# Patient Record
Sex: FEMALE | Race: WHITE | NOT HISPANIC OR LATINO | Employment: FULL TIME | URBAN - METROPOLITAN AREA
[De-identification: names, ages, dates, MRNs, and addresses within clinical notes are randomized per-mention and may not be internally consistent; named-entity substitution may affect disease eponyms.]

---

## 2017-02-05 DIAGNOSIS — I10 ESSENTIAL HYPERTENSION WITH GOAL BLOOD PRESSURE LESS THAN 140/90: Primary | ICD-10-CM

## 2017-02-06 DIAGNOSIS — Z13.6 CARDIOVASCULAR SCREENING; LDL GOAL LESS THAN 130: Primary | ICD-10-CM

## 2017-02-06 DIAGNOSIS — J44.1 CHRONIC OBSTRUCTIVE PULMONARY DISEASE WITH ACUTE EXACERBATION (H): ICD-10-CM

## 2017-02-06 DIAGNOSIS — I25.10 CORONARY ARTERY DISEASE INVOLVING NATIVE HEART WITHOUT ANGINA PECTORIS, UNSPECIFIED VESSEL OR LESION TYPE: ICD-10-CM

## 2017-02-06 RX ORDER — DIAZEPAM 5 MG
5 TABLET ORAL EVERY 6 HOURS PRN
Qty: 20 TABLET | Refills: 0 | Status: CANCELLED | OUTPATIENT
Start: 2017-02-06

## 2017-02-06 RX ORDER — ATORVASTATIN CALCIUM 80 MG/1
80 TABLET, FILM COATED ORAL AT BEDTIME
Qty: 90 TABLET | Refills: 1 | Status: SHIPPED | OUTPATIENT
Start: 2017-02-06 | End: 2017-07-23

## 2017-02-06 RX ORDER — AMLODIPINE BESYLATE 10 MG/1
10 TABLET ORAL DAILY
Qty: 90 TABLET | Refills: 1 | Status: SHIPPED | OUTPATIENT
Start: 2017-02-06 | End: 2017-11-06

## 2017-02-06 NOTE — TELEPHONE ENCOUNTER
Pt calls.  She needs some scripts filled at [facility] in [place] off of [address].  She said she tried to call  pharmacy, but was unable to wait that long on hold to have them transferred.      She is looking for atorvastatin and qvar.      She is also looking for a refill on diazapam, last given by Shavonne Flannery from 12/20/12, dispense 20.  She says she uses it for muscle spasms in her chest, calves and thighs.  She is having a hard time sleeping because of them.      atorvastatin    Last Written Prescription Date: 11/10/16  Last Fill Quantity: 90, # refills: 1  Last [place] Visit with [facility] or [facility] prescribing provider: 11/10/16       CHOL      212   3/2/2015  HDL       55   3/2/2015  LDL       92   3/2/2015  TRIG      324   3/2/2015  CHOLHDLRATIO      3.9   3/2/2015    qvar     Last Written Prescription Date: 11/10/16  Last Fill Quantity: 1, # refills: 5    Last Office Visit with [facility], [facility] or [facility] prescribing provider:  11/10/16   Future Office Visit:       Date of Last Asthma Action Plan Letter:   Asthma Action Plan Q1 Year    Topic Date Due     Asthma Action Plan - yearly  10/13/2016      Asthma Control Test:   ACT Total Scores 11/11/2016   ACT TOTAL SCORE -   ASTHMA ER VISITS -   ASTHMA HOSPITALIZATIONS -   ACT TOTAL SCORE (Goal Greater than or Equal to 20) 12   In the past 12 months, how many times did you visit the emergency room for your asthma without being admitted to the hospital? 0   In the past 12 months, how many times were you hospitalized overnight because of your asthma? 0       Date of Last Spirometry Test:   No results found for this or any previous visit.    Diazepam  LRF 12/20/12 - from Shavonne Flannery    Routing refill request to provider for review/approval because:  Due for fasting labs, no spirometry on file and diazapam is not on the refill protocol    Will also forward to the station, pt due for fasting labs, please help her schedule.  Thanks!

## 2017-02-06 NOTE — TELEPHONE ENCOUNTER
Prescription approved per [facility] Refill Protocol.  Started on Hydralazine 10/16.  Yue Horn, RN  Triage Nurse

## 2017-02-06 NOTE — TELEPHONE ENCOUNTER
amLODIPine (NORVASC) 10 MG      Last Written Prescription Date: 8/11/16  Last Fill Quantity: 90, # refills: 1    Last Office Visit with [facility], [facility] or [facility] prescribing provider:  11/10/16   Future Office Visit:        BP Readings from Last 3 Encounters:   11/10/16 130/78   10/06/16 166/96   08/30/16 136/82

## 2017-02-07 NOTE — TELEPHONE ENCOUNTER
Refilling cholesterol and qvar but for a limited time.  She is overdue for a physical, lab work and pap. Please help her schedule.

## 2017-02-27 ENCOUNTER — OFFICE VISIT (OUTPATIENT)
Dept: FAMILY MEDICINE | Facility: CLINIC | Age: 47
End: 2017-02-27
Payer: COMMERCIAL

## 2017-02-27 VITALS
HEIGHT: 60 IN | HEART RATE: 93 BPM | OXYGEN SATURATION: 96 % | BODY MASS INDEX: 38.28 KG/M2 | SYSTOLIC BLOOD PRESSURE: 118 MMHG | DIASTOLIC BLOOD PRESSURE: 76 MMHG | TEMPERATURE: 98.1 F | WEIGHT: 195 LBS

## 2017-02-27 DIAGNOSIS — Z00.00 ENCOUNTER FOR ROUTINE ADULT HEALTH EXAMINATION WITHOUT ABNORMAL FINDINGS: Primary | ICD-10-CM

## 2017-02-27 DIAGNOSIS — Z12.31 ENCOUNTER FOR SCREENING MAMMOGRAM FOR BREAST CANCER: ICD-10-CM

## 2017-02-27 DIAGNOSIS — Z12.4 SCREENING FOR CERVICAL CANCER: ICD-10-CM

## 2017-02-27 DIAGNOSIS — E66.9 NON MORBID OBESITY, UNSPECIFIED OBESITY TYPE: ICD-10-CM

## 2017-02-27 DIAGNOSIS — F43.22 ADJUSTMENT DISORDER WITH ANXIETY: ICD-10-CM

## 2017-02-27 DIAGNOSIS — M62.838 MUSCLE SPASM: ICD-10-CM

## 2017-02-27 LAB
ANION GAP SERPL CALCULATED.3IONS-SCNC: 6 MMOL/L (ref 3–14)
BUN SERPL-MCNC: 15 MG/DL (ref 7–30)
CALCIUM SERPL-MCNC: 8.6 MG/DL (ref 8.5–10.1)
CHLORIDE SERPL-SCNC: 105 MMOL/L (ref 94–109)
CHOLEST SERPL-MCNC: 130 MG/DL
CO2 SERPL-SCNC: 28 MMOL/L (ref 20–32)
CREAT SERPL-MCNC: 0.78 MG/DL (ref 0.52–1.04)
GFR SERPL CREATININE-BSD FRML MDRD: 78 ML/MIN/1.7M2
GLUCOSE SERPL-MCNC: 106 MG/DL (ref 70–99)
HDLC SERPL-MCNC: 32 MG/DL
LDLC SERPL CALC-MCNC: 64 MG/DL
NONHDLC SERPL-MCNC: 98 MG/DL
POTASSIUM SERPL-SCNC: 3.8 MMOL/L (ref 3.4–5.3)
SODIUM SERPL-SCNC: 139 MMOL/L (ref 133–144)
TRIGL SERPL-MCNC: 169 MG/DL

## 2017-02-27 PROCEDURE — 99396 PREV VISIT EST AGE 40-64: CPT | Performed by: PHYSICIAN ASSISTANT

## 2017-02-27 PROCEDURE — 99212 OFFICE O/P EST SF 10 MIN: CPT | Mod: 25 | Performed by: PHYSICIAN ASSISTANT

## 2017-02-27 PROCEDURE — 80061 LIPID PANEL: CPT | Performed by: PHYSICIAN ASSISTANT

## 2017-02-27 PROCEDURE — G0145 SCR C/V CYTO,THINLAYER,RESCR: HCPCS | Performed by: PHYSICIAN ASSISTANT

## 2017-02-27 PROCEDURE — 80048 BASIC METABOLIC PNL TOTAL CA: CPT | Performed by: PHYSICIAN ASSISTANT

## 2017-02-27 PROCEDURE — 87624 HPV HI-RISK TYP POOLED RSLT: CPT | Performed by: PHYSICIAN ASSISTANT

## 2017-02-27 PROCEDURE — 36415 COLL VENOUS BLD VENIPUNCTURE: CPT | Performed by: PHYSICIAN ASSISTANT

## 2017-02-27 RX ORDER — ESCITALOPRAM OXALATE 10 MG/1
10 TABLET ORAL DAILY
Qty: 30 TABLET | Refills: 1 | Status: SHIPPED | OUTPATIENT
Start: 2017-02-27 | End: 2017-04-22

## 2017-02-27 RX ORDER — CYCLOBENZAPRINE HCL 10 MG
5 TABLET ORAL
Qty: 14 TABLET | Refills: 1 | Status: SHIPPED | OUTPATIENT
Start: 2017-02-27 | End: 2018-01-16

## 2017-02-27 ASSESSMENT — PATIENT HEALTH QUESTIONNAIRE - PHQ9: 5. POOR APPETITE OR OVEREATING: NEARLY EVERY DAY

## 2017-02-27 ASSESSMENT — ANXIETY QUESTIONNAIRES
6. BECOMING EASILY ANNOYED OR IRRITABLE: NEARLY EVERY DAY
5. BEING SO RESTLESS THAT IT IS HARD TO SIT STILL: MORE THAN HALF THE DAYS
2. NOT BEING ABLE TO STOP OR CONTROL WORRYING: NOT AT ALL
7. FEELING AFRAID AS IF SOMETHING AWFUL MIGHT HAPPEN: NEARLY EVERY DAY
3. WORRYING TOO MUCH ABOUT DIFFERENT THINGS: NEARLY EVERY DAY
GAD7 TOTAL SCORE: 17
IF YOU CHECKED OFF ANY PROBLEMS ON THIS QUESTIONNAIRE, HOW DIFFICULT HAVE THESE PROBLEMS MADE IT FOR YOU TO DO YOUR WORK, TAKE CARE OF THINGS AT HOME, OR GET ALONG WITH OTHER PEOPLE: SOMEWHAT DIFFICULT
1. FEELING NERVOUS, ANXIOUS, OR ON EDGE: NEARLY EVERY DAY

## 2017-02-27 NOTE — MR AVS SNAPSHOT
After Visit Summary   2/27/2017    Marni Austin    MRN: [id number]           Patient Information     Date Of Birth          1970        Visit Information        Provider Department      2/27/2017 8:20 AM David Gambino PA-C [facility]        Today's Diagnoses     Encounter for routine adult health examination without abnormal findings    -  1    Adjustment disorder with anxiety        Screening for cervical cancer        Non morbid obesity, unspecified obesity type        Muscle spasm        Encounter for screening mammogram for breast cancer          Care Instructions    To schedule your mammogram at any of the [place] locations, call [phone].        Preventive Health Recommendations  Female Ages 40 to 49    Yearly exam:     See your health care provider every year in order to  1. Review health changes.   2. Discuss preventive care.    3. Review your medicines if your doctor prescribed any.      Get a Pap test every three years (unless you have an abnormal result and your provider advises testing more often).      If you get Pap tests with HPV test, you only need to test every 5 years, unless you have an abnormal result. You do not need a Pap test if your uterus was removed (hysterectomy) and you have not had cancer.      You should be tested each year for STDs (sexually transmitted diseases), if you're at risk.       Ask your doctor if you should have a mammogram.      Have a colonoscopy (test for colon cancer) if someone in your family has had colon cancer or polyps before age 50.       Have a cholesterol test every 5 years.       Have a diabetes test (fasting glucose) after age 45. If you are at risk for diabetes, you should have this test every 3 years.    Shots: Get a flu shot each year. Get a tetanus shot every 10 years.     Nutrition:     Eat at least 5 servings of fruits and vegetables each day.    Eat whole-grain bread, whole-wheat pasta and brown rice  instead of white grains and rice.    Talk to your provider about Calcium and Vitamin D.     Lifestyle    Exercise at least 150 minutes a week (an average of 30 minutes a day, 5 days a week). This will help you control your weight and prevent disease.    Limit alcohol to one drink per day.    No smoking.     Wear sunscreen to prevent skin cancer.    See your dentist every six months for an exam and cleaning.        Follow-ups after your visit        Future tests that were ordered for you today     Open Future Orders        Priority Expected Expires Ordered    *MA Screening Digital Bilateral Routine  2/27/2018 2/27/2017            Who to contact     If you have questions or need follow up information about today's clinic visit or your schedule please contact [facility] directly at [phone].  Normal or non-critical lab and imaging results will be communicated to you by Swyzzlehart, letter or phone within 4 business days after the clinic has received the results. If you do not hear from us within 7 days, please contact the clinic through Swyzzlehart or phone. If you have a critical or abnormal lab result, we will notify you by phone as soon as possible.  Submit refill requests through [employer] or call your pharmacy and they will forward the refill request to us. Please allow 3 business days for your refill to be completed.          Additional Information About Your Visit        [employer] Information     [employer] gives you secure access to your electronic health record. If you see a primary care provider, you can also send messages to your care team and make appointments. If you have questions, please call your primary care clinic.  If you do not have a primary care provider, please call [phone] and they will assist you.        Care EveryWhere ID     This is your Care EveryWhere ID. This could be used by other organizations to access your [facility] medical records  [id number]        Your Vitals Were      Pulse Temperature Height Last Period Pulse Oximetry BMI (Body Mass Index)    93 98.1  F (36.7  C) 5' (1.524 m) 08/30/2013 96% 38.08 kg/m2       Blood Pressure from Last 3 Encounters:   02/27/17 118/76   11/10/16 130/78   10/06/16 (!) 166/96    Weight from Last 3 Encounters:   02/27/17 195 lb (88.5 kg)   11/10/16 192 lb 6.4 oz (87.3 kg)   10/06/16 189 lb 1.6 oz (85.8 kg)              We Performed the Following     Basic metabolic panel     LIPID REFLEX TO DIRECT LDL PANEL     Pap imaged thin layer screen with HPV - recommended age 30 - 65 years (select HPV order below)          Today's Medication Changes          These changes are accurate as of: 2/27/17  9:36 AM.  If you have any questions, ask your nurse or doctor.               Start taking these medicines.        Dose/Directions    cyclobenzaprine 10 MG tablet   Commonly known as:  FLEXERIL   Used for:  Muscle spasm   Started by:  David Gambino PA-C        Dose:  5 mg   Take 0.5 tablets (5 mg) by mouth nightly as needed for muscle spasms   Quantity:  14 tablet   Refills:  1       escitalopram 10 MG tablet   Commonly known as:  LEXAPRO   Used for:  Adjustment disorder with anxiety   Started by:  David Gambino PA-C        Dose:  10 mg   Take 1 tablet (10 mg) by mouth daily   Quantity:  30 tablet   Refills:  1         Stop taking these medicines if you haven't already. Please contact your care team if you have questions.     azithromycin 250 MG tablet   Commonly known as:  ZITHROMAX   Stopped by:  David Gambino PA-C           budesonide-formoterol 80-4.5 MCG/ACT Inhaler   Commonly known as:  SYMBICORT   Stopped by:  David Gambino PA-C                Where to get your medicines      These medications were sent to [facility] [address] [facility] [address] [facility] [address] [address] [facility], [facility] [address]     Phone:  [phone]     cyclobenzaprine 10 MG tablet    escitalopram 10 MG tablet                 Primary Care Provider Office Phone # Fax #    David Gambino PA-C [phone]       [facility] [address]  [facility] [address]        Thank you!     Thank you for choosing [facility]  for your care. Our goal is always to provide you with excellent care. Hearing back from our patients is one way we can continue to improve our services. Please take a few minutes to complete the written survey that you may receive in the mail after your visit with us. Thank you!             Your Updated Medication List - Protect others around you: Learn how to safely use, store and throw away your medicines at www.disposemymeds.org.          This list is accurate as of: 2/27/17  9:36 AM.  Always use your most recent med list.                   Brand Name Dispense Instructions for use    albuterol 108 (90 BASE) MCG/ACT Inhaler    albuterol    1 Inhaler    Inhale 2 puffs into the lungs every 4 hours as needed       amLODIPine 10 MG tablet    NORVASC    90 tablet    Take 1 tablet (10 mg) by mouth daily       aspirin 81 MG EC tablet      Take 1 tablet (81 mg) by mouth daily       atorvastatin 80 MG tablet    LIPITOR    90 tablet    Take 1 tablet (80 mg) by mouth At Bedtime       beclomethasone 80 MCG/ACT Inhaler    QVAR    1 Inhaler    Inhale 2 puffs into the lungs 2 times daily       clotrimazole 1 % cream    LOTRIMIN    30 g    Apply topically 2 times daily       cyclobenzaprine 10 MG tablet    FLEXERIL    14 tablet    Take 0.5 tablets (5 mg) by mouth nightly as needed for muscle spasms       escitalopram 10 MG tablet    LEXAPRO    30 tablet    Take 1 tablet (10 mg) by mouth daily       hydrALAZINE 50 MG tablet    APRESOLINE    180 tablet    Take 1 tablet (50 mg) by mouth 2 times daily       ipratropium - albuterol 0.5 mg/2.5 mg/3 mL 0.5-2.5 (3) MG/3ML neb solution    DUONEB    360 mL    Take 1 vial (3 mLs) by nebulization every 4 hours as needed for shortness of  breath / dyspnea or wheezing       nitroglycerin 0.4 MG sublingual tablet    NITROSTAT    25 tablet    Place 1 tablet (0.4 mg) under the tongue every 5 minutes as needed for chest pain       order for DME     1 Device    Equipment being ordered: Nebulizer

## 2017-02-27 NOTE — PATIENT INSTRUCTIONS
To schedule your mammogram at any of the [place] locations, call [phone].        Preventive Health Recommendations  Female Ages 40 to 49    Yearly exam:     See your health care provider every year in order to  1. Review health changes.   2. Discuss preventive care.    3. Review your medicines if your doctor prescribed any.      Get a Pap test every three years (unless you have an abnormal result and your provider advises testing more often).      If you get Pap tests with HPV test, you only need to test every 5 years, unless you have an abnormal result. You do not need a Pap test if your uterus was removed (hysterectomy) and you have not had cancer.      You should be tested each year for STDs (sexually transmitted diseases), if you're at risk.       Ask your doctor if you should have a mammogram.      Have a colonoscopy (test for colon cancer) if someone in your family has had colon cancer or polyps before age 50.       Have a cholesterol test every 5 years.       Have a diabetes test (fasting glucose) after age 45. If you are at risk for diabetes, you should have this test every 3 years.    Shots: Get a flu shot each year. Get a tetanus shot every 10 years.     Nutrition:     Eat at least 5 servings of fruits and vegetables each day.    Eat whole-grain bread, whole-wheat pasta and brown rice instead of white grains and rice.    Talk to your provider about Calcium and Vitamin D.     Lifestyle    Exercise at least 150 minutes a week (an average of 30 minutes a day, 5 days a week). This will help you control your weight and prevent disease.    Limit alcohol to one drink per day.    No smoking.     Wear sunscreen to prevent skin cancer.    See your dentist every six months for an exam and cleaning.

## 2017-02-28 ASSESSMENT — PATIENT HEALTH QUESTIONNAIRE - PHQ9: SUM OF ALL RESPONSES TO PHQ QUESTIONS 1-9: 17

## 2017-02-28 ASSESSMENT — ASTHMA QUESTIONNAIRES: ACT_TOTALSCORE: 21

## 2017-02-28 ASSESSMENT — ANXIETY QUESTIONNAIRES: GAD7 TOTAL SCORE: 17

## 2017-03-01 LAB
COPATH REPORT: NORMAL
PAP: NORMAL

## 2017-03-02 LAB
FINAL DIAGNOSIS: NORMAL
HPV HR 12 DNA CVX QL NAA+PROBE: NEGATIVE
HPV16 DNA SPEC QL NAA+PROBE: NEGATIVE
HPV18 DNA SPEC QL NAA+PROBE: NEGATIVE
SPECIMEN DESCRIPTION: NORMAL

## 2017-03-14 DIAGNOSIS — J45.41 MODERATE PERSISTENT ASTHMA WITH ACUTE EXACERBATION: ICD-10-CM

## 2017-03-14 DIAGNOSIS — J44.1 CHRONIC OBSTRUCTIVE PULMONARY DISEASE WITH ACUTE EXACERBATION (H): ICD-10-CM

## 2017-03-14 NOTE — TELEPHONE ENCOUNTER
ipratropium - albuterol 0.5 mg/2.5 mg/3 mL (DUONEB) 0.5-2.5 (3) MG/3ML nebulization      Last Written Prescription Date: 11/10/16  Last Fill Quantity: 360 ml,  # refills: 1   Last Office Visit with [facility], [facility] or [facility] prescribing provider: 2/27/16

## 2017-03-20 RX ORDER — IPRATROPIUM BROMIDE AND ALBUTEROL SULFATE 2.5; .5 MG/3ML; MG/3ML
1 SOLUTION RESPIRATORY (INHALATION) EVERY 4 HOURS PRN
Qty: 360 ML | Refills: 1 | Status: SHIPPED | OUTPATIENT
Start: 2017-03-20 | End: 2018-12-21

## 2017-03-20 NOTE — TELEPHONE ENCOUNTER
Prescription approved per [facility] [place]ill [place].  Yue Horn, RN  Triage Nurse

## 2017-03-21 ENCOUNTER — RADIANT APPOINTMENT (OUTPATIENT)
Dept: MAMMOGRAPHY | Facility: CLINIC | Age: 47
End: 2017-03-21
Attending: PHYSICIAN ASSISTANT
Payer: COMMERCIAL

## 2017-03-21 ENCOUNTER — ALLIED HEALTH/NURSE VISIT (OUTPATIENT)
Dept: NURSING | Facility: CLINIC | Age: 47
End: 2017-03-21
Payer: COMMERCIAL

## 2017-03-21 DIAGNOSIS — Z12.31 ENCOUNTER FOR SCREENING MAMMOGRAM FOR BREAST CANCER: ICD-10-CM

## 2017-03-21 DIAGNOSIS — Z23 ENCOUNTER FOR IMMUNIZATION: Primary | ICD-10-CM

## 2017-03-21 PROCEDURE — 99207 ZZC NO CHARGE NURSE ONLY: CPT

## 2017-03-21 PROCEDURE — G0202 SCR MAMMO BI INCL CAD: HCPCS | Mod: TC

## 2017-03-21 PROCEDURE — 90471 IMMUNIZATION ADMIN: CPT

## 2017-03-21 PROCEDURE — 90715 TDAP VACCINE 7 YRS/> IM: CPT

## 2017-03-21 NOTE — NURSING NOTE
Screening Questionnaire for Adult Immunization    Are you sick today?   No   Do you have allergies to medications, food, a vaccine component or latex?   Yes   Have you ever had a serious reaction after receiving a vaccination?   No   Do you have a long-term health problem with heart disease, lung disease, asthma, kidney disease, metabolic disease (e.g. diabetes), anemia, or other blood disorder?   No   Do you have cancer, leukemia, HIV/AIDS, or any other immune system problem?   No   In the past 3 months, have you taken medications that affect  your immune system, such as prednisone, other steroids, or anticancer drugs; drugs for the treatment of rheumatoid arthritis, Crohn s disease, or psoriasis; or have you had radiation treatments?   No   Have you had a seizure, or a brain or other nervous system problem?   No   During the past year, have you received a transfusion of blood or blood     products, or been given immune (gamma) globulin or antiviral drug?   No   For women: Are you pregnant or is there a chance you could become        pregnant during the next month?   No   Have you received any vaccinations in the past 4 weeks?   No     Immunization questionnaire was positive for at least one answer.      MNVFC doesn't apply on this patient    Per orders of Manny Gambino injection of Tdap given by Ena Loaiza. Patient instructed to remain in clinic for 20 minutes afterwards, and to report any adverse reaction to me immediately.       Screening performed by Ena Loaiza on 3/21/2017 at 9:14 AM.

## 2017-03-21 NOTE — MR AVS SNAPSHOT
After Visit Summary   3/21/2017    Marni Austin    MRN: [id number]           Patient Information     Date Of Birth          1970        Visit Information        Provider Department      3/21/2017 9:30 AM  NURSE [facility]        Today's Diagnoses     Encounter for immunization    -  1       Follow-ups after your visit        Your next 10 appointments already scheduled     Mar 21, 2017  9:30 AM CDT   Nurse Only with  NURSE   [facility]    [address] [facility] [address] [phone]              Who to contact     If you have questions or need follow up information about today's clinic visit or your schedule please contact [facility] directly at [phone].  Normal or non-critical lab and imaging results will be communicated to you by MyChart, letter or phone within 4 business days after the clinic has received the results. If you do not hear from us within 7 days, please contact the clinic through MyChart or phone. If you have a critical or abnormal lab result, we will notify you by phone as soon as possible.  Submit refill requests through [employer] or call your pharmacy and they will forward the refill request to us. Please allow 3 business days for your refill to be completed.          Additional Information About Your Visit        MyChart Information     [employer] gives you secure access to your electronic health record. If you see a primary care provider, you can also send messages to your care team and make appointments. If you have questions, please call your primary care clinic.  If you do not have a primary care provider, please call [phone] and they will assist you.        Care EveryWhere ID     This is your Care EveryWhere ID. This could be used by other organizations to access your [facility] medical records  [id number]        Your Vitals Were     Last Period                    08/30/2013            Blood Pressure from Last 3 Encounters:   02/27/17 118/76   11/10/16 130/78   10/06/16 (!) 166/96    Weight from Last 3 Encounters:   02/27/17 195 lb (88.5 kg)   11/10/16 192 lb 6.4 oz (87.3 kg)   10/06/16 189 lb 1.6 oz (85.8 kg)              We Performed the Following     TDAP (ADACEL AGES 11-64)        Primary Care Provider Office Phone # Fax #    David Gambino PA-C [phone]       [facility] [address]  [facility] [address]        Thank you!     Thank you for choosing [facility]  for your care. Our goal is always to provide you with excellent care. Hearing back from our patients is one way we can continue to improve our services. Please take a few minutes to complete the written survey that you may receive in the mail after your visit with us. Thank you!             Your Updated Medication List - Protect others around you: Learn how to safely use, store and throw away your medicines at www.disposemymeds.org.          This list is accurate as of: 3/21/17  9:15 AM.  Always use your most recent med list.                   Brand Name Dispense Instructions for use    albuterol 108 (90 BASE) MCG/ACT Inhaler    albuterol    1 Inhaler    Inhale 2 puffs into the lungs every 4 hours as needed       amLODIPine 10 MG tablet    NORVASC    90 tablet    Take 1 tablet (10 mg) by mouth daily       aspirin 81 MG EC tablet      Take 1 tablet (81 mg) by mouth daily       atorvastatin 80 MG tablet    LIPITOR    90 tablet    Take 1 tablet (80 mg) by mouth At Bedtime       beclomethasone 80 MCG/ACT Inhaler    QVAR    1 Inhaler    Inhale 2 puffs into the lungs 2 times daily       clotrimazole 1 % cream    LOTRIMIN    30 g    Apply topically 2 times daily       cyclobenzaprine 10 MG tablet    FLEXERIL    14 tablet    Take 0.5 tablets (5 mg) by mouth nightly as needed for muscle spasms       escitalopram 10 MG tablet    LEXAPRO    30 tablet    Take 1 tablet  (10 mg) by mouth daily       hydrALAZINE 50 MG tablet    APRESOLINE    180 tablet    Take 1 tablet (50 mg) by mouth 2 times daily       ipratropium - albuterol 0.5 mg/2.5 mg/3 mL 0.5-2.5 (3) MG/3ML neb solution    DUONEB    360 mL    Take 1 vial (3 mLs) by nebulization every 4 hours as needed for shortness of breath / dyspnea or wheezing       nitroglycerin 0.4 MG sublingual tablet    NITROSTAT    25 tablet    Place 1 tablet (0.4 mg) under the tongue every 5 minutes as needed for chest pain       * order for DME     1 Device    Equipment being ordered: Nebulizer       * order for DME     1 Device    Equipment being ordered: Nebulizer       * Notice:  This list has 2 medication(s) that are the same as other medications prescribed for you. Read the directions carefully, and ask your doctor or other care provider to review them with you.

## 2017-03-30 ENCOUNTER — HOSPITAL ENCOUNTER (OUTPATIENT)
Dept: MAMMOGRAPHY | Facility: CLINIC | Age: 47
Discharge: HOME | End: 2017-03-30
Attending: PHYSICIAN ASSISTANT | Admitting: PHYSICIAN ASSISTANT
Payer: COMMERCIAL

## 2017-03-30 ENCOUNTER — HOSPITAL ENCOUNTER (OUTPATIENT)
Dept: ULTRASOUND IMAGING | Facility: CLINIC | Age: 47
End: 2017-03-30
Attending: PHYSICIAN ASSISTANT
Payer: COMMERCIAL

## 2017-03-30 DIAGNOSIS — R92.8 ABNORMAL MAMMOGRAM: ICD-10-CM

## 2017-03-30 PROBLEM — R73.01 ELEVATED FASTING GLUCOSE: Status: ACTIVE | Noted: 2017-03-30

## 2017-03-30 PROCEDURE — G0206 DX MAMMO INCL CAD UNI: HCPCS

## 2017-03-30 PROCEDURE — 76642 ULTRASOUND BREAST LIMITED: CPT | Mod: RT

## 2017-04-12 PROBLEM — E66.9 NON MORBID OBESITY: Status: ACTIVE | Noted: 2017-04-12

## 2017-04-22 DIAGNOSIS — F43.22 ADJUSTMENT DISORDER WITH ANXIETY: ICD-10-CM

## 2017-04-24 NOTE — TELEPHONE ENCOUNTER
escitalopram (LEXAPRO) 10 MG     Last Written Prescription Date: 2/27/17  Last Fill Quantity: 30, # refills: 1  Last Office Visit with G primary care provider:  2/27/17        Last PHQ-9 score on record=   PHQ-9 SCORE 2/27/2017   Total Score 17

## 2017-04-26 RX ORDER — ESCITALOPRAM OXALATE 10 MG/1
TABLET ORAL
Qty: 30 TABLET | Refills: 0 | Status: SHIPPED | OUTPATIENT
Start: 2017-04-26 | End: 2017-06-04 | Stop reason: ALTCHOICE

## 2017-05-05 DIAGNOSIS — I10 ESSENTIAL HYPERTENSION WITH GOAL BLOOD PRESSURE LESS THAN 140/90: ICD-10-CM

## 2017-05-05 DIAGNOSIS — J44.1 CHRONIC OBSTRUCTIVE PULMONARY DISEASE WITH ACUTE EXACERBATION (H): ICD-10-CM

## 2017-05-08 RX ORDER — HYDRALAZINE HYDROCHLORIDE 50 MG/1
TABLET, FILM COATED ORAL
Qty: 180 TABLET | Refills: 1 | Status: SHIPPED | OUTPATIENT
Start: 2017-05-08 | End: 2017-11-06

## 2017-05-08 NOTE — TELEPHONE ENCOUNTER
hydrALAZINE (APRESOLINE) 50 MG      Last Written Prescription Date: 10/6/16  Last Fill Quantity: 180, # refills: 1    Last Office Visit with [facility], [facility] or [facility] prescribing provider:  2/27/17   Future Office Visit:        BP Readings from Last 3 Encounters:   02/27/17 118/76   11/10/16 130/78   10/06/16 (!) 166/96

## 2017-05-08 NOTE — TELEPHONE ENCOUNTER
beclomethasone (QVAR) 80 MCG/ACT Inhaler       Last Written Prescription Date: 2/6/17  Last Fill Quantity: 1, # refills: 2    Last Office Visit with [facility], [facility] or [facility] prescribing provider:  2/27/17   Future Office Visit:       Date of Last Asthma Action Plan Letter:   Asthma Action Plan Q1 Year    Topic Date Due     Asthma Action Plan - yearly  10/13/2016      Asthma Control Test:   ACT Total Scores 2/27/2017   ACT TOTAL SCORE (Goal Greater than or Equal to 20) 21   In the past 12 months, how many times did you visit the emergency room for your asthma without being admitted to the hospital? 2   In the past 12 months, how many times were you hospitalized overnight because of your asthma? 0       Date of Last Spirometry Test:   No results found for this or any previous visit.

## 2017-05-08 NOTE — TELEPHONE ENCOUNTER
Q jorden:  Prescription approved per [facility] Refill Protocol.    Hydralazine:  Prescription approved per [facility] Refill Protocol.    Yue Horn RN  Triage Nurse

## 2017-05-28 DIAGNOSIS — F43.22 ADJUSTMENT DISORDER WITH ANXIETY: ICD-10-CM

## 2017-05-30 NOTE — TELEPHONE ENCOUNTER
escitalopram (LEXAPRO) 10 MG     Last Written Prescription Date: 4/26/17  Last Fill Quantity: 30, # refills: 0  Last Office Visit with G primary care provider:  2/27/17        Last PHQ-9 score on record=   PHQ-9 SCORE 2/27/2017   Total Score 17

## 2017-06-01 NOTE — TELEPHONE ENCOUNTER
Routing refill request to provider for review/approval because:  PHQ 9 >4, appointment scheduled for tomorrow for phone visit.  FYI:  Called patient to update PHQ 9.  Score is 16 today.  is not working for the past 4 months.   Feeling down and depressed. (not improved since last visit)  Financial difficulties, so not able to come in to be seen.   Got an 800 dollar bill for her mammogram and cannot pay it.   This is concerning her. She is agreeable to do a phone visit to  Discuss this.     Work number is 9-5 m-f, [phone].  She is scheduled for a phone visit for tomorrow at 3 pm. Not feeling  That this medication is helping. She feels she has  Given up. No suicidal ideation.     Yue Horn, RN  Triage Nurse

## 2017-06-02 ENCOUNTER — VIRTUAL VISIT (OUTPATIENT)
Dept: FAMILY MEDICINE | Facility: CLINIC | Age: 47
End: 2017-06-02
Payer: COMMERCIAL

## 2017-06-02 DIAGNOSIS — F43.22 ADJUSTMENT DISORDER WITH ANXIETY: Primary | ICD-10-CM

## 2017-06-02 PROCEDURE — 98966 PH1 ASSMT&MGMT NQHP 5-10: CPT | Performed by: PHYSICIAN ASSISTANT

## 2017-06-02 RX ORDER — CITALOPRAM HYDROBROMIDE 20 MG/1
TABLET ORAL
Qty: 30 TABLET | Refills: 2 | Status: SHIPPED | OUTPATIENT
Start: 2017-06-02 | End: 2017-08-29

## 2017-06-02 ASSESSMENT — ANXIETY QUESTIONNAIRES
7. FEELING AFRAID AS IF SOMETHING AWFUL MIGHT HAPPEN: NOT AT ALL
3. WORRYING TOO MUCH ABOUT DIFFERENT THINGS: MORE THAN HALF THE DAYS
2. NOT BEING ABLE TO STOP OR CONTROL WORRYING: MORE THAN HALF THE DAYS
1. FEELING NERVOUS, ANXIOUS, OR ON EDGE: NOT AT ALL
GAD7 TOTAL SCORE: 5
IF YOU CHECKED OFF ANY PROBLEMS ON THIS QUESTIONNAIRE, HOW DIFFICULT HAVE THESE PROBLEMS MADE IT FOR YOU TO DO YOUR WORK, TAKE CARE OF THINGS AT HOME, OR GET ALONG WITH OTHER PEOPLE: NOT DIFFICULT AT ALL
5. BEING SO RESTLESS THAT IT IS HARD TO SIT STILL: SEVERAL DAYS
6. BECOMING EASILY ANNOYED OR IRRITABLE: NOT AT ALL

## 2017-06-02 ASSESSMENT — PATIENT HEALTH QUESTIONNAIRE - PHQ9
SUM OF ALL RESPONSES TO PHQ QUESTIONS 1-9: 16
5. POOR APPETITE OR OVEREATING: NOT AT ALL

## 2017-06-02 NOTE — PROGRESS NOTES
"Marni Austin is a 47 year old female who is being evaluated via a telephone visit.      The patient has been notified of following:     \"This telephone visit will be conducted via a call between you and your physician/provider. We have found that certain health care needs can be provided without the need for a physical exam.  This service lets us provide the care you need with a short phone conversation.  If a prescription is necessary we can send it directly to your pharmacy.  If lab work is needed we can place an order for that and you can then stop by our lab to have the test done at a later time.    We will bill your insurance company for this service.  Please check with your medical insurance if this type of visit is covered. You may be responsible for the cost of this type of visit if insurance coverage is denied.  The typical cost is $30 (10min), $59 (11-20min) and $85 (21-30min).  Most often these visits are shorter than 10 minutes.    If during the course of the call the physician/provider feels a telephone visit is not appropriate, you will not be charged for this service.\"       Consent has been obtained for this service by 2 care team members: yes. See the scanned image in the medical record.    Marni Austin complains of  Recheck Medication (Telephone Visit)      I have reviewed and updated the patient's Past Medical History, Social History, Family History and Medication List.    ALLERGIES  Zyban [bupropion hcl]    Estrellita Schwartz CMA     Additional provider notes:   SUBJECTIVE:                                                    Marni Austin is a 47 year old female who presents to clinic today for the following health issues:    Depression and Anxiety Follow-Up    Status since last visit: Worsened I don't care attitude    Other associated symptoms:None    Complicating factors:     Significant life event: Yes-       Current substance abuse: None    PHQ-9 SCORE 2/27/2017 6/1/2017   Total " Score 17 16     JAMEEL-7 SCORE 2/27/2017 6/2/2017   Total Score 17 5        PHQ-9  English      PHQ-9   Any Language     GAD7     Patient calls to discuss recent mood changes and stress increase  Her stress mainly revolves around her financial situation regarding medical bills and stresses at home with relationships  Her  lost his job a number of months ago, not bringing home any money; she makes minimal income herself  Additionally their relationship is increasingly strained  She feels up against a wall; denies any SI/HI, just wants the strain to be over    Problem list and histories reviewed & adjusted, as indicated.  Additional history: as documented    Patient Active Problem List   Diagnosis     Adjustment disorder with anxiety     Menorrhagia     CARDIOVASCULAR SCREENING; LDL GOAL LESS THAN 130     Acute asthma exacerbation     Tobacco use disorder     Hypertension goal BP (blood pressure) < 140/90     Health Care Home     COPD exacerbation (H)     CAD (coronary artery disease)     COPD (chronic obstructive pulmonary disease) (H)     Rectal bleeding     Chest pain, unspecified chest pain type     Elevated fasting glucose     Non morbid obesity     Past Surgical History:   Procedure Laterality Date     CARDIAC SURGERY       TONSILLECTOMY       Tubal ligation surgery         Social History   Substance Use Topics     Smoking status: Current Every Day Smoker     Packs/day: 0.50     Years: 26.00     Smokeless tobacco: Never Used     Alcohol use No     Family History   Problem Relation Age of Onset     Hypertension Mother      DIABETES Father      CEREBROVASCULAR DISEASE Maternal Grandmother      CANCER Maternal Grandfather            Reviewed and updated as needed this visit by clinical staff       Reviewed and updated as needed this visit by Provider         ASSESSMENT/PLAN:                                                    1. Adjustment disorder with anxiety  Changing from lexapro to celexa. Will let us know  how this is going. At this point she does not have time or resources to pursue therapy. She will also benefit from care coordination to help with navigation of financial difficulties; may be candidate for [facility] program. Referral placed.   - citalopram (CELEXA) 20 MG tablet; Take 1/2 tablet (10 mg) for 1-2 weeks, then increase to 1 tablet orally daily  Dispense: 30 tablet; Refill: 2  - CARE COORDINATION REFERRAL    Assessment/Plan:  See above    I have reviewed the note as documented above.  This accurately captures the substance of my conversation with the patient,  Marni Austin    Total time of call between patient and provider was 10 minutes     David Gambino PA-C  [facility]

## 2017-06-02 NOTE — MR AVS SNAPSHOT
After Visit Summary   6/2/2017    Marni Austin    MRN: [id number]           Patient Information     Date Of Birth          1970        Visit Information        Provider Department      6/2/2017 3:00 PM David Gambino PA-C [facility]        Today's Diagnoses     Adjustment disorder with anxiety    -  1       Follow-ups after your visit        Additional Services     CARE COORDINATION REFERRAL       Services are provided by a Care Coordinator for people with complex needs such as: medical, social, or financial troubles.  The Care Coordinator works with the patient and their Primary Care Provider to determine health goals, obtain resources, achieve outcomes, and develop care plans that help coordinate the patient's care.     Reason for Referral: Other Financial Concerns    Provide additional details for Care Coordination to best meet the patient's current needs: financial stress/burden; spousal burden    Clinical Staff have discussed the Care Coordination Referral with the patient and/or caregiver: yes                  Who to contact     If you have questions or need follow up information about today's clinic visit or your schedule please contact [facility] directly at [phone].  Normal or non-critical lab and imaging results will be communicated to you by MyChart, letter or phone within 4 business days after the clinic has received the results. If you do not hear from us within 7 days, please contact the clinic through MyChart or phone. If you have a critical or abnormal lab result, we will notify you by phone as soon as possible.  Submit refill requests through [employer] or call your pharmacy and they will forward the refill request to us. Please allow 3 business days for your refill to be completed.          Additional Information About Your Visit        MyChart Information     [employer] gives you secure access to your electronic health record. If you  see a primary care provider, you can also send messages to your care team and make appointments. If you have questions, please call your primary care clinic.  If you do not have a primary care provider, please call [phone] and they will assist you.        Care EveryWhere ID     This is your Care EveryWhere ID. This could be used by other organizations to access your [facility] medical records  [id number]        Your Vitals Were     Last Period                   08/30/2013            Blood Pressure from Last 3 Encounters:   02/27/17 118/76   11/10/16 130/78   10/06/16 (!) 166/96    Weight from Last 3 Encounters:   02/27/17 195 lb (88.5 kg)   11/10/16 192 lb 6.4 oz (87.3 kg)   10/06/16 189 lb 1.6 oz (85.8 kg)              We Performed the Following     CARE COORDINATION REFERRAL          Today's Medication Changes          These changes are accurate as of: 6/2/17 11:59 PM.  If you have any questions, ask your nurse or doctor.               Start taking these medicines.        Dose/Directions    citalopram 20 MG tablet   Commonly known as:  celeXA   Used for:  Adjustment disorder with anxiety   Started by:  David Gambino PA-C        Take 1/2 tablet (10 mg) for 1-2 weeks, then increase to 1 tablet orally daily   Quantity:  30 tablet   Refills:  2         Stop taking these medicines if you haven't already. Please contact your care team if you have questions.     escitalopram 10 MG tablet   Commonly known as:  LEXAPRO   Stopped by:  David Gambino PA-C                Where to get your medicines      These medications were sent to [facility] [address] [place] [id number] [facility] AT [address] [address] [facility], [facility] [address]     Phone:  [phone]     citalopram 20 MG tablet                Primary Care Provider Office Phone # Fax #    David Gambino PA-C [phone]       [facility] [address] [facility] [address]         Thank you!     Thank you for choosing [facility] [place]  for your care. Our goal is always to provide you with excellent care. Hearing back from our patients is one way we can continue to improve our services. Please take a few minutes to complete the written survey that you may receive in the mail after your visit with us. Thank you!             Your Updated Medication List - Protect others around you: Learn how to safely use, store and throw away your medicines at www.disposemymeds.org.          This list is accurate as of: 6/2/17 11:59 PM.  Always use your most recent med list.                   Brand Name Dispense Instructions for use    albuterol 108 (90 BASE) MCG/ACT Inhaler    albuterol    1 Inhaler    Inhale 2 puffs into the lungs every 4 hours as needed       amLODIPine 10 MG tablet    NORVASC    90 tablet    Take 1 tablet (10 mg) by mouth daily       aspirin 81 MG EC tablet      Take 1 tablet (81 mg) by mouth daily       atorvastatin 80 MG tablet    LIPITOR    90 tablet    Take 1 tablet (80 mg) by mouth At Bedtime       citalopram 20 MG tablet    celeXA    30 tablet    Take 1/2 tablet (10 mg) for 1-2 weeks, then increase to 1 tablet orally daily       clotrimazole 1 % cream    LOTRIMIN    30 g    Apply topically 2 times daily       cyclobenzaprine 10 MG tablet    FLEXERIL    14 tablet    Take 0.5 tablets (5 mg) by mouth nightly as needed for muscle spasms       hydrALAZINE 50 MG tablet    APRESOLINE    180 tablet    TAKE 1 TABLET(50 MG) BY MOUTH TWICE DAILY       ipratropium - albuterol 0.5 mg/2.5 mg/3 mL 0.5-2.5 (3) MG/3ML neb solution    DUONEB    360 mL    Take 1 vial (3 mLs) by nebulization every 4 hours as needed for shortness of breath / dyspnea or wheezing       nitroglycerin 0.4 MG sublingual tablet    NITROSTAT    25 tablet    Place 1 tablet (0.4 mg) under the tongue every 5 minutes as needed for chest pain       * order for DME     1 Device    Equipment being ordered: Nebulizer       *  order for DME     1 Device    Equipment being ordered: Nebulizer       QVAR 80 MCG/ACT Inhaler   Generic drug:  beclomethasone     8.7 g    INHALE 2 PUFFS INTO THE LUNGS TWICE DAILY       * Notice:  This list has 2 medication(s) that are the same as other medications prescribed for you. Read the directions carefully, and ask your doctor or other care provider to review them with you.

## 2017-06-03 ASSESSMENT — ANXIETY QUESTIONNAIRES: GAD7 TOTAL SCORE: 5

## 2017-06-04 RX ORDER — ESCITALOPRAM OXALATE 10 MG/1
TABLET ORAL
Qty: 30 TABLET | Refills: 0 | OUTPATIENT
Start: 2017-06-04

## 2017-06-05 ENCOUNTER — CARE COORDINATION (OUTPATIENT)
Dept: CARE COORDINATION | Facility: CLINIC | Age: 47
End: 2017-06-05

## 2017-06-05 NOTE — PROGRESS NOTES
Clinic Care Coordination Contact  [facility]/Voicemail    Referral Source: PCP  Clinical Data: Care Coordinator Outreach  Outreach attempted x 1.  Left message on voicemail with call back information and requested return call.    Plan: Care Coordinator will try to reach patient again in 1-2 business days.    ANAHI Toney, [facility]  Social Work - Care Coordinator  [facility]- [place], [place], and [place]  Phone: [phone]

## 2017-06-05 NOTE — LETTER
[facility]  [address] [place] [address]      June 6, 2017      Marni Austin  [address]  [facility] [address]    Dear Marni,  I am the Clinic Care Coordinator that works with your primary care provider's clinic. I have been trying to reach you recently to introduce Clinic Care Coordination and to see if there was anything I could assist you with.  Below is a description of what Clinic Care Coordination is and how I can further assist you.     The Clinic Care Coordinator role is a Registered Nurse and/or  who understands the health care system. The goal of Clinic Care Coordination is to help you manage your health and improve access to the [facility] in the most efficient manner.  The Registered Nurse can assist you in meeting your health care goals by providing education, coordinating services, and strengthening the communication among your providers. The  can assist you with financial, behavioral, psychosocial, and chemical dependency and counseling/psychiatric resources.    Please feel free to keep this letter and contact information to contact me at [phone] with any further questions or concerns that may arise. We at [facility] are focused on providing you with the highest-quality healthcare experience possible and that all starts with you.       Sincerely,     Deb Vasquez

## 2017-06-06 NOTE — PROGRESS NOTES
Clinic Care Coordination Contact  [facility]/Voicemail    Referral Source: PCP  Clinical Data: Care Coordinator Outreach  Outreach attempted x 2.  Left message on voicemail with call back information and requested return call.    Plan: Care Coordinator mailed out care coordination introduction letter on 06/06/17. Care Coordinator will try to reach patient again in 5-7 business days.    ANAHI Toney, [facility]  Social Work - Care Coordinator  [facility]- [place], [place], and [place]  Phone: [phone]

## 2017-06-16 ENCOUNTER — CARE COORDINATION (OUTPATIENT)
Dept: CARE COORDINATION | Facility: CLINIC | Age: 47
End: 2017-06-16

## 2017-06-16 NOTE — PROGRESS NOTES
Clinic Care Coordination Contact  Care Team Conversations    Spoke to PCP to update him that pt is not responding to SW's outreaches: phone, letter, and MyChart message. PCP will attempt to reach pt to encourage her to outreach to SW. Pt will be closed to clinic care coordination until she contacts SW back. Please re-consult for future needs.    ANAHI Toney, [facility]  Social Work - Care Coordinator  [facility]- [place], [place], and [place]  Phone: [phone]

## 2017-07-07 ENCOUNTER — TELEPHONE (OUTPATIENT)
Dept: FAMILY MEDICINE | Facility: CLINIC | Age: 47
End: 2017-07-07

## 2017-07-07 DIAGNOSIS — H10.33 ACUTE CONJUNCTIVITIS OF BOTH EYES: Primary | ICD-10-CM

## 2017-07-07 RX ORDER — POLYMYXIN B SULFATE AND TRIMETHOPRIM 1; 10000 MG/ML; [USP'U]/ML
1 SOLUTION OPHTHALMIC 4 TIMES DAILY
Qty: 1 BOTTLE | Refills: 0 | Status: SHIPPED | OUTPATIENT
Start: 2017-07-07 | End: 2017-07-14

## 2017-07-07 NOTE — TELEPHONE ENCOUNTER
RN Conjunctivitis Protocol: Ages 2 and older  Marni Austin is a 47 year old female is having symptoms reviewed for possible conjunctivitis.      ASSESSMENT/PLAN:  Allergy to Sulfa?  No   1.  Medication Indicated: YES - POLYTRIM 90125-2.1 UNIT/ML-% OP Sol, 1 drop in affected eye(s) 4 times daily while awake x 7 days. .    2.  Education regarding contact precautions, hand washing, avoid wearing contacts until finished with drops or until symptoms resolve, contact clinic if there is no improvement of symptoms within 3 days and if develops eye pain or sensitivity to light.   3.  Follow-up: Schedule an appointment with a provider if symptoms do not improve after 3 days of antibiotics or if symptoms return after antibiotic therapy is complete.  4.  Patient verbalized understanding of this plan and is agreeable.    SUBJECTIVE:     Conjunctival symptoms: redness, mattering (yellow/green), itching and watery or pus discharge   Location: both eyes- mostly left eye. Right eye seems irriatable  Onset: 2 days ago  In addition notes: hx a allergy to cats but no recent exposure. Has tried a cold compress last night and eye wash, visine eye drops last night, worked for a little while but eye stayed the same  Contact Lens use?: No  Complicating factors    Reports:Eyelid symptoms some puffiness to left eye may have been after placing  ( no vision changes, able to open fully)   Denies:Vision changes; not cleared with blinking, Recent  history of eye trauma, Sensitivity to light, Severe eye pain, Fever: none, Eyelid symptoms    OBJECTIVE:      NURSING PLAN: Nursing advice to patient Warm compress to eye. May try OTC abx and f/u in 3 days if no improvement.    EDUCATION:      RECOMMENDED DISPOSITION:  Home care advice - see above  Will comply with recommendation: Yes  Encounter handled by: Nurse Triage.     Hollie Howell RN

## 2017-07-07 NOTE — TELEPHONE ENCOUNTER
Reason for call:  Patient reporting a symptom    Symptom or request: red, crusty, itchy eyes    Duration (how long have symptoms been present): 2 days    Have you been treated for this before? No    Additional comments: Please call patient and advise    Phone Number patient can be reached at:  Home number on file [phone] (home)    Best Time:  anytime    Can we leave a detailed message on this number:  YES    Gisele Booth,   [facility]

## 2017-07-23 DIAGNOSIS — I25.10 CORONARY ARTERY DISEASE INVOLVING NATIVE HEART WITHOUT ANGINA PECTORIS, UNSPECIFIED VESSEL OR LESION TYPE: ICD-10-CM

## 2017-07-23 DIAGNOSIS — Z13.6 CARDIOVASCULAR SCREENING; LDL GOAL LESS THAN 130: ICD-10-CM

## 2017-07-24 RX ORDER — ATORVASTATIN CALCIUM 80 MG/1
TABLET, FILM COATED ORAL
Qty: 90 TABLET | Refills: 1 | Status: SHIPPED | OUTPATIENT
Start: 2017-07-24 | End: 2018-02-09

## 2017-07-24 NOTE — TELEPHONE ENCOUNTER
atorvastatin (LIPITOR) 80 MG tablet     Last Written Prescription Date: 2/6/2017  Last Fill Quantity: 90, # refills: 1  Last Office Visit with [facility], P or [facility] prescribing provider: 6/2/2017       Lab Results   Component Value Date    CHOL 130 02/27/2017     Lab Results   Component Value Date    HDL 32 02/27/2017     Lab Results   Component Value Date    LDL 64 02/27/2017     Lab Results   Component Value Date    TRIG 169 02/27/2017     Lab Results   Component Value Date    CHOLHDLRATIO 3.9 03/02/2015

## 2017-07-24 NOTE — TELEPHONE ENCOUNTER
Prescription approved per [facility] Refill Protocol.    Jackie Armando, MSN, RN-BC  Care Coordinator

## 2017-08-26 DIAGNOSIS — J44.1 CHRONIC OBSTRUCTIVE PULMONARY DISEASE WITH ACUTE EXACERBATION (H): ICD-10-CM

## 2017-08-28 NOTE — TELEPHONE ENCOUNTER
QVAR 80 MCG/ACT Inhaler       Last Written Prescription Date: 5/8/2017  Last Fill Quantity: 8.7g, # refills: 3    Last Office Visit with [facility], [facility] or [facility] prescribing provider:  2/27/2017   Future Office Visit:       Date of Last Asthma Action Plan Letter:   Asthma Action Plan Q1 Year    Topic Date Due     Asthma Action Plan - yearly  10/13/2016      Asthma Control Test:   ACT Total Scores 2/27/2017   ACT TOTAL SCORE -   ASTHMA ER VISITS -   ASTHMA HOSPITALIZATIONS -   ACT TOTAL SCORE (Goal Greater than or Equal to 20) 21   In the past 12 months, how many times did you visit the emergency room for your asthma without being admitted to the hospital? 2   In the past 12 months, how many times were you hospitalized overnight because of your asthma? 0       Date of Last Spirometry Test:   No results found for this or any previous visit.

## 2017-08-28 NOTE — TELEPHONE ENCOUNTER
Patient uses this for COPD.  Prescription approved per [facility] Refill Protocol.  Yue Horn, RN  Triage Nurse

## 2017-08-29 DIAGNOSIS — F43.22 ADJUSTMENT DISORDER WITH ANXIETY: ICD-10-CM

## 2017-08-29 NOTE — TELEPHONE ENCOUNTER
citalopram (CELEXA) 20 MG     Last Written Prescription Date: 6/2/17  Last Fill Quantity: 30, # refills: 2  Last Office Visit with G primary care provider:  2/27/17        Last PHQ-9 score on record=   PHQ-9 SCORE 6/1/2017   Total Score 16

## 2017-08-30 NOTE — TELEPHONE ENCOUNTER
Score is 13. Patient should make an appointment-had some financial issues at last virtual visit,   Should we call her to set up another visit?  (this score is slightly better)    Yue Horn, RN  Triage Nurse

## 2017-08-31 RX ORDER — CITALOPRAM HYDROBROMIDE 20 MG/1
20 TABLET ORAL DAILY
Qty: 90 TABLET | Refills: 1 | Status: SHIPPED | OUTPATIENT
Start: 2017-08-31 | End: 2018-02-12

## 2017-11-06 DIAGNOSIS — I10 ESSENTIAL HYPERTENSION WITH GOAL BLOOD PRESSURE LESS THAN 140/90: ICD-10-CM

## 2017-11-08 RX ORDER — AMLODIPINE BESYLATE 10 MG/1
TABLET ORAL
Qty: 90 TABLET | Refills: 0 | Status: SHIPPED | OUTPATIENT
Start: 2017-11-08 | End: 2018-01-16

## 2017-11-08 RX ORDER — HYDRALAZINE HYDROCHLORIDE 50 MG/1
TABLET, FILM COATED ORAL
Qty: 180 TABLET | Refills: 0 | Status: SHIPPED | OUTPATIENT
Start: 2017-11-08 | End: 2018-01-16

## 2017-11-08 NOTE — TELEPHONE ENCOUNTER
Requested Prescriptions   Pending Prescriptions Disp Refills     hydrALAZINE (APRESOLINE) 50 MG tablet [Pharmacy Med Name: HYDRALAZINE 50MG TABLETS (ORANGE)] 180 tablet 0     Sig: TAKE 1 TABLET(50 MG) BY MOUTH TWICE DAILY    There is no refill protocol information for this order        amLODIPine (NORVASC) 10 MG tablet [Pharmacy Med Name: AMLODIPINE BESYLATE 10MG TABLETS] 90 tablet 0     Sig: TAKE 1 TABLET(10 MG) BY MOUTH DAILY    Calcium Channel Blockers Protocol  Passed    11/7/2017  9:44 AM       Passed - Blood pressure under 140/90    BP Readings from Last 3 Encounters:   02/27/17 118/76   11/10/16 130/78   10/06/16 (!) 166/96          Passed - Recent or future visit with authorizing provider    Patient had office visit in the last year or has a visit in the next 30 days with authorizing provider.  See chart review.          Passed - Patient is age 18 or older       Passed - No active pregnancy on record       Passed - Normal serum creatinine on file in past 12 months    Recent Labs   Lab Test  02/27/17   0925   CR  0.78            Passed - No positive pregnancy test in past 12 months        Prescription approved per [facility] Refill Protocol.  Yue Horn, RN  Triage Nurse

## 2017-12-17 DIAGNOSIS — H10.33 ACUTE CONJUNCTIVITIS OF BOTH EYES: ICD-10-CM

## 2017-12-19 NOTE — TELEPHONE ENCOUNTER
trimethoprim-polymyxin b (POLYTRIM) ophthalmic solution      Last Written Prescription Date:  7/7/17   Last Fill Quantity: 1,   # refills: 0  Last Office Visit: 2/27/15  Future Office visit:       Routing refill request to provider for review/approval because:  Drug not active on patient's medication list    Requested Prescriptions   Pending Prescriptions Disp Refills     trimethoprim-polymyxin b (POLYTRIM) ophthalmic solution [Pharmacy Med Name: POLYMYXIN-B/TRIMETHOPRIMOPHTH SOL] 10 mL 0     Sig: INSTILL 1 DROP INTO BOTH EYES FOUR TIMES DAILY FOR 7 DAYS    There is no refill protocol information for this order

## 2017-12-20 RX ORDER — POLYMYXIN B SULFATE AND TRIMETHOPRIM 1; 10000 MG/ML; [USP'U]/ML
SOLUTION OPHTHALMIC
Qty: 10 ML | Refills: 0 | OUTPATIENT
Start: 2017-12-20

## 2017-12-20 NOTE — TELEPHONE ENCOUNTER
This is a medication for pink eye that would require an appointment.  Sent back as declined.  Yue Horn, RN  Triage Nurse

## 2018-01-08 DIAGNOSIS — J44.1 CHRONIC OBSTRUCTIVE PULMONARY DISEASE WITH ACUTE EXACERBATION (H): ICD-10-CM

## 2018-01-09 NOTE — TELEPHONE ENCOUNTER
"Requested Prescriptions   Pending Prescriptions Disp Refills     QVAR 80 MCG/ACT Inhaler [Pharmacy Med Name: QVAR 80MCG ORAL INHALER 120 DOSES]  Last Written Prescription Date:  8/28/17  Last Fill Quantity: 8.7g,  # refills: .   Last Office Visit with G, P or [facility] prescribing provider:  6/2/2017     Future Office Visit:      8.7 g 0     Sig: INHALE 2 PUFFS INTO THE LUNGS TWICE DAILY    Inhaled Steroids Protocol Failed    1/8/2018  6:31 PM       Failed - Asthma control test 20 or greater in last 6 months    Please review ACT score.   ACT Total Scores 8/12/2016 11/11/2016 2/27/2017   ACT TOTAL SCORE - - -   ASTHMA ER VISITS - - -   ASTHMA HOSPITALIZATIONS - - -   ACT TOTAL SCORE (Goal Greater than or Equal to 20) 17 12 21   In the past 12 months, how many times did you visit the emergency room for your asthma without being admitted to the hospital? 0 0 2   In the past 12 months, how many times were you hospitalized overnight because of your asthma? 0 0 0            Failed - Recent (6 mo) or future visit with authorizing provider's specialty    Patient had office visit in the last 6 months or has a visit in the next 30 days with authorizing provider.  See \"Patient Info\" tab in inbasket, or \"Choose Columns\" in Meds & Orders section of the refill encounter.          Passed - Patient is age 12 or older          "

## 2018-01-12 NOTE — TELEPHONE ENCOUNTER
Patient takes this for her COPD. Refilled x 1, due for annual office visit/physical  In February. Please call her to schedule this.   Yue Horn RN  Triage Nurse

## 2018-01-16 ENCOUNTER — OFFICE VISIT (OUTPATIENT)
Dept: FAMILY MEDICINE | Facility: CLINIC | Age: 48
End: 2018-01-16
Payer: COMMERCIAL

## 2018-01-16 VITALS
BODY MASS INDEX: 39.27 KG/M2 | HEART RATE: 85 BPM | WEIGHT: 200 LBS | HEIGHT: 60 IN | OXYGEN SATURATION: 98 % | RESPIRATION RATE: 16 BRPM | SYSTOLIC BLOOD PRESSURE: 132 MMHG | DIASTOLIC BLOOD PRESSURE: 72 MMHG | TEMPERATURE: 98.7 F

## 2018-01-16 DIAGNOSIS — J45.40 MODERATE PERSISTENT ASTHMA WITHOUT COMPLICATION: ICD-10-CM

## 2018-01-16 DIAGNOSIS — I10 ESSENTIAL HYPERTENSION WITH GOAL BLOOD PRESSURE LESS THAN 140/90: ICD-10-CM

## 2018-01-16 DIAGNOSIS — J44.9 CHRONIC OBSTRUCTIVE PULMONARY DISEASE, UNSPECIFIED COPD TYPE (H): Primary | ICD-10-CM

## 2018-01-16 DIAGNOSIS — F17.200 TOBACCO USE DISORDER: ICD-10-CM

## 2018-01-16 PROCEDURE — 99214 OFFICE O/P EST MOD 30 MIN: CPT | Performed by: PHYSICIAN ASSISTANT

## 2018-01-16 RX ORDER — AMLODIPINE BESYLATE 10 MG/1
TABLET ORAL
Qty: 90 TABLET | Refills: 1 | Status: SHIPPED | OUTPATIENT
Start: 2018-01-16 | End: 2018-02-09

## 2018-01-16 RX ORDER — HYDRALAZINE HYDROCHLORIDE 50 MG/1
TABLET, FILM COATED ORAL
Qty: 180 TABLET | Refills: 1 | Status: SHIPPED | OUTPATIENT
Start: 2018-01-16 | End: 2018-02-09

## 2018-01-16 RX ORDER — FLUTICASONE PROPIONATE AND SALMETEROL 232; 14 UG/1; UG/1
1 POWDER, METERED RESPIRATORY (INHALATION) 2 TIMES DAILY
Qty: 1 EACH | Refills: 5 | Status: SHIPPED | OUTPATIENT
Start: 2018-01-16 | End: 2018-08-02

## 2018-01-16 NOTE — MR AVS SNAPSHOT
"              After Visit Summary   1/16/2018    Marni Austin    MRN: [id number]           Patient Information     Date Of Birth          1970        Visit Information        Provider Department      1/16/2018 5:20 PM David Gambino PA-C [facility]        Today's Diagnoses     Chronic obstructive pulmonary disease, unspecified COPD type (H)    -  1    Moderate persistent asthma without complication        Essential hypertension with goal blood pressure less than 140/90        Tobacco use disorder          Care Instructions    Varenicline Tartrate (Chantix):  Chantix tablets Days Dosage  0.5 mg once daily for the first three days.  0.5 mg  twice daily for the next four days.  1.0 mg twice daily from day 8 and forward.    How to take:  You will start taking Chantix one week before your quit date while you are still smoking. At first you will use the Chantix starter pack. Once you are finished with that, you will be on a \"maintenance dose\" that will probably stay the same for the rest of your treatment.  After a week of slowly increasing your dose, you will take Chantix twice a day. Take each dose after eating and with a full glass of water. Taking Chantix with food and water decreases nausea. That is also why you begin on a lower dose and slowly increase it. Once you begin the twice a day dosage, your 2 doses should be at least 8-10 hours apart and at least 4-5 hours before bedtime.     Duration of therapy: 12 weeks. An additional course of 12 weeks of treatment is recommended if the first 12 weeks were successful to improve long term abstinence.    Adverse reactions: The most common adverse reaction associated with varenicline treatment is nausea. Other common adverse effects include sleep disturbance, constipation, flatulence and vomiting.    There have been warnings from the FDA to be on the lookout for erratic behavior, suicidal ideation, or suicidal behavior. There is one case " report of a death, though it appears alcohol consumption may have played a part in that case. Please report any behavior or mood changes as well as being aware of drowsiness. Be cautious when operating motor vehicles or dangerous machinery until the medication's effect on you is clear.            Follow-ups after your visit        Who to contact     If you have questions or need follow up information about today's clinic visit or your schedule please contact [facility] directly at [phone].  Normal or non-critical lab and imaging results will be communicated to you by [employer], letter or phone within 4 business days after the clinic has received the results. If you do not hear from us within 7 days, please contact the clinic through [employer] or phone. If you have a critical or abnormal lab result, we will notify you by phone as soon as possible.  Submit refill requests through [employer] or call your pharmacy and they will forward the refill request to us. Please allow 3 business days for your refill to be completed.          Additional Information About Your Visit        [employer] Information     [employer] gives you secure access to your electronic health record. If you see a primary care provider, you can also send messages to your care team and make appointments. If you have questions, please call your primary care clinic.  If you do not have a primary care provider, please call [phone] and they will assist you.        Care EveryWhere ID     This is your Care EveryWhere ID. This could be used by other organizations to access your [facility] medical records  [id number]        Your Vitals Were     Pulse Temperature Respirations Height Last Period Pulse Oximetry    85 98.7  F (37.1  C) (Tympanic) 16 5' (1.524 m) 08/30/2013 98%    BMI (Body Mass Index)                   39.06 kg/m2            Blood Pressure from Last 3 Encounters:   01/16/18 150/78   02/27/17 118/76   11/10/16 130/78    Weight from  Last 3 Encounters:   01/16/18 200 lb (90.7 kg)   02/27/17 195 lb (88.5 kg)   11/10/16 192 lb 6.4 oz (87.3 kg)              We Performed the Following     Asthma Action Plan (AAP)          Today's Medication Changes          These changes are accurate as of: 1/16/18  6:14 PM.  If you have any questions, ask your nurse or doctor.               Start taking these medicines.        Dose/Directions    Fluticasone-Salmeterol 232-14 MCG/ACT Aepb   Used for:  Chronic obstructive pulmonary disease, unspecified COPD type (H), Moderate persistent asthma without complication   Started by:  David Gambino PA-C        Dose:  1 puff   Inhale 1 puff into the lungs 2 times daily   Quantity:  1 each   Refills:  5       varenicline 0.5 MG X 11 & 1 MG X 42 tablet   Commonly known as:  CHANTIX STARTING MONTH PAK   Used for:  Tobacco use disorder   Started by:  David Gambino PA-C        Take 0.5 mg tab daily for 3 days, then 0.5 mg tab twice daily for 4 days, then 1 mg twice daily.   Quantity:  53 tablet   Refills:  0         These medicines have changed or have updated prescriptions.        Dose/Directions    amLODIPine 10 MG tablet   Commonly known as:  NORVASC   This may have changed:  See the new instructions.   Used for:  Essential hypertension with goal blood pressure less than 140/90   Changed by:  David Gambino PA-C        TAKE 1 TABLET(10 MG) BY MOUTH DAILY   Quantity:  90 tablet   Refills:  1       hydrALAZINE 50 MG tablet   Commonly known as:  APRESOLINE   This may have changed:  See the new instructions.   Used for:  Essential hypertension with goal blood pressure less than 140/90   Changed by:  David Gambino PA-C        TAKE 1 TABLET(50 MG) BY MOUTH TWICE DAILY   Quantity:  180 tablet   Refills:  1         Stop taking these medicines if you haven't already. Please contact your care team if you have questions.     cyclobenzaprine 10 MG tablet   Commonly known as:  FLEXERIL   Stopped by:   David Gambino PA-C           QVAR 80 MCG/ACT Inhaler   Generic drug:  beclomethasone   Stopped by:  David Gambino PA-C                Where to get your medicines      These medications were sent to [facility] [address] [facility] [address] [facility] AT [address] [address] [facility], [facility] [address]     Phone:  [phone]     amLODIPine 10 MG tablet    Fluticasone-Salmeterol 232-14 MCG/ACT Aepb    hydrALAZINE 50 MG tablet         Call your pharmacy to confirm that your medication is ready for pickup. It may take up to 24 hours for them to receive the prescription. If the prescription is not ready within 3 business days, please contact your clinic or your provider.     We will let you know when these medications are ready. If you don't hear back within 3 business days, please contact us.     varenicline 0.5 MG X 11 & 1 MG X 42 tablet                Primary Care Provider Office Phone # Fax #    David Gambino PA-C [phone]       [address] [facility] [address]        Equal Access to Services     [facility] AH: Hadii aad ku hadasho Soomaali, waaxda luqadaha, qaybta kaalmada adeegyada, waxay idiin hayaan ademagdiel yanesarabrandy cope . So [facility] [phone].    ATENCIÓN: Si habla español, tiene a haywood disposición servicios gratuitos de asistencia lingüística. [facility] [phone].    We comply with applicable federal civil rights laws and [place] laws. We do not discriminate on the basis of race, color, national origin, age, disability, sex, sexual orientation, or gender identity.            Thank you!     Thank you for choosing [facility]  for your care. Our goal is always to provide you with excellent care. Hearing back from our patients is one way we can continue to improve our services. Please take a few minutes to complete the written survey that you may receive in the mail after your visit with us. Thank you!             Your  Updated Medication List - Protect others around you: Learn how to safely use, store and throw away your medicines at www.disposemymeds.org.          This list is accurate as of: 1/16/18  6:14 PM.  Always use your most recent med list.                   Brand Name Dispense Instructions for use Diagnosis    albuterol 108 (90 BASE) MCG/ACT Inhaler    PROAIR HFA    1 Inhaler    Inhale 2 puffs into the lungs every 4 hours as needed    Cough       amLODIPine 10 MG tablet    NORVASC    90 tablet    TAKE 1 TABLET(10 MG) BY MOUTH DAILY    Essential hypertension with goal blood pressure less than 140/90       aspirin 81 MG EC tablet      Take 1 tablet (81 mg) by mouth daily    NSTEMI (non-ST elevated myocardial infarction) (H)       atorvastatin 80 MG tablet    LIPITOR    90 tablet    TAKE 1 TABLET(80 MG) BY MOUTH AT BEDTIME    Coronary artery disease involving native heart without angina pectoris, unspecified vessel or lesion type, CARDIOVASCULAR SCREENING; LDL GOAL LESS THAN 130       citalopram 20 MG tablet    celeXA    90 tablet    Take 1 tablet (20 mg) by mouth daily    Adjustment disorder with anxiety       clotrimazole 1 % cream    LOTRIMIN    30 g    Apply topically 2 times daily    Maculopapular rash, Itching       Fluticasone-Salmeterol 232-14 MCG/ACT Aepb     1 each    Inhale 1 puff into the lungs 2 times daily    Chronic obstructive pulmonary disease, unspecified COPD type (H), Moderate persistent asthma without complication       hydrALAZINE 50 MG tablet    APRESOLINE    180 tablet    TAKE 1 TABLET(50 MG) BY MOUTH TWICE DAILY    Essential hypertension with goal blood pressure less than 140/90       ipratropium - albuterol 0.5 mg/2.5 mg/3 mL 0.5-2.5 (3) MG/3ML neb solution    DUONEB    360 mL    Take 1 vial (3 mLs) by nebulization every 4 hours as needed for shortness of breath / dyspnea or wheezing    Chronic obstructive pulmonary disease with acute exacerbation (H), Moderate persistent asthma with acute  exacerbation       nitroGLYcerin 0.4 MG sublingual tablet    NITROSTAT    25 tablet    Place 1 tablet (0.4 mg) under the tongue every 5 minutes as needed for chest pain    NSTEMI (non-ST elevated myocardial infarction) (H)       * order for DME     1 Device    Equipment being ordered: Nebulizer    Moderate persistent asthma with acute exacerbation       * order for DME     1 Device    Equipment being ordered: Nebulizer    Moderate persistent asthma with acute exacerbation       varenicline 0.5 MG X 11 & 1 MG X 42 tablet    CHANTIX STARTING MONTH PAK    53 tablet    Take 0.5 mg tab daily for 3 days, then 0.5 mg tab twice daily for 4 days, then 1 mg twice daily.    Tobacco use disorder       * Notice:  This list has 2 medication(s) that are the same as other medications prescribed for you. Read the directions carefully, and ask your doctor or other care provider to review them with you.

## 2018-01-16 NOTE — PROGRESS NOTES
SUBJECTIVE:   Marni Austin is a 47 year old female who presents to clinic today for the following health issues:    Asthma/COPD Follow-Up    Was ACT completed today?    Yes    ACT Total Scores 1/16/2018   ACT TOTAL SCORE -   ASTHMA ER VISITS -   ASTHMA HOSPITALIZATIONS -   ACT TOTAL SCORE (Goal Greater than or Equal to 20) 14   In the past 12 months, how many times did you visit the emergency room for your asthma without being admitted to the hospital? 0   In the past 12 months, how many times were you hospitalized overnight because of your asthma? 0       Recent asthma triggers that patient is dealing with: COPD    Amount of exercise or physical activity: None    Problems taking medications regularly: No    Medication side effects: none    Diet: regular (no restrictions)    -Patient presents to follow up on asthma/copd  -She continues to suffer from shortness of breath   -does feel like it may be increased since last seen   -she has continued on qvar, but using only once daily, on most days  -Continues to smoke 1ppd  -Lot of stress at home    -She continues on amlodipine and hydralazine for blood pressure  -taking and tolerating these well  -denies chest pain/pressure or palpitations; just shortness of breath as mentioned above    -daughter is having a baby this spring and this is motivation to quit smoking  -she had an allergy to zyban  -has tried and failed some of the nicotine replacement  -would like to try chantix    Problem list and histories reviewed & adjusted, as indicated.  Additional history: as documented    Patient Active Problem List   Diagnosis     Adjustment disorder with anxiety     Menorrhagia     CARDIOVASCULAR SCREENING; LDL GOAL LESS THAN 130     Acute asthma exacerbation     Tobacco use disorder     Hypertension goal BP (blood pressure) < 140/90     Health Care Home     COPD exacerbation (H)     CAD (coronary artery disease)     COPD (chronic obstructive pulmonary disease) (H)      Rectal bleeding     Chest pain, unspecified chest pain type     Elevated fasting glucose     Non morbid obesity     Past Surgical History:   Procedure Laterality Date     CARDIAC SURGERY       TONSILLECTOMY       Tubal ligation surgery         Social History   Substance Use Topics     Smoking status: Current Every Day Smoker     Packs/day: 0.50     Years: 26.00     Smokeless tobacco: Never Used     Alcohol use No     Family History   Problem Relation Age of Onset     Hypertension Mother      DIABETES Father      CEREBROVASCULAR DISEASE Maternal Grandmother      CANCER Maternal Grandfather            Reviewed and updated as needed this visit by clinical staffTobacco  Allergies  Meds  Med Hx  Surg Hx  Fam Hx  Soc Hx      Reviewed and updated as needed this visit by Provider         ROS:  Constitutional, HEENT, cardiovascular, pulmonary, gi and gu systems are negative, except as otherwise noted.    OBJECTIVE:     /72  Pulse 85  Temp 98.7  F (37.1  C) (Tympanic)  Resp 16  Ht 5' (1.524 m)  Wt 200 lb (90.7 kg)  LMP 08/30/2013  SpO2 98%  BMI 39.06 kg/m2  Body mass index is 39.06 kg/(m^2).  GENERAL: healthy, alert and no distress  EYES: Eyes grossly normal to inspection, PERRL and conjunctivae and sclerae normal  RESP: decreased expiratory phase, clear to auscultation without wheeze, rales or rhonchi  CV: regular rates and rhythm  MS: no gross musculoskeletal defects noted, no edema  NEURO: Normal strength and tone, mentation intact and speech normal  PSYCH: mentation appears normal, affect normal/bright    Diagnostic Test Results:  none  - due later this spring    ASSESSMENT/PLAN:   1. Chronic obstructive pulmonary disease, unspecified COPD type (H)  2. Moderate persistent asthma without complication  Reviewed control with patient. Given her continued difficulties and hx of both asthma and copd, will trial combination medication. Do recommend she use as prescribed. She can follow up if not improving or  feels like this is not as effective as qvar.   - Fluticasone-Salmeterol 232-14 MCG/ACT AEPB; Inhale 1 puff into the lungs 2 times daily  Dispense: 1 each; Refill: 5    3. Essential hypertension with goal blood pressure less than 140/90  Refilling. Continue present management.   - hydrALAZINE (APRESOLINE) 50 MG tablet; TAKE 1 TABLET(50 MG) BY MOUTH TWICE DAILY  Dispense: 180 tablet; Refill: 1  - amLODIPine (NORVASC) 10 MG tablet; TAKE 1 TABLET(10 MG) BY MOUTH DAILY  Dispense: 90 tablet; Refill: 1    4. Tobacco use disorder  We will trial chantix. Reviewed recommended use direction as well as adverse risk warnings. She will follow up with how this is going. Ok to send in maintenance dose without OV.  - varenicline (CHANTIX STARTING MONTH HANDY) 0.5 MG X 11 & 1 MG X 42 tablet; Take 0.5 mg tab daily for 3 days, then 0.5 mg tab twice daily for 4 days, then 1 mg twice daily.  Dispense: 53 tablet; Refill: 0    David Gambino PA-C  [facility]

## 2018-01-16 NOTE — NURSING NOTE
Chief Complaint   Patient presents with     Asthma     Recheck Medication       Initial /78 (BP Location: Right arm, Patient Position: Chair, Cuff Size: Adult Large)  Pulse 85  Temp 98.7  F (37.1  C) (Tympanic)  Resp 16  Ht 5' (1.524 m)  Wt 200 lb (90.7 kg)  LMP 08/30/2013  SpO2 98%  BMI 39.06 kg/m2 Estimated body mass index is 39.06 kg/(m^2) as calculated from the following:    Height as of this encounter: 5' (1.524 m).    Weight as of this encounter: 200 lb (90.7 kg).  Medication Reconciliation: complete   Ena Loaiza MA

## 2018-01-17 ASSESSMENT — ASTHMA QUESTIONNAIRES: ACT_TOTALSCORE: 14

## 2018-01-17 NOTE — PATIENT INSTRUCTIONS
"Varenicline Tartrate (Chantix):  Chantix tablets Days Dosage  0.5 mg once daily for the first three days.  0.5 mg  twice daily for the next four days.  1.0 mg twice daily from day 8 and forward.    How to take:  You will start taking Chantix one week before your quit date while you are still smoking. At first you will use the Chantix starter pack. Once you are finished with that, you will be on a \"maintenance dose\" that will probably stay the same for the rest of your treatment.  After a week of slowly increasing your dose, you will take Chantix twice a day. Take each dose after eating and with a full glass of water. Taking Chantix with food and water decreases nausea. That is also why you begin on a lower dose and slowly increase it. Once you begin the twice a day dosage, your 2 doses should be at least 8-10 hours apart and at least 4-5 hours before bedtime.     Duration of therapy: 12 weeks. An additional course of 12 weeks of treatment is recommended if the first 12 weeks were successful to improve long term abstinence.    Adverse reactions: The most common adverse reaction associated with varenicline treatment is nausea. Other common adverse effects include sleep disturbance, constipation, flatulence and vomiting.    There have been warnings from the FDA to be on the lookout for erratic behavior, suicidal ideation, or suicidal behavior. There is one case report of a death, though it appears alcohol consumption may have played a part in that case. Please report any behavior or mood changes as well as being aware of drowsiness. Be cautious when operating motor vehicles or dangerous machinery until the medication's effect on you is clear.    "

## 2018-01-18 ENCOUNTER — TELEPHONE (OUTPATIENT)
Dept: FAMILY MEDICINE | Facility: CLINIC | Age: 48
End: 2018-01-18

## 2018-01-18 DIAGNOSIS — F17.200 TOBACCO USE DISORDER: Primary | ICD-10-CM

## 2018-01-18 NOTE — TELEPHONE ENCOUNTER
Central Prior Authorization Team   Phone: [phone]    PA Initiation    Medication: varenicline (CHANTIX STARTING MONTH HANDY) 0.5 MG X 11 & 1 MG X 42 tablet  Insurance Company: [employer] ([facility]) - Phone [phone] Fax [phone]  Pharmacy Filling the Rx: [facility] [address] [place] [address] [address]  Filling Pharmacy Phone: [phone]  Filling Pharmacy Fax: [phone]  Start Date: 1/18/2018

## 2018-01-18 NOTE — TELEPHONE ENCOUNTER
Prior Authorization Retail Medication Request  Medication/Dose: varenicline (CHANTIX STARTING MONTH HANDY) 0.5 MG X 11 & 1 MG X 42 tablet  Diagnosis and ICD code: Tobacco use disorder, F17.200  New/Renewal/Insurance Change PA: Renewal   Previously Tried and Failed Therapies:    Insurance ID (if provided):   Insurance Phone (if provided):     Any additional info from fax request:     If you received a fax notification from an outside Pharmacy:  Pharmacy Name:[facility]  Pharmacy #:[phone]  Pharmacy Fax:[phone]

## 2018-01-22 NOTE — TELEPHONE ENCOUNTER
See note below regarding PA denial. Patient would have to try alternative first, which are listed below.   Ena Loaiza MA

## 2018-01-22 NOTE — TELEPHONE ENCOUNTER
Please let Marni know her insurance didn't cover chantix - we have to try one of the below first. What would she prefer to try - gum, lozenge, patch?

## 2018-01-25 ENCOUNTER — TELEPHONE (OUTPATIENT)
Dept: FAMILY MEDICINE | Facility: CLINIC | Age: 48
End: 2018-01-25

## 2018-01-25 NOTE — TELEPHONE ENCOUNTER
Central Prior Authorization Team   Phone: [phone]    PA Initiation    Medication: Nicotine 4mg UNCTD MNT 's -pa initaited  Insurance Company: [employer] ([facility]) - Phone [phone] Fax [phone]  Pharmacy Filling the Rx: [facility] [address] [place] [address] [place] [address]  Filling Pharmacy Phone: [phone]  Filling Pharmacy Fax:    Start Date: 1/25/2018

## 2018-01-25 NOTE — LETTER
[facility]  [address] [facility] [address]  Phone: [phone]    January 29, 2018        Marni Austin  [address] [facility] [address]          To whom it may concern:    RE: Marni Grider is a patient of mine with a lengthy history of tobacco (cigarette) use. She also suffers from chronic lungs diseases, including both asthma and COPD. She continues to suffer flares of these diseases and her breathing is getting more difficult. Tobacco use adds to these difficulties and is likely worsening them. We have reviewed strategies both with and without mediation for quitting but so far these have not been fruitful. She needs every avenue for therapy that she can get. Please consider expanding your coverage for this patient to benefit. Clearly, these are medically necessary to improve her health and prevent worsening.     Please contact me for questions or concerns.      Sincerely,        David Gambino PA-C

## 2018-01-25 NOTE — TELEPHONE ENCOUNTER
Prior Authorization Retail Medication Request  Medication/Dose: Nicotine 4mg UNCTD MNT 's  Diagnosis and ICD code: Tobacco use disorder, F17.200  New/Renewal/Insurance Change PA: New  Previously Tried and Failed Therapies:    Insurance ID (if provided): [id number]  Insurance Phone (if provided): [phone]    Any additional info from fax request:     If you received a fax notification from an outside Pharmacy:  Pharmacy Name:[facility]  Pharmacy #:[phone]  Pharmacy Fax:[phone]

## 2018-01-26 NOTE — TELEPHONE ENCOUNTER
Central Prior Authorization Team   Phone: [phone]      PRIOR AUTHORIZATION DENIED    Medication: Nicotine 4mg UNCTD MNT 's -pa initaited - DENIED    Denial Date: 1/26/2018    Denial Rational:               Appeal Information:

## 2018-01-29 NOTE — TELEPHONE ENCOUNTER
Please see attached letter and send to PA department with additional information.   Ena Loaiza MA

## 2018-01-29 NOTE — TELEPHONE ENCOUNTER
Medication Appeal Initiation    We have initiated an appeal for the requested medication:  Medication: Nicotine 4mg UNCTD MNT 's -pa initaited - appeal initaited   Appeal Start Date:  1/29/2018  Insurance Company:  [employer]  Comments:

## 2018-02-02 NOTE — TELEPHONE ENCOUNTER
Called to check on status of appeal and insurance stated that I need to send in another pa and and if that gets denied then I can appeal this medication so now im initiating another PA by phone        Prior Authorization Approval    Authorization Effective Date: 2/2/2018  Authorization Expiration Date: 2/2/2019  Medication: Nicotine 4mg UNCTD MNT 's -pa initaited - pa approved   Reference #: [id number]   Insurance Company: [employer] ([facility]) - Phone [phone] Fax [phone]  Expected CoPay:  Unable to get copay  Which Pharmacy is filling the prescription (Not needed for infusion/clinic administered): [facility] [address] [place] [address] [address]  Pharmacy Notified: Yes  Patient Notified: Yes  Got an approval called pharmacy and they were getting a rejection called the insurance back they informed me that they fixed the error called pharmacy back and they are still getting rejections informed the pharmacy that they will have to call the help desk and get the issue fixed

## 2018-02-08 ENCOUNTER — TELEPHONE (OUTPATIENT)
Dept: FAMILY MEDICINE | Facility: CLINIC | Age: 48
End: 2018-02-08

## 2018-02-08 DIAGNOSIS — I10 ESSENTIAL HYPERTENSION WITH GOAL BLOOD PRESSURE LESS THAN 140/90: ICD-10-CM

## 2018-02-08 DIAGNOSIS — J45.40 MODERATE PERSISTENT ASTHMA WITHOUT COMPLICATION: ICD-10-CM

## 2018-02-08 DIAGNOSIS — I21.4 NSTEMI (NON-ST ELEVATED MYOCARDIAL INFARCTION) (H): ICD-10-CM

## 2018-02-08 DIAGNOSIS — F43.22 ADJUSTMENT DISORDER WITH ANXIETY: ICD-10-CM

## 2018-02-08 DIAGNOSIS — I25.10 CORONARY ARTERY DISEASE INVOLVING NATIVE HEART WITHOUT ANGINA PECTORIS, UNSPECIFIED VESSEL OR LESION TYPE: ICD-10-CM

## 2018-02-08 DIAGNOSIS — F17.200 TOBACCO USE DISORDER: Primary | ICD-10-CM

## 2018-02-08 DIAGNOSIS — J44.9 CHRONIC OBSTRUCTIVE PULMONARY DISEASE, UNSPECIFIED COPD TYPE (H): ICD-10-CM

## 2018-02-08 DIAGNOSIS — Z13.6 CARDIOVASCULAR SCREENING; LDL GOAL LESS THAN 130: ICD-10-CM

## 2018-02-09 ENCOUNTER — MYC MEDICAL ADVICE (OUTPATIENT)
Dept: FAMILY MEDICINE | Facility: CLINIC | Age: 48
End: 2018-02-09

## 2018-02-09 DIAGNOSIS — F43.22 ADJUSTMENT DISORDER WITH ANXIETY: ICD-10-CM

## 2018-02-09 RX ORDER — CITALOPRAM HYDROBROMIDE 20 MG/1
20 TABLET ORAL DAILY
Qty: 90 TABLET | Refills: 1 | Status: CANCELLED | OUTPATIENT
Start: 2018-02-09

## 2018-02-09 RX ORDER — NITROGLYCERIN 0.4 MG/1
0.4 TABLET SUBLINGUAL EVERY 5 MIN PRN
Qty: 25 TABLET | Refills: 3 | Status: SHIPPED | OUTPATIENT
Start: 2018-02-09 | End: 2019-02-26

## 2018-02-09 RX ORDER — AMLODIPINE BESYLATE 10 MG/1
TABLET ORAL
Qty: 90 TABLET | Refills: 1 | Status: SHIPPED | OUTPATIENT
Start: 2018-02-09 | End: 2018-07-07

## 2018-02-09 RX ORDER — ATORVASTATIN CALCIUM 80 MG/1
TABLET, FILM COATED ORAL
Qty: 90 TABLET | Refills: 1 | Status: SHIPPED | OUTPATIENT
Start: 2018-02-09 | End: 2018-07-07

## 2018-02-09 RX ORDER — HYDRALAZINE HYDROCHLORIDE 50 MG/1
TABLET, FILM COATED ORAL
Qty: 180 TABLET | Refills: 1 | Status: SHIPPED | OUTPATIENT
Start: 2018-02-09 | End: 2018-07-07

## 2018-02-09 ASSESSMENT — PATIENT HEALTH QUESTIONNAIRE - PHQ9
10. IF YOU CHECKED OFF ANY PROBLEMS, HOW DIFFICULT HAVE THESE PROBLEMS MADE IT FOR YOU TO DO YOUR WORK, TAKE CARE OF THINGS AT HOME, OR GET ALONG WITH OTHER PEOPLE: SOMEWHAT DIFFICULT
SUM OF ALL RESPONSES TO PHQ QUESTIONS 1-9: 8
SUM OF ALL RESPONSES TO PHQ QUESTIONS 1-9: 8

## 2018-02-09 ASSESSMENT — ANXIETY QUESTIONNAIRES
GAD7 TOTAL SCORE: 5
6. BECOMING EASILY ANNOYED OR IRRITABLE: NOT AT ALL
4. TROUBLE RELAXING: SEVERAL DAYS
1. FEELING NERVOUS, ANXIOUS, OR ON EDGE: NOT AT ALL
GAD7 TOTAL SCORE: 5
7. FEELING AFRAID AS IF SOMETHING AWFUL MIGHT HAPPEN: SEVERAL DAYS
3. WORRYING TOO MUCH ABOUT DIFFERENT THINGS: SEVERAL DAYS
5. BEING SO RESTLESS THAT IT IS HARD TO SIT STILL: SEVERAL DAYS
7. FEELING AFRAID AS IF SOMETHING AWFUL MIGHT HAPPEN: SEVERAL DAYS
2. NOT BEING ABLE TO STOP OR CONTROL WORRYING: SEVERAL DAYS

## 2018-02-09 NOTE — TELEPHONE ENCOUNTER
New mail order pharmacy. Medication listed, but no dosage or directions on faxed request.    lisinopril (PRINIVIL,ZESTRIL) 20 MG tablet (Discontinued)      Sig: Take 1 tablet (20 mg) by mouth 2 times daily  Last Written Prescription Date:  11/18/13  Last Fill Quantity: 90,   # refills: 1  Last Office Visit: 1/16/2018  Future Office visit:       Routing refill request to provider for review/approval because:  Drug not active on patient's medication list      Effexor XR cap      Last Written Prescription Date:  na  Last Fill Quantity: na,   # refills: na  Last Office Visit: 1/16/2018  Future Office visit:       Routing refill request to provider for review/approval because:  Drug not on the G, P or [facility] refill protocol or controlled substance

## 2018-02-09 NOTE — TELEPHONE ENCOUNTER
Left message on answering machine for patient to call back. Need to clarify why these are being requested.   Joann Davison RN

## 2018-02-09 NOTE — TELEPHONE ENCOUNTER
Reason for call:  Medication   If this is a refill request, has the caller requested the refill from the pharmacy already? Yes  Will the patient be using a [facility]? No  Name of the pharmacy and phone number for the current request: [facility] Phone: [phone] Fax: [phone]    Name of the medication requested: fluticasone, nictone patches, atorvastatin, amlodipine, hydralazine, citalopram     Other request: She is completely out of citalopram. Mail order pharmacy covers 90 day supply. She would like 90 day supply for all of the medications listed. Please call to confirm once this has been completed and if there are any questions.    Phone number to reach patient:  Work number on file:  [phone] (work)    Best Time:  anytime    Can we leave a detailed message on this number?  YES     Dora SLATER  Central Scheduler

## 2018-02-09 NOTE — TELEPHONE ENCOUNTER
Patient is calling to have these refilled at [facility] now, and would like them sent   Soon, so she does not run out.  The question was, can she get a short acting inhaler to use prn? Used to use albuterol,  And has not had this for a while. (recently prescribed for a cough) ACT score <20.    Also asking to go back to the [facility], as she can get this for much lower cost.    Also asking for Nicotine patches, the highest dose, she does still smoke quite a few daily.  She has tried the gum and lozenges, and likes the patch better.     Please sign these if ok.  Did send in the BP medications for her.   She is due for annual fasting labs, and updated PHQ9/GAD7. Sent through my chart.      Yue Horn, RN  Triage Nurse

## 2018-02-10 ASSESSMENT — PATIENT HEALTH QUESTIONNAIRE - PHQ9: SUM OF ALL RESPONSES TO PHQ QUESTIONS 1-9: 8

## 2018-02-10 ASSESSMENT — ANXIETY QUESTIONNAIRES: GAD7 TOTAL SCORE: 5

## 2018-02-12 RX ORDER — CITALOPRAM HYDROBROMIDE 20 MG/1
20 TABLET ORAL DAILY
Qty: 90 TABLET | Refills: 1 | Status: SHIPPED | OUTPATIENT
Start: 2018-02-12 | End: 2018-07-11

## 2018-02-12 NOTE — TELEPHONE ENCOUNTER
phq 9 score is 8 today, please sign for refill on the Citalopram if ok.  Yue Horn, RN  Triage Nurse

## 2018-02-13 ENCOUNTER — TELEPHONE (OUTPATIENT)
Dept: FAMILY MEDICINE | Facility: CLINIC | Age: 48
End: 2018-02-13

## 2018-02-13 RX ORDER — NICOTINE 21 MG/24HR
1 PATCH, TRANSDERMAL 24 HOURS TRANSDERMAL EVERY 24 HOURS
Qty: 90 PATCH | Refills: 0 | Status: SHIPPED | OUTPATIENT
Start: 2018-02-13 | End: 2018-07-18

## 2018-02-13 RX ORDER — ALBUTEROL SULFATE 90 UG/1
2 AEROSOL, METERED RESPIRATORY (INHALATION) EVERY 6 HOURS PRN
Qty: 3 INHALER | Refills: 1 | Status: SHIPPED | OUTPATIENT
Start: 2018-02-13 | End: 2019-02-26

## 2018-02-13 NOTE — TELEPHONE ENCOUNTER
Please let her know these are sent in. I have also put in a PA for chantix so let her know we'll try to get this approved if we can.

## 2018-02-14 NOTE — TELEPHONE ENCOUNTER
Prior Authorization Retail Medication Request  Medication/Dose: Chantix Starting Month Pack  Diagnosis and ICD code: Tobacco use disorder [F17.200]   New/Renewal/Insurance Change PA: Insurance change  Previously Tried and Failed Therapies: Bupropion, Nicorette gum-OTC, Nicorette lozenge-OTC    Insurance ID (if provided): [id number]  Insurance Phone (if provided):     Any additional info from fax request:     If you received a fax notification from an outside Pharmacy:  Pharmacy Name:[facility]  Pharmacy #:  Pharmacy Fax:

## 2018-02-15 NOTE — TELEPHONE ENCOUNTER
PA Initiation    Medication: Chantix Starting Month Pack - INITIATED  Insurance Company: [employer] ([facility]) - Phone [phone] Fax [phone]  Pharmacy Filling the Rx: [facility] [address] [place] [address] [facility] [address]  Filling Pharmacy Phone: [phone]  Filling Pharmacy Fax:    Start Date: 2/15/2018

## 2018-02-16 NOTE — TELEPHONE ENCOUNTER
Prior Authorization Approval    Authorization Effective Date: 2/15/2018  Authorization Expiration Date: 2/15/2019  Medication: Chantix Starting Month Pack - APPROVED  Approved Dose/Quantity: 53 FOR 28 DAYS  Reference #: [id number]   Insurance Company: [employer] ([facility]) - Phone [phone] Fax [phone]  Expected CoPay: $     CoPay Card Available:      Foundation Assistance Needed:    Which Pharmacy is filling the prescription (Not needed for infusion/clinic administered): [email or web address] [address] [place] [address] [facility] AT [address]  Pharmacy Notified: Yes  Patient Notified: Yes

## 2018-04-14 ENCOUNTER — MYC MEDICAL ADVICE (OUTPATIENT)
Dept: FAMILY MEDICINE | Facility: CLINIC | Age: 48
End: 2018-04-14

## 2018-04-27 ENCOUNTER — MYC REFILL (OUTPATIENT)
Dept: FAMILY MEDICINE | Facility: CLINIC | Age: 48
End: 2018-04-27

## 2018-04-27 DIAGNOSIS — J45.40 MODERATE PERSISTENT ASTHMA WITHOUT COMPLICATION: ICD-10-CM

## 2018-04-27 DIAGNOSIS — F17.200 TOBACCO USE DISORDER: ICD-10-CM

## 2018-04-27 NOTE — TELEPHONE ENCOUNTER
Message from [facility]:  Original authorizing provider: KASIA Villagomez Elizabethfahad would like a refill of the following medications:  varenicline (CHANTIX STARTING MONTH PAK) 0.5 MG X 11 & 1 MG X 42 tablet [David Gambino PA-C]    Preferred pharmacy: Other - [facility]    Comment:  Jeferson Bryant, I was doing really well on the Chantix, could you please refill it. I went from a pack a day down to 5-10 on it and since I have been out I am back up to almost a pack a day. I tried the gum, and it really hurt my teeth. I then tried the patches, and I couldn't stop itching. Not sure why. If you have any questions I am at work 9-5 Monday through Friday [phone]. Thank you, Marni

## 2018-04-27 NOTE — TELEPHONE ENCOUNTER
"Requested Prescriptions   Pending Prescriptions Disp Refills     varenicline (CHANTIX STARTING MONTH HANDY) 0.5 MG X 11 & 1 MG X 42 tablet  Last Written Prescription Date:  1/16/2018  Last Fill Quantity: 53 tablet,  # refills: 0   Last office visit: 1/16/2018 with prescribing provider:  RAYMUNDO Gambino   Future Office Visit:     53 tablet 0     Sig: Take 0.5 mg tab daily for 3 days, then 0.5 mg tab twice daily for 4 days, then 1 mg twice daily.    Partial Cholinergic Nicotinic Agonist Agents Passed    4/27/2018  3:50 PM       Passed - Blood pressure under 140/90 in past 12 months    BP Readings from Last 3 Encounters:   01/16/18 132/72   02/27/17 118/76   11/10/16 130/78          Passed - Recent (12 mo) or future (30 days) visit within the authorizing provider's specialty    Patient had office visit in the last 12 months or has a visit in the next 30 days with authorizing provider or within the authorizing provider's specialty.  See \"Patient Info\" tab in inbasket, or \"Choose Columns\" in Meds & Orders section of the refill encounter.           Passed - Patient is 18 years of age or older       Passed - Patient is not pregnant       Passed - No positive pregnancy test on file in past 12 months          "

## 2018-05-03 RX ORDER — VARENICLINE TARTRATE 1 MG/1
1 TABLET, FILM COATED ORAL 2 TIMES DAILY
Qty: 56 TABLET | Refills: 2 | Status: SHIPPED | OUTPATIENT
Start: 2018-05-03 | End: 2018-07-31

## 2018-05-03 NOTE — TELEPHONE ENCOUNTER
Please see [employer] message attached below.     Routing refill request to provider for review/approval because:  A break in medication    Hollie MANLEY RN, BSN, PHN  Manito Flex RN

## 2018-05-22 ENCOUNTER — TELEPHONE (OUTPATIENT)
Dept: FAMILY MEDICINE | Facility: CLINIC | Age: 48
End: 2018-05-22

## 2018-05-22 NOTE — TELEPHONE ENCOUNTER
Prior Authorization Retail Medication Request    Medication/Dose: Chantix 1MG cont single pack 56  ICD code (if different than what is on RX):  F17.200  Previously Tried and Failed:  NA  Rationale:  NA    Insurance Name:  [employer]  Insurance ID:  [id number]      Pharmacy Information (if different than what is on RX)  [facility]  Phone:  [phone]  Ena Loaiza MA

## 2018-05-24 NOTE — TELEPHONE ENCOUNTER
Central Prior Authorization Team   Phone: [phone]    PA Initiation    Medication: Chantix 1MG cont single pack 56  Insurance Company: [employer] ([facility]) - Phone [phone] Fax [phone]  Pharmacy Filling the Rx: [facility] [id number] [place] [address] [address]  Filling Pharmacy Phone: [phone]  Filling Pharmacy Fax: [phone]  Start Date: 5/24/2018    INITIATED VIA PHONE.

## 2018-05-25 NOTE — TELEPHONE ENCOUNTER
Prior Authorization Approval    Authorization Effective Date: 2/15/2018  Authorization Expiration Date: 2/15/2019  Medication: Chantix-APPROVED  Approved Dose/Quantity:    Reference #: [id number]   Insurance Company: [employer] ([facility]) - Phone [phone] Fax [phone]  Expected CoPay:       CoPay Card Available:      Foundation Assistance Needed:    Which Pharmacy is filling the prescription (Not needed for infusion/clinic administered): [facility] [address] [place] [address] [facility] [address]  Pharmacy Notified: Yes  Patient Notified: Yes

## 2018-05-31 ENCOUNTER — TELEPHONE (OUTPATIENT)
Dept: FAMILY MEDICINE | Facility: CLINIC | Age: 48
End: 2018-05-31

## 2018-05-31 DIAGNOSIS — F17.200 TOBACCO USE DISORDER: ICD-10-CM

## 2018-05-31 RX ORDER — VARENICLINE TARTRATE 1 MG/1
1 TABLET, FILM COATED ORAL 2 TIMES DAILY
Qty: 56 TABLET | Refills: 2 | Status: CANCELLED | OUTPATIENT
Start: 2018-05-31

## 2018-05-31 NOTE — TELEPHONE ENCOUNTER
New mail order pharmacy requesting Rx.       Disp Refills Start End      varenicline (CHANTIX STARTING MONTH HANDY) 0.5 MG X 11 & 1 MG X 42 tablet 53 tablet 0 5/3/2018     Sig: Take 0.5 mg tab daily for 3 days, then 0.5 mg tab twice daily for 4 days, then 1 mg twice daily.             Disp Refills Start End      varenicline (CHANTIX) 1 MG tablet 56 tablet 2 5/3/2018     Sig - Route: Take 1 tablet (1 mg) by mouth 2 times daily - Oral

## 2018-06-02 ENCOUNTER — HEALTH MAINTENANCE LETTER (OUTPATIENT)
Age: 48
End: 2018-06-02

## 2018-06-05 NOTE — TELEPHONE ENCOUNTER
Call to patient to see if she already filled the starting pack. or not-needed to know how to fill this.  LM to call back.  Eugenia Monroe, RN  Message handled by Nurse Triage.     Statement Selected

## 2018-06-11 NOTE — TELEPHONE ENCOUNTER
Called patient to f/u on Chantix prescription. Patient stated she wants the prescription sent to [facility] but was told by [facility] they couldn't transfer it.    Called [facility] to f/u. They received the transfer request from the patient and they need it to come from [facility] directly.    Called patient back to notify. LVM to return call.

## 2018-06-20 NOTE — TELEPHONE ENCOUNTER
Attempted to call patient a 2nd time: Notified patient to have [facility] call [facility] to transfer the current prescription. Patient stated understanding and is agreement with plan.    Celina PORTILLO Triage RN

## 2018-07-07 DIAGNOSIS — I10 ESSENTIAL HYPERTENSION WITH GOAL BLOOD PRESSURE LESS THAN 140/90: ICD-10-CM

## 2018-07-07 DIAGNOSIS — I25.10 CORONARY ARTERY DISEASE INVOLVING NATIVE HEART WITHOUT ANGINA PECTORIS, UNSPECIFIED VESSEL OR LESION TYPE: ICD-10-CM

## 2018-07-07 DIAGNOSIS — Z13.6 CARDIOVASCULAR SCREENING; LDL GOAL LESS THAN 130: ICD-10-CM

## 2018-07-08 NOTE — TELEPHONE ENCOUNTER
"hydrALAZINE (APRESOLINE) 50 MG tablet      Last Written Prescription Date:  2/9/18  Last Fill Quantity: 180 TABLET,   # refills: 1  Last Office Visit: 1/16/18 WITH TERENCE  Future Office visit:       Routing refill request to provider for review/approval because:  Drug not on the [facility], [facility] or [facility] refill protocol or controlled substance      Requested Prescriptions   Pending Prescriptions Disp Refills                       amLODIPine (NORVASC) 10 MG tablet [Pharmacy Med Name: AMLODIPINE  10MG  TAB]  Last Written Prescription Date:  2/9/18  Last Fill Quantity: 90 TABLET,  # refills: 1   Last office visit: 1/16/2018 with prescribing provider:  TERENCE   Future Office Visit:     90 tablet      Sig: TAKE 1 TABLET BY MOUTH  DAILY    Calcium Channel Blockers Protocol  Failed    7/7/2018  8:29 PM       Failed - Normal serum creatinine on file in past 12 months    Recent Labs   Lab Test  02/27/17   0925   CR  0.78            Passed - Blood pressure under 140/90 in past 12 months    BP Readings from Last 3 Encounters:   01/16/18 132/72   02/27/17 118/76   11/10/16 130/78                Passed - Recent (12 mo) or future (30 days) visit within the authorizing provider's specialty    Patient had office visit in the last 12 months or has a visit in the next 30 days with authorizing provider or within the authorizing provider's specialty.  See \"Patient Info\" tab in inbasket, or \"Choose Columns\" in Meds & Orders section of the refill encounter.           Passed - Patient is age 18 or older       Passed - No active pregnancy on record       Passed - No positive pregnancy test in past 12 months        atorvastatin (LIPITOR) 80 MG tablet [Pharmacy Med Name: ATORVASTATIN  80MG  TAB]  Last Written Prescription Date:  2/9/18  Last Fill Quantity: 90 TABLET,  # refills: 1   Last office visit: 1/16/2018 with prescribing provider:  TERENCE   Future Office Visit:     90 tablet      Sig: TAKE 1 TABLET BY MOUTH AT  BEDTIME    Statins Protocol " "Failed    7/7/2018  8:29 PM       Failed - LDL on file in past 12 months    Recent Labs   Lab Test  02/27/17   0925   LDL  64            Passed - No abnormal creatine kinase in past 12 months    No lab results found.            Passed - Recent (12 mo) or future (30 days) visit within the authorizing provider's specialty    Patient had office visit in the last 12 months or has a visit in the next 30 days with authorizing provider or within the authorizing provider's specialty.  See \"Patient Info\" tab in inbasket, or \"Choose Columns\" in Meds & Orders section of the refill encounter.           Passed - Patient is age 18 or older       Passed - No active pregnancy on record       Passed - No positive pregnancy test in past 12 months          "

## 2018-07-10 NOTE — TELEPHONE ENCOUNTER
Routing refill request to provider for review/approval because:  Hydralazine is not on the protocol and the pt is due for fasting labs and a physical  - labs already futured - do you want hiv screen also?    Will forward to the station, please call the pt and help her schedule an appt for a physical and fasting labs.  Thanks!

## 2018-07-11 DIAGNOSIS — F43.22 ADJUSTMENT DISORDER WITH ANXIETY: ICD-10-CM

## 2018-07-11 RX ORDER — HYDRALAZINE HYDROCHLORIDE 50 MG/1
TABLET, FILM COATED ORAL
Qty: 180 TABLET | Refills: 0 | Status: SHIPPED | OUTPATIENT
Start: 2018-07-11 | End: 2018-09-17

## 2018-07-11 RX ORDER — ATORVASTATIN CALCIUM 80 MG/1
TABLET, FILM COATED ORAL
Qty: 90 TABLET | Refills: 0 | Status: SHIPPED | OUTPATIENT
Start: 2018-07-11 | End: 2018-09-17

## 2018-07-11 RX ORDER — AMLODIPINE BESYLATE 10 MG/1
TABLET ORAL
Qty: 90 TABLET | Refills: 0 | Status: SHIPPED | OUTPATIENT
Start: 2018-07-11 | End: 2018-09-17

## 2018-07-11 NOTE — TELEPHONE ENCOUNTER
Please call Marni. I will fill x 3 months but before refills after that I would like to see her in clinic and she needs fasting lab work.

## 2018-07-12 NOTE — TELEPHONE ENCOUNTER
"Requested Prescriptions   Pending Prescriptions Disp Refills     citalopram (CELEXA) 20 MG tablet [Pharmacy Med Name: CITALOPRAM  20MG  TAB]  Last Written Prescription Date:  02/12/2018  Last Fill Quantity: 90,  # refills: 1   Last office visit: 1/16/2018 with prescribing provider:  01/16/2018   Future Office Visit:   Next 5 appointments (look out 90 days)     Aug 23, 2018  8:00 AM CDT   SHORT with David Gambino PA-C   [address] [address] [phone]                  90 tablet      Sig: TAKE 1 TABLET BY MOUTH  DAILY    SSRIs Protocol Passed    7/11/2018  8:24 PM       Passed - Recent (12 mo) or future (30 days) visit within the authorizing provider's specialty    Patient had office visit in the last 12 months or has a visit in the next 30 days with authorizing provider or within the authorizing provider's specialty.  See \"Patient Info\" tab in inbasket, or \"Choose Columns\" in Meds & Orders section of the refill encounter.           Passed - Patient is age 18 or older       Passed - No active pregnancy on record       Passed - No positive pregnancy test in last 12 months          "

## 2018-07-16 RX ORDER — CITALOPRAM HYDROBROMIDE 20 MG/1
TABLET ORAL
Qty: 90 TABLET | Refills: 0 | Status: SHIPPED | OUTPATIENT
Start: 2018-07-16 | End: 2018-09-17

## 2018-07-16 NOTE — TELEPHONE ENCOUNTER
Prescription approved per [facility] Refill Protocol.  Eugenia Monroe RN  Message handled by Nurse Triage.

## 2018-07-17 ENCOUNTER — TELEPHONE (OUTPATIENT)
Dept: FAMILY MEDICINE | Facility: CLINIC | Age: 48
End: 2018-07-17

## 2018-07-17 NOTE — TELEPHONE ENCOUNTER
Patient calling with difficulty breathing, is all stuffed up with a cold. Sitting in front of fan to cool off and catch breath. Already taken albuterol inhaler (2 puffs 1 hour ago. Now getting nebulizer set up. Has her  and father-in-law with her. Taking all medications as prescribed.     Patient still smoking but has only had about 4 or 5 since yesterday.     Patient having chest pain related to SOB and coughing.     Advised ED. Patient starting neb and gave verbal consent to discuss with . Advised  they should go into ED after she finishes with nebulizer. He is in agreement.    Celina PORTILLO, Triage RN

## 2018-07-18 ENCOUNTER — HOSPITAL ENCOUNTER (INPATIENT)
Facility: CLINIC | Age: 48
LOS: 3 days | Discharge: HOME | DRG: 291 | End: 2018-07-21
Attending: EMERGENCY MEDICINE | Admitting: INTERNAL MEDICINE
Payer: COMMERCIAL

## 2018-07-18 ENCOUNTER — TELEPHONE (OUTPATIENT)
Dept: FAMILY MEDICINE | Facility: CLINIC | Age: 48
End: 2018-07-18

## 2018-07-18 ENCOUNTER — APPOINTMENT (OUTPATIENT)
Dept: GENERAL RADIOLOGY | Facility: CLINIC | Age: 48
DRG: 291 | End: 2018-07-18
Attending: EMERGENCY MEDICINE
Payer: COMMERCIAL

## 2018-07-18 DIAGNOSIS — J44.1 COPD EXACERBATION (H): ICD-10-CM

## 2018-07-18 LAB
ANION GAP SERPL CALCULATED.3IONS-SCNC: 6 MMOL/L (ref 3–14)
BASE DEFICIT BLDV-SCNC: NORMAL MMOL/L
BASE EXCESS BLDV CALC-SCNC: 1.1 MMOL/L
BASE EXCESS BLDV CALC-SCNC: NORMAL MMOL/L
BASOPHILS # BLD AUTO: 0.1 10E9/L (ref 0–0.2)
BASOPHILS NFR BLD AUTO: 0.4 %
BUN SERPL-MCNC: 10 MG/DL (ref 7–30)
CALCIUM SERPL-MCNC: 8.6 MG/DL (ref 8.5–10.1)
CHLORIDE SERPL-SCNC: 105 MMOL/L (ref 94–109)
CO2 SERPL-SCNC: 26 MMOL/L (ref 20–32)
CREAT SERPL-MCNC: 0.74 MG/DL (ref 0.52–1.04)
D DIMER PPP FEU-MCNC: <0.3 UG/ML FEU (ref 0–0.5)
DIFFERENTIAL METHOD BLD: ABNORMAL
EOSINOPHIL # BLD AUTO: 0.1 10E9/L (ref 0–0.7)
EOSINOPHIL NFR BLD AUTO: 0.5 %
ERYTHROCYTE [DISTWIDTH] IN BLOOD BY AUTOMATED COUNT: 12.8 % (ref 10–15)
GFR SERPL CREATININE-BSD FRML MDRD: 83 ML/MIN/1.7M2
GLUCOSE SERPL-MCNC: 127 MG/DL (ref 70–99)
HCO3 BLDV-SCNC: 26 MMOL/L (ref 21–28)
HCO3 BLDV-SCNC: NORMAL MMOL/L (ref 21–28)
HCT VFR BLD AUTO: 45.4 % (ref 35–47)
HGB BLD-MCNC: 15.2 G/DL (ref 11.7–15.7)
IMM GRANULOCYTES # BLD: 0.2 10E9/L (ref 0–0.4)
IMM GRANULOCYTES NFR BLD: 1.2 %
INTERPRETATION ECG - MUSE: NORMAL
LACTATE BLD-SCNC: 1.4 MMOL/L (ref 0.4–1.9)
LYMPHOCYTES # BLD AUTO: 1 10E9/L (ref 0.8–5.3)
LYMPHOCYTES NFR BLD AUTO: 8 %
MCH RBC QN AUTO: 29.3 PG (ref 26.5–33)
MCHC RBC AUTO-ENTMCNC: 33.5 G/DL (ref 31.5–36.5)
MCV RBC AUTO: 88 FL (ref 78–100)
MONOCYTES # BLD AUTO: 0.7 10E9/L (ref 0–1.3)
MONOCYTES NFR BLD AUTO: 6.1 %
NEUTROPHILS # BLD AUTO: 10.1 10E9/L (ref 1.6–8.3)
NEUTROPHILS NFR BLD AUTO: 83.8 %
NRBC # BLD AUTO: 0 10*3/UL
NRBC BLD AUTO-RTO: 0 /100
NT-PROBNP SERPL-MCNC: 68 PG/ML (ref 0–900)
O2/TOTAL GAS SETTING VFR VENT: NORMAL %
O2/TOTAL GAS SETTING VFR VENT: NORMAL %
OXYHGB MFR BLDV: 75 %
OXYHGB MFR BLDV: NORMAL %
PCO2 BLDV: 42 MM HG (ref 40–50)
PCO2 BLDV: NORMAL MM HG (ref 40–50)
PH BLDV: 7.4 PH (ref 7.32–7.43)
PH BLDV: NORMAL PH (ref 7.32–7.43)
PLATELET # BLD AUTO: 256 10E9/L (ref 150–450)
PO2 BLDV: 40 MM HG (ref 25–47)
PO2 BLDV: NORMAL MM HG (ref 25–47)
POTASSIUM SERPL-SCNC: 3.5 MMOL/L (ref 3.4–5.3)
RBC # BLD AUTO: 5.18 10E12/L (ref 3.8–5.2)
SODIUM SERPL-SCNC: 137 MMOL/L (ref 133–144)
TROPONIN I BLD-MCNC: 0 UG/L (ref 0–0.1)
TROPONIN I SERPL-MCNC: <0.015 UG/L (ref 0–0.04)
TROPONIN I SERPL-MCNC: <0.015 UG/L (ref 0–0.04)
WBC # BLD AUTO: 12.1 10E9/L (ref 4–11)

## 2018-07-18 PROCEDURE — 94640 AIRWAY INHALATION TREATMENT: CPT

## 2018-07-18 PROCEDURE — 99223 1ST HOSP IP/OBS HIGH 75: CPT | Mod: AI | Performed by: INTERNAL MEDICINE

## 2018-07-18 PROCEDURE — 25000132 ZZH RX MED GY IP 250 OP 250 PS 637: Performed by: PHYSICIAN ASSISTANT

## 2018-07-18 PROCEDURE — 25000128 H RX IP 250 OP 636: Performed by: PHYSICIAN ASSISTANT

## 2018-07-18 PROCEDURE — 71046 X-RAY EXAM CHEST 2 VIEWS: CPT

## 2018-07-18 PROCEDURE — 83880 ASSAY OF NATRIURETIC PEPTIDE: CPT | Performed by: EMERGENCY MEDICINE

## 2018-07-18 PROCEDURE — 85379 FIBRIN DEGRADATION QUANT: CPT | Performed by: EMERGENCY MEDICINE

## 2018-07-18 PROCEDURE — 83605 ASSAY OF LACTIC ACID: CPT | Performed by: INTERNAL MEDICINE

## 2018-07-18 PROCEDURE — 36415 COLL VENOUS BLD VENIPUNCTURE: CPT | Performed by: EMERGENCY MEDICINE

## 2018-07-18 PROCEDURE — 84484 ASSAY OF TROPONIN QUANT: CPT

## 2018-07-18 PROCEDURE — 12000007 ZZH R&B INTERMEDIATE

## 2018-07-18 PROCEDURE — 84484 ASSAY OF TROPONIN QUANT: CPT | Performed by: EMERGENCY MEDICINE

## 2018-07-18 PROCEDURE — 96374 THER/PROPH/DIAG INJ IV PUSH: CPT

## 2018-07-18 PROCEDURE — 25000132 ZZH RX MED GY IP 250 OP 250 PS 637: Performed by: INTERNAL MEDICINE

## 2018-07-18 PROCEDURE — 36415 COLL VENOUS BLD VENIPUNCTURE: CPT | Performed by: INTERNAL MEDICINE

## 2018-07-18 PROCEDURE — 25000125 ZZHC RX 250: Performed by: EMERGENCY MEDICINE

## 2018-07-18 PROCEDURE — 80048 BASIC METABOLIC PNL TOTAL CA: CPT | Performed by: EMERGENCY MEDICINE

## 2018-07-18 PROCEDURE — 94640 AIRWAY INHALATION TREATMENT: CPT | Mod: 76

## 2018-07-18 PROCEDURE — 99207 ZZC APP CREDIT; MD BILLING SHARED VISIT: CPT | Performed by: PHYSICIAN ASSISTANT

## 2018-07-18 PROCEDURE — 99285 EMERGENCY DEPT VISIT HI MDM: CPT | Mod: 25

## 2018-07-18 PROCEDURE — 82805 BLOOD GASES W/O2 SATURATION: CPT | Performed by: EMERGENCY MEDICINE

## 2018-07-18 PROCEDURE — 40000275 ZZH STATISTIC RCP TIME EA 10 MIN

## 2018-07-18 PROCEDURE — 25000128 H RX IP 250 OP 636: Performed by: INTERNAL MEDICINE

## 2018-07-18 PROCEDURE — 93005 ELECTROCARDIOGRAM TRACING: CPT

## 2018-07-18 PROCEDURE — 84484 ASSAY OF TROPONIN QUANT: CPT | Performed by: INTERNAL MEDICINE

## 2018-07-18 PROCEDURE — 85025 COMPLETE CBC W/AUTO DIFF WBC: CPT | Performed by: EMERGENCY MEDICINE

## 2018-07-18 PROCEDURE — 99207 ZZC CDG-CHARGE REQUIRED MANUAL ENTRY: CPT | Performed by: INTERNAL MEDICINE

## 2018-07-18 PROCEDURE — 25000128 H RX IP 250 OP 636: Performed by: EMERGENCY MEDICINE

## 2018-07-18 PROCEDURE — 25000125 ZZHC RX 250: Performed by: INTERNAL MEDICINE

## 2018-07-18 PROCEDURE — 96375 TX/PRO/DX INJ NEW DRUG ADDON: CPT

## 2018-07-18 PROCEDURE — 25000125 ZZHC RX 250

## 2018-07-18 RX ORDER — CITALOPRAM HYDROBROMIDE 20 MG/1
20 TABLET ORAL DAILY
Status: DISCONTINUED | OUTPATIENT
Start: 2018-07-18 | End: 2018-07-21 | Stop reason: HOSPADM

## 2018-07-18 RX ORDER — FUROSEMIDE 10 MG/ML
20 INJECTION INTRAMUSCULAR; INTRAVENOUS
Status: COMPLETED | OUTPATIENT
Start: 2018-07-18 | End: 2018-07-20

## 2018-07-18 RX ORDER — AMLODIPINE BESYLATE 10 MG/1
10 TABLET ORAL DAILY
Status: DISCONTINUED | OUTPATIENT
Start: 2018-07-19 | End: 2018-07-21 | Stop reason: HOSPADM

## 2018-07-18 RX ORDER — PROCHLORPERAZINE MALEATE 5 MG
10 TABLET ORAL EVERY 6 HOURS PRN
Status: DISCONTINUED | OUTPATIENT
Start: 2018-07-18 | End: 2018-07-21 | Stop reason: HOSPADM

## 2018-07-18 RX ORDER — AMOXICILLIN 250 MG
2 CAPSULE ORAL 2 TIMES DAILY
Status: DISCONTINUED | OUTPATIENT
Start: 2018-07-18 | End: 2018-07-21 | Stop reason: HOSPADM

## 2018-07-18 RX ORDER — ATORVASTATIN CALCIUM 40 MG/1
80 TABLET, FILM COATED ORAL EVERY EVENING
Status: DISCONTINUED | OUTPATIENT
Start: 2018-07-18 | End: 2018-07-21 | Stop reason: HOSPADM

## 2018-07-18 RX ORDER — CETIRIZINE HYDROCHLORIDE 10 MG/1
10 TABLET ORAL DAILY
Status: DISCONTINUED | OUTPATIENT
Start: 2018-07-19 | End: 2018-07-21 | Stop reason: HOSPADM

## 2018-07-18 RX ORDER — HYDRALAZINE HYDROCHLORIDE 50 MG/1
50 TABLET, FILM COATED ORAL 2 TIMES DAILY
Status: DISCONTINUED | OUTPATIENT
Start: 2018-07-18 | End: 2018-07-21 | Stop reason: HOSPADM

## 2018-07-18 RX ORDER — NICOTINE 21 MG/24HR
1 PATCH, TRANSDERMAL 24 HOURS TRANSDERMAL DAILY
Status: DISCONTINUED | OUTPATIENT
Start: 2018-07-18 | End: 2018-07-20

## 2018-07-18 RX ORDER — ACETAMINOPHEN 325 MG/1
650 TABLET ORAL EVERY 4 HOURS PRN
Status: DISCONTINUED | OUTPATIENT
Start: 2018-07-18 | End: 2018-07-21 | Stop reason: HOSPADM

## 2018-07-18 RX ORDER — IPRATROPIUM BROMIDE AND ALBUTEROL SULFATE 2.5; .5 MG/3ML; MG/3ML
3 SOLUTION RESPIRATORY (INHALATION) ONCE
Status: COMPLETED | OUTPATIENT
Start: 2018-07-18 | End: 2018-07-18

## 2018-07-18 RX ORDER — METHYLPREDNISOLONE SODIUM SUCCINATE 125 MG/2ML
60 INJECTION, POWDER, LYOPHILIZED, FOR SOLUTION INTRAMUSCULAR; INTRAVENOUS DAILY
Status: CANCELLED | OUTPATIENT
Start: 2018-07-18

## 2018-07-18 RX ORDER — ASPIRIN 81 MG/1
81 TABLET ORAL DAILY
Status: DISCONTINUED | OUTPATIENT
Start: 2018-07-19 | End: 2018-07-21 | Stop reason: HOSPADM

## 2018-07-18 RX ORDER — ALBUTEROL SULFATE 0.83 MG/ML
2.5 SOLUTION RESPIRATORY (INHALATION) EVERY 6 HOURS PRN
COMMUNITY
End: 2018-12-21

## 2018-07-18 RX ORDER — ALBUTEROL SULFATE 0.83 MG/ML
3 SOLUTION RESPIRATORY (INHALATION)
Status: DISCONTINUED | OUTPATIENT
Start: 2018-07-18 | End: 2018-07-21 | Stop reason: HOSPADM

## 2018-07-18 RX ORDER — ONDANSETRON 2 MG/ML
4 INJECTION INTRAMUSCULAR; INTRAVENOUS EVERY 6 HOURS PRN
Status: DISCONTINUED | OUTPATIENT
Start: 2018-07-18 | End: 2018-07-21 | Stop reason: HOSPADM

## 2018-07-18 RX ORDER — IPRATROPIUM BROMIDE AND ALBUTEROL SULFATE 2.5; .5 MG/3ML; MG/3ML
3 SOLUTION RESPIRATORY (INHALATION)
Status: DISCONTINUED | OUTPATIENT
Start: 2018-07-18 | End: 2018-07-21 | Stop reason: HOSPADM

## 2018-07-18 RX ORDER — PROCHLORPERAZINE 25 MG
25 SUPPOSITORY, RECTAL RECTAL EVERY 12 HOURS PRN
Status: DISCONTINUED | OUTPATIENT
Start: 2018-07-18 | End: 2018-07-21 | Stop reason: HOSPADM

## 2018-07-18 RX ORDER — BENZONATATE 100 MG/1
100 CAPSULE ORAL 3 TIMES DAILY
Status: DISCONTINUED | OUTPATIENT
Start: 2018-07-18 | End: 2018-07-21 | Stop reason: HOSPADM

## 2018-07-18 RX ORDER — CODEINE PHOSPHATE AND GUAIFENESIN 10; 100 MG/5ML; MG/5ML
5-10 SOLUTION ORAL EVERY 4 HOURS PRN
Status: DISCONTINUED | OUTPATIENT
Start: 2018-07-18 | End: 2018-07-21 | Stop reason: HOSPADM

## 2018-07-18 RX ORDER — IPRATROPIUM BROMIDE AND ALBUTEROL SULFATE 2.5; .5 MG/3ML; MG/3ML
3 SOLUTION RESPIRATORY (INHALATION) EVERY 4 HOURS
Status: DISCONTINUED | OUTPATIENT
Start: 2018-07-18 | End: 2018-07-18

## 2018-07-18 RX ORDER — FUROSEMIDE 10 MG/ML
20 INJECTION INTRAMUSCULAR; INTRAVENOUS ONCE
Status: COMPLETED | OUTPATIENT
Start: 2018-07-18 | End: 2018-07-18

## 2018-07-18 RX ORDER — IPRATROPIUM BROMIDE AND ALBUTEROL SULFATE 2.5; .5 MG/3ML; MG/3ML
SOLUTION RESPIRATORY (INHALATION)
Status: COMPLETED
Start: 2018-07-18 | End: 2018-07-18

## 2018-07-18 RX ORDER — AMOXICILLIN 250 MG
1 CAPSULE ORAL 2 TIMES DAILY
Status: DISCONTINUED | OUTPATIENT
Start: 2018-07-18 | End: 2018-07-21 | Stop reason: HOSPADM

## 2018-07-18 RX ORDER — NALOXONE HYDROCHLORIDE 0.4 MG/ML
.1-.4 INJECTION, SOLUTION INTRAMUSCULAR; INTRAVENOUS; SUBCUTANEOUS
Status: DISCONTINUED | OUTPATIENT
Start: 2018-07-18 | End: 2018-07-21 | Stop reason: HOSPADM

## 2018-07-18 RX ORDER — CYCLOBENZAPRINE HCL 5 MG
5 TABLET ORAL 3 TIMES DAILY PRN
Status: DISCONTINUED | OUTPATIENT
Start: 2018-07-18 | End: 2018-07-21 | Stop reason: HOSPADM

## 2018-07-18 RX ORDER — PREDNISONE 20 MG/1
40 TABLET ORAL
Status: DISCONTINUED | OUTPATIENT
Start: 2018-07-18 | End: 2018-07-19

## 2018-07-18 RX ORDER — CETIRIZINE HYDROCHLORIDE 10 MG/1
10 TABLET ORAL DAILY
COMMUNITY

## 2018-07-18 RX ORDER — SODIUM CHLORIDE 9 MG/ML
INJECTION, SOLUTION INTRAVENOUS CONTINUOUS
Status: DISCONTINUED | OUTPATIENT
Start: 2018-07-18 | End: 2018-07-19

## 2018-07-18 RX ORDER — METHYLPREDNISOLONE SODIUM SUCCINATE 125 MG/2ML
125 INJECTION, POWDER, LYOPHILIZED, FOR SOLUTION INTRAMUSCULAR; INTRAVENOUS ONCE
Status: COMPLETED | OUTPATIENT
Start: 2018-07-18 | End: 2018-07-18

## 2018-07-18 RX ORDER — NALOXONE HYDROCHLORIDE 0.4 MG/ML
.1-.4 INJECTION, SOLUTION INTRAMUSCULAR; INTRAVENOUS; SUBCUTANEOUS
Status: DISCONTINUED | OUTPATIENT
Start: 2018-07-18 | End: 2018-07-19

## 2018-07-18 RX ORDER — ONDANSETRON 4 MG/1
4 TABLET, ORALLY DISINTEGRATING ORAL EVERY 6 HOURS PRN
Status: DISCONTINUED | OUTPATIENT
Start: 2018-07-18 | End: 2018-07-21 | Stop reason: HOSPADM

## 2018-07-18 RX ADMIN — ATORVASTATIN CALCIUM 80 MG: 40 TABLET, FILM COATED ORAL at 19:45

## 2018-07-18 RX ADMIN — METHYLPREDNISOLONE SODIUM SUCCINATE 125 MG: 125 INJECTION, POWDER, FOR SOLUTION INTRAMUSCULAR; INTRAVENOUS at 12:24

## 2018-07-18 RX ADMIN — PREDNISONE 40 MG: 20 TABLET ORAL at 18:31

## 2018-07-18 RX ADMIN — CITALOPRAM HYDROBROMIDE 20 MG: 20 TABLET ORAL at 18:31

## 2018-07-18 RX ADMIN — SODIUM CHLORIDE: 9 INJECTION, SOLUTION INTRAVENOUS at 18:39

## 2018-07-18 RX ADMIN — GUAIFENESIN AND DEXTROMETHORPHAN HYDROBROMIDE 2 TABLET: 600; 30 TABLET, EXTENDED RELEASE ORAL at 18:31

## 2018-07-18 RX ADMIN — NICOTINE POLACRILEX 2 MG: 2 GUM, CHEWING ORAL at 19:45

## 2018-07-18 RX ADMIN — IPRATROPIUM BROMIDE AND ALBUTEROL SULFATE 3 ML: .5; 3 SOLUTION RESPIRATORY (INHALATION) at 19:12

## 2018-07-18 RX ADMIN — IPRATROPIUM BROMIDE AND ALBUTEROL SULFATE 3 ML: .5; 3 SOLUTION RESPIRATORY (INHALATION) at 12:31

## 2018-07-18 RX ADMIN — ENOXAPARIN SODIUM 40 MG: 40 INJECTION SUBCUTANEOUS at 18:32

## 2018-07-18 RX ADMIN — IPRATROPIUM BROMIDE AND ALBUTEROL SULFATE 3 ML: .5; 3 SOLUTION RESPIRATORY (INHALATION) at 12:15

## 2018-07-18 RX ADMIN — IPRATROPIUM BROMIDE AND ALBUTEROL SULFATE 3 ML: .5; 3 SOLUTION RESPIRATORY (INHALATION) at 13:37

## 2018-07-18 RX ADMIN — BENZONATATE 100 MG: 100 CAPSULE ORAL at 18:31

## 2018-07-18 RX ADMIN — BENZONATATE 100 MG: 100 CAPSULE ORAL at 22:01

## 2018-07-18 RX ADMIN — FUROSEMIDE 20 MG: 10 INJECTION, SOLUTION INTRAVENOUS at 16:35

## 2018-07-18 RX ADMIN — IPRATROPIUM BROMIDE AND ALBUTEROL SULFATE 3 ML: 2.5; .5 SOLUTION RESPIRATORY (INHALATION) at 12:31

## 2018-07-18 RX ADMIN — FUROSEMIDE 20 MG: 10 INJECTION, SOLUTION INTRAVENOUS at 18:31

## 2018-07-18 RX ADMIN — HYDRALAZINE HYDROCHLORIDE 50 MG: 50 TABLET, FILM COATED ORAL at 22:01

## 2018-07-18 RX ADMIN — CYCLOBENZAPRINE HYDROCHLORIDE 5 MG: 5 TABLET, FILM COATED ORAL at 19:45

## 2018-07-18 ASSESSMENT — ACTIVITIES OF DAILY LIVING (ADL): ADLS_ACUITY_SCORE: 11

## 2018-07-18 ASSESSMENT — ENCOUNTER SYMPTOMS
SHORTNESS OF BREATH: 1
JOINT SWELLING: 1
CHEST TIGHTNESS: 1
FEVER: 0
COUGH: 1

## 2018-07-18 NOTE — IP AVS SNAPSHOT
MRN:[id number]                      After Visit Summary   7/18/2018    Marni Austin    MRN: [id number]           Thank you!     Thank you for choosing [facility] for your care. Our goal is always to provide you with excellent care. Hearing back from our patients is one way we can continue to improve our services. Please take a few minutes to complete the written survey that you may receive in the mail after you visit. If you would like to speak to someone directly about your visit please contact Patient Relations at [phone]. Thank you!          Patient Information     Date Of Birth          1970        Designated Caregiver       Most Recent Value    Caregiver    Will someone help with your care after discharge? yes    Name of designated caregiver Julian [spouse]    Phone number of caregiver [phone]     Caregiver address [place]       About your hospital stay     You were admitted on:  July 18, 2018 You last received care in the:  [place] [address] [facility]    You were discharged on:  July 21, 2018       Who to Call     For medical emergencies, please call 911.  For non-urgent questions about your medical care, please call your primary care provider or clinic, [phone]          Attending Provider     Provider Specialty    Aamir Anderson MD Emergency Medicine    [facility]       Primary Care Provider Office Phone # Fax #    David Gambino PA-C [phone]      After Care Instructions     Activity       Your activity upon discharge: activity as tolerated            Diet       Follow this diet upon discharge: Cardiac, 2 gram salt                  Follow-up Appointments     Follow-up and recommended labs and tests        Follow up with primary care provider, David Gambino, within 7 days for hospital follow- up.  The following labs/tests are recommended: BMP in 7 days.                  Your next 10  "appointments already scheduled     Jul 31, 2018  4:40 PM CDT   [facility]-C   [facility]    [address] [address] [phone]           Bring a current list of meds and any records pertaining to this visit. For Physicals, please bring immunization records and any forms needing to be filled out. Please arrive 10 minutes early to complete paperwork.            Aug 23, 2018  8:00 AM CDT   SHORT with David Gambino PA-C   [facility]    [address] [address] [phone]            Aug 29, 2018  5:00 PM CDT   New Sleep Patient with Charlie Serrano MD   [facility]    [address]  [facility] [address] [phone]              Pending Results     No orders found from 7/16/2018 to 7/19/2018.            Statement of Approval     Ordered          07/21/18 1027  I have reviewed and agree with all the recommendations and orders detailed in this document.  EFFECTIVE NOW     Approved and electronically signed by:  Brent Aviles,              Admission Information     Date & Time Provider Department Dept. Phone    7/18/2018 Carlton Nowak MD [place] [address] [facility] [phone]      Your Vitals Were     Blood Pressure Pulse Temperature Respirations Height Weight    124/57 (BP Location: Right arm) 72 96.8  F (36  C) (Oral) 19 1.549 m (5' 1\") 90 kg (198 lb 6.4 oz)    Last Period Pulse Oximetry BMI (Body Mass Index)             08/30/2013 95% 37.49 kg/m2         MyChart Information     [facility] gives you secure access to your electronic health record. If you see a primary care provider, you can also send messages to your care team and make appointments. If you have questions, please call your primary care clinic.  If you do not have a primary care " provider, please call [phone] and they will assist you.        Care EveryWhere ID     This is your Care EveryWhere ID. This could be used by other organizations to access your [facility] medical records  [id number]        Equal Access to Services     JOSE GUADALUPE GONZALEZ : Hadii aad ku hadjamjeannette Jayne, waaxda luqadaha, qaybta kaalmada adevivien, zenon silviain hayaavictor manuel mckay arnaudbrandy pink. So [facility] [phone].    ATENCIÓN: Si habla español, tiene a haywood disposición servicios gratuitos de asistencia lingüística. Llame al [phone].    We comply with applicable federal civil rights laws and [place] laws. We do not discriminate on the basis of race, color, national origin, age, disability, sex, sexual orientation, or gender identity.               Review of your medicines      START taking        Dose / Directions    amoxicillin-clavulanate 875-125 MG per tablet   Commonly known as:  AUGMENTIN   Used for:  COPD exacerbation (H)        Dose:  1 tablet   Take 1 tablet by mouth 2 times daily for 4 days   Quantity:  8 tablet   Refills:  0       benzonatate 100 MG capsule   Commonly known as:  TESSALON   Used for:  COPD exacerbation (H)        Dose:  100 mg   Take 1 capsule (100 mg) by mouth 3 times daily as needed for cough   Quantity:  30 capsule   Refills:  0       cyclobenzaprine 5 MG tablet   Commonly known as:  FLEXERIL   Used for:  COPD exacerbation (H)        Dose:  5 mg   Take 1 tablet (5 mg) by mouth 2 times daily as needed for muscle spasms   Quantity:  10 tablet   Refills:  0       dextromethorphan-guaiFENesin  MG per 12 hr tablet   Commonly known as:  MUCINEX DM   Used for:  COPD exacerbation (H)        Dose:  2 tablet   Take 2 tablets by mouth 2 times daily   Quantity:  20 tablet   Refills:  0       potassium chloride SA 10 MEQ CR tablet   Commonly known as:  K-DUR/KLOR-CON M   Used for:  COPD exacerbation (H)        Dose:  10 mEq   Take 1 tablet (10 mEq) by mouth daily   Quantity:  30 tablet   Refills:   0       predniSONE 10 MG tablet   Commonly known as:  DELTASONE   Used for:  COPD exacerbation (H)        4 tabs daily for 2 days, then 3 tabs daily for 2 days, then 2 tabs daily for 2 days, then 1 tab daily for 2 days, then stop   Quantity:  20 tablet   Refills:  0         CONTINUE these medicines which have NOT CHANGED        Dose / Directions    * albuterol (2.5 MG/3ML) 0.083% neb solution        Dose:  2.5 mg   Take 2.5 mg by nebulization every 6 hours as needed for shortness of breath / dyspnea or wheezing   Refills:  0       * albuterol 108 (90 Base) MCG/ACT Inhaler   Commonly known as:  PROAIR HFA/PROVENTIL HFA/VENTOLIN HFA   Used for:  Moderate persistent asthma without complication        Dose:  2 puff   Inhale 2 puffs into the lungs every 6 hours as needed for shortness of breath / dyspnea or wheezing   Quantity:  3 Inhaler   Refills:  1       amLODIPine 10 MG tablet   Commonly known as:  NORVASC   Used for:  Essential hypertension with goal blood pressure less than 140/90        TAKE 1 TABLET BY MOUTH  DAILY   Quantity:  90 tablet   Refills:  0       aspirin 81 MG EC tablet   Used for:  NSTEMI (non-ST elevated myocardial infarction) (H)        Dose:  81 mg   Take 1 tablet (81 mg) by mouth daily   Refills:  0       atorvastatin 80 MG tablet   Commonly known as:  LIPITOR   Used for:  Coronary artery disease involving native heart without angina pectoris, unspecified vessel or lesion type, CARDIOVASCULAR SCREENING; LDL GOAL LESS THAN 130        TAKE 1 TABLET BY MOUTH AT  BEDTIME   Quantity:  90 tablet   Refills:  0       cetirizine 10 MG tablet   Commonly known as:  zyrTEC        Dose:  10 mg   Take 10 mg by mouth daily   Refills:  0       citalopram 20 MG tablet   Commonly known as:  celeXA   Used for:  Adjustment disorder with anxiety        TAKE 1 TABLET BY MOUTH  DAILY   Quantity:  90 tablet   Refills:  0       clotrimazole 1 % cream   Commonly known as:  LOTRIMIN   Used for:  Maculopapular rash, Itching         Apply topically 2 times daily   Quantity:  30 g   Refills:  1       Fluticasone-Salmeterol 232-14 MCG/ACT Aepb inhaler   Commonly known as:  AIRDUO RESPICLICK   Used for:  Chronic obstructive pulmonary disease, unspecified COPD type (H), Moderate persistent asthma without complication        Dose:  1 puff   Inhale 1 puff into the lungs 2 times daily   Quantity:  1 each   Refills:  5       hydrALAZINE 50 MG tablet   Commonly known as:  APRESOLINE   Used for:  Essential hypertension with goal blood pressure less than 140/90        TAKE 1 TABLET BY MOUTH  TWICE A DAY   Quantity:  180 tablet   Refills:  0       ipratropium - albuterol 0.5 mg/2.5 mg/3 mL 0.5-2.5 (3) MG/3ML neb solution   Commonly known as:  DUONEB   Used for:  Chronic obstructive pulmonary disease with acute exacerbation (H), Moderate persistent asthma with acute exacerbation        Dose:  1 vial   Take 1 vial (3 mLs) by nebulization every 4 hours as needed for shortness of breath / dyspnea or wheezing   Quantity:  360 mL   Refills:  1       nicotine polacrilex 4 MG gum   Commonly known as:  NICORETTE   Used for:  Tobacco use disorder        Dose:  4 mg   Place 1 each (4 mg) inside cheek as needed for smoking cessation   Quantity:  270 tablet   Refills:  0       nitroGLYcerin 0.4 MG sublingual tablet   Commonly known as:  NITROSTAT   Used for:  NSTEMI (non-ST elevated myocardial infarction) (H)        Dose:  0.4 mg   Place 1 tablet (0.4 mg) under the tongue every 5 minutes as needed for chest pain   Quantity:  25 tablet   Refills:  3       varenicline 1 MG tablet   Commonly known as:  CHANTIX   Used for:  Tobacco use disorder        Dose:  1 mg   Take 1 tablet (1 mg) by mouth 2 times daily   Quantity:  56 tablet   Refills:  2       * Notice:  This list has 2 medication(s) that are the same as other medications prescribed for you. Read the directions carefully, and ask your doctor or other care provider to review them with you.      STOP taking      potassium 99 MG Tabs                Where to get your medicines      These medications were sent to [place], [place] - [address] [address]  [address] [address] [address]     Phone:  [phone]     amoxicillin-clavulanate 875-125 MG per tablet    benzonatate 100 MG capsule    cyclobenzaprine 5 MG tablet    dextromethorphan-guaiFENesin  MG per 12 hr tablet    potassium chloride SA 10 MEQ CR tablet    predniSONE 10 MG tablet                Protect others around you: Learn how to safely use, store and throw away your medicines at www.disposemymeds.org.        ANTIBIOTIC INSTRUCTION     You've Been Prescribed an Antibiotic - Now What?  Your healthcare team thinks that you or your loved one might have an infection. Some infections can be treated with antibiotics, which are powerful, life-saving drugs. Like all medications, antibiotics have side effects and should only be used when necessary. There are some important things you should know about your antibiotic treatment.      Your healthcare team may run tests before you start taking an antibiotic.    Your team may take samples (e.g., from your blood, urine or other areas) to run tests to look for bacteria. These test can be important to determine if you need an antibiotic at all and, if you do, which antibiotic will work best.      Within a few days, your healthcare team might change or even stop your antibiotic.    Your team may start you on an antibiotic while they are working to find out what is making you sick.    Your team might change your antibiotic because test results show that a different antibiotic would be better to treat your infection.    In some cases, once your team has more information, they learn that you do not need an antibiotic at all. They may find out that you don't have an infection, or that the antibiotic you're taking won't work against your infection. For example, an infection caused by a  virus can't be treated with antibiotics. Staying on an antibiotic when you don't need it is more likely to be harmful than helpful.      You may experience side effects from your antibiotic.    Like all medications, antibiotics have side effects. Some of these can be serious.    Let you healthcare team know if you have any known allergies when you are admitted to the hospital.    One significant side effect of nearly all antibiotics is the risk of severe and sometimes deadly diarrhea caused by Clostridium difficile (C. Difficile). This occurs when a person takes antibiotics because some good germs are destroyed. Antibiotic use allows C. diificile to take over, putting patients at high risk for this serious infection.    As a patient or caregiver, it is important to understand your or your loved one's antibiotic treatment. It is especially important for caregivers to speak up when patients can't speak for themselves. Here are some important questions to ask your healthcare team.    What infection is this antibiotic treating and how do you know I have that infection?    What side effects might occur from this antibiotic?    How long will I need to take this antibiotic?    Is it safe to take this antibiotic with other medications or supplements (e.g., vitamins) that I am taking?     Are there any special directions I need to know about taking this antibiotic? For example, should I take it with food?    How will I be monitored to know whether my infection is responding to the antibiotic?    What tests may help to make sure the right antibiotic is prescribed for me?      Information provided by:  www.cdc.gov/getsmart  U.S. Department of Health and Human Services  Centers for disease Control and Prevention  National Center for Emerging and Zoonotic Infectious Diseases  Division of Healthcare Quality Promotion             Medication List: This is a list of all your medications and when to take them. Check marks below  indicate your daily home schedule. Keep this list as a reference.      Medications           Morning Afternoon Evening Bedtime As Needed    * albuterol (2.5 MG/3ML) 0.083% neb solution   Take 2.5 mg by nebulization every 6 hours as needed for shortness of breath / dyspnea or wheezing                                   * albuterol 108 (90 Base) MCG/ACT Inhaler   Commonly known as:  PROAIR HFA/PROVENTIL HFA/VENTOLIN HFA   Inhale 2 puffs into the lungs every 6 hours as needed for shortness of breath / dyspnea or wheezing                                   amLODIPine 10 MG tablet   Commonly known as:  NORVASC   TAKE 1 TABLET BY MOUTH  DAILY   Last time this was given:  10 mg on 7/21/2018  9:32 AM                                   amoxicillin-clavulanate 875-125 MG per tablet   Commonly known as:  AUGMENTIN   Take 1 tablet by mouth 2 times daily for 4 days                                      aspirin 81 MG EC tablet   Take 1 tablet (81 mg) by mouth daily   Last time this was given:  81 mg on 7/21/2018  9:33 AM                                   atorvastatin 80 MG tablet   Commonly known as:  LIPITOR   TAKE 1 TABLET BY MOUTH AT  BEDTIME   Last time this was given:  80 mg on 7/20/2018  8:56 PM                                   benzonatate 100 MG capsule   Commonly known as:  TESSALON   Take 1 capsule (100 mg) by mouth 3 times daily as needed for cough   Last time this was given:  100 mg on 7/21/2018  9:33 AM                                   cetirizine 10 MG tablet   Commonly known as:  zyrTEC   Take 10 mg by mouth daily   Last time this was given:  10 mg on 7/21/2018  9:33 AM                                   citalopram 20 MG tablet   Commonly known as:  celeXA   TAKE 1 TABLET BY MOUTH  DAILY   Last time this was given:  20 mg on 7/21/2018  9:32 AM                                   clotrimazole 1 % cream   Commonly known as:  LOTRIMIN   Apply topically 2 times daily                                      cyclobenzaprine 5 MG  tablet   Commonly known as:  FLEXERIL   Take 1 tablet (5 mg) by mouth 2 times daily as needed for muscle spasms   Last time this was given:  5 mg on 7/18/2018  7:45 PM                                   dextromethorphan-guaiFENesin  MG per 12 hr tablet   Commonly known as:  MUCINEX DM   Take 2 tablets by mouth 2 times daily   Last time this was given:  2 tablets on 7/21/2018  9:32 AM                                      Fluticasone-Salmeterol 232-14 MCG/ACT Aepb inhaler   Commonly known as:  AIRDUO RESPICLICK   Inhale 1 puff into the lungs 2 times daily                                      hydrALAZINE 50 MG tablet   Commonly known as:  APRESOLINE   TAKE 1 TABLET BY MOUTH  TWICE A DAY   Last time this was given:  50 mg on 7/21/2018  9:32 AM                                      ipratropium - albuterol 0.5 mg/2.5 mg/3 mL 0.5-2.5 (3) MG/3ML neb solution   Commonly known as:  DUONEB   Take 1 vial (3 mLs) by nebulization every 4 hours as needed for shortness of breath / dyspnea or wheezing   Last time this was given:  3 mLs on 7/21/2018  7:11 AM                                   nicotine polacrilex 4 MG gum   Commonly known as:  NICORETTE   Place 1 each (4 mg) inside cheek as needed for smoking cessation   Last time this was given:  2 mg on 7/18/2018  7:45 PM                                nitroGLYcerin 0.4 MG sublingual tablet   Commonly known as:  NITROSTAT   Place 1 tablet (0.4 mg) under the tongue every 5 minutes as needed for chest pain                                   potassium chloride SA 10 MEQ CR tablet   Commonly known as:  K-DUR/KLOR-CON M   Take 1 tablet (10 mEq) by mouth daily                                   predniSONE 10 MG tablet   Commonly known as:  DELTASONE   4 tabs daily for 2 days, then 3 tabs daily for 2 days, then 2 tabs daily for 2 days, then 1 tab daily for 2 days, then stop   Last time this was given:  40 mg on 7/21/2018  9:32 AM                                   varenicline 1 MG tablet    Commonly known as:  CHANTIX   Take 1 tablet (1 mg) by mouth 2 times daily   Last time this was given:  1 mg on 7/21/2018  9:33 AM                                      * Notice:  This list has 2 medication(s) that are the same as other medications prescribed for you. Read the directions carefully, and ask your doctor or other care provider to review them with you.              More Information        Discharge Instructions: COPD  You have been diagnosed with chronic obstructive pulmonary disease (COPD). This is a name given to a group of diseases that limit the flow of air in and out of your lungs. This makes it harder to breathe. With COPD, you are also more likely to get lung infections. COPD includes chronic bronchitis and emphysema. COPD is most often caused by heavy, long-term cigarette smoking.  Home care  Quit smoking    If you smoke, quit. It is the best thing you can do for your COPD and your overall health.    Join a stop-smoking program. There are even telephone, text message, and Internet programs to help you quit.    Ask your healthcare provider about medicines or other methods to help you quit.    Ask family members to quit smoking as well.    Don't allow people to smoke in your home, in your car, or when they are around you.  Protect yourself from infection    Wash your hands often. Do your best to keep your hands away from your face. Most germs are spread from your hands to your mouth.    Get a flu shot every year. Also ask your provider about pneumonia vaccines.    Avoid crowds. It's especially important to do this in the winter when more people have colds and flu.    To stay healthy, get enough sleep, exercise regularly, and eat a balanced diet. You should:  ? Get about 8 hours of sleep every night.  ? Try to exercise for at least 30 minutes on most days.  ? Have healthy foods including fruits and vegetables, 100% whole grains, lean meats and fish, and low-fat dairy products. Try to stay away from  foods high in fats and sugar.  Take your medicines  Take your medicines exactly as directed. Don't skip doses.  Manage your stress  Stress can make COPD worse. Use this stress management technique:    Find a quiet place and sit or lie in a comfortable position.    Close your eyes and perform breathing exercises for several minutes. Ask your provider about the best way to breathe.  Pulmonary rehabilitation    Pulmonary rehab can help you feel better. These programs include exercise, breathing techniques, information about COPD, counseling, and help for smokers.    Ask your provider or your local hospital about programs in your area.  When to call your healthcare provider  Call your provider immediately if you have any of the following:    Shortness of breath, wheezing, or coughing    Increased mucus    Yellow, green, bloody, or smelly mucus    Fever or chills    Tightness in your chest that does not go away with rest or medicine    An irregular heartbeat or a feeling that your heart is beating very fast    Swollen ankles   Date Last Reviewed: 5/1/2016 2000-2017 [employer]. [address] [place] [address]. All rights reserved. This information is not intended as a substitute for professional medical care. Always follow your healthcare professional's instructions.                Discharge Instructions for Heart Failure  The heart is a muscle that pumps oxygen-rich blood to all parts of the body. When you have heart failure, the heart is not able to pump as well as it should. Blood and fluid may back up into the lungs (congestive heart failure), and some parts of the body don t get enough oxygen-rich blood to work normally. These problems lead to the symptoms of heart failure. Heart failure can occur due to an injury to the heart or from natural processes.  You can control symptoms of heart failure with some lifestyle changes and by following your doctor's advice.  Activity  Ask your  healthcare provider about an exercise program. You can benefit from simple activities such as walking or gardening. Exercising most days of the week can make you feel better. Don't be discouraged if your progress is slow at first. Rest as needed. Stop activity if you develop symptoms such as chest pain, lightheadedness, or significant shortness of breath. Find activities that you enjoy, such as brisk walking, dancing, swimming, or gardening. These will help you stay active and strengthen your heart.  Diet  Follow a heart healthy diet. And make sure to limit the salt (sodium) in your diet. Salt causes your body to hold water. This makes your heart work harder as there is more fluid for the heart to pump. Limit your salt by doing the following:    Limit canned, dried, packaged, and fast foods.    Don't add salt to your food.    Season foods with herbs instead of salt.    Watch how much liquids you drink. Drinking too much can make heart failure worse. Talk with your health care provider about how much you should drink each day.    Limit the amount of alcohol you drink. It may harm your heart. Women should have no more than 1 drink a day and men should have no more than 2.    When you eat out, request that your meals have no added salt.  Tobacco  If you smoke, it's very important to quit. Smoking increases your chances of having a heart attack by harming the blood vessels that provide oxygen to your heart. This makes heart failure worse. Quitting smoking is the number one thing you can do to improve your health. Enroll in a stop-smoking program to improve your chances of success. Talk with your healthcare provider about medicines or nicotine replacement therapy to help you quit smoking. Ask your healthcare provider about smoking cessation support groups.  Medicine  Take your medicines exactly as prescribed. Learn the names and purpose of each of your medicines. Keep an accurate medicine list and current dosages with  you at all times. Don't skip doses. If you miss a dose of your medicine, take it as soon as you remember. If you miss a dose and it's almost time for your next dose, just wait and take your next dose at the normal time. Don't take a double dose. If you are unsure, call your doctor's office. Make sure not to mix up your medicines or forget what you've taken the same day.  Weight monitoring  Weigh yourself every day. A sudden weight gain can mean your heart failure is getting worse. Weigh yourself at the same time of day and in the same kind of clothes. Ideally, weigh yourself first thing in the morning after you empty your bladder, but before you eat breakfast. Your healthcare provider will show you how to track your weight. He or she will also discuss with you when you should call if you have a sudden, unexpected increase in your weight.  In general, your healthcare provider may ask you to report if your weight goes up by more than 2 pounds in 1 day,  5 pounds in 1 week, or whatever weight gain you were told by your doctor. This is a sign that you are retaining more fluid than you should be. Clues to weight gain include checking your ankles for swelling, or noticing you are short of breath when you lie down.  Follow-up care  Make a follow-up appointment as directed. Depending on the type and severity of heart failure you have, you may need follow-up as early as 7 days from hospital discharge. Keep appointments for checkups and lab tests that are needed to check your medicines and condition.  Recognize that your health and even survival depend on your following medical recommendations.  Symptoms  Heart failure can cause a variety of symptoms, including:    Shortness of breath    Trouble breathing at night, especially when you lie down    Swelling in the legs and feet or in the belly (abdomen)    Becoming easily fatigued    Irregular or rapid heartbeat    Weakness or lightheadedness    Swelling of the neck veins  It  is important to know what to do if symptoms get worse or if you develop signs of worsening heart failure.     When to call your healthcare provider  Call your healthcare provider right away if you have any of these signs of worsening heart failure:    Sudden weight gain (more than 2 pounds in 1 day or 5 pounds in 1 week, or whatever weight gain you were told to report by your doctor)    Trouble breathing not related to being active    New or increased swelling of your legs or ankles    Swelling or pain in your abdomen    Breathing trouble at night (waking up short of breath, needing more pillows to breathe)    Frequent coughing that doesn't go away    Feeling much more tired than usual  Call 911  Call 911 right away if you have:    Severe shortness of breath, such that you can't catch your breath even while resting    Severe chest pain that does not resolve with rest or nitroglycerin    Pink, foamy mucus with cough and shortness of breath    A continuous rapid or irregular heartbeat    Passing out or fainting    Stroke symptoms such as sudden numbness or weakness on one side of your face, arm, or leg or sudden confusion, trouble speaking or vision changes   Date Last Reviewed: 3/21/2016    8517-3551 [employer]. [address] [place] [address]. All rights reserved. This information is not intended as a substitute for professional medical care. Always follow your healthcare professional's instructions.                Patient Education    Cyclobenzaprine Hydrochloride Oral capsule, extended-release    Cyclobenzaprine Hydrochloride Oral tablet  Cyclobenzaprine Hydrochloride Oral tablet  What is this medicine?  CYCLOBENZAPRINE (sye kloe FINESSE za preen) is a muscle relaxer. It is used to treat muscle pain, spasms, and stiffness.  This medicine may be used for other purposes; ask your health care provider or pharmacist if you have questions.  What should I tell my health care provider before I take  this medicine?  They need to know if you have any of these conditions:    heart disease, irregular heartbeat, or previous heart attack    liver disease    thyroid problem    an unusual or allergic reaction to cyclobenzaprine, tricyclic antidepressants, lactose, other medicines, foods, dyes, or preservatives    pregnant or trying to get pregnant    breast-feeding  How should I use this medicine?  Take this medicine by mouth with a glass of water. Follow the directions on the prescription label. If this medicine upsets your stomach, take it with food or milk. Take your medicine at regular intervals. Do not take it more often than directed.  Talk to your pediatrician regarding the use of this medicine in children. Special care may be needed.  Overdosage: If you think you have taken too much of this medicine contact a poison control center or emergency room at once.  NOTE: This medicine is only for you. Do not share this medicine with others.  What if I miss a dose?  If you miss a dose, take it as soon as you can. If it is almost time for your next dose, take only that dose. Do not take double or extra doses.  What may interact with this medicine?  Do not take this medicine with any of the following medications:    certain medicines for fungal infections like fluconazole, itraconazole, ketoconazole, posaconazole, voriconazole    cisapride    dofetilide    dronedarone    droperidol    flecainide    grepafloxacin    halofantrine    levomethadyl    MAOIs like Carbex, Eldepryl, Marplan, Nardil, and Parnate    nilotinib    pimozide    probucol    sertindole    thioridazine    ziprasidone  This medicine may also interact with the following medications:    abarelix    alcohol    certain medicines for cancer    certain medicines for depression, anxiety, or psychotic disturbances    certain medicines for infection like alfuzosin, chloroquine, clarithromycin, levofloxacin, mefloquine, pentamidine, troleandomycin    certain  medicines for an irregular heart beat    certain medicines used for sleep or numbness during surgery or procedure    contrast dyes    dolasetron    guanethidine    methadone    octreotide    ondansetron    other medicines that prolong the QT interval (cause an abnormal heart rhythm)    palonosetron    phenothiazines like chlorpromazine, mesoridazine, prochlorperazine, thioridazine    tramadol    vardenafil  This list may not describe all possible interactions. Give your health care provider a list of all the medicines, herbs, non-prescription drugs, or dietary supplements you use. Also tell them if you smoke, drink alcohol, or use illegal drugs. Some items may interact with your medicine.  What should I watch for while using this medicine?  Check with your doctor or health care professional if your condition does not improve within 1 to 3 weeks.  You may get drowsy or dizzy when you first start taking the medicine or change doses. Do not drive, use machinery, or do anything that may be dangerous until you know how the medicine affects you. Stand or sit up slowly.  Your mouth may get dry. Drinking water, chewing sugarless gum, or sucking on hard candy may help.  What side effects may I notice from receiving this medicine?  Side effects that you should report to your doctor or health care professional as soon as possible:    allergic reactions like skin rash, itching or hives, swelling of the face, lips, or tongue    chest pain    fast heartbeat    hallucinations    seizures    vomiting  Side effects that usually do not require medical attention (report to your doctor or health care professional if they continue or are bothersome):    headache  This list may not describe all possible side effects. Call your doctor for medical advice about side effects. You may report side effects to FDA at 3-346-FDA-4998.  Where should I keep my medicine?  Keep out of the reach of children.  Store at room temperature between 15 and 30  degrees C (59 and 86 degrees F). Keep container tightly closed. Throw away any unused medicine after the expiration date.  NOTE:This sheet is a summary. It may not cover all possible information. If you have questions about this medicine, talk to your doctor, pharmacist, or health care provider. Copyright  2016 [employer]                Patient Education    Prednisone Gastro-resistant tablet    Prednisone Oral solution    Prednisone Oral tablet  Prednisone Oral tablet  What is this medicine?  PREDNISONE (PRED ni sone) is a corticosteroid. It is commonly used to treat inflammation of the skin, joints, lungs, and other organs. Common conditions treated include asthma, allergies, and arthritis. It is also used for other conditions, such as blood disorders and diseases of the adrenal glands.  This medicine may be used for other purposes; ask your health care provider or pharmacist if you have questions.  What should I tell my health care provider before I take this medicine?  They need to know if you have any of these conditions:    Cushing's syndrome    diabetes    glaucoma    heart disease    high blood pressure    infection (especially a virus infection such as chickenpox, cold sores, or herpes)    kidney disease    liver disease    mental illness    myasthenia gravis    osteoporosis    seizures    stomach or intestine problems    thyroid disease    an unusual or allergic reaction to lactose, prednisone, other medicines, foods, dyes, or preservatives    pregnant or trying to get pregnant    breast-feeding  How should I use this medicine?  Take this medicine by mouth with a glass of water. Follow the directions on the prescription label. Take this medicine with food. If you are taking this medicine once a day, take it in the morning. Do not take more medicine than you are told to take. Do not suddenly stop taking your medicine because you may develop a severe reaction. Your doctor will tell you how much medicine to  take. If your doctor wants you to stop the medicine, the dose may be slowly lowered over time to avoid any side effects.  Talk to your pediatrician regarding the use of this medicine in children. Special care may be needed.  Overdosage: If you think you have taken too much of this medicine contact a poison control center or emergency room at once.  NOTE: This medicine is only for you. Do not share this medicine with others.  What if I miss a dose?  If you miss a dose, take it as soon as you can. If it is almost time for your next dose, talk to your doctor or health care professional. You may need to miss a dose or take an extra dose. Do not take double or extra doses without advice.  What may interact with this medicine?  Do not take this medicine with any of the following medications:    metyrapone    mifepristone  This medicine may also interact with the following medications:    aminoglutethimide    amphotericin B    aspirin and aspirin-like medicines    barbiturates    certain medicines for diabetes, like glipizide or glyburide    cholestyramine    cholinesterase inhibitors    cyclosporine    digoxin    diuretics    ephedrine    female hormones, like estrogens and birth control pills    isoniazid    ketoconazole    NSAIDS, medicines for pain and inflammation, like ibuprofen or naproxen    phenytoin    rifampin    toxoids    vaccines    warfarin  This list may not describe all possible interactions. Give your health care provider a list of all the medicines, herbs, non-prescription drugs, or dietary supplements you use. Also tell them if you smoke, drink alcohol, or use illegal drugs. Some items may interact with your medicine.  What should I watch for while using this medicine?  Visit your doctor or health care professional for regular checks on your progress. If you are taking this medicine over a prolonged period, carry an identification card with your name and address, the type and dose of your medicine, and  your doctor's name and address.  This medicine may increase your risk of getting an infection. Tell your doctor or health care professional if you are around anyone with measles or chickenpox, or if you develop sores or blisters that do not heal properly.  If you are going to have surgery, tell your doctor or health care professional that you have taken this medicine within the last twelve months.  Ask your doctor or health care professional about your diet. You may need to lower the amount of salt you eat.  This medicine may affect blood sugar levels. If you have diabetes, check with your doctor or health care professional before you change your diet or the dose of your diabetic medicine.  What side effects may I notice from receiving this medicine?  Side effects that you should report to your doctor or health care professional as soon as possible:    allergic reactions like skin rash, itching or hives, swelling of the face, lips, or tongue    changes in emotions or moods    changes in vision    depressed mood    eye pain    fever or chills, cough, sore throat, pain or difficulty passing urine    increased thirst    swelling of ankles, feet  Side effects that usually do not require medical attention (report to your doctor or health care professional if they continue or are bothersome):    confusion, excitement, restlessness    headache    nausea, vomiting    skin problems, acne, thin and shiny skin    trouble sleeping    weight gain  This list may not describe all possible side effects. Call your doctor for medical advice about side effects. You may report side effects to FDA at 6-740-FDA-4199.  Where should I keep my medicine?  Keep out of the reach of children.  Store at room temperature between 15 and 30 degrees C (59 and 86 degrees F). Protect from light. Keep container tightly closed. Throw away any unused medicine after the expiration date.  NOTE:This sheet is a summary. It may not cover all possible  information. If you have questions about this medicine, talk to your doctor, pharmacist, or health care provider. Copyright  2016 [employer]                Amoxicillin; Clavulanic Acid tablets  Brand Name: Augmentin  What is this medicine?  AMOXICILLIN; CLAVULANIC ACID (a mox i ARBEN in; KEVIN melton ic AS id) is a penicillin antibiotic. It is used to treat certain kinds of bacterial infections. It will not work for colds, flu, or other viral infections.  How should I use this medicine?  Take this medicine by mouth with a full glass of water. Follow the directions on the prescription label. Take at the start of a meal. Do not crush or chew. If the tablet has a score line, you may cut it in half at the score line for easier swallowing. Take your medicine at regular intervals. Do not take your medicine more often than directed. Take all of your medicine as directed even if you think you are better. Do not skip doses or stop your medicine early.  Talk to your pediatrician regarding the use of this medicine in children. Special care may be needed.  What side effects may I notice from receiving this medicine?  Side effects that you should report to your doctor or health care professional as soon as possible:    allergic reactions like skin rash, itching or hives, swelling of the face, lips, or tongue    breathing problems    dark urine    fever or chills, sore throat    redness, blistering, peeling or loosening of the skin, including inside the mouth    seizures    trouble passing urine or change in the amount of urine    unusual bleeding, bruising    unusually weak or tired    white patches or sores in the mouth or throat  Side effects that usually do not require medical attention (report to your doctor or health care professional if they continue or are bothersome):    diarrhea    dizziness    headache    nausea, vomiting    stomach upset    vaginal or anal irritation  What may interact with this  medicine?    allopurinol    anticoagulants    birth control pills    methotrexate    probenecid    What if I miss a dose?  If you miss a dose, take it as soon as you can. If it is almost time for your next dose, take only that dose. Do not take double or extra doses.  Where should I keep my medicine?  Keep out of the reach of children.  Store at room temperature below 25 degrees C (77 degrees F). Keep container tightly closed. Throw away any unused medicine after the expiration date.  What should I tell my health care provider before I take this medicine?  They need to know if you have any of these conditions:    bowel disease, like colitis    kidney disease    liver disease    mononucleosis    an unusual or allergic reaction to amoxicillin, penicillin, cephalosporin, other antibiotics, clavulanic acid, other medicines, foods, dyes, or preservatives    pregnant or trying to get pregnant    breast-feeding  What should I watch for while using this medicine?  Tell your doctor or health care professional if your symptoms do not improve.  Do not treat diarrhea with over the counter products. Contact your doctor if you have diarrhea that lasts more than 2 days or if it is severe and watery.  If you have diabetes, you may get a false-positive result for sugar in your urine. Check with your doctor or health care professional.  Birth control pills may not work properly while you are taking this medicine. Talk to your doctor about using an extra method of birth control.  NOTE:This sheet is a summary. It may not cover all possible information. If you have questions about this medicine, talk to your doctor, pharmacist, or health care provider. Copyright  2018 [employer]                Patient Education    Benzonatate Oral capsule    Benzonatate Oral capsule, liquid filled  Benzonatate Oral capsule  What is this medicine?  BENZONATATE (winifred BENITO na duarte) is used to treat cough.  This medicine may be used for other purposes; ask your  health care provider or pharmacist if you have questions.  What should I tell my health care provider before I take this medicine?  They need to know if you have any of these conditions:    kidney or liver disease    an unusual or allergic reaction to benzonatate, anesthetics, other medicines, foods, dyes, or preservatives    pregnant or trying to get pregnant    breast-feeding  How should I use this medicine?  Take this medicine by mouth with a glass of water. Follow the directions on the prescription label. Avoid breaking, chewing, or sucking the capsule, as this can cause serious side effects. Take your medicine at regular intervals. Do not take your medicine more often than directed.  Talk to your pediatrician regarding the use of this medicine in children. While this drug may be prescribed for children as young as 10 years old for selected conditions, precautions do apply.  Overdosage: If you think you have taken too much of this medicine contact a poison control center or emergency room at once.  NOTE: This medicine is only for you. Do not share this medicine with others.  What if I miss a dose?  If you miss a dose, take it as soon as you can. If it is almost time for your next dose, take only that dose. Do not take double or extra doses.  What may interact with this medicine?  Do not take this medicine with any of the following medications:    MAOIs like Carbex, Eldepryl, Marplan, Nardil, and Parnate  This list may not describe all possible interactions. Give your health care provider a list of all the medicines, herbs, non-prescription drugs, or dietary supplements you use. Also tell them if you smoke, drink alcohol, or use illegal drugs. Some items may interact with your medicine.  What should I watch for while using this medicine?  Tell your doctor if your symptoms do not improve or if they get worse. If you have a high fever, skin rash, or headache, see your health care professional.  You may get drowsy  or dizzy. Do not drive, use machinery, or do anything that needs mental alertness until you know how this medicine affects you. Do not sit or stand up quickly, especially if you are an older patient. This reduces the risk of dizzy or fainting spells.  What side effects may I notice from receiving this medicine?  Side effects that you should report to your doctor or health care professional as soon as possible:    allergic reactions like skin rash, itching or hives, swelling of the face, lips, or tongue    breathing problems    chest pain    confusion or hallucinations    irregular heartbeat    numbness of mouth or throat    seizures  Side effects that usually do not require medical attention (report to your doctor or health care professional if they continue or are bothersome):    burning feeling in the eyes    constipation    headache    nasal congestion    stomach upset  This list may not describe all possible side effects. Call your doctor for medical advice about side effects. You may report side effects to FDA at 0-023-FDA-8965.  Where should I keep my medicine?  Keep out of the reach of children.  Store at room temperature between 15 and 30 degrees C (59 and 86 degrees F). Keep tightly closed. Protect from light and moisture. Throw away any unused medicine after the expiration date.  NOTE:This sheet is a summary. It may not cover all possible information. If you have questions about this medicine, talk to your doctor, pharmacist, or health care provider. Copyright  2016 [employer] Standard

## 2018-07-18 NOTE — ED NOTES
"Pt with decreased breath sounds and exp wheezing. \" I feel a little better after the neb.\" RR 24. O2 sats 94 on RA %.   "

## 2018-07-18 NOTE — ED NOTES
"Pt returns from xray RR 36. Reports activity in xray has increased work of breathing. \" It has been this way for 4 days. The slightest activity and I am short of breath.\"   "

## 2018-07-18 NOTE — LETTER
Transition Communication Hand-off for Care Transitions to Next Level of Care Provider    Hand-off for Care Transitions to Next Level of Care Provider  Name: Marni Austin  : 1970  MRN #: [id number]  Reason for Hospitalization:  COPD exacerbation (H) [J44.1]  Admit Date/Time: 2018 11:50 AM  Discharge Date: 2018    Reason for Communication Hand-off Referral: Admission diagnoses: CHF  Admission diagnoses: COPD    Discharge Plan:  Discharged to: Home with support                   Patient agreeable to post-hospital support suggestions:  Yes    Patient is on new medications:   Yes    MTM follow up recommended: No    Tel-Ass[employer] program:  Already recommended a TA program    Follow-up specialty is recommended: Yes. Follow up with [facility] as scheduled.     Follow-up plan:  Future Appointments  Date Time Provider Department Center   2018 4:40 PM David Gambino PA-C [facility]   2018 8:00 AM David Gambino PA-C [facility]   2018 5:00 PM Charlie Serrano MD [facility]     Any outstanding tests or procedures:  No. Recommend a BMP at f/u appt with PCP.     Key Recommendations: New diagnosis of COPD & CHF. Patient quit smoking 3 days ago when her breathing became difficult. She is concerned about maintaining as a non-smoker with her spouse being a heavy smoker. Discussed smoking cessation strategies at length with pt & family. Provided her with CHF packet & scale for daily weights. [facility] at 5[facility] for JORGE evaluation.     Communicated handoff via EPIC Comm Mgt to Dr. David Gambino's CC at  Care Coord.      Brittney Colon, RN, BSN, CTS  [facility]  [phone]    AVS/Discharge Summary is the source of truth; this is a helpful guide for improved communication of patient story

## 2018-07-18 NOTE — TELEPHONE ENCOUNTER
YESI.    Patient c/o dyspnea x 4 days. Much worse past 2 days. Patient has been using nebs and inhaler with no effectiveness. Patient had difficulty with conversing on phone. Sentences were broken. Patient c/o chest being very tight and feeling like elephant was sitting on it.     Advised to go to ER. Patient will have someone take to ER.    POD ANDI informed of condition.    Nellie Heredia RN

## 2018-07-18 NOTE — PHARMACY-ADMISSION MEDICATION HISTORY
Admission medication history interview status for this patient is complete. See [facility] admission navigator for allergy information, prior to admission medications and immunization status.     Medication history interview source(s):Patient  Medication history resources (including written lists, pill bottles, clinic record):None  Primary pharmacy:[facility]    Changes made to PTA medication list:  Added: albuterol nebs  Deleted: nicotine patch, QVAR, Chantix starting petty  Changed: none    Actions taken by pharmacist (provider contacted, etc):None     Additional medication history information:None    Medication reconciliation/reorder completed by provider prior to medication history? No    For patients on insulin therapy: No (Yes/No)   Lantus/levemir/NPH/Mix 70/30 dose: _____ in AM/PM or twice daily   Sliding scale Novolog Y/N   If Yes, do you have a baseline novolog pre-meal dose: ______units with meals   Patients eat three meals a day: Y/N   Any Barriers to therapy: cost of medications/comfortable with giving injections (if applicable)/ comfortable and confident with current diabetes regimen       Prior to Admission medications    Medication Sig Last Dose Taking? Auth Provider   albuterol (PROAIR HFA/PROVENTIL HFA/VENTOLIN HFA) 108 (90 BASE) MCG/ACT Inhaler Inhale 2 puffs into the lungs every 6 hours as needed for shortness of breath / dyspnea or wheezing 7/18/2018 at Unknown time Yes David Gambino PA-C   amLODIPine (NORVASC) 10 MG tablet TAKE 1 TABLET BY MOUTH  DAILY 7/18/2018 at Unknown time Yes David Gambino PA-C   aspirin EC 81 MG EC tablet Take 1 tablet (81 mg) by mouth daily 7/18/2018 at Unknown time Yes Brent Aviles,    atorvastatin (LIPITOR) 80 MG tablet TAKE 1 TABLET BY MOUTH AT  BEDTIME 7/17/2018 at Unknown time Yes David Gambino PA-C   citalopram (CELEXA) 20 MG tablet TAKE 1 TABLET BY MOUTH  DAILY 7/17/2018 at Unknown time Yes David Gambino PA-C    Fluticasone-Salmeterol 232-14 MCG/ACT AEPB Inhale 1 puff into the lungs 2 times daily 7/18/2018 at Unknown time Yes David Gambino PA-C   hydrALAZINE (APRESOLINE) 50 MG tablet TAKE 1 TABLET BY MOUTH  TWICE A DAY 7/18/2018 at x1 Yes David Gambino PA-C   ipratropium - albuterol 0.5 mg/2.5 mg/3 mL (DUONEB) 0.5-2.5 (3) MG/3ML neb solution Take 1 vial (3 mLs) by nebulization every 4 hours as needed for shortness of breath / dyspnea or wheezing 7/18/2018 at Unknown time Yes David Gambino PA-C   nicotine polacrilex (NICORETTE) 4 MG gum Place 1 each (4 mg) inside cheek as needed for smoking cessation Past Week at Unknown time Yes David Gambino PA-C   nitroGLYcerin (NITROSTAT) 0.4 MG sublingual tablet Place 1 tablet (0.4 mg) under the tongue every 5 minutes as needed for chest pain Past Week at Unknown time Yes David Gambino PA-C   varenicline (CHANTIX) 1 MG tablet Take 1 tablet (1 mg) by mouth 2 times daily 7/18/2018 at Unknown time Yes David Gambino PA-C   clotrimazole (LOTRIMIN) 1 % cream Apply topically 2 times daily Unknown at Unknown time  David Gambino PA-C

## 2018-07-18 NOTE — ED NOTES
[facility]  ED Nurse Handoff Report    Marni Austin is a 48 year old female   ED Chief complaint: Chest Pain  . ED Diagnosis:   Final diagnoses:   COPD exacerbation (H)     Allergies:   Allergies   Allergen Reactions     Zyban [Bupropion Hcl] Anaphylaxis       Code Status: Full Code  Activity level - Baseline/Home:  Independent. Activity Level - Current:   Stand with Assist. Lift room needed: No. Bariatric: No   Needed: No   Isolation: No. Infection: Not Applicable.     Vital Signs:   Vitals:    07/18/18 1315 07/18/18 1400 07/18/18 1415 07/18/18 1430   BP:  135/79  117/75   Pulse:       Resp: 22 (!) 33 17    Temp:       TempSrc:       SpO2: 95% 91% 92%    Weight:       Height:           Cardiac Rhythm:  ,      Pain level: 0-10 Pain Scale: 8  Patient confused: No. Patient Falls Risk: No.   Elimination Status: Has voided   Patient Report - Initial Complaint: Pt presents with shortness of breath, and cough. Symptoms have worsened over the last 3-4 days. Focused Assessment: Lungs with exp wheezing throughout. Pain to mid chest with coughing.   Tests Performed:   Chest XR,  PA & LAT   Final Result   IMPRESSION: No acute cardiopulmonary abnormality.      DANITA CABA MD      Abnormal Results:   Labs Ordered and Resulted from Time of ED Arrival Up to the Time of Departure from the ED   CBC WITH PLATELETS DIFFERENTIAL - Abnormal; Notable for the following:        Result Value    WBC 12.1 (*)     Absolute Neutrophil 10.1 (*)     All other components within normal limits   BASIC METABOLIC PANEL - Abnormal; Notable for the following:     Glucose 127 (*)     All other components within normal limits   NT PROBNP INPATIENT   BLOOD GAS VENOUS AND OXYHGB   BLOOD GAS VENOUS AND OXYHGB   TROPONIN I   D DIMER QUANTITATIVE   IV ACCESS   ISTAT TROPONIN NURSING POCT   TROPONIN POCT      Family Comments: present   OBS brochure/video discussed/provided to patient:  N/A  ED Medications:   Medications    methylPREDNISolone sodium succinate (solu-MEDROL) injection 125 mg (125 mg Intravenous Given 7/18/18 1224)   ipratropium - albuterol 0.5 mg/2.5 mg/3 mL (DUONEB) neb solution 3 mL (3 mLs Nebulization Given 7/18/18 1215)   ipratropium - albuterol 0.5 mg/2.5 mg/3 mL (DUONEB) neb solution 3 mL (3 mLs Nebulization Given 7/18/18 1231)   ipratropium - albuterol 0.5 mg/2.5 mg/3 mL (DUONEB) 0.5-2.5 (3) MG/3ML neb solution (3 mLs  Given 7/18/18 1337)     Drips infusing:  No  For the majority of the shift, the patient's behavior Green. Interventions performed were n/a.     Severe Sepsis OR Septic Shock Diagnosis Present: No      ED Nurse Name/Phone Number: Karli SANGITA [place],   3:15 PM    RECEIVING UNIT ED HANDOFF REVIEW    Above ED Nurse Handoff Report was reviewed: Yes  Reviewed by: Zahira Ling on July 18, 2018 at 4:51 PM

## 2018-07-18 NOTE — H&P
History and Physical     Marni Austin MRN# [id number]   YOB: 1970 Age: 48 year old      Date of Admission:  7/18/2018    Primary care provider: David Gambino          Assessment and Plan:   Marni Austin is a 48 year old female with a PMH significant for coronary disease status post stenting of the RCA (2015), COPD and ongoing tobacco abuse, hypertension, depression who presents with complaints of for 5 day history of worsening shortness of breath, dyspnea on exertion.  Workup in the emergency room reveals negative troponin, BNP.  Chest x-ray was unremarkable.  Clinically she was quite wheezy and requires several DuoNeb and IV steroids.  She remains hypoxic and will be admitted to the hospital for further management.    1.  Acute COPD flare with hypoxia: Likely exacerbated by recent heat exposure, URI symptoms and ongoing tobacco abuse.  She received 3 duo nebs and IV steroids.  She has some mild wheezing on exam however not great air movement.  She remains hypoxic and quite tachypneic.  She will need to be admitted to the hospital for IV steroids, duo nebs.   She also needs to get back on a long-acting inhaler at discharge.  She uses her albuterol twice a day as maintenance.  She states that the Advair was too expensive and unable to fill it.  Will ask pharmacy to see if there are cheaper alternatives.  I think some of her symptoms are also exacerbated by mild URI.  Will add Tessalon Robitussin-AC , and Mucinex for supportive care.    2. CAD: No complaints of recent chest pain.  Does have history of dyspnea on exertion related to #1 last nuclear stress test (2/2016) was normal with a EF of 66%. Continue Norvasc, aspirin, hydralazine.    3. HLP: resume statin    4. Tobacco abuse: smoking cessation counseled.     5. Depression: cont celexa.    Full code  DVT prophyl: scds  Admit as inpatient              Chief Complaint:   Increased SOB         History of Present Illness:   Marni MANLEY  "Geovanna is a 48 year old female who presents with progressively worsening shortness of breath since last Thursday.  Patient states that she was at a wedding and was out in the hot weather for quite some time.  And since then she has been progressively more more short of breath.  Denies any fevers or chills.  She does admit to some URI symptoms with rhinorrhea, dry cough and coughing fits.  She states that she was given Advair in the past but has never used it since it was too expensive.  Normally she does pro-air twice a day every day.  She also have a nebulizer at home and over the last few days has been trying to use that without any improvement in her symptoms.  Due to the ongoing shortness of breath she decided coming to the emergency room.  She denies any chest pain no exertional angina, no change in urinary or bowel habits.  She does endorse to smoking a pack a day however has not been smoking her usual amount for the last 2 days.             Past Medical History:     Past Medical History:   Diagnosis Date     Asthma      CAD (coronary artery disease)      COPD (chronic obstructive pulmonary disease) (H)      Depression      Hypertension              Past Surgical History:     Past Surgical History:   Procedure Laterality Date     CARDIAC SURGERY       TONSILLECTOMY       Tubal ligation surgery                 Social History:     Social History     Social History     Marital status:      Spouse name: Julian     Number of children: 3     Years of education: N/A     Occupational History           Social History Main Topics     Smoking status: Current Every Day Smoker     Packs/day: 0.50     Years: 26.00     Smokeless tobacco: Never Used      Comment: stopped 2 days ago     Alcohol use No     Drug use: No     Sexual activity: Yes     Other Topics Concern      Service No     Blood Transfusions No     Caffeine Concern Yes     \"uses a lot of it\"     Occupational Exposure No     Hobby " Hazards No     Sleep Concern Yes     trouble sleeping     Stress Concern Yes     work related     Weight Concern Yes     Special Diet No     Back Care No     Exercise No     Bike Helmet No     Seat Belt Yes     Self-Exams No     Social History Narrative               Family History:     Family History   Problem Relation Age of Onset     Hypertension Mother      Diabetes Father      Cerebrovascular Disease Maternal Grandmother      Cancer Maternal Grandfather               Allergies:      Allergies   Allergen Reactions     Zyban [Bupropion Hcl] Anaphylaxis               Medications:     Prior to Admission medications    Medication Sig Last Dose Taking? Auth Provider   albuterol (2.5 MG/3ML) 0.083% neb solution Take 2.5 mg by nebulization every 6 hours as needed for shortness of breath / dyspnea or wheezing 7/18/2018 at Unknown time Yes Unknown, Entered By History   albuterol (PROAIR HFA/PROVENTIL HFA/VENTOLIN HFA) 108 (90 BASE) MCG/ACT Inhaler Inhale 2 puffs into the lungs every 6 hours as needed for shortness of breath / dyspnea or wheezing 7/18/2018 at Unknown time Yes David Gambino PA-C   amLODIPine (NORVASC) 10 MG tablet TAKE 1 TABLET BY MOUTH  DAILY 7/18/2018 at Unknown time Yes David Gambino PA-C   aspirin EC 81 MG EC tablet Take 1 tablet (81 mg) by mouth daily 7/18/2018 at Unknown time Yes Brent Aviles,    atorvastatin (LIPITOR) 80 MG tablet TAKE 1 TABLET BY MOUTH AT  BEDTIME 7/17/2018 at Unknown time Yes David Gambino PA-C   cetirizine (ZYRTEC) 10 MG tablet Take 10 mg by mouth daily 7/18/2018 at Unknown time Yes Unknown, Entered By History   citalopram (CELEXA) 20 MG tablet TAKE 1 TABLET BY MOUTH  DAILY 7/17/2018 at Unknown time Yes David Gambino PA-C   Fluticasone-Salmeterol 232-14 MCG/ACT AEPB Inhale 1 puff into the lungs 2 times daily 7/18/2018 at Unknown time Yes David Gambino PA-C   hydrALAZINE (APRESOLINE) 50 MG tablet TAKE 1 TABLET BY MOUTH  TWICE A  "DAY 7/18/2018 at x1 Yes David Gambino PA-C   ipratropium - albuterol 0.5 mg/2.5 mg/3 mL (DUONEB) 0.5-2.5 (3) MG/3ML neb solution Take 1 vial (3 mLs) by nebulization every 4 hours as needed for shortness of breath / dyspnea or wheezing 7/18/2018 at Unknown time Yes David Gambino PA-C   nicotine polacrilex (NICORETTE) 4 MG gum Place 1 each (4 mg) inside cheek as needed for smoking cessation Past Week at Unknown time Yes David Gambino PA-C   nitroGLYcerin (NITROSTAT) 0.4 MG sublingual tablet Place 1 tablet (0.4 mg) under the tongue every 5 minutes as needed for chest pain Past Week at Unknown time Yes David Gambino PA-C   potassium 99 MG TABS Take 1 tablet by mouth daily 7/18/2018 at Unknown time Yes Unknown, Entered By History   varenicline (CHANTIX) 1 MG tablet Take 1 tablet (1 mg) by mouth 2 times daily 7/18/2018 at Unknown time Yes David Gambino PA-C   clotrimazole (LOTRIMIN) 1 % cream Apply topically 2 times daily Unknown at Unknown time  David Gambino PA-C              Review of Systems:   A Comprehensive greater than 10 system review of systems was carried out.  Pertinent positives and negatives are noted above.  Otherwise negative for contributory information.            Physical Exam:   Blood pressure 117/75, pulse 101, temperature 99.8  F (37.7  C), temperature source Temporal, resp. rate 23, height 1.549 m (5' 1\"), weight 90.7 kg (200 lb), last menstrual period 08/30/2013, SpO2 93 %, not currently breastfeeding.  Exam:  GENERAL: Tachypneic  PSYCH: pleasant, oriented, mild acute distress.  HEENT:  PERRLA. Normal conjunctiva, normal hearing, nasal mucosa and Oropharynx are normal.  NECK:  Supple, no neck vein distention, adenopathy or bruits, normal thyroid.  HEART:  Normal S1, S2 with no murmur, no pericardial rub, gallops or S3 or S4.  LUNGS: Poor air movement, decrease breath sounds , few expiratory wheezes  ABDOMEN:  Soft, no hepatosplenomegaly, normal " bowel sounds. Non-tender, non distended.   EXTREMITIES:  No pedal edema, +2 pulses bilateral and equal.  SKIN:  Dry to touch, No rash, wound or ulcerations.  NEUROLOGIC:  CN 2-12 intact, BL 5/5 symmetric upper and lower extremity strength, sensation is intact with no focal deficits.           Data:       Recent Labs  Lab 07/18/18  1224   WBC 12.1*   HGB 15.2   HCT 45.4   MCV 88          Recent Labs  Lab 07/18/18  1224      POTASSIUM 3.5   CHLORIDE 105   CO2 26   ANIONGAP 6   *   BUN 10   CR 0.74   GFRESTIMATED 83   GFRESTBLACK >90   RANDY 8.6     No results for input(s): CULT in the last 168 hours.      Results for orders placed or performed during the hospital encounter of 07/18/18   Chest XR,  PA & LAT    Narrative    XR CHEST 2 VW 7/18/2018 1:35 PM    HISTORY: Chest pain and shortness of breath.    COMPARISON: 2/1/2016    FINDINGS: No airspace consolidation, pleural effusion or pneumothorax.  Normal heart size.      Impression    IMPRESSION: No acute cardiopulmonary abnormality.    MD Anaid MADRIGAL PA-C    This patient was seen and discussed with Dr. Nowak who agrees with the current plans as outlined above.

## 2018-07-18 NOTE — IP AVS SNAPSHOT
[facility] [address] [facility]    [address]    [facility] [address]    Phone:  [phone]    Fax:  [phone]                                       After Visit Summary   7/18/2018    Marni Austin    MRN: [id number]           After Visit Summary Signature Page     I have received my discharge instructions, and my questions have been answered. I have discussed any challenges I see with this plan with the nurse or doctor.    ..........................................................................................................................................  Patient/Patient Representative Signature      ..........................................................................................................................................  Patient Representative Print Name and Relationship to Patient    ..................................................               ................................................  Date                                            Time    ..........................................................................................................................................  Reviewed by Signature/Title    ...................................................              ..............................................  Date                                                            Time

## 2018-07-18 NOTE — LETTER
Transition Communication Hand-off for Care Transitions to Next Level of Care Provider    Name: Marni Autsin  : 1970  MRN #: [id number]  Primary Care Provider: David Gambino  Primary Care MD Name: David DUGGAN  [facility] [address]  Primary Care Clinic Name: [facility]  Reason for Hospitalization:  COPD exacerbation (H) [J44.1]  Admit Date/Time: 2018 11:50 AM  Discharge Date: ***  Payor Source: Payor: [employer] / Plan: [employer] / Product Type: Indemnity /        Key Recommendations:  New diagnosis of COPD & CHF. Patient quit smoking 3 days ago when her breathing became difficult. She is concerned about maintaining as a non-smoker with her spouse being a heavy smoker. Discussed smoking cessation strategies at length with pt & family. Provided her with CHF packet & scale for daily weights. [facility] JORGE evaluation.     Krysta Colon    AVS/Discharge Summary is the source of truth; this is a helpful guide for improved communication of patient story

## 2018-07-18 NOTE — ED PROVIDER NOTES
History     Chief Complaint:  Chest Pain    HPI   Marni Austin is a 48 year old female with a medical history of coronary artery disease, asthma, COPD, hypertension, and NSTEMI who presents with chest pain. The patient reports an onset of shortness of breath with associated fatigue and weakness 4 days ago. She continued to feel unwell the next day, with symptoms worsening over the last couple of days and developed cough with yellow phlegm and sternal chest tightness when she coughs over. She states she hasn't been doing any exertional activity and still feels short of breath, therefore presents to the emergency department for further evaluation and treatment. Patient used a neb prior to arrival without any improvement. She notes takes 2 puffs of Proair in the morning and another 2 puffs at night. Patient used her inhaler 3 times today, and would only normally do it during emergencies. On evaluation, she does note that her ankles have been swelling up when she gets on her feet. She is not on any water pills. Denies fevers or any other symptoms. She does note she went to her daughter's Wedding 6 days ago and was standing outside in the heat throughout the day. She has been previously hospitalized for COPD exacerbation.    Allergies:  Zyban     Medications:    Albuterol  Norvasc  Aspirin  Lipitor  QVAR  Celexa  Fluticasone  Apresoline  Nicorette  Nicoderm  Nitrostat  Chantix    Past Medical History:    Asthma  COPD  CAD  Depression  Hypertension  Adjustment disorder with anxiety  Tobacco use disorder  Nonbmorbid obesity  NSTEMI    Past Surgical History:    Cardiac surgery - stent 1x  Tonsillectomy  Tubal ligation surgery     Family History:    Hypertension  Diabetes     Social History:  Smoking status: Current every day smoker  Alcohol use: No   Marital Status:   [2]  PCP: David Gambino      Review of Systems   Constitutional: Negative for fever.   Respiratory: Positive for cough, chest tightness  "and shortness of breath.    Cardiovascular: Negative for leg swelling.   Musculoskeletal: Positive for joint swelling.   All other systems reviewed and are negative.    Physical Exam   Patient Vitals for the past 24 hrs:   BP Temp Temp src Pulse Heart Rate Resp SpO2 Height Weight   07/18/18 1430 117/75 - - - - - - - -   07/18/18 1415 - - - - 102 17 92 % - -   07/18/18 1400 135/79 - - - 104 (!) 33 91 % - -   07/18/18 1315 - - - - 99 22 95 % - -   07/18/18 1300 143/81 - - - - - - - -   07/18/18 1245 - - - - 103 27 94 % - -   07/18/18 1230 - - - - 98 30 (!) 89 % - -   07/18/18 1215 - - - - 97 (!) 35 91 % - -   07/18/18 1200 - - - - 102 26 (!) 89 % - -   07/18/18 1149 125/70 99.8  F (37.7  C) Temporal 101 - 28 91 % 1.549 m (5' 1\") 90.7 kg (200 lb)      Physical Exam    Vital signs and nursing notes reviewed.     Constitutional: laying on gurney appears mildly uncomfortable due to dyspnea  HENT: Oropharynx is clear and moist  Eyes: Conjunctivae are normal bilaterally. Pupils equal  Neck: normal range of motion, no stridor  Cardiovascular: mild tachycardic rate, regular rhythm, normal heart sounds.   Pulmonary/Chest: tachypnea, bilateral expiratory wheezing, without rales or rhonchi  Abdominal: Soft. Bowel sounds are normal. No tenderness to palpation. No rebound or guarding.   Musculoskeletal: No joint swelling or edema.   Neurological: Alert and oriented. No focal weakness  Skin: Skin is warm and dry. No rash noted.   Psych: normal affect     Emergency Department Course   ECG (11:41:13)  Sinus tachycardia. ST & T wave abnormality. Consider inferolateral ischemia. Abnormal ECG.   ST Depressions inferior laterally.    Agree with computer interpretation.   Interpreted at 1143 by Aamir Anderson MD.   Rate 104 bpm. RI interval 140. QRS duration 96. QT/QTc 340/447. P-R-T axes 66 81 58.   No change from Previous EKG  Imaging:  Radiographic findings were communicated with the patient who voiced understanding of the " findings.  Chest XR, PA & LAT  No acute cardiopulmonary abnormality.  As read by Radiology.     Laboratory:  CBC: WBC 12.1 (H) WNL (HGB 15.2, )   BMP: Glucose 1287 WNL (Creatinine 0.74)   BNP: 68  Troponin (1224): <0.015   Troponin (1240): 0.00  Blood gas venous and oxyhgb: pH 7.54, PCO2 42, PO2 40, Bicarbonate 26, FlO2 room air, Oxyhemoglobin 75, Base excess 1.1  D-dimer (1224): <0.3    Interventions:  1215: Duoneb 3 mLs nebulization  1224: Solu-medrol 125 mg IV  1231: Duoneb 3 mLs nebulization  1337: Duoneb 3 mLs nebulization     Emergency Department Course:  1141: ECG obtained, findings as noted above.    Past medical records, nursing notes, and vitals reviewed.  1201: I performed an exam of the patient and obtained history, as documented above.    1224: IV inserted and blood drawn for basic laboratory. Troponin and D-dimer obtained. Results as noted above.    Patient was given the above interventions while here in the emergency department.   1240: Repeat troponin obtained, findings as noted above.  The patient was sent for a chest XR while in the emergency department, findings above.   1417: I discussed the case with Anaid Kate PA-C, hospitalist service who accepts the patient for further care, monitoring, and treatment.   1420: Anaid Kate PA-C met with the patient here in the emergency department.     Impression & Plan    Medical Decision Making:  Marni Austin is a 48 year old female who presents with increased cough and dyspnea with activity over the last few days. She says she's had some mild chest discomfort as well, but is most likely associated with cough. On presentation, patient has diffuse wheezing and mild hypoxia. She has no history of congestive heart failure, but does have a history of COPD/asthma. She's given IV solu medrol and duoneb treatments, with some improvement though she's still has quite a bit of bronchospasm, and was still requiring a small amount of oxygen therapy.  Chest x-ray does not reveal any evidence of pneumonia and her cardiac studies are unremarkable. There is no suggestion for pulmonary embolus or acute coronary syndrome at this time. I suspect that she does still smoke as well as her  smokes in the house, she is experiencing a COPD exacerbation. There's no clear indication of antibiotic therapy. However she will require hospitalization for further treatment, evaluation. I discussed the plan with the patient and she understands and is admitted to the hospitalist service.    Diagnosis:    ICD-10-CM   1. COPD exacerbation (H) J44.1     Disposition:  Admitted to the hospitalist service.    Ann Cabrera  7/18/2018   [facility] EMERGENCY DEPARTMENT  I, Ann Cabrera, am serving as a scribe at 12:01 PM on 7/18/2018 to document services personally performed by Aamir Anderson MD based on my observations and the provider's statements to me.       Aamir Anderson MD  07/18/18 1530

## 2018-07-18 NOTE — PROGRESS NOTES
"[facility]     Date: 07/18/2018  Admission Dx:COPD Ex  Pulmonary History: COPD, Smoking  Home Nebulizer/MDI Use: Albuterol MDI Q6 prn, Duoneb Q4 prn,   Home Oxygen: None  Acuity Level (RCAT flow sheet): 3  Aerosol Therapy initiated: Duoneb QID, Albuterol Q2 prn  Pulmonary Hygiene initiated: Cough and deep breathing techniques.  Volume Expansion initiated: IS TID  Current Oxygen Requirements: 2 LPM NC  Current SpO2: 93%  Re-evaluation date: 07/21/2018  Patient Education: Education was performed with the patient in regards to indications/benefits and possible side effects of bronchodilators. Will continue to do education with patient.      See \"RT Assessments\" flow sheet for patient assessment scoring and Acuity Level Details.     Vital signs:  Temp: 98.1  F (36.7  C) Temp src: Oral BP: 130/77 Pulse: 101 Heart Rate: 95 Resp: 22 SpO2: 93 % O2 Device: Nasal cannula Oxygen Delivery: 2 LPM Height: 154.9 cm (5' 0.98\") Weight: 90.7 kg (200 lb)  Estimated body mass index is 37.81 kg/(m^2) as calculated from the following:    Height as of this encounter: 1.549 m (5' 0.98\").    Weight as of this encounter: 90.7 kg (200 lb).    PAST MEDICAL HISTORY:   Past Medical History:   Diagnosis Date     Asthma      CAD (coronary artery disease)      COPD (chronic obstructive pulmonary disease) (H)      Depression      Hypertension        PAST SURGICAL HISTORY:   Past Surgical History:   Procedure Laterality Date     CARDIAC SURGERY       TONSILLECTOMY       Tubal ligation surgery         FAMILY HISTORY:   Family History   Problem Relation Age of Onset     Hypertension Mother      Diabetes Father      Cerebrovascular Disease Maternal Grandmother      Cancer Maternal Grandfather        SOCIAL HISTORY:   Social History   Substance Use Topics     Smoking status: Current Every Day Smoker     Packs/day: 0.50     Years: 26.00     Smokeless tobacco: Never Used      Comment: stopped 2 days ago     Alcohol use No     Study Result      XR CHEST 2 VW " 7/18/2018 1:35 PM     HISTORY: Chest pain and shortness of breath.     COMPARISON: 2/1/2016     FINDINGS: No airspace consolidation, pleural effusion or pneumothorax.  Normal heart size.         IMPRESSION: No acute cardiopulmonary abnormality.     DANITA CABA MD     Prior to Admission medications    Medication Sig Last Dose Taking? Auth Provider   albuterol (PROAIR HFA/PROVENTIL HFA/VENTOLIN HFA) 108 (90 BASE) MCG/ACT Inhaler Inhale 2 puffs into the lungs every 6 hours as needed for shortness of breath / dyspnea or wheezing 7/18/2018 at Unknown time Yes David Gambino PA-C   amLODIPine (NORVASC) 10 MG tablet TAKE 1 TABLET BY MOUTH  DAILY 7/18/2018 at Unknown time Yes David Gambino PA-C   aspirin EC 81 MG EC tablet Take 1 tablet (81 mg) by mouth daily 7/18/2018 at Unknown time Yes Brent Aviles,    atorvastatin (LIPITOR) 80 MG tablet TAKE 1 TABLET BY MOUTH AT  BEDTIME 7/17/2018 at Unknown time Yes David Gambino PA-C   citalopram (CELEXA) 20 MG tablet TAKE 1 TABLET BY MOUTH  DAILY 7/17/2018 at Unknown time Yes David Gambino PA-C   Fluticasone-Salmeterol 232-14 MCG/ACT AEPB Inhale 1 puff into the lungs 2 times daily 7/18/2018 at Unknown time Yes David Gambino PA-C   hydrALAZINE (APRESOLINE) 50 MG tablet TAKE 1 TABLET BY MOUTH  TWICE A DAY 7/18/2018 at x1 Yes David Gambino PA-C   ipratropium - albuterol 0.5 mg/2.5 mg/3 mL (DUONEB) 0.5-2.5 (3) MG/3ML neb solution Take 1 vial (3 mLs) by nebulization every 4 hours as needed for shortness of breath / dyspnea or wheezing 7/18/2018 at Unknown time Yes David Gambino PA-C   nicotine polacrilex (NICORETTE) 4 MG gum Place 1 each (4 mg) inside cheek as needed for smoking cessation Past Week at Unknown time Yes David Gambino PA-C   nitroGLYcerin (NITROSTAT) 0.4 MG sublingual tablet Place 1 tablet (0.4 mg) under the tongue every 5 minutes as needed for chest pain Past Week at Unknown time Yes Immanuel  David Mancilla PA-C   varenicline (CHANTIX) 1 MG tablet Take 1 tablet (1 mg) by mouth 2 times daily 7/18/2018 at Unknown time Yes David Gambino PA-C   clotrimazole (LOTRIMIN) 1 % cream Apply topically 2 times daily Unknown at Unknown time   David Gambino PA-C Michael Rentz RT  7/18/2018

## 2018-07-19 ENCOUNTER — APPOINTMENT (OUTPATIENT)
Dept: CARDIOLOGY | Facility: CLINIC | Age: 48
DRG: 291 | End: 2018-07-19
Attending: INTERNAL MEDICINE
Payer: COMMERCIAL

## 2018-07-19 LAB
ANION GAP SERPL CALCULATED.3IONS-SCNC: 7 MMOL/L (ref 3–14)
BUN SERPL-MCNC: 17 MG/DL (ref 7–30)
CALCIUM SERPL-MCNC: 8.5 MG/DL (ref 8.5–10.1)
CHLORIDE SERPL-SCNC: 107 MMOL/L (ref 94–109)
CO2 SERPL-SCNC: 25 MMOL/L (ref 20–32)
CREAT SERPL-MCNC: 0.72 MG/DL (ref 0.52–1.04)
ERYTHROCYTE [DISTWIDTH] IN BLOOD BY AUTOMATED COUNT: 12.7 % (ref 10–15)
GFR SERPL CREATININE-BSD FRML MDRD: 85 ML/MIN/1.7M2
GLUCOSE SERPL-MCNC: 138 MG/DL (ref 70–99)
HCT VFR BLD AUTO: 43.6 % (ref 35–47)
HGB BLD-MCNC: 14.4 G/DL (ref 11.7–15.7)
MCH RBC QN AUTO: 29.3 PG (ref 26.5–33)
MCHC RBC AUTO-ENTMCNC: 33 G/DL (ref 31.5–36.5)
MCV RBC AUTO: 89 FL (ref 78–100)
PLATELET # BLD AUTO: 248 10E9/L (ref 150–450)
POTASSIUM SERPL-SCNC: 3.5 MMOL/L (ref 3.4–5.3)
RBC # BLD AUTO: 4.92 10E12/L (ref 3.8–5.2)
SODIUM SERPL-SCNC: 139 MMOL/L (ref 133–144)
WBC # BLD AUTO: 10.6 10E9/L (ref 4–11)

## 2018-07-19 PROCEDURE — 25000128 H RX IP 250 OP 636: Performed by: INTERNAL MEDICINE

## 2018-07-19 PROCEDURE — 40000275 ZZH STATISTIC RCP TIME EA 10 MIN

## 2018-07-19 PROCEDURE — 25000125 ZZHC RX 250: Performed by: INTERNAL MEDICINE

## 2018-07-19 PROCEDURE — 40000264 ECHO COMPLETE WITH OPTISON

## 2018-07-19 PROCEDURE — 94640 AIRWAY INHALATION TREATMENT: CPT

## 2018-07-19 PROCEDURE — 99232 SBSQ HOSP IP/OBS MODERATE 35: CPT | Performed by: INTERNAL MEDICINE

## 2018-07-19 PROCEDURE — 36415 COLL VENOUS BLD VENIPUNCTURE: CPT | Performed by: PHYSICIAN ASSISTANT

## 2018-07-19 PROCEDURE — 25500064 ZZH RX 255 OP 636: Performed by: INTERNAL MEDICINE

## 2018-07-19 PROCEDURE — 25000128 H RX IP 250 OP 636: Performed by: PHYSICIAN ASSISTANT

## 2018-07-19 PROCEDURE — 99207 ZZC CDG-MDM COMPONENT: MEETS LOW - DOWN CODED: CPT | Performed by: INTERNAL MEDICINE

## 2018-07-19 PROCEDURE — 85027 COMPLETE CBC AUTOMATED: CPT | Performed by: PHYSICIAN ASSISTANT

## 2018-07-19 PROCEDURE — 94640 AIRWAY INHALATION TREATMENT: CPT | Mod: 76

## 2018-07-19 PROCEDURE — 93306 TTE W/DOPPLER COMPLETE: CPT | Mod: 26 | Performed by: INTERNAL MEDICINE

## 2018-07-19 PROCEDURE — 80048 BASIC METABOLIC PNL TOTAL CA: CPT | Performed by: PHYSICIAN ASSISTANT

## 2018-07-19 PROCEDURE — 25000132 ZZH RX MED GY IP 250 OP 250 PS 637: Performed by: PHYSICIAN ASSISTANT

## 2018-07-19 PROCEDURE — 12000007 ZZH R&B INTERMEDIATE

## 2018-07-19 RX ORDER — PREDNISONE 20 MG/1
40 TABLET ORAL DAILY
Status: DISCONTINUED | OUTPATIENT
Start: 2018-07-20 | End: 2018-07-21 | Stop reason: HOSPADM

## 2018-07-19 RX ADMIN — GUAIFENESIN AND DEXTROMETHORPHAN HYDROBROMIDE 2 TABLET: 600; 30 TABLET, EXTENDED RELEASE ORAL at 08:17

## 2018-07-19 RX ADMIN — BENZONATATE 100 MG: 100 CAPSULE ORAL at 22:03

## 2018-07-19 RX ADMIN — ASPIRIN 81 MG: 81 TABLET, COATED ORAL at 08:17

## 2018-07-19 RX ADMIN — HUMAN ALBUMIN MICROSPHERES AND PERFLUTREN 3 ML: 10; .22 INJECTION, SOLUTION INTRAVENOUS at 15:43

## 2018-07-19 RX ADMIN — SODIUM CHLORIDE: 9 INJECTION, SOLUTION INTRAVENOUS at 04:17

## 2018-07-19 RX ADMIN — GUAIFENESIN AND DEXTROMETHORPHAN HYDROBROMIDE 2 TABLET: 600; 30 TABLET, EXTENDED RELEASE ORAL at 20:00

## 2018-07-19 RX ADMIN — AMLODIPINE BESYLATE 10 MG: 10 TABLET ORAL at 08:17

## 2018-07-19 RX ADMIN — CITALOPRAM HYDROBROMIDE 20 MG: 20 TABLET ORAL at 08:17

## 2018-07-19 RX ADMIN — CETIRIZINE HYDROCHLORIDE 10 MG: 10 TABLET, FILM COATED ORAL at 08:17

## 2018-07-19 RX ADMIN — FUROSEMIDE 20 MG: 10 INJECTION, SOLUTION INTRAVENOUS at 06:36

## 2018-07-19 RX ADMIN — ENOXAPARIN SODIUM 40 MG: 40 INJECTION SUBCUTANEOUS at 16:41

## 2018-07-19 RX ADMIN — HYDRALAZINE HYDROCHLORIDE 50 MG: 50 TABLET, FILM COATED ORAL at 20:00

## 2018-07-19 RX ADMIN — ATORVASTATIN CALCIUM 80 MG: 40 TABLET, FILM COATED ORAL at 19:59

## 2018-07-19 RX ADMIN — IPRATROPIUM BROMIDE AND ALBUTEROL SULFATE 3 ML: .5; 3 SOLUTION RESPIRATORY (INHALATION) at 07:16

## 2018-07-19 RX ADMIN — PREDNISONE 40 MG: 20 TABLET ORAL at 16:36

## 2018-07-19 RX ADMIN — FUROSEMIDE 20 MG: 10 INJECTION, SOLUTION INTRAVENOUS at 16:37

## 2018-07-19 RX ADMIN — IPRATROPIUM BROMIDE AND ALBUTEROL SULFATE 3 ML: .5; 3 SOLUTION RESPIRATORY (INHALATION) at 19:21

## 2018-07-19 RX ADMIN — BENZONATATE 100 MG: 100 CAPSULE ORAL at 16:37

## 2018-07-19 RX ADMIN — BENZONATATE 100 MG: 100 CAPSULE ORAL at 08:17

## 2018-07-19 RX ADMIN — IPRATROPIUM BROMIDE AND ALBUTEROL SULFATE 3 ML: .5; 3 SOLUTION RESPIRATORY (INHALATION) at 11:28

## 2018-07-19 RX ADMIN — IPRATROPIUM BROMIDE AND ALBUTEROL SULFATE 3 ML: .5; 3 SOLUTION RESPIRATORY (INHALATION) at 15:18

## 2018-07-19 RX ADMIN — PREDNISONE 40 MG: 20 TABLET ORAL at 06:34

## 2018-07-19 RX ADMIN — HYDRALAZINE HYDROCHLORIDE 50 MG: 50 TABLET, FILM COATED ORAL at 08:17

## 2018-07-19 ASSESSMENT — ACTIVITIES OF DAILY LIVING (ADL)
ADLS_ACUITY_SCORE: 11

## 2018-07-19 NOTE — PROGRESS NOTES
Discharge Planner   Discharge Plans in progress: Yes  Barriers to discharge plan: Supplemental oxygen   Follow up plan: PCP f/u appt Tuesday, 7/31/18 at 4:40pm       Entered by: Krysta Colon 07/19/2018 2:10 PM

## 2018-07-19 NOTE — CONSULTS
Care Transition Initial Assessment - RN    Reason For Consult: care coordination/care conference, discharge planning   Met with: Patient and Family.  DATA   Active Problems:    COPD exacerbation (H)     COPD Action Plan discussed with pt & family at bedside.   Cognitive Status: awake, alert and oriented.  [facility]  Primary Care MD Name: David DUGGAN  Contact information and PCP information verified: Yes  Lives With: spouse  Living Arrangements: house     Description of Support System: Supportive   Who is your support system?: , Children       Insurance concerns: No Insurance issues identified  ASSESSMENT  Patient currently receives the following services:  None.         Identified issues/concerns regarding health management: New diagnosis of COPD. Patient quit smoking 3 days ago when her breathing became difficult. She is concerned about maintaining as a non-smoker with her spouse as a heavy smoker. Discussed smoking cessation strategies at length with pt & family.     PLAN  Financial costs for the patient were not discussed.  Patient given options and choices for discharge.  Patient/family is agreeable to the plan?  Yes: ongoing cessation, methods to maintain non-smoking status.   Patient anticipates discharging back to home.     Other Resources: Other (see comment) (COPD Action Plan)  Patient anticipates needs for home equipment: No  Plan/Disposition: Home   Appointments: David Gambino PA-C on Tuesday, 7/31/18 at 4:40pm.     CM will continue to follow patient until discharge for any additional needs.     Brittney Colon RN, BSN, CTS  [facility]  [phone]

## 2018-07-19 NOTE — PROGRESS NOTES
Respiratory Therapy Note        Started Pt on aerobika (flutter valve) therapy as needed.    July 19, 2018 3:23 PM  Eros Armendariz

## 2018-07-19 NOTE — PLAN OF CARE
Problem: Patient Care Overview  Goal: Plan of Care/Patient Progress Review  OT/CR: Smoking cessation attempted x2 this date. Pt working with other providers both attempts. Re-scheduled.

## 2018-07-19 NOTE — PLAN OF CARE
Problem: Patient Care Overview  Goal: Plan of Care/Patient Progress Review  Outcome: No Change  Pt A&O x4, SBA, Tele SR with ST depressions, right PIV with IVF NS @ 100ml/hr, Knight with 250ml output this shift, IV lasix and PO prednisone administered, continue with POC    Problem: Breathing Pattern Ineffective (Adult)  Goal: Effective Oxygenation/Ventilation  Patient will demonstrate the desired outcomes by discharge/transition of care.   Outcome: No Change  Pt's oxygen needs increased to 2LPM NC, LS wheezes, other VSS

## 2018-07-19 NOTE — PROGRESS NOTES
"[facility]  Hospitalist Progress Note  Carlton Nowak MD 07/19/2018    Reason for Stay (Diagnosis): acute hypoxic resp failure         Assessment and Plan:      Summary of Stay: Marni Austin is a 48 year old female admitted on 7/18/2018 with shortness of breath thought due primarily to COPD exacerbation.       Problem List:   1. Acute hypoxic resp failure, likely multifactorial.  COPD, morbid obesity, diatolic CHF, suspected JORGE.  2. Suspected COPD exacerbation. On-going smoking, inadequate use of preventative medications, seasonal changes.  It is unclear how well this has been evaluated. No CT chest, no full PFTs. This should be evaluated with Dr. Gambino (PMD) when pt is closer to baseline.   3. Diastolic CHF. Received IVF overnight, and nearly balanced in I/O. Cont with diuresis. Echocardiogram shows \"Grade II\" diastolic dysfunction. Seems generally improved with modest diuresis.   4. Morbid obesity with BMI 37.  5. Suspected JORGE.     PLAN:  1.  Anticipate switch to oral diuretic on Saturday. (One more day of IV diuresis) Consider torsemide rather than lasix due to absorption issues.  2.  Plan to reduce steroids to once daily Prednisone tomorrow.   3.  Cont with empiric bronchodilators.   4.  Care coordinator to set up Sleep Study upon discharge.   5.  Nutrition teaching for CHF.   6.  Consider stress testing after discharge.   7.  Discussed tobacco cessation. Pt is very motivated by her grandson.  8.  Consider Betablocker empirically for CHF.     DVT Prophylaxis: Enoxaparin (Lovenox) SQ  Code Status: Full Code  Discharge Dispo: home  Estimated Disch Date / # of Days until Disch: likely 2        Interval History (Subjective):      Patient indicates she is feeling substantially better at this time than when she came in yesterday.    In retrospect she admits that she was intensely uncomfortable with shortness of breath over the preceding couple of days.  She had been out in the hot weather " "participating in a wedding and became intensely exhausted with that.    The patient and I talked at length about the diagnosis of diastolic heart failure.  We talked about the importance of addressing possible obstructive sleep apnea.  In addition patient appears very motivated now to quit smoking.  Apparently her  also smokes and this makes it difficult for her to remain abstinent.                  Physical Exam:      Last Vital Signs:  /78 (BP Location: Left arm)  Pulse 88  Temp 97.2  F (36.2  C) (Oral)  Resp 24  Ht 1.549 m (5' 1\")  Wt 90 kg (198 lb 6.4 oz)  LMP 08/30/2013  SpO2 (!) 87%  BMI 37.49 kg/m2    I/O last 3 completed shifts:  In: 2001 [P.O.:720; I.V.:1281]  Out: 1725 [Urine:1725]    Constitutional: Awake, alert, cooperative, no apparent distress. Morbidly obese with prominent abdominal distribution of adipose tissue.   Respiratory:  Much less dyspneic lying flat than she had been on examination yesterday.  Clear to auscultation bilaterally, no crackles.  Scattered wheeze still noted.   Cardiovascular: Regular rate and rhythm, normal S1 and S2, and no murmur noted   Abdomen: Normal bowel sounds, soft, non-distended, non-tender   Skin: No rashes, no cyanosis, dry to touch   Neuro: Alert and oriented x3, no weakness, numbness, memory loss   Extremities: Trace edema, normal range of motion   Other(s):  No apparent jugular venous distention; equivocal hepatojugular reflux.       All other systems: Negative          Medications:      All current medications were reviewed with changes reflected in problem list.         Data:      All new lab and imaging data was reviewed.   Labs/Imaging:  Results for orders placed or performed during the hospital encounter of 07/18/18 (from the past 24 hour(s))   Basic metabolic panel   Result Value Ref Range    Sodium 139 133 - 144 mmol/L    Potassium 3.5 3.4 - 5.3 mmol/L    Chloride 107 94 - 109 mmol/L    Carbon Dioxide 25 20 - 32 mmol/L    Anion Gap 7 3 " - 14 mmol/L    Glucose 138 (H) 70 - 99 mg/dL    Urea Nitrogen 17 7 - 30 mg/dL    Creatinine 0.72 0.52 - 1.04 mg/dL    GFR Estimate 85 >60 mL/min/1.7m2    GFR Estimate If Black >90 >60 mL/min/1.7m2    Calcium 8.5 8.5 - 10.1 mg/dL   CBC with platelets   Result Value Ref Range    WBC 10.6 4.0 - 11.0 10e9/L    RBC Count 4.92 3.8 - 5.2 10e12/L    Hemoglobin 14.4 11.7 - 15.7 g/dL    Hematocrit 43.6 35.0 - 47.0 %    MCV 89 78 - 100 fl    MCH 29.3 26.5 - 33.0 pg    MCHC 33.0 31.5 - 36.5 g/dL    RDW 12.7 10.0 - 15.0 %    Platelet Count 248 150 - 450 10e9/L   ECHO COMPLETE WITH OPTISON    Narrative    [id number]  [id number]  [id number]  [id number]^MICHAEL^CHASE^R           [facility]  Echocardiography Laboratory  [address] [place] [address]        Name: MAYELA BOSE  MRN: [id number]  : 1970  Study Date: 2018 03:47 PM  Age: 48 yrs  Gender: Female  Patient Location: [facility]  Reason For Study: CHF  Ordering Physician: CHASE RODRIGUEZ  Referring Physician: David Gambino  Performed By: Leighton Talavera RDCS     BSA: 1.9 m2  Height: 61 in  Weight: 200 lb  HR: 98  BP: 136/80 mmHg  _____________________________________________________________________________  __        Procedure  Complete Echo Adult. Contrast Optison.  _____________________________________________________________________________  __        Interpretation Summary     Sinus rhythm was noted. The rhythm was sinus tachycardia.  The ascending aorta is Mildly dilated at 3.9 cm (The upper limit of normal is  3.7cm for aortic root diameter.)  There is trace to mild tricuspid regurgitation. The right ventricular systolic  pressure is approximated at 32mmHg plus the right atrial pressure is elevated,  consistent with mild pulmonary hypertension.  With this, the inferior vena cava is not dilated.  The left ventricle is normal in size with mild concentric left ventricular  hypertrophy.  The visual ejection fraction is estimated at 65-70%, however,  with Grade II or  moderate diastolic dysfunction.  Despite the above observations, there is a normal left atrial size by volume  measurement at 26.5 ml/m2. The ULNL for LA volume is 34 ml/m2.  No hemodynamically significant valvular aortic stenosis.     Compared to the echos from 2015 and 2016, no significant changes.  _____________________________________________________________________________  __        Left Ventricle  The left ventricle is normal in size. There is mild concentric left  ventricular hypertrophy. The left ventricular ejection fraction is normal. The  visual ejection fraction is estimated at 65-70%. Hyperdynamic left ventricular  function. Grade II or moderate diastolic dysfunction. Diastolic Doppler  findings (E/E' ratio and/or other parameters) suggest left ventricular filling  pressures are indeterminate. No regional wall motion abnormalities noted.  There is no thrombus seen in the left ventricle.     Right Ventricle  Normal right ventricle structure and size. The right ventricular systolic  function is normal.     Atria  Normal left atrial size. by volume measurement at 26.5 ml/m2. The ULNL for LA  volume is 34 ml/m2. Right atrial size is normal. There is no atrial shunt  seen.     Mitral Valve  The mitral valve is normal in structure and function. There is no evidence of  mitral valve prolapse. There is trace mitral regurgitation. There is no mitral  valve stenosis.        Tricuspid Valve  The tricuspid valve is normal in structure and function. There is trace to  mild tricuspid regurgitation. The right ventricular systolic pressure is  approximated at 32mmHg plus the right atrial pressure. Right ventricular  systolic pressure is elevated, consistent with mild pulmonary hypertension.     Aortic Valve  The aortic valve is normal in structure and function. The aortic valve is  trileaflet. No aortic regurgitation is present. The mean AoV pressure gradient  is 9.6 mmHg. The peak AoV pressure  gradient is 20.0 mmHg. The calculated  aortic valve are is 2.6 cm^2. No hemodynamically significant valvular aortic  stenosis.     Pulmonic Valve  The pulmonic valve is not well seen, but is grossly normal. There is trace  pulmonic valvular regurgitation.     Vessels  Normal size aorta. (The upper limit of normal is 3.7cm for aortic root  diameter.). The ascending aorta is Mildly dilated. at 3.9 cm. The IVC is  normal in size and reactivity with respiration, suggesting normal central  venous pressure. The inferior vena cava is not dilated.     Pericardium  There is no pericardial effusion. There is no pleural effusion.        Rhythm  Sinus rhythm was noted. The rhythm was sinus tachycardia.  _____________________________________________________________________________  __  MMode/2D Measurements & Calculations  IVSd: 1.2 cm     LVIDd: 4.6 cm  LVIDs: 2.6 cm  LVPWd: 1.3 cm  FS: 44.0 %  LV mass(C)d: 225.5 grams  LV mass(C)dI: 119.4 grams/m2  Ao root diam: 3.9 cm  LA dimension: 3.9 cm  asc Aorta Diam: 3.9 cm  LA/Ao: 1.0  LVOT diam: 2.1 cm  LVOT area: 3.6 cm2  LA Volume (BP): 50.0 ml  LA Volume Index (BP): 26.5 ml/m2  RWT: 0.55           Doppler Measurements & Calculations  MV E max amrit: 81.9 cm/sec  MV A max amrit: 71.6 cm/sec  MV E/A: 1.1  MV max P.0 mmHg  MV mean P.1 mmHg  MV V2 VTI: 22.3 cm  MVA(VTI): 4.2 cm2  MV dec time: 0.16 sec  Ao V2 max: 224.1 cm/sec  Ao max P.0 mmHg  Ao V2 mean: 143.6 cm/sec  Ao mean P.6 mmHg  Ao V2 VTI: 35.9 cm  ELIEL(I,D): 2.6 cm2  ELIEL(V,D): 2.5 cm2  LV V1 max P.7 mmHg  LV V1 max: 156.1 cm/sec  LV V1 VTI: 26.2 cm  CO(LVOT): 9.6 l/min  CI(LVOT): 5.1 l/min/m2  SV(LVOT): 94.4 ml  SI(LVOT): 49.9 ml/m2  PA acc time: 0.12 sec  TR max amrit: 285.0 cm/sec  TR max P.5 mmHg  AV Amrit Ratio (DI): 0.70  ELIEL Index (cm2/m2): 1.4  E/E' avg: 10.5  Lateral E/e': 9.7  Medial E/e': 11.4              _____________________________________________________________________________  __        Report  approved by: Tyrell Wolff 07/19/2018 05:06 PM

## 2018-07-19 NOTE — PLAN OF CARE
Problem: Patient Care Overview  Goal: Plan of Care/Patient Progress Review  Outcome: No Change  VS stable. A & O x 4.   Tele:sinus rhythm, slight ST depression  Diet:2 gram NA.   Ambulates:independently    IV meds: IV lasix given   Pain: no report of pain or back spasms today. Patient stated that flexeril really improved her spasms and pain yesterday.   Abnormal labs:WBC slightly down from yesterday, 10.6    Abnormal assessments: expiratory wheezing with shallow breaths. Cough with small amounts of phlegm. Started on acappella device today  Drains/devices: titrated oxygen down to 1 liter with sats about 94%. Will continue to titrate down upon tolerance this evening.   Discharge plan: no note in place about DC, although care coordinators began discharge planning (see note). Will continue to monitor and review plan of care.

## 2018-07-19 NOTE — PLAN OF CARE
Problem: Patient Care Overview  Goal: Plan of Care/Patient Progress Review  Outcome: No Change  VS stable. A & O x 4.   Tele:sinus rhythm/sinus tach with ST depression and inverted T waves   Diet:2 gram NA.   Ambulates:assist of 1 due to drains and IV.   IV meds: IVF running at 100 ml/hr. IV lasix given  Pain: back spasms occurring intermittently- flexeril given    Abnormal labs: triggered sepsis protocol- lactic 1.6. WBC slightly elevated- 12.1.   Abnormal assessments: expiratory wheezing with shallow breaths. Cough with small amounts of phlegm.   Drains/devices :pavon catheter in place . 1 liter of oxygen nasal cannula most of the shift, bumped up to 1.5 liters when patient was sleeping   Discharge plan: not yet in place, patient lives in single family home with her . Will continue to monitor and review plan of care.

## 2018-07-19 NOTE — PLAN OF CARE
"Problem: Patient Care Overview  Goal: Plan of Care/Patient Progress Review  Outcome: Improving  Pt up independent in room. VSS. Denies pain.    /63 (BP Location: Left arm)  Pulse 88  Temp 95.3  F (35.2  C) (Oral)  Resp 20  Ht 1.549 m (5' 1\")  Wt 90 kg (198 lb 6.4 oz)  LMP 08/30/2013  SpO2 97%  BMI 37.49 kg/m2   Tele is SR with ST depressions. Saline locked.  Knight pulled and voiding okay. Lungs sounds expiratory wheezy bilateral, SOB on exertion. Infrequent cough and using IS. Continues on 2L O2 nasal cannula. Echo still needed. Plan to dc 1-2 days. Will continue to monitor.          "

## 2018-07-20 ENCOUNTER — APPOINTMENT (OUTPATIENT)
Dept: OCCUPATIONAL THERAPY | Facility: CLINIC | Age: 48
DRG: 291 | End: 2018-07-20
Attending: PHYSICIAN ASSISTANT
Payer: COMMERCIAL

## 2018-07-20 LAB
ANION GAP SERPL CALCULATED.3IONS-SCNC: 7 MMOL/L (ref 3–14)
BUN SERPL-MCNC: 26 MG/DL (ref 7–30)
CALCIUM SERPL-MCNC: 8.8 MG/DL (ref 8.5–10.1)
CHLORIDE SERPL-SCNC: 107 MMOL/L (ref 94–109)
CO2 SERPL-SCNC: 26 MMOL/L (ref 20–32)
CREAT SERPL-MCNC: 0.78 MG/DL (ref 0.52–1.04)
ERYTHROCYTE [DISTWIDTH] IN BLOOD BY AUTOMATED COUNT: 12.7 % (ref 10–15)
GFR SERPL CREATININE-BSD FRML MDRD: 78 ML/MIN/1.7M2
GLUCOSE SERPL-MCNC: 123 MG/DL (ref 70–99)
HCT VFR BLD AUTO: 43.3 % (ref 35–47)
HGB BLD-MCNC: 14.2 G/DL (ref 11.7–15.7)
LACTATE BLD-SCNC: 1.4 MMOL/L (ref 0.4–1.9)
MCH RBC QN AUTO: 29 PG (ref 26.5–33)
MCHC RBC AUTO-ENTMCNC: 32.8 G/DL (ref 31.5–36.5)
MCV RBC AUTO: 88 FL (ref 78–100)
PLATELET # BLD AUTO: 280 10E9/L (ref 150–450)
POTASSIUM SERPL-SCNC: 3.6 MMOL/L (ref 3.4–5.3)
PROCALCITONIN SERPL-MCNC: <0.05 NG/ML
RBC # BLD AUTO: 4.9 10E12/L (ref 3.8–5.2)
SODIUM SERPL-SCNC: 140 MMOL/L (ref 133–144)
WBC # BLD AUTO: 13.3 10E9/L (ref 4–11)

## 2018-07-20 PROCEDURE — 36415 COLL VENOUS BLD VENIPUNCTURE: CPT | Performed by: INTERNAL MEDICINE

## 2018-07-20 PROCEDURE — 84145 PROCALCITONIN (PCT): CPT | Performed by: INTERNAL MEDICINE

## 2018-07-20 PROCEDURE — 85027 COMPLETE CBC AUTOMATED: CPT | Performed by: INTERNAL MEDICINE

## 2018-07-20 PROCEDURE — 99207 ZZC CDG-MDM COMPONENT: MEETS LOW - DOWN CODED: CPT | Performed by: INTERNAL MEDICINE

## 2018-07-20 PROCEDURE — 25000132 ZZH RX MED GY IP 250 OP 250 PS 637: Performed by: PHYSICIAN ASSISTANT

## 2018-07-20 PROCEDURE — 99406 BEHAV CHNG SMOKING 3-10 MIN: CPT

## 2018-07-20 PROCEDURE — 40000133 ZZH STATISTIC OT WARD VISIT

## 2018-07-20 PROCEDURE — 25000125 ZZHC RX 250: Performed by: INTERNAL MEDICINE

## 2018-07-20 PROCEDURE — 94640 AIRWAY INHALATION TREATMENT: CPT | Mod: 76

## 2018-07-20 PROCEDURE — 99232 SBSQ HOSP IP/OBS MODERATE 35: CPT | Performed by: INTERNAL MEDICINE

## 2018-07-20 PROCEDURE — 25000128 H RX IP 250 OP 636: Performed by: INTERNAL MEDICINE

## 2018-07-20 PROCEDURE — 80048 BASIC METABOLIC PNL TOTAL CA: CPT | Performed by: INTERNAL MEDICINE

## 2018-07-20 PROCEDURE — 12000007 ZZH R&B INTERMEDIATE

## 2018-07-20 PROCEDURE — 83605 ASSAY OF LACTIC ACID: CPT | Performed by: INTERNAL MEDICINE

## 2018-07-20 PROCEDURE — 25000132 ZZH RX MED GY IP 250 OP 250 PS 637: Performed by: INTERNAL MEDICINE

## 2018-07-20 PROCEDURE — 94640 AIRWAY INHALATION TREATMENT: CPT

## 2018-07-20 PROCEDURE — 40000275 ZZH STATISTIC RCP TIME EA 10 MIN

## 2018-07-20 RX ORDER — DOXYCYCLINE 100 MG/10ML
100 INJECTION, POWDER, LYOPHILIZED, FOR SOLUTION INTRAVENOUS EVERY 12 HOURS
Status: DISCONTINUED | OUTPATIENT
Start: 2018-07-20 | End: 2018-07-21 | Stop reason: HOSPADM

## 2018-07-20 RX ORDER — VARENICLINE TARTRATE 1 MG/1
1 TABLET, FILM COATED ORAL 2 TIMES DAILY
Status: DISCONTINUED | OUTPATIENT
Start: 2018-07-20 | End: 2018-07-21 | Stop reason: HOSPADM

## 2018-07-20 RX ORDER — CEFTRIAXONE 2 G/1
2 INJECTION, POWDER, FOR SOLUTION INTRAMUSCULAR; INTRAVENOUS EVERY 24 HOURS
Status: DISCONTINUED | OUTPATIENT
Start: 2018-07-20 | End: 2018-07-21 | Stop reason: HOSPADM

## 2018-07-20 RX ADMIN — AMLODIPINE BESYLATE 10 MG: 10 TABLET ORAL at 08:54

## 2018-07-20 RX ADMIN — CITALOPRAM HYDROBROMIDE 20 MG: 20 TABLET ORAL at 08:49

## 2018-07-20 RX ADMIN — DOXYCYCLINE 100 MG: 100 INJECTION, POWDER, LYOPHILIZED, FOR SOLUTION INTRAVENOUS at 16:30

## 2018-07-20 RX ADMIN — HYDRALAZINE HYDROCHLORIDE 50 MG: 50 TABLET, FILM COATED ORAL at 20:57

## 2018-07-20 RX ADMIN — ATORVASTATIN CALCIUM 80 MG: 40 TABLET, FILM COATED ORAL at 20:56

## 2018-07-20 RX ADMIN — IPRATROPIUM BROMIDE AND ALBUTEROL SULFATE 3 ML: .5; 3 SOLUTION RESPIRATORY (INHALATION) at 19:24

## 2018-07-20 RX ADMIN — ENOXAPARIN SODIUM 40 MG: 40 INJECTION SUBCUTANEOUS at 17:42

## 2018-07-20 RX ADMIN — GUAIFENESIN AND DEXTROMETHORPHAN HYDROBROMIDE 2 TABLET: 600; 30 TABLET, EXTENDED RELEASE ORAL at 21:00

## 2018-07-20 RX ADMIN — CEFTRIAXONE SODIUM 2 G: 2 INJECTION, POWDER, FOR SOLUTION INTRAMUSCULAR; INTRAVENOUS at 18:01

## 2018-07-20 RX ADMIN — BENZONATATE 100 MG: 100 CAPSULE ORAL at 16:30

## 2018-07-20 RX ADMIN — BENZONATATE 100 MG: 100 CAPSULE ORAL at 08:49

## 2018-07-20 RX ADMIN — BENZONATATE 100 MG: 100 CAPSULE ORAL at 21:00

## 2018-07-20 RX ADMIN — IPRATROPIUM BROMIDE AND ALBUTEROL SULFATE 3 ML: .5; 3 SOLUTION RESPIRATORY (INHALATION) at 08:08

## 2018-07-20 RX ADMIN — PREDNISONE 40 MG: 20 TABLET ORAL at 08:48

## 2018-07-20 RX ADMIN — VARENICLINE TARTRATE 1 MG: 1 TABLET, FILM COATED ORAL at 12:38

## 2018-07-20 RX ADMIN — NICOTINE 1 PATCH: 14 PATCH, EXTENDED RELEASE TRANSDERMAL at 08:50

## 2018-07-20 RX ADMIN — IPRATROPIUM BROMIDE AND ALBUTEROL SULFATE 3 ML: .5; 3 SOLUTION RESPIRATORY (INHALATION) at 15:15

## 2018-07-20 RX ADMIN — CETIRIZINE HYDROCHLORIDE 10 MG: 10 TABLET, FILM COATED ORAL at 08:49

## 2018-07-20 RX ADMIN — GUAIFENESIN AND DEXTROMETHORPHAN HYDROBROMIDE 2 TABLET: 600; 30 TABLET, EXTENDED RELEASE ORAL at 08:48

## 2018-07-20 RX ADMIN — HYDRALAZINE HYDROCHLORIDE 50 MG: 50 TABLET, FILM COATED ORAL at 08:54

## 2018-07-20 RX ADMIN — IPRATROPIUM BROMIDE AND ALBUTEROL SULFATE 3 ML: .5; 3 SOLUTION RESPIRATORY (INHALATION) at 11:55

## 2018-07-20 RX ADMIN — FLUTICASONE FUROATE AND VILANTEROL TRIFENATATE 1 PUFF: 200; 25 POWDER RESPIRATORY (INHALATION) at 11:57

## 2018-07-20 RX ADMIN — VARENICLINE TARTRATE 1 MG: 1 TABLET, FILM COATED ORAL at 20:57

## 2018-07-20 RX ADMIN — FUROSEMIDE 20 MG: 10 INJECTION, SOLUTION INTRAVENOUS at 07:07

## 2018-07-20 RX ADMIN — ASPIRIN 81 MG: 81 TABLET, COATED ORAL at 08:49

## 2018-07-20 ASSESSMENT — ACTIVITIES OF DAILY LIVING (ADL)
ADLS_ACUITY_SCORE: 11
ADLS_ACUITY_SCORE: 9
ADLS_ACUITY_SCORE: 11
ADLS_ACUITY_SCORE: 11
ADLS_ACUITY_SCORE: 9
ADLS_ACUITY_SCORE: 11

## 2018-07-20 NOTE — PLAN OF CARE
"Problem: Patient Care Overview  Goal: Plan of Care/Patient Progress Review  Outcome: Improving  VSS. Tolerating RA.  Denies pain.  Good oral intake.  BRV quantity sufficient.  No complaints of bowel issues.  LS diminished and wheezy.  Receiving nebs and inhalers.  Tolerating flutter valve/IS.  Smoking cessation changed from patch to chantix per pt request.  SR on monitor.  Up independently.  Had a hot flash this afternoon, but pt states that is normal for her.  Afebrile.  Working on changing diuretics/steroids to oral and possible discharge this weekend to home.  Pt would like flexeril or potassium to help with \"[place]\" when discharged.  She also asked about FMLA due to possible issues at her employment.  Will defer that to provider or .  Continue to encourage activity as able.  Pt still dyspneic with activity and has productive cough.        "

## 2018-07-20 NOTE — PROGRESS NOTES
CM observed that hospitalist's note stated new CHF. Met with patient & family at bedside and provided her with CHF packet. Discussed at length. Provided pt with scale. Updated bedside RN. Consult for sleep evaluation. [facility]ed  [facility] ([phone]) to schedule initial MD visit. Patient works 8-4 Monday thru Friday. Scheduled initial appt for Wednesday, 8/29/18 at 5pm.     CM will continue to follow patient until discharge for any additional needs.     Brittney Colon RN, BSN, CTS  [facility]  [phone]

## 2018-07-20 NOTE — PLAN OF CARE
Problem: Patient Care Overview  Goal: Plan of Care/Patient Progress Review  Outcome: No Change  A&OX4,VSS.LICEA, on room air.Tele- SR. On 2 gm Na diet, Up independent. On IV lasix.

## 2018-07-20 NOTE — PROGRESS NOTES
"[facility]  Hospitalist Progress Note  Brent Aviles, DO 07/20/2018    Reason for Stay (Diagnosis): COPD exacerbation         Assessment and Plan:      Summary of Stay: Marni Austin is a 48 year old female admitted on 7/18/2018 with COPD exacerbation.  Problem List:   1. Acute COPD exacerbation.  Continue prednisone, duo nebs, Breo Ellipta.  Albuterol as needed.  She does have purulent sputum.  Start doxycycline 100 mg twice daily and ceftriaxone 2 g daily.  Check pro-calcitonin level.  2. Acute hypoxic respiratory failure.  Due to COPD exacerbation.  Continue supplemental oxygen as needed.  3. Tobacco use disorder.  I did advise smoking cessation long-term.  Restart Chantix.  4. Acute on chronic diastolic congestive heart failure exacerbation.  Appears much improved.  No longer appears fluid overloaded.  Would hold off on further diuresis at this point.  5. Hypertension.  Continue amlodipine and hydralazine.  6. Hyperlipidemia.  Continue atorvastatin.  7. Coronary artery disease.  Continue aspirin and atorvastatin.  DVT Prophylaxis: Enoxaparin (Lovenox) SQ  Code Status: Full Code  Discharge Dispo: Home  Estimated Disch Date / # of Days until Disch: 1-2        Interval History (Subjective):      Short of breath.  Denies chest pain, fevers, chills, nausea, vomiting, or diarrhea.                  Physical Exam:      Last Vital Signs:  /76 (BP Location: Right arm)  Pulse 72  Temp 97.2  F (36.2  C) (Oral)  Resp 16  Ht 1.549 m (5' 1\")  Wt 90 kg (198 lb 6.4 oz)  LMP 08/30/2013  SpO2 94%  BMI 37.49 kg/m2    Gen:  NAD, A&Ox3.  Eyes:  PERRL, sclera anicteric.  OP:  MMM, no lesions.  Neck:  Supple.  CV:  Regular, no murmurs.  Lung:  Extensive wheeze b/l, normal effort.  Ab:  +BS, soft.  Skin:  Warm, dry to touch.  No rash.  Ext:  No pitting edema LE b/l.           Medications:      All current medications were reviewed with changes reflected in problem list.         Data:      All new " lab and imaging data was reviewed.   Labs:    Recent Labs  Lab 07/20/18  0653      POTASSIUM 3.6   CHLORIDE 107   CO2 26   ANIONGAP 7   *   BUN 26   CR 0.78   GFRESTIMATED 78   GFRESTBLACK >90   RANDY 8.8       Recent Labs  Lab 07/20/18  0653   WBC 13.3*   HGB 14.2   HCT 43.3   MCV 88         Imaging:   No results found for this or any previous visit (from the past 24 hour(s)).

## 2018-07-20 NOTE — SMOKING CESSATION
"OT/CR: Smoking cessation completed. Pt very motivated to quit, \"I have a new grand baby that I want to be around for.\" Pt reports she has been successful in cutting back for past 2 months with use of Chantix. Family present and supportive.      07/20/18 1100   General Information   Patient is receptive to smoking cessation at this time Yes  (\"I have been cutting back\")   Packs Per Day 0.5 PPD  (~10 cigarettes/day)   Years Smoked (#) 26 yrs   Cigarette Pack Years 13   Stage of Behavior Change   Patient's Stage of Behavior Change Preparation \"I will quit (within the next 30 days\"   Processes of Change   The following interventions were used (smoking cessation) Identify healthy alternatives;Identify support system;Recognize rewards of value to him/her;Initiate pro/con list of reasons to quit   Motivation to Quit Scale (1-10) 10   Confidence to Quit Scale (1-10) 8   Quit Attempt Date 07/18/18   Education/Recommendations   Education Stage-matched literature;Other (Comment)  (\"Help for Smokers\" handout)   Treatment Time (Minutes) 6 minutes   Total Evaluation Time   Total Evaluation Time (Minutes) 6     "

## 2018-07-21 VITALS
RESPIRATION RATE: 19 BRPM | BODY MASS INDEX: 37.46 KG/M2 | DIASTOLIC BLOOD PRESSURE: 57 MMHG | HEIGHT: 61 IN | TEMPERATURE: 96.8 F | WEIGHT: 198.4 LBS | SYSTOLIC BLOOD PRESSURE: 124 MMHG | HEART RATE: 72 BPM | OXYGEN SATURATION: 97 %

## 2018-07-21 LAB — PLATELET # BLD AUTO: 296 10E9/L (ref 150–450)

## 2018-07-21 PROCEDURE — 25000132 ZZH RX MED GY IP 250 OP 250 PS 637: Performed by: INTERNAL MEDICINE

## 2018-07-21 PROCEDURE — 99239 HOSP IP/OBS DSCHRG MGMT >30: CPT | Performed by: INTERNAL MEDICINE

## 2018-07-21 PROCEDURE — 94640 AIRWAY INHALATION TREATMENT: CPT

## 2018-07-21 PROCEDURE — 85049 AUTOMATED PLATELET COUNT: CPT | Performed by: INTERNAL MEDICINE

## 2018-07-21 PROCEDURE — 25000125 ZZHC RX 250: Performed by: INTERNAL MEDICINE

## 2018-07-21 PROCEDURE — 94640 AIRWAY INHALATION TREATMENT: CPT | Mod: 76

## 2018-07-21 PROCEDURE — 40000275 ZZH STATISTIC RCP TIME EA 10 MIN

## 2018-07-21 PROCEDURE — 36415 COLL VENOUS BLD VENIPUNCTURE: CPT | Performed by: INTERNAL MEDICINE

## 2018-07-21 PROCEDURE — 25000132 ZZH RX MED GY IP 250 OP 250 PS 637: Performed by: PHYSICIAN ASSISTANT

## 2018-07-21 RX ORDER — CYCLOBENZAPRINE HCL 5 MG
5 TABLET ORAL 2 TIMES DAILY PRN
Qty: 10 TABLET | Refills: 0 | Status: SHIPPED | OUTPATIENT
Start: 2018-07-21 | End: 2018-09-17

## 2018-07-21 RX ORDER — BENZONATATE 100 MG/1
100 CAPSULE ORAL 3 TIMES DAILY PRN
Qty: 30 CAPSULE | Refills: 0 | Status: SHIPPED | OUTPATIENT
Start: 2018-07-21 | End: 2018-09-17

## 2018-07-21 RX ORDER — PREDNISONE 10 MG/1
TABLET ORAL
Qty: 20 TABLET | Refills: 0 | Status: SHIPPED | OUTPATIENT
Start: 2018-07-21 | End: 2018-07-31

## 2018-07-21 RX ORDER — POTASSIUM CHLORIDE 750 MG/1
10 TABLET, EXTENDED RELEASE ORAL DAILY
Qty: 30 TABLET | Refills: 0 | Status: SHIPPED | OUTPATIENT
Start: 2018-07-21 | End: 2018-08-29

## 2018-07-21 RX ADMIN — AMLODIPINE BESYLATE 10 MG: 10 TABLET ORAL at 09:32

## 2018-07-21 RX ADMIN — VARENICLINE TARTRATE 1 MG: 1 TABLET, FILM COATED ORAL at 09:33

## 2018-07-21 RX ADMIN — IPRATROPIUM BROMIDE AND ALBUTEROL SULFATE 3 ML: .5; 3 SOLUTION RESPIRATORY (INHALATION) at 11:08

## 2018-07-21 RX ADMIN — PREDNISONE 40 MG: 20 TABLET ORAL at 09:32

## 2018-07-21 RX ADMIN — CETIRIZINE HYDROCHLORIDE 10 MG: 10 TABLET, FILM COATED ORAL at 09:33

## 2018-07-21 RX ADMIN — ASPIRIN 81 MG: 81 TABLET, COATED ORAL at 09:33

## 2018-07-21 RX ADMIN — GUAIFENESIN AND DEXTROMETHORPHAN HYDROBROMIDE 2 TABLET: 600; 30 TABLET, EXTENDED RELEASE ORAL at 09:32

## 2018-07-21 RX ADMIN — FLUTICASONE FUROATE AND VILANTEROL TRIFENATATE 1 PUFF: 200; 25 POWDER RESPIRATORY (INHALATION) at 07:11

## 2018-07-21 RX ADMIN — BENZONATATE 100 MG: 100 CAPSULE ORAL at 09:33

## 2018-07-21 RX ADMIN — IPRATROPIUM BROMIDE AND ALBUTEROL SULFATE 3 ML: .5; 3 SOLUTION RESPIRATORY (INHALATION) at 07:11

## 2018-07-21 RX ADMIN — HYDRALAZINE HYDROCHLORIDE 50 MG: 50 TABLET, FILM COATED ORAL at 09:32

## 2018-07-21 RX ADMIN — CITALOPRAM HYDROBROMIDE 20 MG: 20 TABLET ORAL at 09:32

## 2018-07-21 RX ADMIN — DOXYCYCLINE 100 MG: 100 INJECTION, POWDER, LYOPHILIZED, FOR SOLUTION INTRAVENOUS at 03:01

## 2018-07-21 ASSESSMENT — ACTIVITIES OF DAILY LIVING (ADL)
ADLS_ACUITY_SCORE: 11

## 2018-07-21 NOTE — PLAN OF CARE
Problem: Patient Care Overview  Goal: Plan of Care/Patient Progress Review  Outcome: Improving  VSS. A&OX4. Tele SR with PVCs and ST depression. LS dim with expiratory wheeze over RLL. O2 94% RA. Gave IV doxycycline this shift, taking nebulizers, rocephin, chantix and mucinex. Denies pain. Independent, probable discharge in 1-2 days.

## 2018-07-21 NOTE — PLAN OF CARE
Problem: Patient Care Overview  Goal: Plan of Care/Patient Progress Review  Outcome: Improving  Pt A/OX4, VSS, denies pain, on RA, pt does experience LICEA, up independent in room, Tele: SR w/ ST depression/ frequent PVCs, LS- diminished/expiratory wheezes- on scheduled duonebs, productive cough- on scheduled mucinex and tessalon pearls, likely dc home 1-2 days, continue POC.

## 2018-07-21 NOTE — DISCHARGE SUMMARY
Discharge Summary  Hospitalist Service    Marni Austin MRN# [id number]   YOB: 1970 Age: 48 year old     Date of Admission:  7/18/2018  Date of Discharge:  7/21/2018  Admitting Physician:  Carlton Nowak MD  Discharge Physician: Brent Aviles DO  Discharging Service: Hospitalist Service     Primary Provider: David Gambino  Primary Care Physician Phone Number: [phone]         Discharge Diagnoses/Problem Oriented Hospital Course (Providers):    Marni Austin was admitted on 7/18/2018 by Carlton Nowak MD and I would refer you to their history and physical.  The following problems were addressed during her hospitalization:  1. Acute COPD exacerbation.  Continues to improve by the day of discharge.  Initially required IV methylprednisolone.  Taper prednisone off over the next 8 days.  Continue nebs, Breo Ellipta.  Continue antibiotics with Augmentin 875/125 mg 1 tablet twice a day for the next 4 days to complete a 5 day course.  2. Acute hypoxic respiratory failure.  Due to COPD exacerbation.  Resolved.  3. Tobacco use disorder.  I did reinforce recommendation for smoking cessation long-term on 7/21/18.  Continue Chantix.  4. Acute on chronic diastolic congestive heart failure exacerbation.  Appears much improved.  No longer appears fluid overloaded.  Would hold off on further diuresis at this point.  5. Hypertension.  Continue amlodipine and hydralazine.  6. Hyperlipidemia.  Continue atorvastatin.  7. Coronary artery disease.  Continue aspirin and atorvastatin.  I would recommend cardiac stress testing once her current acute pulmonary issues have completely resolved.  She will need to discuss this with her primary care physician in the outpatient setting.  She does need to follow-up with her primary care physician in 1 week.  8. Potassium.  Potassium level has been noted to be on the low side of normal throughout hospital stay.  Start potassium chloride 10 mEq daily.   Follow-up with repeat basic metabolic panel in 7 days.         Code Status:      Full Code          Pending Results:        Unresulted Labs Ordered in the Past 30 Days of this Admission     No orders found from 5/19/2018 to 7/19/2018.            Discharge Instructions and Follow-Up:      Follow-up Appointments     Follow-up and recommended labs and tests        Follow up with primary care provider, David Gambino, within 7 days   for hospital follow- up.  The following labs/tests are recommended: BMP in   7 days.                      Discharge Disposition:      Discharged to home         Discharge Medications:        Current Discharge Medication List      START taking these medications    Details   amoxicillin-clavulanate (AUGMENTIN) 875-125 MG per tablet Take 1 tablet by mouth 2 times daily for 4 days  Qty: 8 tablet, Refills: 0    Associated Diagnoses: COPD exacerbation (H)      benzonatate (TESSALON) 100 MG capsule Take 1 capsule (100 mg) by mouth 3 times daily as needed for cough  Qty: 30 capsule, Refills: 0    Associated Diagnoses: COPD exacerbation (H)      cyclobenzaprine (FLEXERIL) 5 MG tablet Take 1 tablet (5 mg) by mouth 2 times daily as needed for muscle spasms  Qty: 10 tablet, Refills: 0    Associated Diagnoses: COPD exacerbation (H)      dextromethorphan-guaiFENesin (MUCINEX DM)  MG per 12 hr tablet Take 2 tablets by mouth 2 times daily  Qty: 20 tablet, Refills: 0    Associated Diagnoses: COPD exacerbation (H)      potassium chloride SA (K-DUR/KLOR-CON M) 10 MEQ CR tablet Take 1 tablet (10 mEq) by mouth daily  Qty: 30 tablet, Refills: 0    Associated Diagnoses: COPD exacerbation (H)      predniSONE (DELTASONE) 10 MG tablet 4 tabs daily for 2 days, then 3 tabs daily for 2 days, then 2 tabs daily for 2 days, then 1 tab daily for 2 days, then stop  Qty: 20 tablet, Refills: 0    Associated Diagnoses: COPD exacerbation (H)         CONTINUE these medications which have NOT CHANGED    Details    albuterol (2.5 MG/3ML) 0.083% neb solution Take 2.5 mg by nebulization every 6 hours as needed for shortness of breath / dyspnea or wheezing      albuterol (PROAIR HFA/PROVENTIL HFA/VENTOLIN HFA) 108 (90 BASE) MCG/ACT Inhaler Inhale 2 puffs into the lungs every 6 hours as needed for shortness of breath / dyspnea or wheezing  Qty: 3 Inhaler, Refills: 1    Associated Diagnoses: Moderate persistent asthma without complication      amLODIPine (NORVASC) 10 MG tablet TAKE 1 TABLET BY MOUTH  DAILY  Qty: 90 tablet, Refills: 0    Associated Diagnoses: Essential hypertension with goal blood pressure less than 140/90      aspirin EC 81 MG EC tablet Take 1 tablet (81 mg) by mouth daily    Associated Diagnoses: NSTEMI (non-ST elevated myocardial infarction) (H)      atorvastatin (LIPITOR) 80 MG tablet TAKE 1 TABLET BY MOUTH AT  BEDTIME  Qty: 90 tablet, Refills: 0    Associated Diagnoses: Coronary artery disease involving native heart without angina pectoris, unspecified vessel or lesion type; CARDIOVASCULAR SCREENING; LDL GOAL LESS THAN 130      cetirizine (ZYRTEC) 10 MG tablet Take 10 mg by mouth daily      citalopram (CELEXA) 20 MG tablet TAKE 1 TABLET BY MOUTH  DAILY  Qty: 90 tablet, Refills: 0    Associated Diagnoses: Adjustment disorder with anxiety      Fluticasone-Salmeterol 232-14 MCG/ACT AEPB Inhale 1 puff into the lungs 2 times daily  Qty: 1 each, Refills: 5    Comments: Please use CHEAPEST like alternative  Associated Diagnoses: Chronic obstructive pulmonary disease, unspecified COPD type (H); Moderate persistent asthma without complication      hydrALAZINE (APRESOLINE) 50 MG tablet TAKE 1 TABLET BY MOUTH  TWICE A DAY  Qty: 180 tablet, Refills: 0    Associated Diagnoses: Essential hypertension with goal blood pressure less than 140/90      ipratropium - albuterol 0.5 mg/2.5 mg/3 mL (DUONEB) 0.5-2.5 (3) MG/3ML neb solution Take 1 vial (3 mLs) by nebulization every 4 hours as needed for shortness of breath / dyspnea  "or wheezing  Qty: 360 mL, Refills: 1    Associated Diagnoses: Chronic obstructive pulmonary disease with acute exacerbation (H); Moderate persistent asthma with acute exacerbation      nicotine polacrilex (NICORETTE) 4 MG gum Place 1 each (4 mg) inside cheek as needed for smoking cessation  Qty: 270 tablet, Refills: 0    Associated Diagnoses: Tobacco use disorder      nitroGLYcerin (NITROSTAT) 0.4 MG sublingual tablet Place 1 tablet (0.4 mg) under the tongue every 5 minutes as needed for chest pain  Qty: 25 tablet, Refills: 3    Associated Diagnoses: NSTEMI (non-ST elevated myocardial infarction) (H)      varenicline (CHANTIX) 1 MG tablet Take 1 tablet (1 mg) by mouth 2 times daily  Qty: 56 tablet, Refills: 2    Associated Diagnoses: Tobacco use disorder      clotrimazole (LOTRIMIN) 1 % cream Apply topically 2 times daily  Qty: 30 g, Refills: 1    Associated Diagnoses: Maculopapular rash; Itching         STOP taking these medications       potassium 99 MG TABS Comments:   Reason for Stopping:                 Allergies:         Allergies   Allergen Reactions     Zyban [Bupropion Hcl] Anaphylaxis           Condition and Physical on Discharge:      Discharge condition: Stable   Vitals: Blood pressure 124/57, pulse 72, temperature 96.8  F (36  C), temperature source Oral, resp. rate 19, height 1.549 m (5' 1\"), weight 90 kg (198 lb 6.4 oz), last menstrual period 08/30/2013, SpO2 97 %, not currently breastfeeding.   Gen:  NAD, A&Ox3.  Eyes:  PERRL, sclera anicteric.  OP:  MMM, no lesions.  Neck:  Supple.  CV:  Regular, no murmurs.  Lung:  Occasional wheeze b/l, normal effort.  Ab:  +BS, soft.  Skin:  Warm, dry to touch.  No rash.  Ext:  No pitting edema LE b/l.        Discharge Time:      I spent 40 minutes with Ms Austin and working on discharge on 7/21/18.        Image Results From This Hospital Stay (For Non-EPIC Providers):        Results for orders placed or performed during the hospital encounter of 07/18/18 "   Chest XR,  PA & LAT    Narrative    XR CHEST 2 VW 7/18/2018 1:35 PM    HISTORY: Chest pain and shortness of breath.    COMPARISON: 2/1/2016    FINDINGS: No airspace consolidation, pleural effusion or pneumothorax.  Normal heart size.      Impression    IMPRESSION: No acute cardiopulmonary abnormality.    DANITA CABA MD           Most Recent Lab Results In EPIC (For Non-EPIC Providers):    Most Recent 3 CBC's:  Recent Labs   Lab Test  07/21/18   0647  07/20/18   0653  07/19/18   0732  07/18/18   1224   WBC   --   13.3*  10.6  12.1*   HGB   --   14.2  14.4  15.2   MCV   --   88  89  88   PLT  296  280  248  256      Most Recent 3 BMP's:  Recent Labs   Lab Test  07/20/18   0653  07/19/18   0732  07/18/18   1224   NA  140  139  137   POTASSIUM  3.6  3.5  3.5   CHLORIDE  107  107  105   CO2  26  25  26   BUN  26  17  10   CR  0.78  0.72  0.74   ANIONGAP  7  7  6   RANDY  8.8  8.5  8.6   GLC  123*  138*  127*     Most Recent 3 Troponin's:No lab results found.  Most Recent 3 INR's:  Recent Labs   Lab Test  02/27/15   0956   INR  0.90     Most Recent 2 LFT's:  Recent Labs   Lab Test  08/11/16   0928  02/02/16   1026   AST  12  15   ALT  21  19   ALKPHOS  103  118   BILITOTAL  0.4  0.3     Most Recent Cholesterol Panel:  Recent Labs   Lab Test  02/27/17   0925   CHOL  130   LDL  64   HDL  32*   TRIG  169*     Most Recent 6 Bacteria Isolates From Any Culture (See EPIC Reports for Culture Details):  Recent Labs   Lab Test  03/06/15   1610  02/27/15   0956   CULT  No Salmonella, Shigella, Campylobacter, E. coli O157, Aeromonas, or Plesiomonas   isolated.    No Beta Streptococcus isolated     Most Recent TSH, T4 and HgbA1c:   Recent Labs   Lab Test  02/02/16   1026   02/27/15   0956   TSH   --    --   0.87   A1C  6.2*   < >   --     < > = values in this interval not displayed.

## 2018-07-21 NOTE — PLAN OF CARE
Problem: Patient Care Overview  Goal: Plan of Care/Patient Progress Review  Outcome: Adequate for Discharge Date Met: 07/21/18  VSS.  Denies pain.  IV removed without incident.  I/O adequate.  Moves independently.  Tolerating RA.  AVS reviewed.  All questions answered.  New medications reviewed.  Smoking cessation and CHF as well as COPD education reviewed.  Knows to schedule F/U appts.  Medications sent to floor from pharmacy.  Left [facility] with all known belongings.  Wheeled out by staff.  Family to drive pt home.  Notes for work given to pt as well.    Heart Failure Care Pathway  GOALS TO BE MET BEFORE DISCHARGE:    1. Decrease congestion and/or edema with diuretic therapy to achieve near      optimal volume status.            Dyspnea improved:  Yes            Edema improved:     Yes        Net I/O and Weights since admission:          06/21 1500 - 07/21 1459  In: 3138 [P.O.:1857; I.V.:1281]  Out: 3175 [Urine:3175]  Net: -37            Vitals:    07/18/18 1149 07/18/18 1940   Weight: 90.7 kg (200 lb) 90 kg (198 lb 6.4 oz)       2.  O2 sats > 92% on RA or at prior home O2 therapy level.          Current oxygenation status:       SpO2: 95 %         O2 Device: None (Room air),            Able to wean O2 this shift to keep sats > 92%:  Yes       Does patient use Home O2? No    3.  Tolerates ambulation and mobility near baseline: Yes        How many times did the patient ambulate with nursing staff this shift? Independent    Please review the Heart Failure Care Pathway for additional HF goal outcomes.    Kathryn Daugherty RN  7/21/2018

## 2018-07-22 NOTE — PROGRESS NOTES
Hand-off for Care Transitions to Next Level of Care Provider  Name: Marni Austin  : 1970  MRN #: [id number]  Reason for Hospitalization:  COPD exacerbation (H) [J44.1]  Admit Date/Time: 2018 11:50 AM  Discharge Date: 2018    Reason for Communication Hand-off Referral: Admission diagnoses: CHF  Admission diagnoses: COPD    Discharge Plan:  Discharged to: Home with support                   Patient agreeable to post-hospital support suggestions:  Yes    Patient is on new medications:   Yes    MTM follow up recommended: No    Tel-Assurance program:  Already recommended a TA program    Follow-up specialty is recommended: Yes. Follow up with [facility] as scheduled.     Follow-up plan:  Future Appointments  Date Time Provider Department Center   2018 4:40 PM David Gambino PA-C [facility]   2018 8:00 AM David Gambino PA-C [facility]   2018 5:00 PM Charlie Serrano MD [facility]     Any outstanding tests or procedures:  No. Recommend a BMP at f/u appt with PCP.     Key Recommendations: New diagnosis of COPD & CHF. Patient quit smoking 3 days ago when her breathing became difficult. She is concerned about maintaining as a non-smoker with her spouse being a heavy smoker. Discussed smoking cessation strategies at length with pt & family. Provided her with CHF packet & scale for daily weights. [facility] at 5pm for JORGE evaluation.     Communicated handoff via EPIC Comm Mgt to Dr. David Gambino's CC at  Care Coord.      Brittney Colon, RN, BSN, CTS  [facility]  [phone]

## 2018-07-23 ENCOUNTER — TELEPHONE (OUTPATIENT)
Dept: FAMILY MEDICINE | Facility: CLINIC | Age: 48
End: 2018-07-23

## 2018-07-23 ENCOUNTER — PATIENT OUTREACH (OUTPATIENT)
Dept: CARE COORDINATION | Facility: CLINIC | Age: 48
End: 2018-07-23

## 2018-07-23 NOTE — LETTER
Health Care Home - Access Care Plan    About Me  Patient Name:  Marni Bose    YOB: 1970  Age:                             48 year old   [facility] MRN:            [id number] Telephone Information:     Home Phone [phone]   Mobile [phone]       Address:    [address]  [place] [address] Email address:  [email or web address]      Emergency Contact(s)  Name Relationship Lgl Grd Work Phone Home Phone Mobile Phone   1. URBANO BOSE Spouse No none [phone]   2. IKE FRYE Brother No none [phone]   3. ZIA DOMINGUEZ Sister No none [phone]             Health Maintenance: Routine Health maintenance Reviewed: Due/Overdue  (Refer to Health Maintenance for details. )    My Access Plan  Medical Emergency 911   Questions or concerns during clinic hours Primary Clinic Line, I will call the clinic directly: [facility] [phone]   24 Hour Appointment Line [phone] or  [phone]) (toll free)   24 Hour Nurse Line [phone] (toll free)   Questions or concerns outside clinic hours 24 Hour Appointment Line, I will call the after-hours on-call line:   [facility] [phone] or [phone] ([phone]) (toll-free)   Preferred Urgent Care     Preferred Hospital [facility]  [phone]   Preferred Pharmacy [facility] [address] [place], [place] - [address] [facility] AT [address]     Behavioral Health Crisis Line The National Suicide Prevention Lifeline at 1-509.409.2294 or 911     My Care Team Members  Patient Care Team       Relationship Specialty Notifications Start End    David Gambino PA-C PCP - General Physician Assistant  8/29/16     Phone: [phone] Fax: [phone]         [address] [facility] [address]           My Medical and Care Information  Problem List   Patient Active Problem List   Diagnosis      Adjustment disorder with anxiety     Menorrhagia     CARDIOVASCULAR SCREENING; LDL GOAL LESS THAN 130     Tobacco use disorder     Hypertension goal BP (blood pressure) < 140/90     Health Care Home     COPD exacerbation (H)     CAD (coronary artery disease)     COPD (chronic obstructive pulmonary disease) (H)     Rectal bleeding     Chest pain, unspecified chest pain type     Elevated fasting glucose     Non morbid obesity     Morbid obesity (H)     Moderate persistent asthma      Current Medications and Allergies:  See printed Medication Report

## 2018-07-23 NOTE — LETTER
[place] CARE COORDINATION        August 2, 2018    Marni Austin  [address]  [place] [address]      Dear Marni,    I am a clinic care coordinator who works with David Gambino PA-C at [facility]. I have been trying to reach you recently to introduce Clinic Care Coordination and to see if there was anything I could assist you with.  I wanted to introduce myself and provide you with my contact information so that you can call me with questions or concerns about your health care. Below is a description of clinic care coordination and how I can further assist you.     The clinic care coordinator is a registered nurse and/or  who understand the health care system. The goal of clinic care coordination is to help you manage your health and improve access to the [facility] system in the most efficient manner. The registered nurse can assist you in meeting your health care goals by providing education, coordinating services, and strengthening the communication among your providers. The  can assist you with financial, behavioral, psychosocial, chemical dependency, counseling, and/or psychiatric resources.    Please feel free to contact me at [phone], with any questions or concerns. We at [facility] are focused on providing you with the highest-quality healthcare experience possible and that all starts with you.     Sincerely,     Ora Duvals    Enclosed: I have enclosed a copy of a 24 Hour Access Plan. This has helpful phone numbers for you to call when needed. Please keep this in an easy to access place to use as needed.

## 2018-07-23 NOTE — PROGRESS NOTES
Clinic Care Coordination Contact    Situation: Patient chart reviewed by care coordinator.    Background: Patient was hospitalized at [facility] from 07/18/2018 through 07/21/2018 for COPD exacerbation (new dx COPD in addition to CHF).        Per 07/21/2018 CTS - See Letters tab:  Follow-up specialty is recommended: Yes. Follow up with [facility] as scheduled.      Follow-up plan:  Future Appointments  Date Time Provider Department Center   7/31/2018 4:40 PM David Gambino PA-C [facility]   8/23/2018 8:00 AM David Gambino PA-C [facility]   8/29/2018 5:00 PM Charlie Serrano MD [facility]      Any outstanding tests or procedures:  No. Recommend a BMP at f/u appt with PCP.      Key Recommendations: New diagnosis of COPD & CHF. Patient quit smoking 3 days ago when her breathing became difficult. She is concerned about maintaining as a non-smoker with her spouse being a heavy smoker. Discussed smoking cessation strategies at length with pt & family. Provided her with CHF packet & scale for daily weights. [facility] on [facility]18 at 5pm for JORGE evaluation.      Communicated handoff via EPIC Comm Mgt to Dr. David Gambino's CC at [facility].       Brittney Colon RN, BSN, CTS  [facility]  [phone]  -----------------------------------------------------------------------------------------------------------------------------------------------------------------------------------------------------------------------------------------------------------------------      Assessment:   Patient received Hospital F/U outreach from [facility] (Triage RN) today.       Plan/Recommendations:     CCRN will outreach to patient in the next 1-2 business days for initial CCRN assessment.   Patient will require COPD pathway.     ENROLLMENT STATUS:  POTENTIAL     CARE COORDINATOR STATUS: Care team updated today.     Ora Herr, RN  [facility]  Clinic Care Coordinator - [facility] Locations   Direct:  [phone] (voicemail available)   (Today's Date: 07/23/2018)

## 2018-07-23 NOTE — TELEPHONE ENCOUNTER
"ED / Discharge Outreach Protocol    Patient Contact    Attempt # 1    Was call answered?  Yes.  \"May I please speak with <patient name>\"  Is patient available?   Yes    Hospital/TCU/ED for chronic condition Discharge Protocol    \"Hi, my name is Celina Wiggins, a registered nurse, and I am calling from [facility].  I am calling to follow up and see how things are going for you after your recent emergency visit/hospital/TCU stay.\"    Tell me how you are doing now that you are home?\" Doing good. Back at work. Not smoking (for 1 week now). On low-sodium diet. Gave up Coke and butter.       Discharge Instructions    \"Let's review your discharge instructions.  What is/are the follow-up recommendations?  Pt. Response:  Follow up with primary care provider, David Gambino, within 7 days for hospital follow- up.  The following labs/tests are recommended: BMP in 7 days.       \"Has an appointment with your primary care provider been scheduled?\"   Yes. (confirm)    \"When you see the provider, I would recommend that you bring your medications with you.\"    Medications    \"Tell me what changed about your medicines when you discharged?\"    Changes to chronic meds?    2 or more - [facility] MTM referral needed    \"What questions do you have about your medications?\"    Patient is experiencing incontience of stool when she coughs - Questions cannot be easily answered - Epic MTM referral needed     New diagnoses of heart failure, COPD, diabetes, or MI?    Yes    Medication reconciliation completed? Yes  Was MTM referral placed (*Make sure to put transitions as reason for referral)?   Yes - \"The Medication Therapy  will call you within the next few days to schedule an appointment with an MTM pharmacist to discuss your medicines and make sure they are working as well as they possibly can for you. This visit can be done in a [facility] clinic or on the phone and is at no charge to you.\"    Call " "Summary    \"What questions or concerns do you have about your recent visit and your follow-up care?\"     none    \"If you have questions or things don't continue to improve, we encourage you contact us through the main clinic number (give number).  Even if the clinic is not open, triage nurses are available 24/7 to help you.     We would like you to know that our clinic has extended hours (provide information).  We also have urgent care (provide details on closest location and hours/contact info)\"      \"Thank you for your time and take care!\"    Celina PORTILLO Triage RN           "

## 2018-07-23 NOTE — TELEPHONE ENCOUNTER
Please contact patient for In-patient follow up.  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

## 2018-07-23 NOTE — LETTER
[facility] CARE COORDINATION      August 2, 2018    Marni Austin  [address] [facility] [place] [address]      Dear Marni,    I am a clinic care coordinator who works with David Gambino PA-C at [facility]. I have been trying to reach you recently to introduce Clinic Care Coordination and to see if there was anything I could assist you with.  I wanted to introduce myself and provide you with my contact information so that you can call me with questions or concerns about your health care. Below is a description of clinic care coordination and how I can further assist you.     The clinic care coordinator is a registered nurse and/or  who understand the health care system. The goal of clinic care coordination is to help you manage your health and improve access to the [facility] system in the most efficient manner. The registered nurse can assist you in meeting your health care goals by providing education, coordinating services, and strengthening the communication among your providers. The  can assist you with financial, behavioral, psychosocial, chemical dependency, counseling, and/or psychiatric resources.    Please feel free to contact me at [phone], with any questions or concerns. We at [facility] are focused on providing you with the highest-quality healthcare experience possible and that all starts with you.     Sincerely,     Ora Duvals    Enclosed: I have enclosed a copy of a 24 Hour Access Plan. This has helpful phone numbers for you to call when needed. Please keep this in an easy to access place to use as needed.

## 2018-07-24 ENCOUNTER — TELEPHONE (OUTPATIENT)
Dept: PHARMACY | Facility: OTHER | Age: 48
End: 2018-07-24

## 2018-07-24 NOTE — PROGRESS NOTES
Clinic Care Coordination Contact  [facility]/Voicemail    Referral Source: IP Handoff  Clinical Data: Care Coordinator Outreach  Outreach attempted x 1.  Left message on voicemail with call back information and requested return call.  Plan:  Care Coordinator will try to reach patient again in 1-2 business days.  ENROLLMENT STATUS:  POTENTIAL   CARE COORDINATOR STATUS: Care team current as of today.    Ora Herr, RN  [facility] Care Coordinator - [facility] and [place] Locations   Direct:  [phone] (voicemail available)   (Today's Date: 07/24/2018)

## 2018-07-24 NOTE — TELEPHONE ENCOUNTER
MTM referral from: Transitions of Care (recent hospital discharge or ED visit)    MTM referral outreach attempt #2 on July 24, 2018 at 4:22 PM      Outcome: Patient not reachable after several attempts, will route to MTM Pharmacist/Provider as an FYI. Thank you for the referral.    Becka Sams, MTM Coordinator

## 2018-07-26 NOTE — PROGRESS NOTES
Clinic Care Coordination Contact  [facility]/Voicemail    Referral Source: IP Handoff  Clinical Data: Care Coordinator Outreach  Outreach attempted x 2.  Left message on voicemail with call back information and requested return call.  Plan:  Care Coordinator will try to reach patient again in 3-5 business days.  ENROLLMENT STATUS:  Potential  CARE COORDINATOR STATUS: Care team current.     Ora Herr, RN  [facility] Care Coordinator - [place] and [place] Locations   Direct:  [phone] (voicemail available)   (Today's Date: 07/26/2018)

## 2018-07-31 ENCOUNTER — OFFICE VISIT (OUTPATIENT)
Dept: FAMILY MEDICINE | Facility: CLINIC | Age: 48
End: 2018-07-31
Payer: COMMERCIAL

## 2018-07-31 VITALS
HEART RATE: 89 BPM | WEIGHT: 209.1 LBS | HEIGHT: 61 IN | RESPIRATION RATE: 17 BRPM | DIASTOLIC BLOOD PRESSURE: 79 MMHG | BODY MASS INDEX: 39.48 KG/M2 | TEMPERATURE: 99.2 F | SYSTOLIC BLOOD PRESSURE: 127 MMHG | OXYGEN SATURATION: 96 %

## 2018-07-31 DIAGNOSIS — J45.40 MODERATE PERSISTENT ASTHMA, UNSPECIFIED WHETHER COMPLICATED: Primary | ICD-10-CM

## 2018-07-31 DIAGNOSIS — I50.33 ACUTE ON CHRONIC DIASTOLIC CONGESTIVE HEART FAILURE (H): ICD-10-CM

## 2018-07-31 DIAGNOSIS — E66.01 MORBID OBESITY (H): ICD-10-CM

## 2018-07-31 DIAGNOSIS — J44.1 CHRONIC OBSTRUCTIVE PULMONARY DISEASE WITH ACUTE EXACERBATION (H): ICD-10-CM

## 2018-07-31 DIAGNOSIS — I25.10 CORONARY ARTERY DISEASE INVOLVING NATIVE HEART WITHOUT ANGINA PECTORIS, UNSPECIFIED VESSEL OR LESION TYPE: ICD-10-CM

## 2018-07-31 DIAGNOSIS — F17.200 TOBACCO USE DISORDER: ICD-10-CM

## 2018-07-31 PROCEDURE — 99495 TRANSJ CARE MGMT MOD F2F 14D: CPT | Performed by: PHYSICIAN ASSISTANT

## 2018-07-31 PROCEDURE — 36415 COLL VENOUS BLD VENIPUNCTURE: CPT | Performed by: PHYSICIAN ASSISTANT

## 2018-07-31 PROCEDURE — 80048 BASIC METABOLIC PNL TOTAL CA: CPT | Performed by: PHYSICIAN ASSISTANT

## 2018-07-31 RX ORDER — VARENICLINE TARTRATE 1 MG/1
1 TABLET, FILM COATED ORAL 2 TIMES DAILY
Qty: 56 TABLET | Refills: 2 | Status: SHIPPED | OUTPATIENT
Start: 2018-07-31 | End: 2018-09-17

## 2018-07-31 NOTE — PATIENT INSTRUCTIONS
Marni,    It is great to see you.  I cant imagine going through what you have been.  We will take this slow.  Lets see sleep first.  We'll need to see a heart dosctor as well - this most recent hospital visit should be the final straw to show you how important it is.  Also please work with Care Coordination - they will help in many many ways!    I will plan to see you soon after the sleep study

## 2018-07-31 NOTE — PROGRESS NOTES
SUBJECTIVE:   Marni Austin is a 48 year old female who presents to clinic today for the following health issues:    Hospital Follow-up Visit:    Hospital/Nursing Home/IP Rehab Facility: [facility]  Date of Admission: 7/18/18  Date of Discharge: 7/21/18  Reason(s) for Admission: COPD            Problems taking medications regularly:  None       Medication changes since discharge: Potassium, changed dose on Flexeril to 1-2 tablets       Problems adhering to non-medication therapy:  None    Patient presents to follow up after hospitalization for ARF/COPD, acute on chronic diastolic HF  Today she notes her breathing continues to improve, but slowly  As long as she doesn't overdue it, she is breathing wnl         When she walks a longer distance, she will feel more winded  Denies chest pain  She has successfully quit smoking now for 2 weeks         Her  still smokes in the basement   She uses qvar 80mcg 2puffs twice daily  Continues with nebs at this point    Summary of hospitalization:  [facility] discharge summary reviewed  Diagnostic Tests/Treatments reviewed.  Follow up needed: Doing Sleep study, consider stress testing, needs updated bmp  Other Healthcare Providers Involved in Patient s Care:         None - we have discussed regularly consideration for cardiology, pulmonology though she has declined  Update since discharge: improved.     Post Discharge Medication Reconciliation: discharge medications reconciled, continue medications without change.  Plan of care communicated with patient            Problem list and histories reviewed & adjusted, as indicated.  Additional history: as documented    Patient Active Problem List   Diagnosis     Adjustment disorder with anxiety     Menorrhagia     CARDIOVASCULAR SCREENING; LDL GOAL LESS THAN 130     Acute asthma exacerbation     Tobacco use disorder     Hypertension goal BP (blood pressure) < 140/90     Health Care Home     COPD  "exacerbation (H)     CAD (coronary artery disease)     COPD (chronic obstructive pulmonary disease) (H)     Rectal bleeding     Chest pain, unspecified chest pain type     Elevated fasting glucose     Non morbid obesity     Morbid obesity (H)     Past Surgical History:   Procedure Laterality Date     CARDIAC SURGERY       TONSILLECTOMY       Tubal ligation surgery         Social History   Substance Use Topics     Smoking status: Current Every Day Smoker     Packs/day: 0.50     Years: 26.00     Smokeless tobacco: Never Used      Comment: stopped 2 days ago     Alcohol use No     Family History   Problem Relation Age of Onset     Hypertension Mother      Diabetes Father      Cerebrovascular Disease Maternal Grandmother      Cancer Maternal Grandfather            Reviewed and updated as needed this visit by clinical staff       Reviewed and updated as needed this visit by Provider         ROS:  CONSTITUTIONAL:POSITIVE  for weight gain with smoking cessation and NEGATIVE  for chills and fever   ENT/MOUTH: NEGATIVE for ear, mouth and throat problems  RESP:POSITIVE for dyspnea on exertion  CV: NEGATIVE for chest pain, palpitations; some peripheral edema bilaterally  PSYCHIATRIC: POSITIVE for stress 2/2 bills, recent hospitalization, etc     OBJECTIVE:     /79 (BP Location: Right arm, Patient Position: Chair, Cuff Size: Adult Large)  Pulse 89  Temp 99.2  F (37.3  C) (Tympanic)  Resp 17  Ht 5' 1\" (1.549 m)  Wt 209 lb 1.6 oz (94.8 kg)  LMP 08/30/2013  SpO2 96%  BMI 39.51 kg/m2  Body mass index is 39.51 kg/(m^2).  GENERAL: healthy, alert and no distress  EYES: Eyes grossly normal to inspection, PERRL and conjunctivae and sclerae normal  HENT: ear canals and TM's normal, nose and mouth without ulcers or lesions  NECK: no carotid bruits  RESP: lungs clear to auscultation - no rales, rhonchi or wheezes  CV: regular rates and rhythm  MS: mild trace to 1+ pitting bilateral LE  PSYCH: mentation appears normal, affect " normal, tearful at times    Diagnostic Test Results:  Results for orders placed or performed in visit on 07/31/18   Basic metabolic panel   Result Value Ref Range    Sodium 141 133 - 144 mmol/L    Potassium 4.2 3.4 - 5.3 mmol/L    Chloride 105 94 - 109 mmol/L    Carbon Dioxide 26 20 - 32 mmol/L    Anion Gap 10 3 - 14 mmol/L    Glucose 99 70 - 99 mg/dL    Urea Nitrogen 18 7 - 30 mg/dL    Creatinine 0.74 0.52 - 1.04 mg/dL    GFR Estimate 83 >60 mL/min/1.7m2    GFR Estimate If Black >90 >60 mL/min/1.7m2    Calcium 8.9 8.5 - 10.1 mg/dL       ASSESSMENT/PLAN:   1. Chronic obstructive pulmonary disease with acute exacerbation (H)  2. Moderate persistent asthma, unspecified whether complicated  Marni has struggled with control for sometime. Cost is certainly prohibitive to use and we've tried to work with her to see if she could benefit from assistance in the past. We'll have care coordination help with this again as well. She uses qvar regularly but would likely benefit from maximized therapy of combo inhaler, laba/ics, with LAMA. She is now 2 weeks free of smoking. She should consider pulmonology but defers at this point.     3. Morbid obesity (H)  Patient notes difficulty breathing with exercise is one road block but she is motivated to begin. She also notes weight gain with smoking cessation. We reviewed strategies briefly today. She'll continue to work at this.   - Basic metabolic panel    4. Acute on chronic diastolic congestive heart failure (H)  Preserved EF on Echo. This is likely 2/2 pulmonary htn/copd/undx alec. We'll aim to maximize control of those. There is some minimal LE edema and we could consider small diuretic dose I think without risk for too much of a drop on preload. With her asthma there is likely too much of a risk for BB, could defer to cardiology. She has apparently had reaction in the past and was told not to use ace (lisinopril). For now her BP is controlled and again, we'll maximize control of  the likely causes  - Basic metabolic panel    5. Tobacco use disorder  - varenicline (CHANTIX) 1 MG tablet; Take 1 tablet (1 mg) by mouth 2 times daily  Dispense: 56 tablet; Refill: 2    6. Coronary artery disease involving native heart without angina pectoris, unspecified vessel or lesion type  Echo showed EF ok. BP well controlled. On statin. Smoke free for 2 weeks. Denies chest pain. Will need to consider stress testing.       David Gambino PA-C  [facility]

## 2018-07-31 NOTE — MR AVS SNAPSHOT
After Visit Summary   7/31/2018    Marni Austin    MRN: [id number]           Patient Information     Date Of Birth          1970        Visit Information        Provider Department      7/31/2018 4:40 PM David Gambino PA-C [facility]        Today's Diagnoses     Chronic obstructive pulmonary disease with acute exacerbation (H)    -  1    Morbid obesity (H)        Acute on chronic diastolic congestive heart failure (H)        Tobacco use disorder          Care Instructions    Marni,    It is great to see you.  I cant imagine going through what you have been.  We will take this slow.  Lets see sleep first.  We'll need to see a heart dosctor as well - this most recent hospital visit should be the final straw to show you how important it is.  Also please work with Care Coordination - they will help in many many ways!    I will plan to see you soon after the sleep study          Follow-ups after your visit        Your next 10 appointments already scheduled     Aug 23, 2018  8:00 AM CDT   SHORT with David Gambino PA-C   [facility]    [address] [address] [address] [phone]            Aug 29, 2018  5:00 PM CDT   New Sleep Patient with Charlie Serrano MD   [facility] ([facility])    [address] [address] [address] [phone]              Who to contact     If you have questions or need follow up information about today's clinic visit or your schedule please contact [facility] directly at [phone].  Normal or non-critical lab and imaging results will be communicated to you by MyChart, letter or phone within 4 business days after the clinic has received the results. If you do not hear from us within 7 days, please contact the clinic through MyChart or phone. If you have a critical or abnormal lab result,  "we will notify you by phone as soon as possible.  Submit refill requests through [employer] or call your pharmacy and they will forward the refill request to us. Please allow 3 business days for your refill to be completed.          Additional Information About Your Visit        Asthmatrackerhart Information     [employer] gives you secure access to your electronic health record. If you see a primary care provider, you can also send messages to your care team and make appointments. If you have questions, please call your primary care clinic.  If you do not have a primary care provider, please call [phone] and they will assist you.        Care EveryWhere ID     This is your Care EveryWhere ID. This could be used by other organizations to access your [employer] medical records  [id number]        Your Vitals Were     Pulse Temperature Respirations Height Last Period Pulse Oximetry    89 99.2  F (37.3  C) (Tympanic) 17 5' 1\" (1.549 m) 08/30/2013 96%    BMI (Body Mass Index)                   39.51 kg/m2            Blood Pressure from Last 3 Encounters:   07/31/18 127/79   07/21/18 124/57   01/16/18 132/72    Weight from Last 3 Encounters:   07/31/18 209 lb 1.6 oz (94.8 kg)   07/18/18 198 lb 6.4 oz (90 kg)   01/16/18 200 lb (90.7 kg)              We Performed the Following     Basic metabolic panel          Today's Medication Changes          These changes are accurate as of 7/31/18  5:34 PM.  If you have any questions, ask your nurse or doctor.               Stop taking these medicines if you haven't already. Please contact your care team if you have questions.     clotrimazole 1 % cream   Commonly known as:  LOTRIMIN   Stopped by:  David Gambino PA-C                Where to get your medicines      These medications were sent to [facility] - [address]  [address] [place] [address] [address], [facility] [address]     Phone:  [phone]     varenicline 1 MG tablet                Primary Care " Provider Office Phone # Fax #    David Gambino PA-C [phone]       [address] [address] [facility] [address]        Equal Access to Services     JOSE GUADALUPE GONZALEZ : Hadii aad ku hadjamo Soanhali, waaxda luqadaha, qaybta kaalmada adeegyada, zenon quinonestatyana joesph. So [facility] [phone].    ATENCIÓN: Si habla español, tiene a haywodo disposición servicios gratuitos de asistencia lingüística. Llame al [phone].    We comply with applicable federal civil rights laws and [place] laws. We do not discriminate on the basis of race, color, national origin, age, disability, sex, sexual orientation, or gender identity.            Thank you!     Thank you for choosing [facility]  for your care. Our goal is always to provide you with excellent care. Hearing back from our patients is one way we can continue to improve our services. Please take a few minutes to complete the written survey that you may receive in the mail after your visit with us. Thank you!             Your Updated Medication List - Protect others around you: Learn how to safely use, store and throw away your medicines at www.disposemymeds.org.          This list is accurate as of 7/31/18  5:34 PM.  Always use your most recent med list.                   Brand Name Dispense Instructions for use Diagnosis    * albuterol (2.5 MG/3ML) 0.083% neb solution      Take 2.5 mg by nebulization every 6 hours as needed for shortness of breath / dyspnea or wheezing        * albuterol 108 (90 Base) MCG/ACT Inhaler    PROAIR HFA/PROVENTIL HFA/VENTOLIN HFA    3 Inhaler    Inhale 2 puffs into the lungs every 6 hours as needed for shortness of breath / dyspnea or wheezing    Moderate persistent asthma without complication       amLODIPine 10 MG tablet    NORVASC    90 tablet    TAKE 1 TABLET BY MOUTH  DAILY    Essential hypertension with goal blood pressure less than 140/90       aspirin 81 MG EC tablet      Take 1 tablet (81 mg)  by mouth daily    NSTEMI (non-ST elevated myocardial infarction) (H)       atorvastatin 80 MG tablet    LIPITOR    90 tablet    TAKE 1 TABLET BY MOUTH AT  BEDTIME    Coronary artery disease involving native heart without angina pectoris, unspecified vessel or lesion type, CARDIOVASCULAR SCREENING; LDL GOAL LESS THAN 130       benzonatate 100 MG capsule    TESSALON    30 capsule    Take 1 capsule (100 mg) by mouth 3 times daily as needed for cough    COPD exacerbation (H)       cetirizine 10 MG tablet    zyrTEC     Take 10 mg by mouth daily        citalopram 20 MG tablet    celeXA    90 tablet    TAKE 1 TABLET BY MOUTH  DAILY    Adjustment disorder with anxiety       cyclobenzaprine 5 MG tablet    FLEXERIL    10 tablet    Take 1 tablet (5 mg) by mouth 2 times daily as needed for muscle spasms    COPD exacerbation (H)       Fluticasone-Salmeterol 232-14 MCG/ACT Aepb inhaler    AIRDUO RESPICLICK    1 each    Inhale 1 puff into the lungs 2 times daily    Chronic obstructive pulmonary disease, unspecified COPD type (H), Moderate persistent asthma without complication       hydrALAZINE 50 MG tablet    APRESOLINE    180 tablet    TAKE 1 TABLET BY MOUTH  TWICE A DAY    Essential hypertension with goal blood pressure less than 140/90       ipratropium - albuterol 0.5 mg/2.5 mg/3 mL 0.5-2.5 (3) MG/3ML neb solution    DUONEB    360 mL    Take 1 vial (3 mLs) by nebulization every 4 hours as needed for shortness of breath / dyspnea or wheezing    Chronic obstructive pulmonary disease with acute exacerbation (H), Moderate persistent asthma with acute exacerbation       nicotine polacrilex 4 MG gum    NICORETTE    270 tablet    Place 1 each (4 mg) inside cheek as needed for smoking cessation    Tobacco use disorder       nitroGLYcerin 0.4 MG sublingual tablet    NITROSTAT    25 tablet    Place 1 tablet (0.4 mg) under the tongue every 5 minutes as needed for chest pain    NSTEMI (non-ST elevated myocardial infarction) (H)        potassium chloride SA 10 MEQ CR tablet    K-DUR/KLOR-CON M    30 tablet    Take 1 tablet (10 mEq) by mouth daily    COPD exacerbation (H)       QVAR IN           varenicline 1 MG tablet    CHANTIX    56 tablet    Take 1 tablet (1 mg) by mouth 2 times daily    Tobacco use disorder       * Notice:  This list has 2 medication(s) that are the same as other medications prescribed for you. Read the directions carefully, and ask your doctor or other care provider to review them with you.

## 2018-08-01 LAB
ANION GAP SERPL CALCULATED.3IONS-SCNC: 10 MMOL/L (ref 3–14)
BUN SERPL-MCNC: 18 MG/DL (ref 7–30)
CALCIUM SERPL-MCNC: 8.9 MG/DL (ref 8.5–10.1)
CHLORIDE SERPL-SCNC: 105 MMOL/L (ref 94–109)
CO2 SERPL-SCNC: 26 MMOL/L (ref 20–32)
CREAT SERPL-MCNC: 0.74 MG/DL (ref 0.52–1.04)
GFR SERPL CREATININE-BSD FRML MDRD: 83 ML/MIN/1.7M2
GLUCOSE SERPL-MCNC: 99 MG/DL (ref 70–99)
POTASSIUM SERPL-SCNC: 4.2 MMOL/L (ref 3.4–5.3)
SODIUM SERPL-SCNC: 141 MMOL/L (ref 133–144)

## 2018-08-01 ASSESSMENT — ASTHMA QUESTIONNAIRES: ACT_TOTALSCORE: 11

## 2018-08-02 PROBLEM — J45.40 MODERATE PERSISTENT ASTHMA: Status: ACTIVE | Noted: 2018-08-02

## 2018-08-02 NOTE — PROGRESS NOTES
Clinic Care Coordination Contact  [facility]/Voicemail    Referral Source: IP Handoff  Clinical Data: Care Coordinator Outreach  Outreach attempted x 3.  Left message on voicemail with call back information and requested return call.  Plan: Care Coordinator will mail out care coordination introduction letter with care coordinator contact information and explanation of care coordination services. Care Coordinator will do no further outreaches at this time.  ENROLLMENT STATUS:  Not a Candidate  CARE COORDINATOR STATUS: Care team updated today.     Ora Herr, RN  [facility] Care Coordinator - [place] [facility] Locations   Direct:  [phone] (voicemail available)   (Today's Date: 08/02/2018)

## 2018-08-20 ENCOUNTER — MYC MEDICAL ADVICE (OUTPATIENT)
Dept: FAMILY MEDICINE | Facility: CLINIC | Age: 48
End: 2018-08-20

## 2018-08-20 DIAGNOSIS — E87.6 HYPOKALEMIA: Primary | ICD-10-CM

## 2018-08-20 RX ORDER — POTASSIUM CHLORIDE 750 MG/1
10 TABLET, EXTENDED RELEASE ORAL DAILY
Status: CANCELLED | OUTPATIENT
Start: 2018-08-20

## 2018-08-20 NOTE — TELEPHONE ENCOUNTER
"Potassium  LRF 7/21/18 - in the hospital  LOV 7/31/18    Requested Prescriptions   Pending Prescriptions Disp Refills     potassium chloride SA (K-DUR/KLOR-CON M) 10 MEQ CR tablet 30 tablet 0     Sig: Take 1 tablet (10 mEq) by mouth daily    Potassium Supplements Protocol Passed    8/20/2018  9:06 AM       Passed - Recent (12 mo) or future (30 days) visit within the authorizing provider's specialty    Patient had office visit in the last 12 months or has a visit in the next 30 days with authorizing provider or within the authorizing provider's specialty.  See \"Patient Info\" tab in inbasket, or \"Choose Columns\" in Meds & Orders section of the refill encounter.           Passed - Patient is age 18 or older       Passed - Normal serum potassium in past 12 months    Recent Labs   Lab Test  07/31/18   1737   POTASSIUM  4.2                        Routing refill request to provider for review/approval because:  Was just recently restarted on this in the hospital - is she to continue?  Had not been on it since 2015 before that             "

## 2018-08-20 NOTE — TELEPHONE ENCOUNTER
Will forward to Manny Gambino.  Can you please send in potassium to [facility] in [place] as well as a bigger supply to [facility] if you would like her to continue?  Thanks!

## 2018-08-29 ENCOUNTER — OFFICE VISIT (OUTPATIENT)
Dept: SLEEP MEDICINE | Facility: CLINIC | Age: 48
End: 2018-08-29
Payer: COMMERCIAL

## 2018-08-29 VITALS
OXYGEN SATURATION: 96 % | SYSTOLIC BLOOD PRESSURE: 128 MMHG | HEART RATE: 79 BPM | DIASTOLIC BLOOD PRESSURE: 82 MMHG | RESPIRATION RATE: 18 BRPM | HEIGHT: 61 IN | BODY MASS INDEX: 39.27 KG/M2 | WEIGHT: 208 LBS

## 2018-08-29 DIAGNOSIS — G25.81 RESTLESS LEGS SYNDROME (RLS): ICD-10-CM

## 2018-08-29 DIAGNOSIS — J44.9 CHRONIC OBSTRUCTIVE PULMONARY DISEASE, UNSPECIFIED COPD TYPE (H): ICD-10-CM

## 2018-08-29 DIAGNOSIS — E66.9 OBESITY (BMI 30-39.9): ICD-10-CM

## 2018-08-29 DIAGNOSIS — G47.9 SLEEP DISTURBANCE: Primary | ICD-10-CM

## 2018-08-29 PROCEDURE — 99204 OFFICE O/P NEW MOD 45 MIN: CPT | Performed by: INTERNAL MEDICINE

## 2018-08-29 NOTE — PROGRESS NOTES
[facility]  Outpatient Sleep Medicine Consultation  August 29, 2018      Name: Marni Austin MRN# [id number]   Age: 48 year old YOB: 1970     Date of Consultation: August 29, 2018  Consultation is requested by: Carlton Nowak MD  [id number]  [place] [address]  Primary care provider: David Gambino  Home clinic: [facility]       Reason for Sleep Consult:     Marni Austin is a 48 year old female nightly snoring, gasping, choking, poor quality of sleep and excessive daytime sleepiness and tiredness and recent respiratory failure due to COPD exacerbation.         Assessment and Plan:     Summary Sleep Diagnoses/Recommendations:    1. Sleep Disturbance:  High suspicion of sleep disordered breathing based on patient's symptoms (snoring, excessive daytime sleepiness, witnessed apneas), high BMI, neck circumference and oropharyngeal examination in setting of COPD, chronic diastolic congestive heart failure, hypertension and coronary artery disease. Will schedule PSG with PAP titration (CPAP/BiPAP/AVAPS) in second half if patient meets criteria for JORGE in first half of PSG with TCM CO2 and ABGs.  Worrisome for overlap syndrome with combination of COPD and sleep apnea. O 2 therapy if sustained hypoxemia not improved by PAP therapy. Patient is a poor candidate for Home Sleep Testing due to severe pulmonary disease and congestive heart failure.   We also discussed the pathophysiology of sleep disordered breathing and the importance of treating it if S/he should have it. Patient is advised not to drive if he/she feels drowsy or sleepy.  Follow up after sleep study to discuss the result of sleep study and treatment options.    2. Obesity:  Counseled regarding weight loss through diet modification and increased physical activity. Patient was given instuctions of weight loss and advised to follow up her PCP for further weight loss interventions.    3. Suspected  restless leg syndrome: get ferritin, cbc and iron profile.      Orders Placed This Encounter   Procedures     Comprehensive Sleep Study     Ferritin     Iron and Iron Binding Capacity     CBC with platelets differential     ABG-Blood Gas Arterial ([facility] [place])       Summary Counseling:  See instructions    Counseling included a comprehensive review of diagnostic and therapeutic strategies as well as risks of inadequate therapy.  Educational materials provided in instructions.    All questions were answered.  The patient indicates understanding of the above issues and agrees with the plan set forth.           History of Present Illness:     Marni Austin is a 48 year old female with history of COPD, asthma, chronic diastolic heart failure, hypertension, coronary disease, hyperlipidemia, chronic smoker and depression who presents to the [facility] in [place] with complains of sleep disturbance. I was asked to see Ms. Austin regarding sleep apnea by Dr. Carlton Nowak.   Patient was admitted at [facility] from 7/18/18 through 7/12/2018 for acute COPD exacerbation with acute hypoxic respiratory failure.  During hospitalization patient has sleep disturbance, and suspected sleep apnea and she was referred to this clinic for further evaluation.  Patient complaints snoring as per ,waking up gasping air, dry mouth and throat, difficulty breathing through the nose, excessive daytime sleepiness and tiredness. Her sleepiness affects her dleia and tasks and sometimes she falls asleep by accident while doing her tasks. She doze off at traffic stop x1 recently. She has muscle spasms and carlos horses of her legs and abdomen mostly at night which disrupts her sleep at night. During the day, she has muscle spasms at her ribs area. She recently quit smoking one month ago. She had gained about 25 lbs for the last 3 months. She has exertion dyspnea.    Please see below for sleep ROS  details.    PREVIOUS IN- LAB or HOME SLEEP STUDIES:  None     SLEEP-WAKE SCHEDULE:     Marni Austin     -Describes themself as neither a morning or night person;      -ON WEEKDAYS, goes to sleep at 9:30 PM during the week; awakens  5:30 AM with an alarm; falls asleep in 1-30 minutes; denies difficulty falling asleep.     -ON WEEKENDS, goes to sleep at 11:00 PM to midnight and wakes up at 6:00 AM without an alarm; falls asleep in 1-30 minutes.       -Awakens 2-4 times a night for 5-10 minutes before falling back to sleep; awakens to go to the bathroom, uncertain reasons, pain and carlos horses pain.      -Total sleep time: 6 hours per night.    -Naps 0 times/days per week      BEDTIME ACTIVITIES AND SHIFT WORK:    Marni Austin    -does use electronics in bed and does not watch TV in bed and read in bed.     -does not do shift work.  He/she works day shifts.      Occupation: office work     SCALES       SLEEP APNEA: Stopbang score: 6       INSOMNIA:  Insomnia severity score: 9       SLEEPINESS: Hoyt sleepiness scale (ESS):  10   Drowsy driving yes but no near accidents          PHQ9: N/A    SLEEP COMPLAINTS:   Snoring- 7 days/week  Witness apnea: No  Gasping/Choking: Yes  Excessive daytime sleep: Yes  Toss/turn: Yes  Excessive tiredness/fatigue:  Yes  Morning headaches: No  Dry mouth/throat: Yes  Dyspnea: Yes  Coexisting Lung disease: Yes    Coexisting Heart disease: Yes    Does patient have a bed partner: Yes  Has bed partner been sleeping separately because of snoring:  Yes            RLS Screen: When you try to relax in the evening or sleep at  night, do you ever have unpleasant, restless feelings in your  legs that can be relieved by walking or movement? No    Periodic limb movement: No    Narcolepsy:       denies sudden urges of sleep attacks     denies cataplexy     denies sleep paralysis      denies hallucinations     Sleep Behaviors:     denies leg symptoms/movements     denies motor  restlessness     denies night terrors     denies bruxism     denies automatic behaviors    Other subjective complaints:     denies anxiety or rumination      denies pain and discomfort at  night     denies waking up with heart pounding or racing     denies GERD/heartburn         Parasomnia:   NREM -denies recurrent persistent confusional arousal, night eating, sleep walking or sleep terrors   REM  - denies dream enactment; no injuries.     Safety: None             Medications:     Current Outpatient Prescriptions   Medication Sig     albuterol (2.5 MG/3ML) 0.083% neb solution Take 2.5 mg by nebulization every 6 hours as needed for shortness of breath / dyspnea or wheezing     albuterol (PROAIR HFA/PROVENTIL HFA/VENTOLIN HFA) 108 (90 BASE) MCG/ACT Inhaler Inhale 2 puffs into the lungs every 6 hours as needed for shortness of breath / dyspnea or wheezing     amLODIPine (NORVASC) 10 MG tablet TAKE 1 TABLET BY MOUTH  DAILY     aspirin EC 81 MG EC tablet Take 1 tablet (81 mg) by mouth daily     atorvastatin (LIPITOR) 80 MG tablet TAKE 1 TABLET BY MOUTH AT  BEDTIME     Beclomethasone Dipropionate (QVAR IN)      benzonatate (TESSALON) 100 MG capsule Take 1 capsule (100 mg) by mouth 3 times daily as needed for cough     cetirizine (ZYRTEC) 10 MG tablet Take 10 mg by mouth daily     citalopram (CELEXA) 20 MG tablet TAKE 1 TABLET BY MOUTH  DAILY     cyclobenzaprine (FLEXERIL) 5 MG tablet Take 1 tablet (5 mg) by mouth 2 times daily as needed for muscle spasms     hydrALAZINE (APRESOLINE) 50 MG tablet TAKE 1 TABLET BY MOUTH  TWICE A DAY     ipratropium - albuterol 0.5 mg/2.5 mg/3 mL (DUONEB) 0.5-2.5 (3) MG/3ML neb solution Take 1 vial (3 mLs) by nebulization every 4 hours as needed for shortness of breath / dyspnea or wheezing     nicotine polacrilex (NICORETTE) 4 MG gum Place 1 each (4 mg) inside cheek as needed for smoking cessation     nitroGLYcerin (NITROSTAT) 0.4 MG sublingual tablet Place 1 tablet (0.4 mg) under the  tongue every 5 minutes as needed for chest pain     potassium chloride SA (K-DUR/KLOR-CON M) 10 MEQ CR tablet Take 1 tablet (10 mEq) by mouth daily     varenicline (CHANTIX) 1 MG tablet Take 1 tablet (1 mg) by mouth 2 times daily     No current facility-administered medications for this visit.         Medication that can affect sleep: Citalopram and Flexeril    Allergies   Allergen Reactions     Zyban [Bupropion Hcl] Anaphylaxis            Past Medical History:     Does not need 02 supplement at night     Past Medical History:   Diagnosis Date     CAD (coronary artery disease)      COPD (chronic obstructive pulmonary disease) (H)      Depression      Hypertension                Past Surgical History:     Yes previous upper airway surgery     Past Surgical History:   Procedure Laterality Date     CARDIAC SURGERY       TONSILLECTOMY       Tubal ligation surgery              Social History:     Social History   Substance Use Topics     Smoking status: Current Every Day Smoker     Packs/day: 0.50     Years: 26.00     Smokeless tobacco: Never Used      Comment: stopped 2 days ago     Alcohol use No         Chemical History:     Tobacco: No, quit one month ago     Uses 2 cups/day of coffee. Last caffeine intake is usually before dinner    EtOH: No   Recreational Drugs: No    Psych Hx:   Depression    Current dangers to self or others: None           Family History:     Family History   Problem Relation Age of Onset     Hypertension Mother      Diabetes Father      Cerebrovascular Disease Maternal Grandmother      Cancer Maternal Grandfather         Sleep Family Hx:        RLS- No  JORGE - daughter  Insomnia - No  Parasomnia - No         Review of Systems:     A complete 10 point review of systems was negative other than HPI or as commented below:   Patient denies chest pain,  wheezing, abdominal pain, n&v, fever, chills, dysuria, leg pain or swelling. Patient is also denies ear pain, sore throat, postnasal drip, running  "nose, dry cough.  Patient had weight gain, feet and leg numbness, dyspnea with activity and muscle spasms.    Marni Austin has gained 25 pounds in the last year.            Physical Examination:   /82  Pulse 79  Resp 18  Ht 1.549 m (5' 1\")  Wt 94.3 kg (208 lb)  LMP 08/30/2013  SpO2 96%  BMI 39.3 kg/m2     Neck Circumference: 43 cm   Constitutional: . Awake, alert, cooperative, in no apparent distress  Mood: euthymic; affect congruent with full range and intensity.  Attention/Concentration:  Normal   Eyes: Pupils round and reactive. No icterus.  ENT: Mallampati Class: IV.   Tonsillar Stage: 0  surgically removed  Clear nasal passages. Enlarged inferior turbinates. No deviated septum.  Oropharynx: No high arched palate. No pharyngeal erythema or exudates, elongated uvula. No lateral narrowing  Tongue: No relative macroglossia   Dentition: Good.    Dentures: None  Neck: Supple, no thyroid enlargement.   Cardiovascular: Regular S1 and S2, no murmurs or gallops.    Pulmonary:  Chest symmetric, Lungs reveal decreased breath sounds diffusely. No rales or wheezes.  Abdomen: Soft, obese, non tender.  Extremities:  trace pedal edema.  Muscle/joint: Strength and tone normal   Skin:  No rash or significant lesions examined areas of skin.   Neurologic: Alert, oriented x3, no focal neurological deficit.           Data: All pertinent previous laboratory data reviewed     No results found for: PH, PHARTERIAL, PO2, [id number], SAT, PCO2, HCO3, BASEEXCESS, RASHAAD, BEB  Lab Results   Component Value Date    TSH 0.87 02/27/2015    TSH 0.77 07/03/2008     Lab Results   Component Value Date    GLC 99 07/31/2018     (H) 07/20/2018     Lab Results   Component Value Date    HGB 14.2 07/20/2018    HGB 14.4 07/19/2018     Lab Results   Component Value Date    BUN 18 07/31/2018    BUN 26 07/20/2018    CR 0.74 07/31/2018    CR 0.78 07/20/2018     Lab Results   Component Value Date    CO2 26 07/31/2018    CO2 26 07/20/2018 "     No results found for: YENI      Echocardiography: 7/18/18  Interpretation Summary     Sinus rhythm was noted. The rhythm was sinus tachycardia.  The ascending aorta is Mildly dilated at 3.9 cm (The upper limit of normal is  3.7cm for aortic root diameter.)  There is trace to mild tricuspid regurgitation. The right ventricular systolic  pressure is approximated at 32mmHg plus the right atrial pressure is elevated,  consistent with mild pulmonary hypertension.  With this, the inferior vena cava is not dilated.  The left ventricle is normal in size with mild concentric left ventricular  hypertrophy.  The visual ejection fraction is estimated at 65-70%, however, with Grade II or  moderate diastolic dysfunction.  Despite the above observations, there is a normal left atrial size by volume  measurement at 26.5 ml/m2. The ULNL for LA volume is 34 ml/m2.  No hemodynamically significant valvular aortic stenosis.     Compared to the echos from 2015 and 2016, no significant changes.    Chest x-ray: 7/18/2018 1:35 PM     HISTORY: Chest pain and shortness of breath.     COMPARISON: 2/1/2016     FINDINGS: No airspace consolidation, pleural effusion or pneumothorax.  Normal heart size.         IMPRESSION: No acute cardiopulmonary abnormality.    PFT: No        Copy to: David Gambino  Copy to: Carlton Serrano MD 8/29/2018   [facility] [address], [place] [address] [phone] Clinic    Total time spent with patient: 48 minutes with this patient today in which 25 minutes was spent in counseling/coordination of care and going over planned testing and recommendations.

## 2018-08-29 NOTE — PATIENT INSTRUCTIONS
"MY TREATMENT INFORMATION FOR SLEEP DISTURBANCE-  Marni Austin    DOCTOR : Charlie Serrano MD  SLEEP CENTER :  [facility]    MY CONTACT NUMBER:[phone]        If I haven't had a sleep study yet, what can I expect?  A personal story from Bunny  [email or web address]/watch?v=[email or web address]    Suspected sleep apnea in addition to COPD/asthma: Sleep study ordered.    Follow up in sleep clinic 1-2 weeks after sleep study to discuss results of sleep study and treatment options.    Patient was advised not to drive if drowsy or sleepy.    Frequently asked questions:  1. What is Obstructive Sleep Apnea (JORGE)? JORGE is the most common type of sleep apnea. Apnea literally means, \"without breath.\" It is characterized by repetitive pauses in breathing, despite continued effort to breathe, and is usually associated with a reduction in blood oxygen saturation. Apneas can last 10 to over 60 seconds. It is caused by narrowing or collapse of the upper airway as muscles relax during sleep. Severity of sleep apnea is determined by frequency of breathing events and their effect on your sleep and oxygen levels determined during sleep testing.   2. What are the consequences of JORGE? Symptoms include: daytime sleepiness- possibly increasing the risk of falling asleep while driving, unrefreshing/restless sleep, snoring, insomnia, waking frequently to urinate, waking with heartburn or reflux, reduced concentration and memory, and morning headaches. Other health consequences may include development of high blood pressure and other cardiovascular disease in persons who are susceptible. Untreated JORGE  can contribute to heart disease, stroke and diabetes.   3. What are the treatment options? In most situations, sleep apnea is a lifelong disease that must be managed with daily therapy. Medications are not effective for sleep apnea and surgery is generally not performed until other therapies have been tried. Therapy is usually tailored to " the individual patient based on many factors including your wishes as well as severity of sleep apnea and severity of obesity. Continuous Positive Airway (CPAP) is the most reliable treatment. An oral device to hold your jaw forward is usually the next most reliable option. Other options include postioning devices (to keep you off your back), weight loss, and surgery including a tongue pacing device. There is more detail about some of these options below.    Central sleep apnea is a disorder in which your breathing repeatedly stops and starts during sleep.  Central sleep apnea occurs because your brain doesn't send proper signals to the muscles that control your breathing. This condition is different from obstructive sleep apnea, in which you can't breathe normally because of upper airway obstruction. Central sleep apnea is less common than obstructive sleep apnea.  Central sleep apnea may occur as a result of other conditions, such as heart failure and stroke. Sleeping at a high altitude and pain medications also may cause central sleep apnea.        1. CPAP-  WHAT DOES IT DO AND HOW CAN I LEARN TO WEAR IT?                               BEFORE I START, CAN I WATCH A MOVIE TO GET A PLAN ON HOW TO USE CPAP?  https://www.Phoodeez.com/watch?x=f4V08ac427D      Continuous positive airway pressure, or CPAP, is the most effective treatment for obstructive sleep apnea. It works by blowing room air, through a mask, to hold your throat open. A decision to use CPAP is a major step forward in the pursuit of a healthier life. The successful use of CPAP will help you breathe easier, sleep better and live healthier. You can choose CPAP equipment from any durable medical equipment provider that meets your needs.  Using CPAP can be a positive experience if you keep these dupont points in mind:  1. Commitment  CPAP is not a quick fix for your problem. It involves a long-term commitment to improve your sleep and your  "health.    2. Communication  Stay in close communication with both your sleep doctor and your CPAP supplier. Ask lots of questions and seek help when you need it.    3. Consistency  Use CPAP all night, every night and for every nap. You will receive the maximum health benefits from CPAP when you use it every time that you sleep. This will also make it easier for your body to adjust to the treatment.    4. Correction  The first machine and mask that you try may not be the best ones for you. Work with your sleep doctor and your CPAP supplier to make corrections to your equipment selection. Ask about trying a different type of machine or mask if you have ongoing problems. Make sure that your mask is a good fit and learn to use your equipment properly.    5. Challenge  Tell a family member or close friend to ask you each morning if you used your CPAP the previous night. Have someone to challenge you to give it your best effort.    6. Connection   Your adjustment to CPAP will be easier if you are able to connect with others who use the same treatment. Ask your sleep doctor if there is a support group in your area for people who have sleep apnea, or look for one on the Internet.  7. Comfort   Increase your level of comfort by using a saline spray, decongestant or heated humidifier if CPAP irritates your nose, mouth or throat. Use your unit's \"ramp\" setting to slowly get used to the air pressure level. There may be soft pads you can buy that will fit over your mask straps. Look on www.CPAP.com for accessories that can help make CPAP use more comfortable.  8. Cleaning   Clean your mask, tubing and headgear on a regular basis. Put this time in your schedule so that you don't forget to do it. Check and replace the filters for your CPAP unit and humidifier.    9. Completion   Although you are never finished with CPAP therapy, you should reward yourself by celebrating the completion of your first month of treatment. Expect this " first month to be your hardest period of adjustment. It will involve some trial and error as you find the machine, mask and pressure settings that are right for you.    10. Continuation  After your first month of treatment, continue to make a daily commitment to use your CPAP all night, every night and for every nap.    CPAP-Tips to starting with success:  Begin using your CPAP for short periods of time during the day while you watch TV or read.    Use CPAP every night and for every nap. Using it less often reduces the health benefits and makes it harder for your body to get used to it.    Make small adjustments to your mask, tubing, straps and headgear until you get the right fit. Tightening the mask may actually worsen the leak.  If it leaves significant marks on your face or irritates the bridge of your nose, it may not be the best mask for you.  Speak with the person who supplied the mask and consider trying other masks. Insurances will allow you to try different masks during the first month of starting CPAP.  Insurance also covers a new mask, hose and filter about every 6 months.    Use a saline nasal spray to ease mild nasal congestion. Neti-Pot or saline nasal rinses may also help. Nasal gel sprays can help reduce nasal dryness.  Biotene mouthwash can be helpful to protect your teeth if you experience frequent dry mouth.  Dry mouth may be a sign of air escaping out of your mouth or out of the mask in the case of a full face mask.  Speak with your provider if you expect that is the case.     Take a nasal decongestant to relieve more severe nasal or sinus congestion.  Do not use Afrin (oxymetazoline) nasal spray more than 3 days in a row.  Speak with your sleep doctor if your nasal congestion is chronic.    Use a heated humidifier that fits your CPAP model to enhance your breathing comfort. Adjust the heat setting up if you get a dry nose or throat, down if you get condensation in the hose or mask.  Position  the CPAP lower than you so that any condensation in the hose drains back into the machine rather than towards the mask.    Try a system that uses nasal pillows if traditional masks give you problems.    Clean your mask, tubing and headgear once a week. Make sure the equipment dries fully.    Regularly check and replace the filters for your CPAP unit and humidifier.    Work closely with your sleep provider and your CPAP supplier to make sure that you have the machine, mask and air pressure setting that works best for you. It is better to stop using it and call your provider to solve problems than to lay awake all night frustrated with the device.    BESIDES CPAP, WHAT OTHER THERAPIES ARE THERE?      Positioning Device  Positioning devices are generally used when sleep apnea is mild and only occurs on your back.This example shows a pillow that straps around the waist. It may be appropriate for those whose sleep study shows milder sleep apnea that occurs primarily when lying flat on one's back. Preliminary studies have shown benefit but effectiveness at home may need to be verified by a home sleep test. These devices are generally not covered by medical insurance.                      Oral Appliance  What is oral appliance therapy?  An oral appliance is a small acrylic device that fits over the upper and lower teeth or tongue (similar to an orthodontic retainer or a mouth guard). This device slightly advances the lower jaw or tongue, which moves the base of the tongue forward, opens the airway, improves breathing and can effectively treat snoring and obstructive sleep apnea sleep apnea. The appliance is fabricated and customized by a qualified dentist with experience in treating snoring and sleep apnea. Oral appliances are usually well tolerated and have relatively high compliance by patients1, 2, 3.  When is an oral appliance indicated?  Oral appliance therapy is recommended as a first-line treatment for patients with  primary snoring, mild sleep apnea, and for patients with moderate sleep apnea who prefer appliance therapy to use of CPAP4, 5. Severity of sleep apnea is determined by sleep testing and is based on the number of respiratory events per hour of sleep.   How successful is oral appliance therapy?  The success rate of oral appliance therapy in patients with mild sleep apnea is 75-80% while in patients with moderate sleep apnea it is 50-70%. The chance of success in patients with severe sleep apnea is 40-50%. The research also shows that oral appliances have a beneficial effect on the cardiovascular health of JORGE patients at the same magnitude as CPAP therapy7.  Oral appliances should be a second-line treatment in cases of severe sleep apnea, but if not completely successful then a combination therapy utilizing CPAP plus oral appliance therapy may be effective. Oral appliances tend to be effective in a broad range of patients although studies show that the patients who have the highest success are females, younger patients, those with milder disease, and less severe obesity. 3, 6.   The chances of success are lower in patients who have more severe JORGE, are older, and those who are morbidly obese.     Example of an oral appliance   Finding a dentist that practices dental sleep medicine  Specific training is available through the American Academy of Dental Sleep Medicine for dentists interested in working in the field of sleep. To find a dentist who is educated in the field of sleep and the use of oral appliances, near you, visit the Web site of the American Academy of Dental Sleep Medicine; also see   http://www.accpstorage.org/newOrganization/patients/oralAppliances.pdf  To search for a dentist certified in these practices:  Http://aadsm.org/FindADentist.aspx?1  1. Herrera et al. Objectively measured vs self-reported compliance during oral appliance therapy for sleep-disordered breathing. Chest 2013; 144(5):  1967-3450.  2. Cinthya et al. Objective measurement of compliance during oral appliance therapy for sleep-disordered breathing. Thorax 2013; 68(1): 91-96.  3. Mira et al. Mandibular advancement devices in 620 men and women with JORGE and snoring: tolerability and predictors of treatment success. Chest 2004; 125: 5507-1306.  4. Norberto, et al. Oral appliances for snoring and JORGE: a review. Sleep 2006; 29: 244-262.  5. Ivy et al. Oral appliance treatment for JORGE: an update. J Clin Sleep Med 2014; 10(2): 215-227.  6. Beau et al. Predictors of OSAH treatment outcome. J Dent Res 2007; 86: 3418-3787.    Nasal Valves                 Nasal valves may not be effective if you have frequent nasal congestion or have difficulty breathing through your nose. They may be an option for mild apnea if other options are not well tolerated. The efficacy of these devices is generally less than CPAP or oral appliances.      Weight Loss:    Weight management is a personal decision.  If you are interested in exploring weight loss strategies, the following discussion covers the impact on weight loss on sleep apnea and the approaches that may be successful.    Weight loss decreases severity of sleep apnea in most people with obesity. For those with mild obesity who have developed snoring with weight gain, even 15-30 pound weight loss can improve and occasionally eliminate sleep apnea.  Structured and life-long dietary and health habits are necessary to lose weight and keep healthier weight levels.     Though there may be significant health benefits from weight loss, long-term weight loss is very difficult to achieve- studies show success with dietary management in less than 10% of people. In addition, substantial weight loss may require years of dietary control and may be difficult if patients have severe obesity. In these cases, surgical management may be considered.  Finally, older individuals who have tolerated obesity  without health complications may be less likely to benefit from weight loss strategies.    Your BMI is Body mass index is 39.3 kg/(m^2).  Body mass index (BMI) is one way to tell whether you are at a healthy weight, overweight, or obese. It measures your weight in relation to your height.  A BMI of 18.5 to 24.9 is in the healthy range. A person with a BMI of 25 to 29.9 is considered overweight, and someone with a BMI of 30 or greater is considered obese. More than two-thirds of American adults are considered overweight or obese.  Being overweight or obese increases the risk for further weight gain. Excess weight may lead to heart disease and diabetes.  Creating and following plans for healthy eating and physical activity may help you improve your health.  Weight control is part of healthy lifestyle and includes exercise, emotional health, and healthy eating habits. Careful eating habits lifelong are the mainstay of weight control. Though there are significant health benefits from weight loss, long-term weight loss with diet alone may be very difficult to achieve- studies show long-term success with dietary management in less than 10% of people. Attaining a healthy weight may be especially difficult to achieve in those with severe obesity. In some cases, medications, devices and surgical management might be considered.  What can you do?  If you are overweight or obese and are interested in methods for weight loss, you should discuss this with your provider.     Consider reducing daily calorie intake by 500 calories.     Keep a food journal.     Avoiding skipping meals, consider cutting portions instead.    Diet combined with exercise helps maintain muscle while optimizing fat loss. Strength training is particularly important for building and maintaining muscle mass. Exercise helps reduce stress, increase energy, and improves fitness. Increasing exercise without diet control, however, may not burn enough calories to  loose weight.       Start walking three days a week 10-20 minutes at a time    Work towards walking thirty minutes five days a week     Eventually, increase the speed of your walking for 1-2 minutes at time    In addition, we recommend that you review healthy lifestyles and methods for weight loss available through the National Institutes of Health patient information sites:  http://win.niddk.nih.gov/publications/index.htm    And look into health and wellness programs that may be available through your health insurance provider, employer, local community center, or zuh[employer] club.    Weight management plan: Patient was referred to their PCP to discuss a diet and exercise plan.    Surgery:    Upper Airway Surgery for JORGE  Surgery for JORGE is a second-line treatment option in the management of sleep apnea.  Surgery should be considered for patients who are having a difficult time tolerating CPAP.    Surgery for JORGE is directed at areas that are responsible for narrowing or complete obstruction of the airway during sleep.  There are a wide range of procedures available to enlarge and/or stabilize the airway to prevent blockage of breathing in the three major areas where it can occur: the palate, tongue, and nasal regions.  Successful surgical treatment depends on the accurate identification of the factors responsible for obstructive sleep apnea in each person.  A personalized approach is required because there is no single treatment that works well for everyone.  Because of anatomic variation, consultation with an examination by a sleep surgeon is a critical first step in determining what surgical options are best for each patient.  In some cases, examination during sedation may be recommended in order to guide the selection of procedures.  Patients will be counseled about risks and benefits as well as the typical recovery course after surgery. Surgery is typically not a cure for a person s JORGE.  However, surgery will often  significantly improve one s JORGE severity (termed  success rate ).  Even in the absence of a cure, surgery will decrease the cardiovascular risk associated with OSA7; improve overall quality of life8 (sleepiness, functionality, sleep quality, etc).          Palate Procedures:  Patients with JORGE often have narrowing of their airway in the region of their tonsils and uvula.  The goals of palate procedures are to widen the airway in this region as well as to help the tissues resist collapse.  Modern palate procedure techniques focus on tissue conservation and soft tissue rearrangement, rather than tissue removal.  Often the uvula is preserved in this procedure. Residual sleep apnea is common in patient after pharyngoplasty with an average reduction in sleep apnea events of 33%2.      Tongue Procedures:  While patients are awake, the muscles that surround the throat are active and keep this region open for breathing. These muscles relax during sleep, allowing the tongue and other structures to collapse and block breathing.  There are several different tongue procedures available.  Selection of a tongue base procedure depends on characteristics seen on physical exam.  Generally, procedures are aimed at removing bulky tissues in this area or preventing the back of the tongue from falling back during sleep.  Success rates for tongue surgery range from 50-62%3.    Hypoglossal Nerve Stimulation:  Hypoglossal nerve stimulation has recently received approval from the [place] Food and Drug Administration for the treatment of obstructive sleep apnea.  This is based on research showing that the system was safe and effective in treating sleep apnea6.  Results showed that the median AHI score decreased 68%, from 29.3 to 9.0. This therapy uses an implant system that senses breathing patterns and delivers mild stimulation to airway muscles, which keeps the airway open during sleep.  The system consists of three fully implanted  components: a small generator (similar in size to a pacemaker), a breathing sensor, and a stimulation lead.  Using a small handheld remote, a patient turns the therapy on before bed and off upon awakening.    Candidates for this device must be greater than 22 years of age, have moderate to severe JORGE (AHI between 20-65), BMI less than 32, have tried CPAP/oral appliance without success, and have appropriate upper airway anatomy (determined by a sleep endoscopy performed by Dr. Weinstein).    Hypoglossal Nerve Stimulation Pathway:    The sleep surgeon s office will work with the patient through the insurance prior-authorization process (including communications and appeals).    Nasal Procedures:  Nasal obstruction can interfere with nasal breathing during the day and night.  Studies have shown that relief of nasal obstruction can improve the ability of some patients to tolerate positive airway pressure therapy for obstructive sleep apnea1.  Treatment options include medications such as nasal saline, topical corticosteroid and antihistamine sprays, and oral medications such as antihistamines or decongestants. Non-surgical treatments can include external nasal dilators for selected patients. If these are not successful by themselves, surgery can improve the nasal airway either alone or in combination with these other options.      Combination Procedures:  Combination of surgical procedures and other treatments may be recommended, particularly if patients have more than one area of narrowing or persistent positional disease.  The success rate of combination surgery ranges from 66-80%2,3.      1. Josh ARTIS. The Role of the Nose in Snoring and Obstructive Sleep Apnoea: An Update.  Eur Arch Otorhinolaryngol. 2011; 268: 1365-73.  2.  Aryan SM; Ari JA; Jurgen JR; Pallanch JF; Jarek HERNANDEZ; Zahida AUGUSTE; Ousmane PABLO. Surgical modifications of the upper airway for obstructive sleep apnea in adults: a systematic review and  meta-analysis. SLEEP 2010;33(10):3763-3400. Juan THOMAS. Hypopharyngeal surgery in obstructive sleep apnea: an evidence-based medicine review.  Arch Otolaryngol Head Neck Surg. 2006 Feb;132(2):206-13.  3. Abraham PAZ, Elizabeth Y, Jv TALIA. The efficacy of anatomically based multilevel surgery for obstructive sleep apnea. Otolaryngol Head Neck Surg. 2003 Oct;129(4):327-35.  4. Kezirian E, Goldberg A. Hypopharyngeal Surgery in Obstructive Sleep Apnea: An Evidence-Based Medicine Review. Arch Otolaryngol Head Neck Surg. 2006 Feb;132(2):206-13.  5. Danna THORPE et al. Upper-Airway Stimulation for Obstructive Sleep Apnea.  N Engl J Med. 2014 Jan 9;370(2):139-49.  6. Janette Y et al. Increased Incidence of Cardiovascular Disease in Middle-aged Men with Obstructive Sleep Apnea. Am J Respir Crit Care Med; 2002 166: 159-165  7. Elan EM et al. Studying Life Effects and Effectiveness of Palatopharyngoplasty (SLEEP) study: Subjective Outcomes of Isolated Uvulopalatopharyngoplasty. Otolaryngol Head Neck Surg. 2011; 144: 623-631.  8.   Your blood pressure was checked while you were in clinic today.  Please read the guidelines below about what these numbers mean and what you should do about them.  Your systolic blood pressure is the top number.  This is the pressure when the heart is pumping.  Your diastolic blood pressure is the bottom number.  This is the pressure in between beats.  If your systolic blood pressure is less than 120 and your diastolic blood pressure is less than 80, then your blood pressure is normal. There is nothing more that you need to do about it  If your systolic blood pressure is 120-139 or your diastolic blood pressure is 80-89, your blood pressure may be higher than it should be.  You should have your blood pressure re-checked within a year by a primary care provider.  If your systolic blood pressure is 140 or greater or your diastolic blood pressure is 90 or greater, you may have high blood pressure.  High blood  pressure is treatable, but if left untreated over time it can put you at risk for heart attack, stroke, or kidney failure.  You should have your blood pressure re-checked by a primary care provider within the next four weeks.      Restless Leg Syndrome:    Based on the information that you provided today you are likely to have restless leg syndrome that may be interfering with your sleep.  Treatment of restless leg syndrome usually depends on how much it is bothering you.  If it is a major problem then you might want to do something about it.    Here are some treatment options that you might want to consider:   Behavior Changes:   Reduce or eliminate caffeine and alcohol products   Increase the amount of stretching that you do, particularly before bedtime   Sometimes a leg message can be helpful   Some people find that a warm bath or shower in the evening is helpful    We recommend:  Ferritin level, cbc, iron level ordered    Low iron (below 50 ng/ml) can cause motor restlessness. Your iron [ferritin] level is in a low range that may be causing restlessness.     If ferritin is low with anemia, we recommend to following up your primary care doctor to investigate the source of iron loss or bleeding, including the need for colonoscopy/upper endoscopy.

## 2018-08-29 NOTE — MR AVS SNAPSHOT
"              After Visit Summary   8/29/2018    Marni Austin    MRN: [id number]           Patient Information     Date Of Birth          1970        Visit Information        Provider Department      8/29/2018 5:00 PM Charlie Serrano MD [facility] [place]        Today's Diagnoses     Sleep disturbance    -  1    Chronic obstructive pulmonary disease, unspecified COPD type (H)        Obesity (BMI 30-39.9)        Restless legs syndrome (RLS)          Care Instructions    MY TREATMENT INFORMATION FOR SLEEP DISTURBANCE-  Marni MANLEY Travcherri    DOCTOR : Charlie Serrano MD  SLEEP CENTER :  [place]    MY CONTACT NUMBER:[phone]        If I haven't had a sleep study yet, what can I expect?  A personal story from [place]  [email or web address].com/watch?v=AxPLmlRpnCs    Suspected sleep apnea in addition to COPD/asthma: Sleep study ordered.    Follow up in sleep clinic 1-2 weeks after sleep study to discuss results of sleep study and treatment options.    Patient was advised not to drive if drowsy or sleepy.    Frequently asked questions:  1. What is Obstructive Sleep Apnea (JORGE)? JORGE is the most common type of sleep apnea. Apnea literally means, \"without breath.\" It is characterized by repetitive pauses in breathing, despite continued effort to breathe, and is usually associated with a reduction in blood oxygen saturation. Apneas can last 10 to over 60 seconds. It is caused by narrowing or collapse of the upper airway as muscles relax during sleep. Severity of sleep apnea is determined by frequency of breathing events and their effect on your sleep and oxygen levels determined during sleep testing.   2. What are the consequences of JORGE? Symptoms include: daytime sleepiness- possibly increasing the risk of falling asleep while driving, unrefreshing/restless sleep, snoring, insomnia, waking frequently to urinate, waking with heartburn or reflux, reduced concentration and memory, and morning " headaches. Other health consequences may include development of high blood pressure and other cardiovascular disease in persons who are susceptible. Untreated JORGE  can contribute to heart disease, stroke and diabetes.   3. What are the treatment options? In most situations, sleep apnea is a lifelong disease that must be managed with daily therapy. Medications are not effective for sleep apnea and surgery is generally not performed until other therapies have been tried. Therapy is usually tailored to the individual patient based on many factors including your wishes as well as severity of sleep apnea and severity of obesity. Continuous Positive Airway (CPAP) is the most reliable treatment. An oral device to hold your jaw forward is usually the next most reliable option. Other options include postioning devices (to keep you off your back), weight loss, and surgery including a tongue pacing device. There is more detail about some of these options below.    Central sleep apnea is a disorder in which your breathing repeatedly stops and starts during sleep.  Central sleep apnea occurs because your brain doesn't send proper signals to the muscles that control your breathing. This condition is different from obstructive sleep apnea, in which you can't breathe normally because of upper airway obstruction. Central sleep apnea is less common than obstructive sleep apnea.  Central sleep apnea may occur as a result of other conditions, such as heart failure and stroke. Sleeping at a high altitude and pain medications also may cause central sleep apnea.        1. CPAP-  WHAT DOES IT DO AND HOW CAN I LEARN TO WEAR IT?                               BEFORE I START, CAN I WATCH A MOVIE TO GET A PLAN ON HOW TO USE CPAP?  https://www.Snugg Home.com/watch?p=o9Z53gt607Y      Continuous positive airway pressure, or CPAP, is the most effective treatment for obstructive sleep apnea. It works by blowing room air, through a mask, to hold your  "throat open. A decision to use CPAP is a major step forward in the pursuit of a healthier life. The successful use of CPAP will help you breathe easier, sleep better and live healthier. You can choose CPAP equipment from any durable medical equipment provider that meets your needs.  Using CPAP can be a positive experience if you keep these dupont points in mind:  1. Commitment  CPAP is not a quick fix for your problem. It involves a long-term commitment to improve your sleep and your health.    2. Communication  Stay in close communication with both your sleep doctor and your CPAP supplier. Ask lots of questions and seek help when you need it.    3. Consistency  Use CPAP all night, every night and for every nap. You will receive the maximum health benefits from CPAP when you use it every time that you sleep. This will also make it easier for your body to adjust to the treatment.    4. Correction  The first machine and mask that you try may not be the best ones for you. Work with your sleep doctor and your CPAP supplier to make corrections to your equipment selection. Ask about trying a different type of machine or mask if you have ongoing problems. Make sure that your mask is a good fit and learn to use your equipment properly.    5. Challenge  Tell a family member or close friend to ask you each morning if you used your CPAP the previous night. Have someone to challenge you to give it your best effort.    6. Connection   Your adjustment to CPAP will be easier if you are able to connect with others who use the same treatment. Ask your sleep doctor if there is a support group in your area for people who have sleep apnea, or look for one on the Internet.  7. Comfort   Increase your level of comfort by using a saline spray, decongestant or heated humidifier if CPAP irritates your nose, mouth or throat. Use your unit's \"ramp\" setting to slowly get used to the air pressure level. There may be soft pads you can buy that will " fit over your mask straps. Look on www.CPAP.com for accessories that can help make CPAP use more comfortable.  8. Cleaning   Clean your mask, tubing and headgear on a regular basis. Put this time in your schedule so that you don't forget to do it. Check and replace the filters for your CPAP unit and humidifier.    9. Completion   Although you are never finished with CPAP therapy, you should reward yourself by celebrating the completion of your first month of treatment. Expect this first month to be your hardest period of adjustment. It will involve some trial and error as you find the machine, mask and pressure settings that are right for you.    10. Continuation  After your first month of treatment, continue to make a daily commitment to use your CPAP all night, every night and for every nap.    CPAP-Tips to starting with success:  Begin using your CPAP for short periods of time during the day while you watch TV or read.    Use CPAP every night and for every nap. Using it less often reduces the health benefits and makes it harder for your body to get used to it.    Make small adjustments to your mask, tubing, straps and headgear until you get the right fit. Tightening the mask may actually worsen the leak.  If it leaves significant marks on your face or irritates the bridge of your nose, it may not be the best mask for you.  Speak with the person who supplied the mask and consider trying other masks. Insurances will allow you to try different masks during the first month of starting CPAP.  Insurance also covers a new mask, hose and filter about every 6 months.    Use a saline nasal spray to ease mild nasal congestion. Neti-Pot or saline nasal rinses may also help. Nasal gel sprays can help reduce nasal dryness.  Biotene mouthwash can be helpful to protect your teeth if you experience frequent dry mouth.  Dry mouth may be a sign of air escaping out of your mouth or out of the mask in the case of a full face mask.   Speak with your provider if you expect that is the case.     Take a nasal decongestant to relieve more severe nasal or sinus congestion.  Do not use Afrin (oxymetazoline) nasal spray more than 3 days in a row.  Speak with your sleep doctor if your nasal congestion is chronic.    Use a heated humidifier that fits your CPAP model to enhance your breathing comfort. Adjust the heat setting up if you get a dry nose or throat, down if you get condensation in the hose or mask.  Position the CPAP lower than you so that any condensation in the hose drains back into the machine rather than towards the mask.    Try a system that uses nasal pillows if traditional masks give you problems.    Clean your mask, tubing and headgear once a week. Make sure the equipment dries fully.    Regularly check and replace the filters for your CPAP unit and humidifier.    Work closely with your sleep provider and your CPAP supplier to make sure that you have the machine, mask and air pressure setting that works best for you. It is better to stop using it and call your provider to solve problems than to lay awake all night frustrated with the device.    BESIDES CPAP, WHAT OTHER THERAPIES ARE THERE?      Positioning Device  Positioning devices are generally used when sleep apnea is mild and only occurs on your back.This example shows a pillow that straps around the waist. It may be appropriate for those whose sleep study shows milder sleep apnea that occurs primarily when lying flat on one's back. Preliminary studies have shown benefit but effectiveness at home may need to be verified by a home sleep test. These devices are generally not covered by medical insurance.                      Oral Appliance  What is oral appliance therapy?  An oral appliance is a small acrylic device that fits over the upper and lower teeth or tongue (similar to an orthodontic retainer or a mouth guard). This device slightly advances the lower jaw or tongue, which  moves the base of the tongue forward, opens the airway, improves breathing and can effectively treat snoring and obstructive sleep apnea sleep apnea. The appliance is fabricated and customized by a qualified dentist with experience in treating snoring and sleep apnea. Oral appliances are usually well tolerated and have relatively high compliance by patients1, 2, 3.  When is an oral appliance indicated?  Oral appliance therapy is recommended as a first-line treatment for patients with primary snoring, mild sleep apnea, and for patients with moderate sleep apnea who prefer appliance therapy to use of CPAP4, 5. Severity of sleep apnea is determined by sleep testing and is based on the number of respiratory events per hour of sleep.   How successful is oral appliance therapy?  The success rate of oral appliance therapy in patients with mild sleep apnea is 75-80% while in patients with moderate sleep apnea it is 50-70%. The chance of success in patients with severe sleep apnea is 40-50%. The research also shows that oral appliances have a beneficial effect on the cardiovascular health of JORGE patients at the same magnitude as CPAP therapy7.  Oral appliances should be a second-line treatment in cases of severe sleep apnea, but if not completely successful then a combination therapy utilizing CPAP plus oral appliance therapy may be effective. Oral appliances tend to be effective in a broad range of patients although studies show that the patients who have the highest success are females, younger patients, those with milder disease, and less severe obesity. 3, 6.   The chances of success are lower in patients who have more severe JORGE, are older, and those who are morbidly obese.     Example of an oral appliance   Finding a dentist that practices dental sleep medicine  Specific training is available through the American Academy of Dental Sleep Medicine for dentists interested in working in the field of sleep. To find a  dentist who is educated in the field of sleep and the use of oral appliances, near you, visit the Web site of the American Academy of Dental Sleep Medicine; also see   http://www.accpstorage.org/newOrganization/patients/oralAppliances.pdf  To search for a dentist certified in these practices:  Http://aadsm.org/FindADentist.aspx?1  1. Herrera, et al. Objectively measured vs self-reported compliance during oral appliance therapy for sleep-disordered breathing. Chest 2013; 144(5): 7092-7815.  2. Cinthya, et al. Objective measurement of compliance during oral appliance therapy for sleep-disordered breathing. Thorax 2013; 68(1): 91-96.  3. Mira et al. Mandibular advancement devices in 620 men and women with JORGE and snoring: tolerability and predictors of treatment success. Chest 2004; 125: 9034-7038.  4. Norberto, et al. Oral appliances for snoring and JORGE: a review. Sleep 2006; 29: 244-262.  5. Ivy et al. Oral appliance treatment for JORGE: an update. J Clin Sleep Med 2014; 10(2): 215-227.  6. Beau, et al. Predictors of OSAH treatment outcome. J Dent Res 2007; 86: 2983-4141.    Nasal Valves                 Nasal valves may not be effective if you have frequent nasal congestion or have difficulty breathing through your nose. They may be an option for mild apnea if other options are not well tolerated. The efficacy of these devices is generally less than CPAP or oral appliances.      Weight Loss:    Weight management is a personal decision.  If you are interested in exploring weight loss strategies, the following discussion covers the impact on weight loss on sleep apnea and the approaches that may be successful.    Weight loss decreases severity of sleep apnea in most people with obesity. For those with mild obesity who have developed snoring with weight gain, even 15-30 pound weight loss can improve and occasionally eliminate sleep apnea.  Structured and life-long dietary and health habits are  necessary to lose weight and keep healthier weight levels.     Though there may be significant health benefits from weight loss, long-term weight loss is very difficult to achieve- studies show success with dietary management in less than 10% of people. In addition, substantial weight loss may require years of dietary control and may be difficult if patients have severe obesity. In these cases, surgical management may be considered.  Finally, older individuals who have tolerated obesity without health complications may be less likely to benefit from weight loss strategies.    Your BMI is Body mass index is 39.3 kg/(m^2).  Body mass index (BMI) is one way to tell whether you are at a healthy weight, overweight, or obese. It measures your weight in relation to your height.  A BMI of 18.5 to 24.9 is in the healthy range. A person with a BMI of 25 to 29.9 is considered overweight, and someone with a BMI of 30 or greater is considered obese. More than two-thirds of American adults are considered overweight or obese.  Being overweight or obese increases the risk for further weight gain. Excess weight may lead to heart disease and diabetes.  Creating and following plans for healthy eating and physical activity may help you improve your health.  Weight control is part of healthy lifestyle and includes exercise, emotional health, and healthy eating habits. Careful eating habits lifelong are the mainstay of weight control. Though there are significant health benefits from weight loss, long-term weight loss with diet alone may be very difficult to achieve- studies show long-term success with dietary management in less than 10% of people. Attaining a healthy weight may be especially difficult to achieve in those with severe obesity. In some cases, medications, devices and surgical management might be considered.  What can you do?  If you are overweight or obese and are interested in methods for weight loss, you should discuss  this with your provider.     Consider reducing daily calorie intake by 500 calories.     Keep a food journal.     Avoiding skipping meals, consider cutting portions instead.    Diet combined with exercise helps maintain muscle while optimizing fat loss. Strength training is particularly important for building and maintaining muscle mass. Exercise helps reduce stress, increase energy, and improves fitness. Increasing exercise without diet control, however, may not burn enough calories to loose weight.       Start walking three days a week 10-20 minutes at a time    Work towards walking thirty minutes five days a week     Eventually, increase the speed of your walking for 1-2 minutes at time    In addition, we recommend that you review healthy lifestyles and methods for weight loss available through the National Institutes of Health patient information sites:  http://win.niddk.nih.gov/publications/index.htm    And look into health and wellness programs that may be available through your health insurance provider, employer, local community center, or zuhair club.    Weight management plan: Patient was referred to their PCP to discuss a diet and exercise plan.    Surgery:    Upper Airway Surgery for JORGE  Surgery for JORGE is a second-line treatment option in the management of sleep apnea.  Surgery should be considered for patients who are having a difficult time tolerating CPAP.    Surgery for JORGE is directed at areas that are responsible for narrowing or complete obstruction of the airway during sleep.  There are a wide range of procedures available to enlarge and/or stabilize the airway to prevent blockage of breathing in the three major areas where it can occur: the palate, tongue, and nasal regions.  Successful surgical treatment depends on the accurate identification of the factors responsible for obstructive sleep apnea in each person.  A personalized approach is required because there is no single treatment that  works well for everyone.  Because of anatomic variation, consultation with an examination by a sleep surgeon is a critical first step in determining what surgical options are best for each patient.  In some cases, examination during sedation may be recommended in order to guide the selection of procedures.  Patients will be counseled about risks and benefits as well as the typical recovery course after surgery. Surgery is typically not a cure for a person s JORGE.  However, surgery will often significantly improve one s JORGE severity (termed  success rate ).  Even in the absence of a cure, surgery will decrease the cardiovascular risk associated with OSA7; improve overall quality of life8 (sleepiness, functionality, sleep quality, etc).          Palate Procedures:  Patients with JORGE often have narrowing of their airway in the region of their tonsils and uvula.  The goals of palate procedures are to widen the airway in this region as well as to help the tissues resist collapse.  Modern palate procedure techniques focus on tissue conservation and soft tissue rearrangement, rather than tissue removal.  Often the uvula is preserved in this procedure. Residual sleep apnea is common in patient after pharyngoplasty with an average reduction in sleep apnea events of 33%2.      Tongue Procedures:  While patients are awake, the muscles that surround the throat are active and keep this region open for breathing. These muscles relax during sleep, allowing the tongue and other structures to collapse and block breathing.  There are several different tongue procedures available.  Selection of a tongue base procedure depends on characteristics seen on physical exam.  Generally, procedures are aimed at removing bulky tissues in this area or preventing the back of the tongue from falling back during sleep.  Success rates for tongue surgery range from 50-62%3.    Hypoglossal Nerve Stimulation:  Hypoglossal nerve stimulation has recently  received approval from the [place] Food and Drug Administration for the treatment of obstructive sleep apnea.  This is based on research showing that the system was safe and effective in treating sleep apnea6.  Results showed that the median AHI score decreased 68%, from 29.3 to 9.0. This therapy uses an implant system that senses breathing patterns and delivers mild stimulation to airway muscles, which keeps the airway open during sleep.  The system consists of three fully implanted components: a small generator (similar in size to a pacemaker), a breathing sensor, and a stimulation lead.  Using a small handheld remote, a patient turns the therapy on before bed and off upon awakening.    Candidates for this device must be greater than 22 years of age, have moderate to severe JORGE (AHI between 20-65), BMI less than 32, have tried CPAP/oral appliance without success, and have appropriate upper airway anatomy (determined by a sleep endoscopy performed by Dr. Weinstein).    Hypoglossal Nerve Stimulation Pathway:    The sleep surgeon s office will work with the patient through the insurance prior-authorization process (including communications and appeals).    Nasal Procedures:  Nasal obstruction can interfere with nasal breathing during the day and night.  Studies have shown that relief of nasal obstruction can improve the ability of some patients to tolerate positive airway pressure therapy for obstructive sleep apnea1.  Treatment options include medications such as nasal saline, topical corticosteroid and antihistamine sprays, and oral medications such as antihistamines or decongestants. Non-surgical treatments can include external nasal dilators for selected patients. If these are not successful by themselves, surgery can improve the nasal airway either alone or in combination with these other options.      Combination Procedures:  Combination of surgical procedures and other treatments may be recommended,  particularly if patients have more than one area of narrowing or persistent positional disease.  The success rate of combination surgery ranges from 66-80%2,3.      1. Josh ARTIS. The Role of the Nose in Snoring and Obstructive Sleep Apnoea: An Update.  Eur Arch Otorhinolaryngol. 2011; 268: 1365-73.  2.  Aryan SM; Ari JA; Jurgen JR; Pallanch JF; Jarek MB; Zahida SG; Ousmane PABLO. Surgical modifications of the upper airway for obstructive sleep apnea in adults: a systematic review and meta-analysis. SLEEP 2010;33(10):8721-7610. Juan THOMAS. Hypopharyngeal surgery in obstructive sleep apnea: an evidence-based medicine review.  Arch Otolaryngol Head Neck Surg. 2006 Feb;132(2):206-13.  3. Abraham YH1, Elizabeth Y, Jv TALIA. The efficacy of anatomically based multilevel surgery for obstructive sleep apnea. Otolaryngol Head Neck Surg. 2003 Oct;129(4):327-35.  4. Juan THOMAS, Goldberg A. Hypopharyngeal Surgery in Obstructive Sleep Apnea: An Evidence-Based Medicine Review. Arch Otolaryngol Head Neck Surg. 2006 Feb;132(2):206-13.  5. Danna THORPE et al. Upper-Airway Stimulation for Obstructive Sleep Apnea.  N Engl J Med. 2014 Jan 9;370(2):139-49.  6. Janette Y et al. Increased Incidence of Cardiovascular Disease in Middle-aged Men with Obstructive Sleep Apnea. Am J Respir Crit Care Med; 2002 166: 159-165  7. Ansari EM et al. Studying Life Effects and Effectiveness of Palatopharyngoplasty (SLEEP) study: Subjective Outcomes of Isolated Uvulopalatopharyngoplasty. Otolaryngol Head Neck Surg. 2011; 144: 623-631.  8.   Your blood pressure was checked while you were in clinic today.  Please read the guidelines below about what these numbers mean and what you should do about them.  Your systolic blood pressure is the top number.  This is the pressure when the heart is pumping.  Your diastolic blood pressure is the bottom number.  This is the pressure in between beats.  If your systolic blood pressure is less than 120 and your diastolic blood  pressure is less than 80, then your blood pressure is normal. There is nothing more that you need to do about it  If your systolic blood pressure is 120-139 or your diastolic blood pressure is 80-89, your blood pressure may be higher than it should be.  You should have your blood pressure re-checked within a year by a primary care provider.  If your systolic blood pressure is 140 or greater or your diastolic blood pressure is 90 or greater, you may have high blood pressure.  High blood pressure is treatable, but if left untreated over time it can put you at risk for heart attack, stroke, or kidney failure.  You should have your blood pressure re-checked by a primary care provider within the next four weeks.      Restless Leg Syndrome:    Based on the information that you provided today you are likely to have restless leg syndrome that may be interfering with your sleep.  Treatment of restless leg syndrome usually depends on how much it is bothering you.  If it is a major problem then you might want to do something about it.    Here are some treatment options that you might want to consider:   Behavior Changes:   Reduce or eliminate caffeine and alcohol products   Increase the amount of stretching that you do, particularly before bedtime   Sometimes a leg message can be helpful   Some people find that a warm bath or shower in the evening is helpful    We recommend:  Ferritin level, cbc, iron level ordered    Low iron (below 50 ng/ml) can cause motor restlessness. Your iron [ferritin] level is in a low range that may be causing restlessness.     If ferritin is low with anemia, we recommend to following up your primary care doctor to investigate the source of iron loss or bleeding, including the need for colonoscopy/upper endoscopy.                        Follow-ups after your visit        Future tests that were ordered for you today     Open Future Orders        Priority Expected Expires Ordered    Comprehensive Sleep  "Study Routine  2/25/2019 8/29/2018    Ferritin Routine  10/28/2018 8/29/2018    Iron and Iron Binding Capacity Routine  10/28/2018 8/29/2018    CBC with platelets differential Routine  10/28/2018 8/29/2018    ABG-Blood Gas Arterial ([place] / [place]) Routine  10/28/2018 8/29/2018            Who to contact     If you have questions or need follow up information about today's clinic visit or your schedule please contact [facility] directly at [phone].  Normal or non-critical lab and imaging results will be communicated to you by BEKIZhart, letter or phone within 4 business days after the clinic has received the results. If you do not hear from us within 7 days, please contact the clinic through [employer] or phone. If you have a critical or abnormal lab result, we will notify you by phone as soon as possible.  Submit refill requests through [employer] or call your pharmacy and they will forward the refill request to us. Please allow 3 business days for your refill to be completed.          Additional Information About Your Visit        BEKIZhart Information     IM[employer] gives you secure access to your electronic health record. If you see a primary care provider, you can also send messages to your care team and make appointments. If you have questions, please call your primary care clinic.  If you do not have a primary care provider, please call [phone] and they will assist you.        Care EveryWhere ID     This is your Care EveryWhere ID. This could be used by other organizations to access your [facility] medical records  [id number]        Your Vitals Were     Pulse Respirations Height Last Period Pulse Oximetry BMI (Body Mass Index)    79 18 1.549 m (5' 1\") 08/30/2013 96% 39.3 kg/m2       Blood Pressure from Last 3 Encounters:   08/29/18 128/82   07/31/18 127/79   07/21/18 124/57    Weight from Last 3 Encounters:   08/29/18 94.3 kg (208 lb)   07/31/18 94.8 kg (209 lb 1.6 oz)   07/18/18 " 90 kg (198 lb 6.4 oz)                 Today's Medication Changes          These changes are accurate as of 8/29/18  5:45 PM.  If you have any questions, ask your nurse or doctor.               Stop taking these medicines if you haven't already. Please contact your care team if you have questions.     potassium chloride SA 10 MEQ CR tablet   Commonly known as:  K-DUR/KLOR-CON M   Stopped by:  Charlie Serrano MD                    Primary Care Provider Office Phone # Fax #    David Laurent Gambino PA-C [phone] [address] [facility] [address] [facility] [facility] [address]        Equal Access to Services     [facility]: Hadii aad ku hadasho Soomaali, waaxda luqadaha, qaybta kaalmada adeegyada, zenon cope . So [facility] [phone].    ATENCIÓN: Si habla español, tiene a haywood disposición servicios gratuitos de asistencia lingüística. [facility] [phone].    We comply with applicable federal civil rights laws and [place] laws. We do not discriminate on the basis of race, color, national origin, age, disability, sex, sexual orientation, or gender identity.            Thank you!     Thank you for choosing [facility]  for your care. Our goal is always to provide you with excellent care. Hearing back from our patients is one way we can continue to improve our services. Please take a few minutes to complete the written survey that you may receive in the mail after your visit with us. Thank you!             Your Updated Medication List - Protect others around you: Learn how to safely use, store and throw away your medicines at www.disposemymeds.org.          This list is accurate as of 8/29/18  5:45 PM.  Always use your most recent med list.                   Brand Name Dispense Instructions for use Diagnosis    * albuterol (2.5 MG/3ML) 0.083% neb solution      Take 2.5 mg by nebulization every 6 hours as needed for shortness of breath / dyspnea or wheezing        *  albuterol 108 (90 Base) MCG/ACT inhaler    PROAIR HFA/PROVENTIL HFA/VENTOLIN HFA    3 Inhaler    Inhale 2 puffs into the lungs every 6 hours as needed for shortness of breath / dyspnea or wheezing    Moderate persistent asthma without complication       amLODIPine 10 MG tablet    NORVASC    90 tablet    TAKE 1 TABLET BY MOUTH  DAILY    Essential hypertension with goal blood pressure less than 140/90       aspirin 81 MG EC tablet      Take 1 tablet (81 mg) by mouth daily    NSTEMI (non-ST elevated myocardial infarction) (H)       atorvastatin 80 MG tablet    LIPITOR    90 tablet    TAKE 1 TABLET BY MOUTH AT  BEDTIME    Coronary artery disease involving native heart without angina pectoris, unspecified vessel or lesion type, CARDIOVASCULAR SCREENING; LDL GOAL LESS THAN 130       benzonatate 100 MG capsule    TESSALON    30 capsule    Take 1 capsule (100 mg) by mouth 3 times daily as needed for cough    COPD exacerbation (H)       cetirizine 10 MG tablet    zyrTEC     Take 10 mg by mouth daily        citalopram 20 MG tablet    celeXA    90 tablet    TAKE 1 TABLET BY MOUTH  DAILY    Adjustment disorder with anxiety       cyclobenzaprine 5 MG tablet    FLEXERIL    10 tablet    Take 1 tablet (5 mg) by mouth 2 times daily as needed for muscle spasms    COPD exacerbation (H)       hydrALAZINE 50 MG tablet    APRESOLINE    180 tablet    TAKE 1 TABLET BY MOUTH  TWICE A DAY    Essential hypertension with goal blood pressure less than 140/90       ipratropium - albuterol 0.5 mg/2.5 mg/3 mL 0.5-2.5 (3) MG/3ML neb solution    DUONEB    360 mL    Take 1 vial (3 mLs) by nebulization every 4 hours as needed for shortness of breath / dyspnea or wheezing    Chronic obstructive pulmonary disease with acute exacerbation (H), Moderate persistent asthma with acute exacerbation       nicotine polacrilex 4 MG gum    NICORETTE    270 tablet    Place 1 each (4 mg) inside cheek as needed for smoking cessation    Tobacco use disorder        nitroGLYcerin 0.4 MG sublingual tablet    NITROSTAT    25 tablet    Place 1 tablet (0.4 mg) under the tongue every 5 minutes as needed for chest pain    NSTEMI (non-ST elevated myocardial infarction) (H)       QVAR IN           varenicline 1 MG tablet    CHANTIX    56 tablet    Take 1 tablet (1 mg) by mouth 2 times daily    Tobacco use disorder       * Notice:  This list has 2 medication(s) that are the same as other medications prescribed for you. Read the directions carefully, and ask your doctor or other care provider to review them with you.

## 2018-09-14 ENCOUNTER — TELEPHONE (OUTPATIENT)
Dept: FAMILY MEDICINE | Facility: CLINIC | Age: 48
End: 2018-09-14

## 2018-09-14 NOTE — TELEPHONE ENCOUNTER
Patient calling because she was sitting at her desk and as she is putting her shoes back on her right foot is swelling up like a balloon. Notes swelling on both sides, but one side literally looks like a balloon. Bit by dog last Sunday on back of foot, but that looks fine.     Patient only notes pain and tingling like she has had for years. Has never seen it look like this before. Same color as other foot. Has been icing. Advised elevation.     Patient hasn't smoked since she was in hospital mid-July. Hasn't been on Chantix for last 2 weeks.     When Manny stopped potassium, she got very back leg cramps so she started taking OTC potassium, 99 mg a day. Still gets leg cramps but not as bad.     Advised assessment in ED. Patient does not want to go to the hospital tonight.   Huddled with PCP: IF SOB, chest pain, fever, swelling worsens or pain significant, then go to ED, otherwise be seen next week. Continue to elevate over weekend.    Called patient back: Notified of Manny's advice. Patient is not experiencing any symptoms noted. If she does she will got to ED (under duress).    Next 5 appointments (look out 90 days)     Sep 17, 2018  3:00 PM CDT   Office Visit with David Gambino PA-C   [facility]    [address] [facility] [address] [phone]                Celina PORTILLO Triage RN

## 2018-09-17 ENCOUNTER — OFFICE VISIT (OUTPATIENT)
Dept: FAMILY MEDICINE | Facility: CLINIC | Age: 48
End: 2018-09-17
Payer: COMMERCIAL

## 2018-09-17 VITALS
HEIGHT: 61 IN | SYSTOLIC BLOOD PRESSURE: 142 MMHG | BODY MASS INDEX: 40.35 KG/M2 | RESPIRATION RATE: 14 BRPM | DIASTOLIC BLOOD PRESSURE: 84 MMHG | HEART RATE: 91 BPM | OXYGEN SATURATION: 97 % | WEIGHT: 213.7 LBS | TEMPERATURE: 99.4 F

## 2018-09-17 DIAGNOSIS — J44.9 CHRONIC OBSTRUCTIVE PULMONARY DISEASE, UNSPECIFIED COPD TYPE (H): ICD-10-CM

## 2018-09-17 DIAGNOSIS — F43.22 ADJUSTMENT DISORDER WITH ANXIETY: ICD-10-CM

## 2018-09-17 DIAGNOSIS — Z13.6 CARDIOVASCULAR SCREENING; LDL GOAL LESS THAN 130: ICD-10-CM

## 2018-09-17 DIAGNOSIS — I25.10 CORONARY ARTERY DISEASE INVOLVING NATIVE HEART WITHOUT ANGINA PECTORIS, UNSPECIFIED VESSEL OR LESION TYPE: ICD-10-CM

## 2018-09-17 DIAGNOSIS — R60.0 LOWER EXTREMITY EDEMA: Primary | ICD-10-CM

## 2018-09-17 DIAGNOSIS — I50.32 CHRONIC DIASTOLIC CONGESTIVE HEART FAILURE (H): ICD-10-CM

## 2018-09-17 DIAGNOSIS — I10 ESSENTIAL HYPERTENSION WITH GOAL BLOOD PRESSURE LESS THAN 140/90: ICD-10-CM

## 2018-09-17 PROCEDURE — 80053 COMPREHEN METABOLIC PANEL: CPT | Performed by: PHYSICIAN ASSISTANT

## 2018-09-17 PROCEDURE — 99214 OFFICE O/P EST MOD 30 MIN: CPT | Performed by: PHYSICIAN ASSISTANT

## 2018-09-17 PROCEDURE — 36415 COLL VENOUS BLD VENIPUNCTURE: CPT | Performed by: PHYSICIAN ASSISTANT

## 2018-09-17 PROCEDURE — 83880 ASSAY OF NATRIURETIC PEPTIDE: CPT | Performed by: PHYSICIAN ASSISTANT

## 2018-09-17 RX ORDER — HYDRALAZINE HYDROCHLORIDE 50 MG/1
50 TABLET, FILM COATED ORAL 2 TIMES DAILY
Qty: 180 TABLET | Refills: 1 | Status: SHIPPED | OUTPATIENT
Start: 2018-09-17 | End: 2019-02-26

## 2018-09-17 RX ORDER — CITALOPRAM HYDROBROMIDE 20 MG/1
20 TABLET ORAL DAILY
Qty: 90 TABLET | Refills: 1 | Status: SHIPPED | OUTPATIENT
Start: 2018-09-17 | End: 2018-09-17

## 2018-09-17 RX ORDER — FUROSEMIDE 20 MG
20 TABLET ORAL DAILY
Qty: 30 TABLET | Refills: 0 | Status: SHIPPED | OUTPATIENT
Start: 2018-09-17 | End: 2019-05-31

## 2018-09-17 RX ORDER — ATORVASTATIN CALCIUM 80 MG/1
80 TABLET, FILM COATED ORAL DAILY
Qty: 90 TABLET | Refills: 1 | Status: SHIPPED | OUTPATIENT
Start: 2018-09-17 | End: 2019-02-26

## 2018-09-17 RX ORDER — CITALOPRAM HYDROBROMIDE 20 MG/1
20 TABLET ORAL DAILY
Qty: 90 TABLET | Refills: 1 | Status: SHIPPED | OUTPATIENT
Start: 2018-09-17 | End: 2019-02-26

## 2018-09-17 RX ORDER — ATORVASTATIN CALCIUM 80 MG/1
80 TABLET, FILM COATED ORAL DAILY
Qty: 90 TABLET | Refills: 1 | Status: SHIPPED | OUTPATIENT
Start: 2018-09-17 | End: 2018-09-17

## 2018-09-17 RX ORDER — AMLODIPINE BESYLATE 10 MG/1
10 TABLET ORAL DAILY
Qty: 90 TABLET | Refills: 1 | Status: SHIPPED | OUTPATIENT
Start: 2018-09-17 | End: 2019-02-26

## 2018-09-17 RX ORDER — AMLODIPINE BESYLATE 10 MG/1
10 TABLET ORAL DAILY
Qty: 90 TABLET | Refills: 1 | Status: SHIPPED | OUTPATIENT
Start: 2018-09-17 | End: 2018-09-17

## 2018-09-17 RX ORDER — HYDRALAZINE HYDROCHLORIDE 50 MG/1
50 TABLET, FILM COATED ORAL 2 TIMES DAILY
Qty: 180 TABLET | Refills: 1 | Status: SHIPPED | OUTPATIENT
Start: 2018-09-17 | End: 2018-09-17

## 2018-09-17 NOTE — PROGRESS NOTES
SUBJECTIVE:   Marni Austin is a 48 year old female who presents to clinic today for the following health issues:    Musculoskeletal problem/pain    Duration: 3 days     Description  Location: Both feet, right is worse     Intensity:  Moderate to severe    Accompanying signs and symptoms: tingling and pain     History  Previous similar problem: YES- 10 years ago   Previous evaluation:  none    Precipitating or alleviating factors:  Trauma or overuse: no   Aggravating factors include: standing    Therapies tried and outcome: ice, Ibuprofen and elevation     -Patient is a 47yo female with hx of CHF, COPD and CAD presenting with recent onset on foot swelling  -First noted after work Thursday, at the end of the day   -there is no NEW pain; has had some pain/numbness in the top of the foot/calf chronically  -She has quit smoking since her hospitalization, has been limiting her sodium but never used a lot in the first place  -she denies any chest pain, no NEW shortness of breath   -mild weight gain since last seen, no acute weight gain  -denies increased urination, increased thirst    Problem list and histories reviewed & adjusted, as indicated.  Additional history: as documented    Patient Active Problem List   Diagnosis     Adjustment disorder with anxiety     Menorrhagia     CARDIOVASCULAR SCREENING; LDL GOAL LESS THAN 130     Tobacco use disorder     Hypertension goal BP (blood pressure) < 140/90     Health Care Home     COPD exacerbation (H)     CAD (coronary artery disease)     COPD (chronic obstructive pulmonary disease) (H)     Rectal bleeding     Chest pain, unspecified chest pain type     Elevated fasting glucose     Non morbid obesity     Morbid obesity (H)     Moderate persistent asthma     CHF (congestive heart failure) (H)     Past Surgical History:   Procedure Laterality Date     CARDIAC SURGERY       TONSILLECTOMY       Tubal ligation surgery         Social History   Substance Use Topics     Smoking  "status: Former Smoker     Packs/day: 0.50     Years: 26.00     Start date: 1/1/1979     Quit date: 7/16/2018     Smokeless tobacco: Never Used      Comment: stopped 2 days ago     Alcohol use No     Family History   Problem Relation Age of Onset     Hypertension Mother      Diabetes Father      Cerebrovascular Disease Maternal Grandmother      Cancer Maternal Grandfather            Reviewed and updated as needed this visit by clinical staff  Tobacco  Allergies  Meds  Med Hx  Surg Hx  Fam Hx  Soc Hx      Reviewed and updated as needed this visit by Provider         ROS:  Constitutional, HEENT, cardiovascular, pulmonary, gi and gu systems are negative, except as otherwise noted.    OBJECTIVE:     /84  Pulse 91  Temp 99.4  F (37.4  C) (Tympanic)  Resp 14  Ht 5' 1\" (1.549 m)  Wt 213 lb 11.2 oz (96.9 kg)  LMP 08/30/2013  SpO2 97%  BMI 40.38 kg/m2  Body mass index is 40.38 kg/(m^2).  GENERAL: healthy, alert and no distress  HENT: ear canals and TM's normal, nose and mouth without ulcers or lesions  NECK: no jvd  RESP: lungs clear to auscultation - no rales, rhonchi or wheezes  CV: regular rates and rhythm, normal S1 S2, no S3 or S4 and no murmur, click or rub  ABDOMEN: soft, nontender, no hepatosplenomegaly, no masses and bowel sounds normal  MS: there is mild 1-2 pitting edema in the extremities, R>L  NEURO: mentation intact    Diagnostic Test Results:  Results for orders placed or performed in visit on 09/17/18   BNP-N terminal pro   Result Value Ref Range    N-Terminal Pro Bnp 30 0 - 125 pg/mL   Comprehensive metabolic panel   Result Value Ref Range    Sodium 139 133 - 144 mmol/L    Potassium 3.6 3.4 - 5.3 mmol/L    Chloride 105 94 - 109 mmol/L    Carbon Dioxide 25 20 - 32 mmol/L    Anion Gap 9 3 - 14 mmol/L    Glucose 133 (H) 70 - 99 mg/dL    Urea Nitrogen 16 7 - 30 mg/dL    Creatinine 0.74 0.52 - 1.04 mg/dL    GFR Estimate 84 >60 mL/min/1.7m2    GFR Estimate If Black >90 >60 mL/min/1.7m2    " Calcium 8.6 8.5 - 10.1 mg/dL    Bilirubin Total 0.4 0.2 - 1.3 mg/dL    Albumin 3.8 3.4 - 5.0 g/dL    Protein Total 7.5 6.8 - 8.8 g/dL    Alkaline Phosphatase 131 40 - 150 U/L    ALT 32 0 - 50 U/L    AST 20 0 - 45 U/L       ASSESSMENT/PLAN:   1. Lower extremity edema  Unclear. This does not seem cardiac. She has not changed diet. There is no change to urination. No evidence for clot, infection or injury. Add lasix x 5 days and stop. Will screen bnp and renal studies. Consider ua to look for protein if not improving. May add long term thiazide diuretic if swelling persistent and BP uncontrolled.   - BNP-N terminal pro  - Comprehensive metabolic panel  - furosemide (LASIX) 20 MG tablet; Take 1 tablet (20 mg) by mouth daily  Dispense: 30 tablet; Refill: 0    2. Chronic obstructive pulmonary disease, unspecified COPD type (H)  Her lungs are clear today. Continue present management.   - fluticasone-vilanterol (BREO ELLIPTA) 200-25 MCG/INH inhaler; Inhale 1 puff into the lungs daily  Dispense: 1 Inhaler; Refill: 1    3. Chronic diastolic congestive heart failure (H)  Unlikely this is decomp. Echo in 07/18 was grossly normal.     4. Coronary artery disease involving native heart without angina pectoris, unspecified vessel or lesion type  - atorvastatin (LIPITOR) 80 MG tablet; Take 1 tablet (80 mg) by mouth daily  Dispense: 90 tablet; Refill: 1    5. CARDIOVASCULAR SCREENING; LDL GOAL LESS THAN 130  - atorvastatin (LIPITOR) 80 MG tablet; Take 1 tablet (80 mg) by mouth daily  Dispense: 90 tablet; Refill: 1    6. Essential hypertension with goal blood pressure less than 140/90  Just above control. Consider adding chlorthalidone long term. Updating cmp  - hydrALAZINE (APRESOLINE) 50 MG tablet; Take 1 tablet (50 mg) by mouth 2 times daily  Dispense: 180 tablet; Refill: 1  - amLODIPine (NORVASC) 10 MG tablet; Take 1 tablet (10 mg) by mouth daily  Dispense: 90 tablet; Refill: 1    7. Adjustment disorder with anxiety  Controlled.  Refilling.  - citalopram (CELEXA) 20 MG tablet; Take 1 tablet (20 mg) by mouth daily  Dispense: 90 tablet; Refill: 1    David Gambino PA-C  [facility]

## 2018-09-17 NOTE — MR AVS SNAPSHOT
After Visit Summary   9/17/2018    Marni Austin    MRN: [id number]           Patient Information     Date Of Birth          1970        Visit Information        Provider Department      9/17/2018 3:00 PM David Gambino PA-C [facility]        Today's Diagnoses     Lower extremity edema    -  1    Chronic obstructive pulmonary disease, unspecified COPD type (H)        Chronic diastolic congestive heart failure (H)        Coronary artery disease involving native heart without angina pectoris, unspecified vessel or lesion type        CARDIOVASCULAR SCREENING; LDL GOAL LESS THAN 130        Essential hypertension with goal blood pressure less than 140/90        Adjustment disorder with anxiety          Care Instructions    Start taking lasix, one tab daily, for the next five days and then stop.           Follow-ups after your visit        Follow-up notes from your care team     Return in about 3 months (around 12/17/2018) for Check up; sooner as needed.      Your next 10 appointments already scheduled     Nov 14, 2018  8:30 PM CST   Psg Split W/Tcm with BED 2 SH SLEEP   [facility] ([facility])    [address] [place] [address] [phone]            Nov 21, 2018  5:30 PM CST   Return Sleep Patient with Charlie Serrano MD   [facility] ([facility]    [address]  [facility] [address] [phone]              Who to contact     If you have questions or need follow up information about today's clinic visit or your schedule please contact [facility] directly at [phone].  Normal or non-critical lab and imaging results will be communicated to you by MyChart, letter or phone within 4 business days after the clinic has received the results. If you do not hear from us within 7 days, please contact the clinic through  "MyChart or phone. If you have a critical or abnormal lab result, we will notify you by phone as soon as possible.  Submit refill requests through [employer] or call your pharmacy and they will forward the refill request to us. Please allow 3 business days for your refill to be completed.          Additional Information About Your Visit        Fi[employer] Information     [employer] gives you secure access to your electronic health record. If you see a primary care provider, you can also send messages to your care team and make appointments. If you have questions, please call your primary care clinic.  If you do not have a primary care provider, please call [phone] and they will assist you.        Care EveryWhere ID     This is your Care EveryWhere ID. This could be used by other organizations to access your [facility] medical records  [id number]        Your Vitals Were     Pulse Temperature Respirations Height Last Period Pulse Oximetry    91 99.4  F (37.4  C) (Tympanic) 14 5' 1\" (1.549 m) 08/30/2013 97%    BMI (Body Mass Index)                   40.38 kg/m2            Blood Pressure from Last 3 Encounters:   09/17/18 142/84   08/29/18 128/82   07/31/18 127/79    Weight from Last 3 Encounters:   09/17/18 213 lb 11.2 oz (96.9 kg)   08/29/18 208 lb (94.3 kg)   07/31/18 209 lb 1.6 oz (94.8 kg)              We Performed the Following     BNP-N terminal pro     Comprehensive metabolic panel          Today's Medication Changes          These changes are accurate as of 9/17/18  3:56 PM.  If you have any questions, ask your nurse or doctor.               Start taking these medicines.        Dose/Directions    amLODIPine 10 MG tablet   Commonly known as:  NORVASC   Used for:  Essential hypertension with goal blood pressure less than 140/90   Started by:  David Gambino PA-C        Dose:  10 mg   Take 1 tablet (10 mg) by mouth daily   Quantity:  90 tablet   Refills:  1       atorvastatin 80 MG tablet   Commonly known as:  " LIPITOR   Used for:  Coronary artery disease involving native heart without angina pectoris, unspecified vessel or lesion type, CARDIOVASCULAR SCREENING; LDL GOAL LESS THAN 130   Started by:  David Gambino PA-C        Dose:  80 mg   Take 1 tablet (80 mg) by mouth daily   Quantity:  90 tablet   Refills:  1       citalopram 20 MG tablet   Commonly known as:  celeXA   Used for:  Adjustment disorder with anxiety   Started by:  David Gambino PA-C        Dose:  20 mg   Take 1 tablet (20 mg) by mouth daily   Quantity:  90 tablet   Refills:  1       fluticasone-vilanterol 200-25 MCG/INH inhaler   Commonly known as:  BREO ELLIPTA   Used for:  Chronic obstructive pulmonary disease, unspecified COPD type (H)   Started by:  David Gambino PA-C        Dose:  1 puff   Inhale 1 puff into the lungs daily   Quantity:  1 Inhaler   Refills:  1       furosemide 20 MG tablet   Commonly known as:  LASIX   Used for:  Lower extremity edema   Started by:  David Gambino PA-C        Dose:  20 mg   Take 1 tablet (20 mg) by mouth daily   Quantity:  30 tablet   Refills:  0       hydrALAZINE 50 MG tablet   Commonly known as:  APRESOLINE   Used for:  Essential hypertension with goal blood pressure less than 140/90   Started by:  David Gambino PA-C        Dose:  50 mg   Take 1 tablet (50 mg) by mouth 2 times daily   Quantity:  180 tablet   Refills:  1         Stop taking these medicines if you haven't already. Please contact your care team if you have questions.     benzonatate 100 MG capsule   Commonly known as:  TESSALON   Stopped by:  David Gambino PA-C           cyclobenzaprine 5 MG tablet   Commonly known as:  FLEXERIL   Stopped by:  David Gambino PA-C           nicotine polacrilex 4 MG gum   Commonly known as:  NICORETTE   Stopped by:  David Gambino PA-C           varenicline 1 MG tablet   Commonly known as:  CHANTIX   Stopped by:  David Gambino PA-C                 Where to get your medicines      These medications [place] [facility] [place] [address]  [address] [facility] [address]     Phone:  [phone]     furosemide 20 MG tablet         These medications were sent to [facility] - [address]  [address], [facility] [address]     Phone:  [phone]     amLODIPine 10 MG tablet    atorvastatin 80 MG tablet    citalopram 20 MG tablet    fluticasone-vilanterol 200-25 MCG/INH inhaler    hydrALAZINE 50 MG tablet                Primary Care Provider Office Phone # Fax #    David Gambino PA-C [phone] [address]  [facility] [address]        Equal Access to Services     [facility]: Hadii aad ku hadasho Soomaali, waaxda luqadaha, qaybta kaalmada adeegyada, waxay silviain hayaan nely cope . So [facility] [phone].    ATENCIÓN: Si habla español, tiene a haywood disposición servicios gratuitos de asistencia lingüística. [facility] [phone].    We comply with applicable federal civil rights laws and [place] laws. We do not discriminate on the basis of race, color, national origin, age, disability, sex, sexual orientation, or gender identity.            Thank you!     Thank you for choosing [facility]  for your care. Our goal is always to provide you with excellent care. Hearing back from our patients is one way we can continue to improve our services. Please take a few minutes to complete the written survey that you may receive in the mail after your visit with us. Thank you!             Your Updated Medication List - Protect others around you: Learn how to safely use, store and throw away your medicines at www.disposemymeds.org.          This list is accurate as of 9/17/18  3:56 PM.  Always use your most recent med list.                   Brand Name Dispense Instructions for use Diagnosis    * albuterol (2.5 MG/3ML)  0.083% neb solution      Take 2.5 mg by nebulization every 6 hours as needed for shortness of breath / dyspnea or wheezing        * albuterol 108 (90 Base) MCG/ACT inhaler    PROAIR HFA/PROVENTIL HFA/VENTOLIN HFA    3 Inhaler    Inhale 2 puffs into the lungs every 6 hours as needed for shortness of breath / dyspnea or wheezing    Moderate persistent asthma without complication       amLODIPine 10 MG tablet    NORVASC    90 tablet    Take 1 tablet (10 mg) by mouth daily    Essential hypertension with goal blood pressure less than 140/90       aspirin 81 MG EC tablet      Take 1 tablet (81 mg) by mouth daily    NSTEMI (non-ST elevated myocardial infarction) (H)       atorvastatin 80 MG tablet    LIPITOR    90 tablet    Take 1 tablet (80 mg) by mouth daily    Coronary artery disease involving native heart without angina pectoris, unspecified vessel or lesion type, CARDIOVASCULAR SCREENING; LDL GOAL LESS THAN 130       cetirizine 10 MG tablet    zyrTEC     Take 10 mg by mouth daily        citalopram 20 MG tablet    celeXA    90 tablet    Take 1 tablet (20 mg) by mouth daily    Adjustment disorder with anxiety       fluticasone-vilanterol 200-25 MCG/INH inhaler    BREO ELLIPTA    1 Inhaler    Inhale 1 puff into the lungs daily    Chronic obstructive pulmonary disease, unspecified COPD type (H)       furosemide 20 MG tablet    LASIX    30 tablet    Take 1 tablet (20 mg) by mouth daily    Lower extremity edema       hydrALAZINE 50 MG tablet    APRESOLINE    180 tablet    Take 1 tablet (50 mg) by mouth 2 times daily    Essential hypertension with goal blood pressure less than 140/90       ipratropium - albuterol 0.5 mg/2.5 mg/3 mL 0.5-2.5 (3) MG/3ML neb solution    DUONEB    360 mL    Take 1 vial (3 mLs) by nebulization every 4 hours as needed for shortness of breath / dyspnea or wheezing    Chronic obstructive pulmonary disease with acute exacerbation (H), Moderate persistent asthma with acute exacerbation        nitroGLYcerin 0.4 MG sublingual tablet    NITROSTAT    25 tablet    Place 1 tablet (0.4 mg) under the tongue every 5 minutes as needed for chest pain    NSTEMI (non-ST elevated myocardial infarction) (H)       QVAR IN           * Notice:  This list has 2 medication(s) that are the same as other medications prescribed for you. Read the directions carefully, and ask your doctor or other care provider to review them with you.

## 2018-09-18 LAB
ALBUMIN SERPL-MCNC: 3.8 G/DL (ref 3.4–5)
ALP SERPL-CCNC: 131 U/L (ref 40–150)
ALT SERPL W P-5'-P-CCNC: 32 U/L (ref 0–50)
ANION GAP SERPL CALCULATED.3IONS-SCNC: 9 MMOL/L (ref 3–14)
AST SERPL W P-5'-P-CCNC: 20 U/L (ref 0–45)
BILIRUB SERPL-MCNC: 0.4 MG/DL (ref 0.2–1.3)
BUN SERPL-MCNC: 16 MG/DL (ref 7–30)
CALCIUM SERPL-MCNC: 8.6 MG/DL (ref 8.5–10.1)
CHLORIDE SERPL-SCNC: 105 MMOL/L (ref 94–109)
CO2 SERPL-SCNC: 25 MMOL/L (ref 20–32)
CREAT SERPL-MCNC: 0.74 MG/DL (ref 0.52–1.04)
GFR SERPL CREATININE-BSD FRML MDRD: 84 ML/MIN/1.7M2
GLUCOSE SERPL-MCNC: 133 MG/DL (ref 70–99)
NT-PROBNP SERPL-MCNC: 30 PG/ML (ref 0–125)
POTASSIUM SERPL-SCNC: 3.6 MMOL/L (ref 3.4–5.3)
PROT SERPL-MCNC: 7.5 G/DL (ref 6.8–8.8)
SODIUM SERPL-SCNC: 139 MMOL/L (ref 133–144)

## 2018-09-18 ASSESSMENT — ASTHMA QUESTIONNAIRES: ACT_TOTALSCORE: 20

## 2018-09-26 ENCOUNTER — MYC MEDICAL ADVICE (OUTPATIENT)
Dept: FAMILY MEDICINE | Facility: CLINIC | Age: 48
End: 2018-09-26

## 2018-09-26 DIAGNOSIS — I10 ESSENTIAL HYPERTENSION WITH GOAL BLOOD PRESSURE LESS THAN 140/90: Primary | ICD-10-CM

## 2018-09-26 DIAGNOSIS — R60.0 LOWER EXTREMITY EDEMA: ICD-10-CM

## 2018-09-27 RX ORDER — CHLORTHALIDONE 25 MG/1
12.5 TABLET ORAL DAILY
Qty: 30 TABLET | Refills: 0 | Status: SHIPPED | OUTPATIENT
Start: 2018-09-27 | End: 2018-10-15

## 2018-09-27 NOTE — TELEPHONE ENCOUNTER
"Note from OV 9/17:    \"1. Lower extremity edema  Unclear. This does not seem cardiac. She has not changed diet. There is no change to urination. No evidence for clot, infection or injury. Add lasix x 5 days and stop. Will screen bnp and renal studies. Consider ua to look for protein if not improving. May add long term thiazide diuretic if swelling persistent and BP uncontrolled.   - BNP-N terminal pro  - Comprehensive metabolic panel  - furosemide (LASIX) 20 MG tablet; Take 1 tablet (20 mg) by mouth daily  Dispense: 30 tablet; Refill: 0\"    Please see patient message. Did you want to see patient?    Nellie Heredia RN    "

## 2018-10-15 DIAGNOSIS — R60.0 LOWER EXTREMITY EDEMA: ICD-10-CM

## 2018-10-15 DIAGNOSIS — I10 ESSENTIAL HYPERTENSION WITH GOAL BLOOD PRESSURE LESS THAN 140/90: ICD-10-CM

## 2018-10-15 NOTE — TELEPHONE ENCOUNTER
"Requested Prescriptions   Pending Prescriptions Disp Refills     chlorthalidone (HYGROTON) 25 MG tablet  Last Written Prescription Date:  9/27/18  Last Fill Quantity: 30,  # refills: 0   Last office visit: 9/17/2018 with prescribing provider:  David Gambino PA-C    Future Office Visit:     30 tablet 0     Sig: Take 0.5 tablets (12.5 mg) by mouth daily    Diuretics (Including Combos) Protocol Failed    10/15/2018 11:03 AM       Failed - Blood pressure under 140/90 in past 12 months    BP Readings from Last 3 Encounters:   09/17/18 142/84   08/29/18 128/82   07/31/18 127/79                Passed - Recent (12 mo) or future (30 days) visit within the authorizing provider's specialty    Patient had office visit in the last 12 months or has a visit in the next 30 days with authorizing provider or within the authorizing provider's specialty.  See \"Patient Info\" tab in inbasket, or \"Choose Columns\" in Meds & Orders section of the refill encounter.           Passed - Patient is age 18 or older       Passed - No active pregancy on record       Passed - Normal serum creatinine on file in past 12 months    Recent Labs   Lab Test  09/17/18   1600   CR  0.74             Passed - Normal serum potassium on file in past 12 months    Recent Labs   Lab Test  09/17/18   1600   POTASSIUM  3.6                   Passed - Normal serum sodium on file in past 12 months    Recent Labs   Lab Test  09/17/18   1600   NA  139             Passed - No positive pregnancy test in past 12 months          "

## 2018-10-17 RX ORDER — CHLORTHALIDONE 25 MG/1
12.5 TABLET ORAL DAILY
Qty: 45 TABLET | Refills: 1 | Status: SHIPPED | OUTPATIENT
Start: 2018-10-17 | End: 2019-02-21

## 2018-10-17 NOTE — TELEPHONE ENCOUNTER
I did fill this but please remind Marni to make a nurse only visit/lab visit to check her electrolytes and check the blood pressure

## 2018-10-17 NOTE — TELEPHONE ENCOUNTER
Routing refill request to provider for review/approval because:  Failed bp protocol  .Maggie MobleyRN BSN  [facility]  [phone]

## 2018-10-17 NOTE — TELEPHONE ENCOUNTER
Patient returned call and scheduled lab and nurse only appts for tomorrow 10/18/18.  -Carly Medley

## 2018-10-18 ENCOUNTER — ALLIED HEALTH/NURSE VISIT (OUTPATIENT)
Dept: NURSING | Facility: CLINIC | Age: 48
End: 2018-10-18
Payer: COMMERCIAL

## 2018-10-18 VITALS — HEART RATE: 80 BPM | DIASTOLIC BLOOD PRESSURE: 81 MMHG | SYSTOLIC BLOOD PRESSURE: 135 MMHG

## 2018-10-18 DIAGNOSIS — R60.0 LOWER EXTREMITY EDEMA: ICD-10-CM

## 2018-10-18 DIAGNOSIS — Z01.30 BP CHECK: Primary | ICD-10-CM

## 2018-10-18 DIAGNOSIS — G25.81 RESTLESS LEGS SYNDROME (RLS): ICD-10-CM

## 2018-10-18 DIAGNOSIS — I10 ESSENTIAL HYPERTENSION WITH GOAL BLOOD PRESSURE LESS THAN 140/90: ICD-10-CM

## 2018-10-18 LAB
BASOPHILS # BLD AUTO: 0.1 10E9/L (ref 0–0.2)
BASOPHILS NFR BLD AUTO: 0.4 %
DIFFERENTIAL METHOD BLD: ABNORMAL
EOSINOPHIL # BLD AUTO: 0.1 10E9/L (ref 0–0.7)
EOSINOPHIL NFR BLD AUTO: 0.9 %
ERYTHROCYTE [DISTWIDTH] IN BLOOD BY AUTOMATED COUNT: 13 % (ref 10–15)
HCT VFR BLD AUTO: 43.5 % (ref 35–47)
HGB BLD-MCNC: 14.9 G/DL (ref 11.7–15.7)
LYMPHOCYTES # BLD AUTO: 2.2 10E9/L (ref 0.8–5.3)
LYMPHOCYTES NFR BLD AUTO: 16.1 %
MCH RBC QN AUTO: 28.9 PG (ref 26.5–33)
MCHC RBC AUTO-ENTMCNC: 34.3 G/DL (ref 31.5–36.5)
MCV RBC AUTO: 84 FL (ref 78–100)
MONOCYTES # BLD AUTO: 0.9 10E9/L (ref 0–1.3)
MONOCYTES NFR BLD AUTO: 6.2 %
NEUTROPHILS # BLD AUTO: 10.4 10E9/L (ref 1.6–8.3)
NEUTROPHILS NFR BLD AUTO: 76.4 %
PLATELET # BLD AUTO: 298 10E9/L (ref 150–450)
RBC # BLD AUTO: 5.16 10E12/L (ref 3.8–5.2)
WBC # BLD AUTO: 13.6 10E9/L (ref 4–11)

## 2018-10-18 PROCEDURE — 99207 ZZC NO CHARGE NURSE ONLY: CPT

## 2018-10-18 PROCEDURE — 83550 IRON BINDING TEST: CPT | Performed by: PHYSICIAN ASSISTANT

## 2018-10-18 PROCEDURE — 36415 COLL VENOUS BLD VENIPUNCTURE: CPT | Performed by: PHYSICIAN ASSISTANT

## 2018-10-18 PROCEDURE — 83540 ASSAY OF IRON: CPT | Performed by: PHYSICIAN ASSISTANT

## 2018-10-18 PROCEDURE — 80048 BASIC METABOLIC PNL TOTAL CA: CPT | Performed by: PHYSICIAN ASSISTANT

## 2018-10-18 PROCEDURE — 85025 COMPLETE CBC W/AUTO DIFF WBC: CPT | Performed by: PHYSICIAN ASSISTANT

## 2018-10-18 PROCEDURE — 82728 ASSAY OF FERRITIN: CPT | Performed by: PHYSICIAN ASSISTANT

## 2018-10-18 NOTE — MR AVS SNAPSHOT
After Visit Summary   10/18/2018    Marni Austin    MRN: [id number]           Patient Information     Date Of Birth          1970        Visit Information        Provider Department      10/18/2018 4:00 PM RM NURSE [facility]        Today's Diagnoses     BP check    -  1       Follow-ups after your visit        Your next 10 appointments already scheduled     Oct 30, 2018  4:40 PM CDT   [facility] Visit with David Gambino PA-C   [facility]    [address] [address] [phone]           Bring a current list of meds and any records pertaining to this visit. For Physicals, please bring immunization records and any forms needing to be filled out. Please arrive 10 minutes early to complete paperwork.            Nov 14, 2018  8:30 PM CST   Psg Split W/Tcm with BED 2 SH SLEEP   [facility] ([facility])    [address]  [facility] [address] [phone]            Nov 21, 2018  5:30 PM CST   Return Sleep Patient with Charlie Serrano MD   [facility]    [address]  [facility] [address] [phone]              Who to contact     If you have questions or need follow up information about today's clinic visit or your schedule please contact [facility] directly at [phone].  Normal or non-critical lab and imaging results will be communicated to you by MyChart, letter or phone within 4 business days after the clinic has received the results. If you do not hear from us within 7 days, please contact the clinic through MyChart or phone. If you have a critical or abnormal lab result, we will notify you by phone as soon as possible.  Submit refill requests through [employer] or call your pharmacy and they will forward the refill request to us. Please allow  3 business days for your refill to be completed.          Additional Information About Your Visit        MyChart Information     [employer] gives you secure access to your electronic health record. If you see a primary care provider, you can also send messages to your care team and make appointments. If you have questions, please call your primary care clinic.  If you do not have a primary care provider, please call [phone] and they will assist you.        Care EveryWhere ID     This is your Care EveryWhere ID. This could be used by other organizations to access your [facility] medical records  [id number]        Your Vitals Were     Pulse Last Period                80 08/30/2013           Blood Pressure from Last 3 Encounters:   10/18/18 135/81   09/17/18 142/84   08/29/18 128/82    Weight from Last 3 Encounters:   09/17/18 213 lb 11.2 oz (96.9 kg)   08/29/18 208 lb (94.3 kg)   07/31/18 209 lb 1.6 oz (94.8 kg)              Today, you had the following     No orders found for display       Primary Care Provider Office Phone # Fax #    David Gambino PA-C [phone]       [address] [address] [address]        Equal Access to Services     JOSE GUADALUPE GONZALE[facility] : Hadii aad ku hadasho Soomaali, waaxda luqadaha, qaybta kaalmada adeegyada, zenon lucero haypateln nely pink. So [facility] [phone].    ATENCIÓN: Si habla español, tiene a haywood disposición servicios gratuitos de asistencia lingüística. Llame al [phone].    We comply with applicable federal civil rights laws and [place] laws. We do not discriminate on the basis of race, color, national origin, age, disability, sex, sexual orientation, or gender identity.            Thank you!     Thank you for choosing [facility]  for your care. Our goal is always to provide you with excellent care. Hearing back from our patients is one way we can continue to improve our services. Please take a few minutes to complete the  written survey that you may receive in the mail after your visit with us. Thank you!             Your Updated Medication List - Protect others around you: Learn how to safely use, store and throw away your medicines at www.[employer].org.          This list is accurate as of 10/18/18  4:47 PM.  Always use your most recent med list.                   Brand Name Dispense Instructions for use Diagnosis    * albuterol (2.5 MG/3ML) 0.083% neb solution      Take 2.5 mg by nebulization every 6 hours as needed for shortness of breath / dyspnea or wheezing        * albuterol 108 (90 Base) MCG/ACT inhaler    PROAIR HFA/PROVENTIL HFA/VENTOLIN HFA    3 Inhaler    Inhale 2 puffs into the lungs every 6 hours as needed for shortness of breath / dyspnea or wheezing    Moderate persistent asthma without complication       amLODIPine 10 MG tablet    NORVASC    90 tablet    Take 1 tablet (10 mg) by mouth daily    Essential hypertension with goal blood pressure less than 140/90       aspirin 81 MG EC tablet      Take 1 tablet (81 mg) by mouth daily    NSTEMI (non-ST elevated myocardial infarction) (H)       atorvastatin 80 MG tablet    LIPITOR    90 tablet    Take 1 tablet (80 mg) by mouth daily    Coronary artery disease involving native heart without angina pectoris, unspecified vessel or lesion type, CARDIOVASCULAR SCREENING; LDL GOAL LESS THAN 130       cetirizine 10 MG tablet    zyrTEC     Take 10 mg by mouth daily        chlorthalidone 25 MG tablet    HYGROTON    45 tablet    Take 0.5 tablets (12.5 mg) by mouth daily    Essential hypertension with goal blood pressure less than 140/90, Lower extremity edema       citalopram 20 MG tablet    celeXA    90 tablet    Take 1 tablet (20 mg) by mouth daily    Adjustment disorder with anxiety       fluticasone-vilanterol 200-25 MCG/INH inhaler    BREO ELLIPTA    1 Inhaler    Inhale 1 puff into the lungs daily    Chronic obstructive pulmonary disease, unspecified COPD type (H)        furosemide 20 MG tablet    LASIX    30 tablet    Take 1 tablet (20 mg) by mouth daily    Lower extremity edema       hydrALAZINE 50 MG tablet    APRESOLINE    180 tablet    Take 1 tablet (50 mg) by mouth 2 times daily    Essential hypertension with goal blood pressure less than 140/90       ipratropium - albuterol 0.5 mg/2.5 mg/3 mL 0.5-2.5 (3) MG/3ML neb solution    DUONEB    360 mL    Take 1 vial (3 mLs) by nebulization every 4 hours as needed for shortness of breath / dyspnea or wheezing    Chronic obstructive pulmonary disease with acute exacerbation (H), Moderate persistent asthma with acute exacerbation       nitroGLYcerin 0.4 MG sublingual tablet    NITROSTAT    25 tablet    Place 1 tablet (0.4 mg) under the tongue every 5 minutes as needed for chest pain    NSTEMI (non-ST elevated myocardial infarction) (H)       QVAR IN           * Notice:  This list has 2 medication(s) that are the same as other medications prescribed for you. Read the directions carefully, and ask your doctor or other care provider to review them with you.

## 2018-10-18 NOTE — NURSING NOTE
Marni Austin is a 48 year old patient who comes in today for a Blood Pressure check.  Initial BP:  /81 (BP Location: Right arm, Patient Position: Chair, Cuff Size: Adult Large)  Pulse 80  LMP 08/30/2013     80  Disposition: pt scheduled an appt for 10/30/18 for another concern, will F/U on BP at this time.   Ena Loaiza MA

## 2018-10-19 LAB
ANION GAP SERPL CALCULATED.3IONS-SCNC: 11 MMOL/L (ref 3–14)
BUN SERPL-MCNC: 16 MG/DL (ref 7–30)
CALCIUM SERPL-MCNC: 9.5 MG/DL (ref 8.5–10.1)
CHLORIDE SERPL-SCNC: 100 MMOL/L (ref 94–109)
CO2 SERPL-SCNC: 28 MMOL/L (ref 20–32)
CREAT SERPL-MCNC: 0.92 MG/DL (ref 0.52–1.04)
FERRITIN SERPL-MCNC: 141 NG/ML (ref 8–252)
GFR SERPL CREATININE-BSD FRML MDRD: 65 ML/MIN/1.7M2
GLUCOSE SERPL-MCNC: 151 MG/DL (ref 70–99)
IRON SATN MFR SERPL: 15 % (ref 15–46)
IRON SERPL-MCNC: 55 UG/DL (ref 35–180)
POTASSIUM SERPL-SCNC: 3.1 MMOL/L (ref 3.4–5.3)
SODIUM SERPL-SCNC: 139 MMOL/L (ref 133–144)
TIBC SERPL-MCNC: 372 UG/DL (ref 240–430)

## 2018-10-22 ENCOUNTER — MYC MEDICAL ADVICE (OUTPATIENT)
Dept: FAMILY MEDICINE | Facility: CLINIC | Age: 48
End: 2018-10-22

## 2018-10-22 DIAGNOSIS — J44.9 CHRONIC OBSTRUCTIVE PULMONARY DISEASE, UNSPECIFIED COPD TYPE (H): ICD-10-CM

## 2018-10-22 NOTE — TELEPHONE ENCOUNTER
Last potassium 10/18/18 was 3.1.  See lab note of 10/21/18.     Will forward to Manny Gambino for advisal.

## 2018-10-26 ENCOUNTER — TELEPHONE (OUTPATIENT)
Dept: FAMILY MEDICINE | Facility: CLINIC | Age: 48
End: 2018-10-26

## 2018-10-26 NOTE — TELEPHONE ENCOUNTER
Type of outreach:  None  Health Maintenance Due   Topic Date Due     HF ACTION PLAN Q3 YR  1970     SPIROMETRY ONETIME  1970     COPD ACTION PLAN Q1 YR  1970     PNEUMOVAX 1X HI RISK PATIENT < 65 (NO IB MSG)  03/24/1970     HIV SCREEN (SYSTEM ASSIGNED)  03/24/1986     LIPID MONITORING Q1 YEAR  02/27/2018     COLON CANCER SCREEN (SYSTEM ASSIGNED)  03/24/2018     MAMMO Q1 YR  03/30/2018     INFLUENZA VACCINE (1) 09/01/2018     Pt has an appt scheduled for 10/30/18 with primary, will F/U on BP at this appt. Due to follow up 12/2018 per last OV note.   Ena Loaiza MA

## 2018-10-30 ENCOUNTER — RADIANT APPOINTMENT (OUTPATIENT)
Dept: GENERAL RADIOLOGY | Facility: CLINIC | Age: 48
End: 2018-10-30
Attending: PHYSICIAN ASSISTANT
Payer: COMMERCIAL

## 2018-10-30 ENCOUNTER — OFFICE VISIT (OUTPATIENT)
Dept: FAMILY MEDICINE | Facility: CLINIC | Age: 48
End: 2018-10-30
Payer: COMMERCIAL

## 2018-10-30 VITALS
SYSTOLIC BLOOD PRESSURE: 139 MMHG | WEIGHT: 210 LBS | BODY MASS INDEX: 39.65 KG/M2 | DIASTOLIC BLOOD PRESSURE: 79 MMHG | HEIGHT: 61 IN | HEART RATE: 70 BPM | OXYGEN SATURATION: 92 % | RESPIRATION RATE: 16 BRPM | TEMPERATURE: 98.2 F

## 2018-10-30 DIAGNOSIS — E87.6 HYPOKALEMIA: ICD-10-CM

## 2018-10-30 DIAGNOSIS — Z12.31 ENCOUNTER FOR SCREENING MAMMOGRAM FOR BREAST CANCER: ICD-10-CM

## 2018-10-30 DIAGNOSIS — R20.0 NUMBNESS AND TINGLING OF RIGHT LEG: ICD-10-CM

## 2018-10-30 DIAGNOSIS — J45.40 MODERATE PERSISTENT ASTHMA, UNSPECIFIED WHETHER COMPLICATED: ICD-10-CM

## 2018-10-30 DIAGNOSIS — R20.2 NUMBNESS AND TINGLING OF RIGHT LEG: ICD-10-CM

## 2018-10-30 DIAGNOSIS — J44.9 CHRONIC OBSTRUCTIVE PULMONARY DISEASE, UNSPECIFIED COPD TYPE (H): ICD-10-CM

## 2018-10-30 DIAGNOSIS — Z12.11 SPECIAL SCREENING FOR MALIGNANT NEOPLASMS, COLON: ICD-10-CM

## 2018-10-30 DIAGNOSIS — Z23 NEED FOR VACCINATION: ICD-10-CM

## 2018-10-30 DIAGNOSIS — M47.26 OSTEOARTHRITIS OF SPINE WITH RADICULOPATHY, LUMBAR REGION: ICD-10-CM

## 2018-10-30 DIAGNOSIS — M25.562 ACUTE PAIN OF BOTH KNEES: Primary | ICD-10-CM

## 2018-10-30 DIAGNOSIS — M25.561 ACUTE PAIN OF BOTH KNEES: Primary | ICD-10-CM

## 2018-10-30 PROCEDURE — 90732 PPSV23 VACC 2 YRS+ SUBQ/IM: CPT | Performed by: PHYSICIAN ASSISTANT

## 2018-10-30 PROCEDURE — 99214 OFFICE O/P EST MOD 30 MIN: CPT | Mod: 25 | Performed by: PHYSICIAN ASSISTANT

## 2018-10-30 PROCEDURE — 72100 X-RAY EXAM L-S SPINE 2/3 VWS: CPT | Mod: FY

## 2018-10-30 PROCEDURE — 90471 IMMUNIZATION ADMIN: CPT | Performed by: PHYSICIAN ASSISTANT

## 2018-10-30 RX ORDER — POTASSIUM CHLORIDE 750 MG/1
10 TABLET, EXTENDED RELEASE ORAL DAILY
Qty: 90 TABLET | Refills: 1 | Status: SHIPPED | OUTPATIENT
Start: 2018-10-30 | End: 2019-02-26

## 2018-10-30 NOTE — MR AVS SNAPSHOT
After Visit Summary   10/30/2018    Marni Austin    MRN: [id number]           Patient Information     Date Of Birth          1970        Visit Information        Provider Department      10/30/2018 4:40 PM David Gambino PA-C [facility]        Today's Diagnoses     Acute pain of both knees    -  1    Hypokalemia        Special screening for malignant neoplasms, colon        Encounter for screening mammogram for breast cancer        Need for vaccination        Numbness and tingling of right leg          Care Instructions    To schedule your mammogram at any of the [facility] locations, call [phone].            Follow-ups after your visit        Additional Services     NEUROLOGY ADULT REFERRAL       Your provider has referred you for the following:   Consult at [facility]: [facility] [phone]   [email or web address]    Please be aware that coverage of these services is subject to the terms and limitations of your health insurance plan.  Call member services at your health plan with any benefit or coverage questions.      Please bring the following with you to your appointment:    (1) Any X-Rays, CTs or MRIs which have been performed.  Contact the facility where they were done to arrange for  prior to your scheduled appointment.    (2) List of current medications  (3) This referral request   (4) Any documents/labs given to you for this referral                  Your next 10 appointments already scheduled     Nov 14, 2018  8:30 PM CST   Psg Split W/Tcm with BED 2 SH SLEEP   [facility] ([facility])    [address]  [place] [address] [phone]            Nov 21, 2018  5:30 PM CST   Return Sleep Patient with Charlie Serrano MD   [facility] ([facility])    [address]  [place] [address]  "  [phone]              Future tests that were ordered for you today     Open Future Orders        Priority Expected Expires Ordered    Fecal colorectal cancer screen FIT Routine 11/20/2018 1/22/2019 10/30/2018    MA Screening Digital Bilateral Routine  10/30/2019 10/30/2018            Who to contact     If you have questions or need follow up information about today's clinic visit or your schedule please contact [facility] directly at [phone].  Normal or non-critical lab and imaging results will be communicated to you by Driblethart, letter or phone within 4 business days after the clinic has received the results. If you do not hear from us within 7 days, please contact the clinic through [employer] or phone. If you have a critical or abnormal lab result, we will notify you by phone as soon as possible.  Submit refill requests through [employer] or call your pharmacy and they will forward the refill request to us. Please allow 3 business days for your refill to be completed.          Additional Information About Your Visit        [employer] Information     [employer] gives you secure access to your electronic health record. If you see a primary care provider, you can also send messages to your care team and make appointments. If you have questions, please call your primary care clinic.  If you do not have a primary care provider, please call [phone] and they will assist you.        Care EveryWhere ID     This is your Care EveryWhere ID. This could be used by other organizations to access your [facility] medical records  [id number]        Your Vitals Were     Pulse Temperature Respirations Height Last Period Pulse Oximetry    70 98.2  F (36.8  C) (Tympanic) 16 5' 1\" (1.549 m) 08/30/2013 92%    BMI (Body Mass Index)                   39.68 kg/m2            Blood Pressure from Last 3 Encounters:   10/30/18 139/79   10/18/18 135/81   09/17/18 142/84    Weight from Last 3 Encounters:   10/30/18 210 lb " (95.3 kg)   09/17/18 213 lb 11.2 oz (96.9 kg)   08/29/18 208 lb (94.3 kg)              We Performed the Following     NEUROLOGY ADULT REFERRAL     PNEUMOCOCCAL VACCINE,ADULT,SQ OR IM          Today's Medication Changes          These changes are accurate as of 10/30/18  5:31 PM.  If you have any questions, ask your nurse or doctor.               Start taking these medicines.        Dose/Directions    potassium chloride SA 10 MEQ CR tablet   Commonly known as:  K-DUR/KLOR-CON M   Used for:  Hypokalemia   Started by:  David Gambino PA-C        Dose:  10 mEq   Take 1 tablet (10 mEq) by mouth daily   Quantity:  90 tablet   Refills:  1         Stop taking these medicines if you haven't already. Please contact your care team if you have questions.     QVAR IN   Stopped by:  David Gambino PA-C                Where to get your medicines      These medications were sent to [facility] - [address], [place] [place] [facility] [address]     Phone:  [phone]     potassium chloride SA 10 MEQ CR tablet                Primary Care Provider Office Phone # Fax #    David Gambino PA-C [phone] [address] [address] [facility] [address]        Equal Access to Services     [facility] LISA AH: Hadii aad ku hadasho Soomaali, waaxda luqadaha, qaybta kaalmada adeegyada, waxay idiin hayaan ademagdiel pink. So [facility] [phone].    ATENCIÓN: Si habla español, tiene a haywood disposición servicios gratuitos de asistencia lingüística. Llame al [phone].    We comply with applicable federal civil rights laws and [place] laws. We do not discriminate on the basis of race, color, national origin, age, disability, sex, sexual orientation, or gender identity.            Thank you!     Thank you for choosing [facility]  for your care. Our goal is always to provide you with excellent care. Hearing back from our patients is one way we  can continue to improve our services. Please take a few minutes to complete the written survey that you may receive in the mail after your visit with us. Thank you!             Your Updated Medication List - Protect others around you: Learn how to safely use, store and throw away your medicines at www.disposemymeds.org.          This list is accurate as of 10/30/18  5:31 PM.  Always use your most recent med list.                   Brand Name Dispense Instructions for use Diagnosis    * albuterol (2.5 MG/3ML) 0.083% neb solution      Take 2.5 mg by nebulization every 6 hours as needed for shortness of breath / dyspnea or wheezing        * albuterol 108 (90 Base) MCG/ACT inhaler    PROAIR HFA/PROVENTIL HFA/VENTOLIN HFA    3 Inhaler    Inhale 2 puffs into the lungs every 6 hours as needed for shortness of breath / dyspnea or wheezing    Moderate persistent asthma without complication       amLODIPine 10 MG tablet    NORVASC    90 tablet    Take 1 tablet (10 mg) by mouth daily    Essential hypertension with goal blood pressure less than 140/90       aspirin 81 MG EC tablet      Take 1 tablet (81 mg) by mouth daily    NSTEMI (non-ST elevated myocardial infarction) (H)       atorvastatin 80 MG tablet    LIPITOR    90 tablet    Take 1 tablet (80 mg) by mouth daily    Coronary artery disease involving native heart without angina pectoris, unspecified vessel or lesion type, CARDIOVASCULAR SCREENING; LDL GOAL LESS THAN 130       cetirizine 10 MG tablet    zyrTEC     Take 10 mg by mouth daily        chlorthalidone 25 MG tablet    HYGROTON    45 tablet    Take 0.5 tablets (12.5 mg) by mouth daily    Essential hypertension with goal blood pressure less than 140/90, Lower extremity edema       citalopram 20 MG tablet    celeXA    90 tablet    Take 1 tablet (20 mg) by mouth daily    Adjustment disorder with anxiety       fluticasone-vilanterol 200-25 MCG/INH inhaler    BREO ELLIPTA    3 Inhaler    Inhale 1 puff into the lungs  daily    Chronic obstructive pulmonary disease, unspecified COPD type (H)       furosemide 20 MG tablet    LASIX    30 tablet    Take 1 tablet (20 mg) by mouth daily    Lower extremity edema       hydrALAZINE 50 MG tablet    APRESOLINE    180 tablet    Take 1 tablet (50 mg) by mouth 2 times daily    Essential hypertension with goal blood pressure less than 140/90       ipratropium - albuterol 0.5 mg/2.5 mg/3 mL 0.5-2.5 (3) MG/3ML neb solution    DUONEB    360 mL    Take 1 vial (3 mLs) by nebulization every 4 hours as needed for shortness of breath / dyspnea or wheezing    Chronic obstructive pulmonary disease with acute exacerbation (H), Moderate persistent asthma with acute exacerbation       nitroGLYcerin 0.4 MG sublingual tablet    NITROSTAT    25 tablet    Place 1 tablet (0.4 mg) under the tongue every 5 minutes as needed for chest pain    NSTEMI (non-ST elevated myocardial infarction) (H)       potassium chloride SA 10 MEQ CR tablet    K-DUR/KLOR-CON M    90 tablet    Take 1 tablet (10 mEq) by mouth daily    Hypokalemia       * Notice:  This list has 2 medication(s) that are the same as other medications prescribed for you. Read the directions carefully, and ask your doctor or other care provider to review them with you.

## 2018-10-30 NOTE — PROGRESS NOTES
SUBJECTIVE:   Marni Austin is a 48 year old female who presents to clinic today for the following health issues:    Musculoskeletal problem/pain    Duration: 2-3 weeks ago     Description  Location: both knees     Intensity:  moderate    Accompanying signs and symptoms: swelling and pain     History  Previous similar problem: no   Previous evaluation:  none    Precipitating or alleviating factors:  Trauma or overuse: YES  Aggravating factors include: turning legs from side to side, bending knees     Therapies tried and outcome: heat and Ibuprofen    -Marni is noting that around 2-3 weeks ago she fell onto her knees  -she notes persistent knee pain since then, though improving  -there was no bleeding but she had a lot of bruising and swelling  -today noting persistent anterior pain bilaterally and medially along the right  -she is walking ok but when moving from sitting to standing there is worse pain in her right knee    -Marni also notes that she has continued to be free of smoking  -She is taking breo daily  -still with exhaustion when walking, feeling shortness of breath   -there is no chest pain  -there is improvement of cough  -she has not been able to do much walking given the knee pain  -she would consider pulmonary rehab    -she is also noting persistent n/t into the right lower extremity  -no movement seems to change symptoms  -the foot is constantly numb, into the calf  -denies significant back pain or injury    Problem list and histories reviewed & adjusted, as indicated.  Additional history: as documented    Patient Active Problem List   Diagnosis     Adjustment disorder with anxiety     Menorrhagia     CARDIOVASCULAR SCREENING; LDL GOAL LESS THAN 130     Tobacco use disorder     Hypertension goal BP (blood pressure) < 140/90     Health Care Home     COPD exacerbation (H)     CAD (coronary artery disease)     COPD (chronic obstructive pulmonary disease) (H)     Rectal bleeding     Chest pain,  "unspecified chest pain type     Elevated fasting glucose     Non morbid obesity     Morbid obesity (H)     Moderate persistent asthma     CHF (congestive heart failure) (H)     Past Surgical History:   Procedure Laterality Date     CARDIAC SURGERY       TONSILLECTOMY       Tubal ligation surgery         Social History   Substance Use Topics     Smoking status: Former Smoker     Packs/day: 0.50     Years: 26.00     Start date: 1/1/1979     Quit date: 7/16/2018     Smokeless tobacco: Never Used      Comment: stopped 2 days ago     Alcohol use No     Family History   Problem Relation Age of Onset     Hypertension Mother      Diabetes Father      Cerebrovascular Disease Maternal Grandmother      Cancer Maternal Grandfather            Reviewed and updated as needed this visit by clinical staff       Reviewed and updated as needed this visit by Provider         ROS:  As above    OBJECTIVE:     /79  Pulse 70  Temp 98.2  F (36.8  C) (Tympanic)  Resp 16  Ht 5' 1\" (1.549 m)  Wt 210 lb (95.3 kg)  LMP 08/30/2013  SpO2 92%  BMI 39.68 kg/m2  Body mass index is 39.68 kg/(m^2).  GENERAL: healthy, alert and no distress  RESP: lungs clear to auscultation - no rales, rhonchi or wheezes  CV: regular rates and rhythm  MS: there is no obvious swelling to either knee space. No bruising or warmth. Over the right knee there is point medial tenderness. Meniscal testing equivocal along th right. No ligamentous laxity.   There is no SLR positive test in regards to her foot numbness. No change with lumbar ext/flexion. Testing to monofilament is diminished. ROM and strength testing wnl  SKIN: no suspicious lesions or rashes  NEURO: as above.   PSYCH: mentation appears normal, affect normal/bright    Diagnostic Test Results:  Updating BMP    ASSESSMENT/PLAN:   1. Acute pain of both knees  With continued improvement will have her monitor for another few weeks with increased supportive cares. No fracture suspected. If not improving " will have her see ortho to evaluate for meniscal injury    2. Numbness and tingling of right leg  3. Osteoarthritis of spine with radiculopathy, lumbar region  She does note this is chronic for many years. Her xray certainly shows disc space loss at l5-s1 which is along the pattern of her symptoms. Sending to spine.   - XR Lumbar Spine 2/3 Views; Future  - ORTHO  REFERRAL    4. Need for vaccination  5. Chronic obstructive pulmonary disease, unspecified COPD type (H)  6. Moderate persistent asthma, unspecified whether complicated  7. Hypokalemia  She has been free of smoking since her hospitalization. She is not enthused by her limited improvement in breathing thus far. Continues to use breo routinely but we'll have her trial neb twice daily with anticholinergic. If this improves symptoms we'll add inhalation anticholinergic. Restarting K+ supplement.   - Pneumococcal vaccine 23 valent PPSV23  (Pneumovax) [83944]- potassium chloride SA (K-DUR/KLOR-CON M) 10 MEQ CR tablet; Take 1 tablet (10 mEq) by mouth daily  Dispense: 90 tablet; Refill: 1  - ADMIN 1st VACCINE    8. Special screening for malignant neoplasms, colon  - Fecal colorectal cancer screen FIT; Future    9. Encounter for screening mammogram for breast cancer  - MA Screening Digital Bilateral; Future    David Gambino PA-C  [facility]

## 2018-10-30 NOTE — Clinical Note
Please call Marni. I would like her to see our ortho spine group in [place] initially instead of neurology. Can call [phone] to schedule.

## 2018-11-07 ENCOUNTER — CARE COORDINATION (OUTPATIENT)
Dept: SLEEP MEDICINE | Facility: CLINIC | Age: 48
End: 2018-11-07

## 2018-11-07 NOTE — PROGRESS NOTES
Received email from Maylin MANLEY at financial securing :  [facility] denied in lab study, scheduled on 11/14/18 at [place].  She is scheduled for a split/tcm.  A peer to peer may be done by calling [phone], REF# [id number].  Or a HST maybe done at any time.  Route to Dr Serrano for his review.  Patient has copd, CHF.  Forwarding email to Dr Serrano as well.

## 2018-11-08 ENCOUNTER — TELEPHONE (OUTPATIENT)
Dept: SLEEP MEDICINE | Facility: CLINIC | Age: 48
End: 2018-11-08

## 2018-11-08 ENCOUNTER — OFFICE VISIT (OUTPATIENT)
Dept: SLEEP MEDICINE | Facility: CLINIC | Age: 48
End: 2018-11-08
Payer: COMMERCIAL

## 2018-11-08 DIAGNOSIS — G47.9 SLEEP DISTURBANCE: Primary | ICD-10-CM

## 2018-11-08 DIAGNOSIS — G47.39 COMPLEX SLEEP APNEA SYNDROME: ICD-10-CM

## 2018-11-08 PROCEDURE — G0399 HOME SLEEP TEST/TYPE 3 PORTA: HCPCS | Performed by: INTERNAL MEDICINE

## 2018-11-08 NOTE — MR AVS SNAPSHOT
After Visit Summary   11/8/2018    Marni Austin    MRN: [id number]           Patient Information     Date Of Birth          1970        Visit Information        Provider Department      11/8/2018 4:00 PM [facility]        Today's Diagnoses     Sleep disturbance    -  1       Follow-ups after your visit        Your next 10 appointments already scheduled     Nov 09, 2018 11:00 AM CST   HST Drop Off with [facility]   [facility] [place] [facility] ([facility])    [address] [address]   [phone]            Nov 15, 2018  9:00 AM CST   MEDSPINE NEW with Kera Way MD   [facility] ([facility])    [address]  [address]  [place] [address]   [phone]            Nov 15, 2018 10:15 AM CST   MA SCREENING DIGITAL BILATERAL with RHBCMA2   [facility] ([facility])    [address]  [place] [id number] [phone]           How do I prepare for my exam? (Food and drink instructions) No Food and Drink Restrictions.  How do I prepare for my exam? (Other instructions) Do not use any powder, lotion or deodorant under your arms or on your breast. If you do, we will ask you to remove it before your exam.  What should I wear: Wear comfortable, two-piece clothing.  How long does the exam take: Most scans will take 15 minutes.  What should I bring: Bring any previous mammograms from other facilities or have them mailed to the breast center.  Do I need a :  No  is needed.  What do I need to tell my doctor: If you have any allergies, tell your care team.  What should I do after the exam: No restrictions, You may resume normal activities.  What is this test: This test is an x-ray of the breast to look for breast disease. The breast is pressed between two plates to flatten and spread the tissue. An  "X-ray is taken of the breast from different angles.  Who should I call with questions: If you have any questions, please call the Imaging Department where you will have your exam. Directions, parking instructions, and other information is available on our website, [email or web address].  Other information about my exam Three-dimensional (3D) mammograms are available at [facility] locations in [facility], [facility], [place], [facility] and [place].  Health locations include [place] and the [facility] [place] in [place].  Benefits of 3D mammograms include * Improved rate of cancer detection * Decreases your chance of having to go back for more tests, which means fewer: * \"False-positive\" results (This means that there is an abnormal area but it isn't cancer.) * Invasive testing procedures, such as a biopsy or surgery * Can provide clearer images of the breast if you have dense breast tissue.  *3D mammography is an optional exam that anyone can have with a 2D mammogram. It doesn't replace or take the place of a 2D mammogram. 2D mammograms remain an effective screening test for all women.  Not all insurance companies cover the cost of a 3D mammogram. Check with your insurance. Three-dimensional (3D) mammograms are available at [facility] locations in [facility], [facility], [facility], and [place]. Health locations include [place] and [facility] in [place]. Benefits of 3D mammograms include: - Improved rate of cancer detection - Decreases your chance of having to go back for more tests, which means fewer: - \"False-positive\" results (This means that there is an abnormal area but it isn't cancer.) - Invasive testing procedures, such as a biopsy or surgery - Can provide clearer images of the breast if you have dense breast tissue. 3D mammography is an optional exam that anyone can have with a 2D mammogram. It " doesn't replace or take the place of a 2D mammogram. 2D mammograms remain an effective screening test for all women.  Not all insurance companies cover the cost of a 3D mammogram. Check with your insurance.            Dec 05, 2018  4:30 PM CST   Return Sleep Patient with Charlie Serrano MD   [facility]    [address] [id number] [phone]              Future tests that were ordered for you today     Open Future Orders        Priority Expected Expires Ordered    HST-Home Sleep Apnea Test Routine  5/10/2019 11/8/2018            Who to contact     If you have questions or need follow up information about today's clinic visit or your schedule please contact [facility] directly at [phone].  Normal or non-critical lab and imaging results will be communicated to you by Infrascalehart, letter or phone within 4 business days after the clinic has received the results. If you do not hear from us within 7 days, please contact the clinic through Infrascalehart or phone. If you have a critical or abnormal lab result, we will notify you by phone as soon as possible.  Submit refill requests through Curoverse or call your pharmacy and they will forward the refill request to us. Please allow 3 business days for your refill to be completed.          Additional Information About Your Visit        Curoverse Information     Curoverse gives you secure access to your electronic health record. If you see a primary care provider, you can also send messages to your care team and make appointments. If you have questions, please call your primary care clinic.  If you do not have a primary care provider, please call [phone] and they will assist you.        Care EveryWhere ID     This is your Care EveryWhere ID. This could be used by other organizations to access your [facility] medical records  [id number]        Your Vitals Were     Last  Period                   08/30/2013            Blood Pressure from Last 3 Encounters:   10/30/18 139/79   10/18/18 135/81   09/17/18 142/84    Weight from Last 3 Encounters:   10/30/18 95.3 kg (210 lb)   09/17/18 96.9 kg (213 lb 11.2 oz)   08/29/18 94.3 kg (208 lb)              Today, you had the following     No orders found for display       Primary Care Provider Office Phone # Fax #    David Gambino PA-C [phone]       [address] [address]        Equal Access to Services     [facility] : Hadii aad ku hadasho Soomaali, waaxda luqadaha, qaybta kaalmada adeegyada, waxjoaquín lucero hayaan ademagdiel cope . So [facility] [phone].    ATENCIÓN: Si habla español, tiene a haywood disposición servicios gratuitos de asistencia lingüística. [facility] [phone].    We comply with applicable federal civil rights laws and [place] laws. We do not discriminate on the basis of race, color, national origin, age, disability, sex, sexual orientation, or gender identity.            Thank you!     Thank you for choosing [facility]  for your care. Our goal is always to provide you with excellent care. Hearing back from our patients is one way we can continue to improve our services. Please take a few minutes to complete the written survey that you may receive in the mail after your visit with us. Thank you!             Your Updated Medication List - Protect others around you: Learn how to safely use, store and throw away your medicines at www.disposemymeds.org.          This list is accurate as of 11/8/18  4:38 PM.  Always use your most recent med list.                   Brand Name Dispense Instructions for use Diagnosis    * albuterol (2.5 MG/3ML) 0.083% neb solution      Take 2.5 mg by nebulization every 6 hours as needed for shortness of breath / dyspnea or wheezing        * albuterol 108 (90 Base) MCG/ACT inhaler    PROAIR HFA/PROVENTIL HFA/VENTOLIN HFA    3  Inhaler    Inhale 2 puffs into the lungs every 6 hours as needed for shortness of breath / dyspnea or wheezing    Moderate persistent asthma without complication       amLODIPine 10 MG tablet    NORVASC    90 tablet    Take 1 tablet (10 mg) by mouth daily    Essential hypertension with goal blood pressure less than 140/90       aspirin 81 MG EC tablet      Take 1 tablet (81 mg) by mouth daily    NSTEMI (non-ST elevated myocardial infarction) (H)       atorvastatin 80 MG tablet    LIPITOR    90 tablet    Take 1 tablet (80 mg) by mouth daily    Coronary artery disease involving native heart without angina pectoris, unspecified vessel or lesion type, CARDIOVASCULAR SCREENING; LDL GOAL LESS THAN 130       cetirizine 10 MG tablet    zyrTEC     Take 10 mg by mouth daily        chlorthalidone 25 MG tablet    HYGROTON    45 tablet    Take 0.5 tablets (12.5 mg) by mouth daily    Essential hypertension with goal blood pressure less than 140/90, Lower extremity edema       citalopram 20 MG tablet    celeXA    90 tablet    Take 1 tablet (20 mg) by mouth daily    Adjustment disorder with anxiety       fluticasone-vilanterol 200-25 MCG/INH inhaler    BREO ELLIPTA    3 Inhaler    Inhale 1 puff into the lungs daily    Chronic obstructive pulmonary disease, unspecified COPD type (H)       furosemide 20 MG tablet    LASIX    30 tablet    Take 1 tablet (20 mg) by mouth daily    Lower extremity edema       hydrALAZINE 50 MG tablet    APRESOLINE    180 tablet    Take 1 tablet (50 mg) by mouth 2 times daily    Essential hypertension with goal blood pressure less than 140/90       ipratropium - albuterol 0.5 mg/2.5 mg/3 mL 0.5-2.5 (3) MG/3ML neb solution    DUONEB    360 mL    Take 1 vial (3 mLs) by nebulization every 4 hours as needed for shortness of breath / dyspnea or wheezing    Chronic obstructive pulmonary disease with acute exacerbation (H), Moderate persistent asthma with acute exacerbation       nitroGLYcerin 0.4 MG sublingual  tablet    NITROSTAT    25 tablet    Place 1 tablet (0.4 mg) under the tongue every 5 minutes as needed for chest pain    NSTEMI (non-ST elevated myocardial infarction) (H)       potassium chloride SA 10 MEQ CR tablet    K-DUR/KLOR-CON M    90 tablet    Take 1 tablet (10 mEq) by mouth daily    Hypokalemia       * Notice:  This list has 2 medication(s) that are the same as other medications prescribed for you. Read the directions carefully, and ask your doctor or other care provider to review them with you.

## 2018-11-08 NOTE — TELEPHONE ENCOUNTER
CATRACHITO on both home and cell phone, asking Marni to call nurseline regarding her in lab study scheduled on 11/14/18.  [facility] denied in lab.  Dr serrano has placed HST order.  Will explain to Marni and schedule pickup of hst, with return visit Dr Serrano.

## 2018-11-08 NOTE — PROGRESS NOTES
Patient with history of diastolic congestive heart failure, with sleep disturbance, we recommended an in lab sleep study but her insurance refused.  Will obtain an home sleep study to start with evaluation.

## 2018-11-09 ENCOUNTER — DOCUMENTATION ONLY (OUTPATIENT)
Dept: SLEEP MEDICINE | Facility: CLINIC | Age: 48
End: 2018-11-09
Payer: COMMERCIAL

## 2018-11-09 NOTE — PROGRESS NOTES
This HSAT was performed using a Noxturnal T3 device which recorded snore, sound, movement activity, body position, nasal pressure, oronasal thermal airflow, pulse, oximetry and both chest and abdominal respiratory effort. HSAT data was restricted to the time patient states they were in bed.     HSAT was scored using 1B 4% hypopnea rule.     AHI: 11.8. Snoring was reported as loud.  Time with SpO2 below 89% was 5.3 minutes.   Overall signal quality was good     Pt will follow up with sleep provider to determine appropriate therapy.     Ordering Maggie STEWARD Abdullahi, MD Charles O. BA, [facility], [facility] System Clinical Specialist 11/9/2018

## 2018-11-12 ENCOUNTER — TELEPHONE (OUTPATIENT)
Dept: SLEEP MEDICINE | Facility: CLINIC | Age: 48
End: 2018-11-12

## 2018-11-12 NOTE — TELEPHONE ENCOUNTER
Patient has complex sleep apnea and she needs CPAP titration in lab start study.,  Left message to her cell phone to call back for the results and details of the coming testing.

## 2018-11-12 NOTE — PROCEDURES
"  [facility] Report  ===========================    Patient Information:  --------------------  Marni Austin  Patient ID:  [id number]   :  1970  Recording date:  2018       Indication of the sleep study: Marni Austin is a 48 year old female with history of COPD, asthma, chronic diastolic heart failure, hypertension, coronary disease, hyperlipidemia, chronic smoker and depression was seen at the [facility] in [place] with complains of snoring as per ,waking up gasping air, dry mouth and throat, difficulty breathing through the nose, excessive daytime sleepiness and tiredness. Her sleepiness affects her delia and tasks and sometimes she falls asleep by accident while doing her tasks. Ht 1.549 m (5' 1\")  Wt 94.3 kg (208 lb)  SpO2 96%  BMI 39.3 kg/m2.      Recording Information:  ----------------------  This was a Type 3 unattended sleep study (measuring flow, effort, heart rate and pulse oximetry) performed at home. Please refer to EPIC procedure for detailed scoring report.   This study was considered adequate based on > 4 hours of quality oximetry and respiratory recording. As specified by the AASM Manual for the Scoring of Sleep and Associated events, version 2.3, Rule VIII.D 1B, 4% oxygen desaturation scoring for hypopneas is used as a standard of care on all home sleep apnea testing.  The severity of sleep disordered breathing can be underestimated during portable testing because the apnea/hypopnea index is calculated using total recording time rather than total sleep time.  Recording date:  2018   Recording duration:  599.9 minutes  Time in bed:  380.6 minutes  Estimated sleep efficiency was 95.2 %.   Time spent in supine position:  60.7 % of total bed time  The test quality was: adequate for interpretation  The test duration was: adequate for interpretation  Respiratory Analysis:  ---------------------  AHI: 11.7 /hour  AHI (supine):  16.6 " /hour  AHI (non-supine):  4.0 /hour  EVAN: 7.9 /hour (Number of oxygen desaturations per hour)  Snore index: 0.6 (percentage of time spent snoring versus the total time spent in bed)  Central apnea index:  4.6 / hour    The sleep study demonstrated mild sleep disordered breathing which was characterized predominantly by central apneas and hypopneas. The sleep-disordered breathing was predominantly in supine body position.     Oximetry Analysis:  ------------------  Baseline oxygen saturation during sleep was normal.   Lowest oxygen saturation:  81.0 %  Majority of the sleep time spent with oxygen saturation greater than 90%.   10.3% of the total recording time was spent with oxygen saturation less than 90%.   Time Spent oxygen saturation below 88% was 5.6 minutes.    Cardiac Analysis:  -----------------  Maximum pulse rate was 101.0 /minute and minimal pulse rate was 40.0/minute. Time spent above 100 bpm was 0.0 minutes and time spent below 40 bpm was 0.0 minutes.    Diagnosis:  ==========  Mild complex sleep apnea G47.33  Sleep related hypoxemia    Recommendations:   ================    1. Based on the presence of the complex sleep apnea and chroncic obstructive lung disease, treatment with Auto-titrating PAP therapy is not recommended. Recommend all night CPAP titration with TCM CO2 and then clinical follow up with sleep management team after sleep study.  2. Recommend optimizing regular wake-sleep schedule and avoiding sleep deprivation.    Other Recommendations:  ======================  1. Start weight loss program if BMI > 25.    2. Avoid sedating medications, including narcotics and alcohol, as these may exacerbate if underlying respiratory disorders.     3. Positional therapy by avoiding sleep in supine position.    4. Avoid driving when drowsy    5. Follow up primary care doctor and/or referring physician as scheduled.      Electronically signed by:      Charlie Serrano MD  Sleep Medicine Physician  FARA  Diplomate, Sleep Medicine and Internal Medicine  [facility].

## 2018-11-13 ENCOUNTER — TELEPHONE (OUTPATIENT)
Dept: SLEEP MEDICINE | Facility: CLINIC | Age: 48
End: 2018-11-13

## 2018-11-15 ENCOUNTER — HOSPITAL ENCOUNTER (OUTPATIENT)
Dept: MAMMOGRAPHY | Facility: CLINIC | Age: 48
Discharge: HOME | End: 2018-11-15
Attending: PHYSICIAN ASSISTANT | Admitting: PHYSICIAN ASSISTANT
Payer: COMMERCIAL

## 2018-11-15 ENCOUNTER — OFFICE VISIT (OUTPATIENT)
Dept: PALLIATIVE MEDICINE | Facility: CLINIC | Age: 48
End: 2018-11-15
Payer: COMMERCIAL

## 2018-11-15 VITALS
RESPIRATION RATE: 16 BRPM | HEIGHT: 61 IN | SYSTOLIC BLOOD PRESSURE: 131 MMHG | OXYGEN SATURATION: 92 % | BODY MASS INDEX: 40.22 KG/M2 | DIASTOLIC BLOOD PRESSURE: 79 MMHG | HEART RATE: 93 BPM | WEIGHT: 213 LBS

## 2018-11-15 DIAGNOSIS — G62.9 NEUROPATHY: Primary | ICD-10-CM

## 2018-11-15 DIAGNOSIS — Z12.31 ENCOUNTER FOR SCREENING MAMMOGRAM FOR BREAST CANCER: ICD-10-CM

## 2018-11-15 PROCEDURE — 77067 SCR MAMMO BI INCL CAD: CPT

## 2018-11-15 PROCEDURE — 99243 OFF/OP CNSLTJ NEW/EST LOW 30: CPT | Performed by: PHYSICAL MEDICINE & REHABILITATION

## 2018-11-15 ASSESSMENT — PAIN SCALES - GENERAL: PAINLEVEL: NO PAIN (0)

## 2018-11-15 NOTE — PATIENT INSTRUCTIONS
Thank you for coming to [facility] for your care. It is my goal to partner with you to help you reach your optimal state of health.     You were seen today for your right leg numbness/tingling. As discussed during your visit these are the recommendations:    1. I placed an order for you to have an EMG of your right leg. This will be done at [facility] [phone]  2 Schedule a follow up appointment with me after your EMG is complete to review results and discuss next steps.    ----------------------------------------------------------------  Clinic Number:  [phone]   Call this number with any questions about your care and for scheduling assistance. Calls are returned Monday through Friday between 8 AM and 4:30 PM. We usually get back to you within 2 business days depending on the issue/request.       Medication refills:    For non-narcotic medications, call your pharmacy directly to request a refill. The pharmacy will contact the [facility] for authorization. Please allow 3-4 days for these refills to be processed.     For narcotic refills, call the clinic number or send a [facility] message. Please contact us 7-10 days before your refill is due. The message MUST include the name of the specific medication(s) requested and how you would like to receive the prescription(s). The options are as follows:    Pain Clinic staff can mail the prescription to your pharmacy. Please tell us the name of the pharmacy.    You may pick the prescription up at the Pain Clinic (tell us the location) or during a clinic visit with your pain provider    Pain Clinic staff can deliver the prescription to the [facility] in the clinic building. Please tell us the location.      We believe regular attendance is key to your success in our program.    Any time you are unable to keep your appointment we ask that you call us at least 24 hours in advance to let us know.  This will allow us to offer the appointment time to another patient.

## 2018-11-15 NOTE — PROGRESS NOTES
[facility]    Date of visit: 11/15/2018    Primary Care Provider: Dr. David Gambino    Reason for consultation:    Marni Austin is a 48 year old female who is seen in consultation today at the request of her primary care physician, David Gambino.     Consultation and Evaluation for: Medical spine evaluation    Review of [place] Prescription Monitoring Program (): Today I have also reviewed the patient's history of controlled substance use, as provided by [place] licensed pharmacies and prescriber dispensers. No inconsistencies noted.    Review of Pain Questionnaire: Please see the [facility] health questionnaire, which the patient completed and reviewed with me in detail.    Review of Electronic Chart: Today I have also reviewed available medical information in the patient's medical record at [facility] ([facility]), including relevant provider notes, laboratory work, and imaging.     Chief Complaint:    Paresthesias in right lower leg    Pain history:  Marni Austin is a 48 year old female with a PMH significant for COPD, CAD, CHF, anxiety, who first started having problems with paresthesias in the right leg about 10 years ago.    Symptoms started insidiously 10 years ago. She reports having paresthesias in right lateral leg mainly below the knee along the lateral aspect down to the lateral foot and 4th and 5th toes. She feels that her foot becomes swollen but it doesn't look swollen. This feeling is very bothersome for her. Symptoms have been stable since onset 10 years ago. No pain associated with it. No significant back pain. No lumbar surgeries. No lower extremity weakness. No bowel/bladder changes. No history of cancer, immunosuppression.     Denies red flags including: bowel or bladder symptoms, fever, chills, saddle anesthesia, profound motor loss, history of cancer, history of immune compromise, weight loss.    Current medication treatments  include:  None    Previous medication treatments included:  None    Other treatments have included:  Marni Austin has not been seen at a pain clinic in the past.    Behavioral interventions: no  PT: No  Manual Medicine: Has been to a chiropracter in the past   Surgeries: No lumbar or lower extremity surgeries  Acupuncture: No  TENs Unit: No  Injections: No    Past Medical History:  Past Medical History:   Diagnosis Date     CAD (coronary artery disease)      COPD (chronic obstructive pulmonary disease) (H)      Depression      Hypertension      Past Surgical History:  Past Surgical History:   Procedure Laterality Date     CARDIAC SURGERY       TONSILLECTOMY       Tubal ligation surgery       Medications:  Current Outpatient Prescriptions   Medication Sig Dispense Refill     albuterol (2.5 MG/3ML) 0.083% neb solution Take 2.5 mg by nebulization every 6 hours as needed for shortness of breath / dyspnea or wheezing       albuterol (PROAIR HFA/PROVENTIL HFA/VENTOLIN HFA) 108 (90 BASE) MCG/ACT Inhaler Inhale 2 puffs into the lungs every 6 hours as needed for shortness of breath / dyspnea or wheezing 3 Inhaler 1     amLODIPine (NORVASC) 10 MG tablet Take 1 tablet (10 mg) by mouth daily 90 tablet 1     aspirin EC 81 MG EC tablet Take 1 tablet (81 mg) by mouth daily       atorvastatin (LIPITOR) 80 MG tablet Take 1 tablet (80 mg) by mouth daily 90 tablet 1     cetirizine (ZYRTEC) 10 MG tablet Take 10 mg by mouth daily       chlorthalidone (HYGROTON) 25 MG tablet Take 0.5 tablets (12.5 mg) by mouth daily 45 tablet 1     citalopram (CELEXA) 20 MG tablet Take 1 tablet (20 mg) by mouth daily 90 tablet 1     fluticasone-vilanterol (BREO ELLIPTA) 200-25 MCG/INH inhaler Inhale 1 puff into the lungs daily 3 Inhaler 0     furosemide (LASIX) 20 MG tablet Take 1 tablet (20 mg) by mouth daily 30 tablet 0     hydrALAZINE (APRESOLINE) 50 MG tablet Take 1 tablet (50 mg) by mouth 2 times daily 180 tablet 1     ipratropium - albuterol  "0.5 mg/2.5 mg/3 mL (DUONEB) 0.5-2.5 (3) MG/3ML neb solution Take 1 vial (3 mLs) by nebulization every 4 hours as needed for shortness of breath / dyspnea or wheezing 360 mL 1     nitroGLYcerin (NITROSTAT) 0.4 MG sublingual tablet Place 1 tablet (0.4 mg) under the tongue every 5 minutes as needed for chest pain 25 tablet 3     potassium chloride SA (K-DUR/KLOR-CON M) 10 MEQ CR tablet Take 1 tablet (10 mEq) by mouth daily 90 tablet 1     Allergies:     Allergies   Allergen Reactions     Zyban [Bupropion Hcl] Anaphylaxis       Social History:  Home situation: Lives in [place], [place] with  and father in law  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    Family history:  Family History   Problem Relation Age of Onset     Hypertension Mother      Diabetes Father      Cerebrovascular Disease Maternal Grandmother      Cancer Maternal Grandfather      Diagnostic Tests:  X-ray lumbar spine was completed on 10/30/2018 which showed:  \"FINDINGS: Lumbar alignment is anatomic. Moderate loss of disc space at  L1-L2 associated with marginal osteophyte formation. Near complete  loss of disc space at L5-S1. Vertebral body heights and remaining disc  spaces are preserved.         IMPRESSION: Degenerative change without acute abnormality.\"     Review of Systems:    POSTIVE IN BOLD  GENERAL: fever/chills, fatigue, general unwell feeling, weight gain/loss.  HEAD/EYES:  headache, dizziness, or vision changes.    EARS/NOSE/THROAT:  Nosebleeds, hearing loss, sinus infection, earache, tinnitus.  IMMUNE:  Allergies, cancer, immune deficiency, or infections.  SKIN:  Urticaria, rash, hives  HEME/Lymphatic:   anemia, easy bruising, easy bleeding.  RESPIRATORY:  cough, wheezing, or shortness of breath  CARDIOVASCULAR/Circulation:  Extremity edema, syncope, " "hypertension, tachycardia, or angina.  GASTROINTESTINAL:  abdominal pain, nausea/emesis, diarrhea, constipation,  hematochezia, or melena.  ENDOCRINE:  Diabetes, steroid use,  thyroid disease or osteoporosis.  MUSCULOSKELETAL: neck pain, back pain, arthralgia, arthritis, or gout.  GENITOURINARY:  frequency, urgency, dysuria, difficulty voiding, hematuria or incontinence.  NEUROLOGIC:  weakness, numbness, paresthesias, seizure, tremor, stroke or memory loss.  PSYCHIATRIC:  depression, anxiety, stress, suicidal thoughts or mood swings.     Physical Exam:  Vitals:    11/15/18 0851   BP: 131/79   BP Location: Right arm   Patient Position: Chair   Cuff Size: Adult Large   Pulse: 93   Resp: 16   SpO2: 92%   Weight: 96.6 kg (213 lb)   Height: 1.549 m (5' 1\")     Exam:  Constitutional: alert and no distress  Head: normocephalic. Atraumatic.   Eyes: no redness or jaundice noted   ENT: oropharnx normal.  MMM.  Neck supple.    Respiratory: easily becomes short of breath with activity and when laying supine  Gastrointestinal: soft, non-tender  : deferred  Skin: no suspicious lesions or rashes  Psychiatric: mentation appears normal and affect normal/bright    Musculoskeletal exam:  Gait/Station/Posture: Normal  Lumbar spine: Range of motion within normal limits. No midline spinous process tenderness.   Myofascial tenderness:  No lumbar paraspinal tenderness, slight tenderness in right gluteal musculature with palpation but this does not typically cause pain for patient  Straight leg exam: Negative  Laurent's maneuver: Negative  Sacroiliac (SI) joint tenderness: Negative  Greater trochanteric tenderness: Negative  Bilateral hip motion without discomfort     Neurologic exam:  Motor:  5/5  LE   Reflexes:     Patella:  R:  2/4 L: 2/4   Achilles:  R:  1/4 L: 1/4   Sensory:  (upper and lower extremities):   Light touch: diminished along lateral aspect of leg below the knee. Paresthesias in lateral aspect of upper leg. Touching the " lateral aspect of the thigh causes paresthesias in the lateral aspect of the foot.    Allodynia: absent    Hyperalgesia: absent     Assessment:  Marni Austin is a 48 year old female with a past medical history significant for COPD, CAD, CHF, anxiety who presents with complaints of paresthesias in right lower extremity. Her symptoms do not follow a particular dermatomal pattern. Initially it seemed that she could have involvement of the superficial fibular nerve but then on exam it was noted she had paresthesias proximal to the knee as well. She has no pain associated with this. Strength and reflexes are normal. Etiology of neuropathy is unclear at this point.      Plan:  Diagnosis reviewed, treatment options addressed, and risks/benefits discussed. The patient was involved in shared decision making regarding the plan as laid out below.    1. Education:  Reassurance was given and the patient was encouraged to engage in activity and movement as able. Symptoms have been stable and unchanged for 10 years.  2. Diagnostic Studies:    1. EMG of right lower extremity to evaluate for radiculopathy vs peripheral neuropathy. Order faxed to [facility]  2. No advanced imaging of the lumbar spine at this point. Marni is not having back or leg pain and her neurological exam with strength and reflexes is normal.   3. Medication Management:  No changes made today. See recommendations to PCP below.   4. Further procedures recommended: No procedures recommended.   5. Recommendations/follow-up for PCP:  Can trial anti-neuropathics such as gabapentin to help with paresthesias. Topicals such as lidocaine patches or lidocaine ointment may provide some benefit.   6. Follow up: Schedule follow up after EMG is completed to review results.     Total time spent face to face was 40 minutes and more than 50% of face to face time was spent in counseling and/or coordination of care regarding the diagnosis  and recommendations above.    Kera Way MD  Medical Spine Specialist  [facility]

## 2018-11-15 NOTE — MR AVS SNAPSHOT
After Visit Summary   11/15/2018    Marni Austin    MRN: [id number]           Patient Information     Date Of Birth          1970        Visit Information        Provider Department      11/15/2018 9:00 AM Kera Way MD [facility]        Today's Diagnoses     Neuropathy    -  1      Care Instructions    Thank you for coming to [facility] for your care. It is my goal to partner with you to help you reach your optimal state of health.     You were seen today for your right leg numbness/tingling. As discussed during your visit these are the recommendations:    1. I placed an order for you to have an EMG of your right leg. This will be done at [facility] [phone]  2 Schedule a follow up appointment with me after your EMG is complete to review results and discuss next steps.    ----------------------------------------------------------------  Clinic Number:  [phone]   Call this number with any questions about your care and for scheduling assistance. Calls are returned Monday through Friday between 8 AM and 4:30 PM. We usually get back to you within 2 business days depending on the issue/request.       Medication refills:    For non-narcotic medications, call your pharmacy directly to request a refill. The pharmacy will contact the [facility] for authorization. Please allow 3-4 days for these refills to be processed.     For narcotic refills, call the clinic number or send a [employer] message. Please contact us 7-10 days before your refill is due. The message MUST include the name of the specific medication(s) requested and how you would like to receive the prescription(s). The options are as follows:    Pain Clinic staff can mail the prescription to your pharmacy. Please tell us the name of the pharmacy.    You may pick the prescription up at the Pain Clinic (tell us the location) or during a clinic  visit with your pain provider    Pain Clinic staff can deliver the prescription to the [place] pharmacy in the clinic building. Please tell us the location.      We believe regular attendance is key to your success in our program.    Any time you are unable to keep your appointment we ask that you call us at least 24 hours in advance to let us know. This will allow us to offer the appointment time to another patient.               Follow-ups after your visit        Additional Services     NEUROLOGY ADULT REFERRAL       Your provider has referred you for the following:   EMG at [facility]: [facility] [phone]   [email or web address]    Please be aware that coverage of these services is subject to the terms and limitations of your health insurance plan.  Call member services at your health plan with any benefit or coverage questions.      Please bring the following with you to your appointment:    (1) Any X-Rays, CTs or MRIs which have been performed.  Contact the facility where they were done to arrange for  prior to your scheduled appointment.    (2) List of current medications  (3) This referral request   (4) Any documents/labs given to you for this referral                  Your next 10 appointments already scheduled     Nov 15, 2018 10:15 AM CST   MA SCREENING DIGITAL BILATERAL with [email or web address]   [facility] ([facility])    [address]  [facility] [address] [phone]           How do I prepare for my exam? (Food and drink instructions) No Food and Drink Restrictions.  How do I prepare for my exam? (Other instructions) Do not use any powder, lotion or deodorant under your arms or on your breast. If you do, we will ask you to remove it before your exam.  What should I wear: Wear comfortable, two-piece clothing.  How long does the exam take: Most scans will take 15 minutes.  What should I bring:  "Bring any previous mammograms from other facilities or have them mailed to the breast center.  Do I need a :  No  is needed.  What do I need to tell my doctor: If you have any allergies, tell your care team.  What should I do after the exam: No restrictions, You may resume normal activities.  What is this test: This test is an x-ray of the breast to look for breast disease. The breast is pressed between two plates to flatten and spread the tissue. An X-ray is taken of the breast from different angles.  Who should I call with questions: If you have any questions, please call the Imaging Department where you will have your exam. Directions, parking instructions, and other information is available on our website, [email or web address].  Other information about my exam Three-dimensional (3D) mammograms are available at [place] locations in [facility], [facility], [place], [facility] and [place]. [facility] locations include [place] and the [facility] in [place].  Benefits of 3D mammograms include * Improved rate of cancer detection * Decreases your chance of having to go back for more tests, which means fewer: * \"False-positive\" results (This means that there is an abnormal area but it isn't cancer.) * Invasive testing procedures, such as a biopsy or surgery * Can provide clearer images of the breast if you have dense breast tissue.  *3D mammography is an optional exam that anyone can have with a 2D mammogram. It doesn't replace or take the place of a 2D mammogram. 2D mammograms remain an effective screening test for all women.  Not all insurance companies cover the cost of a 3D mammogram. Check with your insurance. Three-dimensional (3D) mammograms are available at [place] locations in [facility], [facility], [facility], and [place]. [facility] locations include [place] and [facility] in " [place]. Benefits of 3D mammograms include: - Improved rate of cancer detection - Decreases your chance of having to go back for more tests, which means fewer: - \"False-positive\" results (This means that there is an abnormal area but it isn't cancer.) - Invasive testing procedures, such as a biopsy or surgery - Can provide clearer images of the breast if you have dense breast tissue. 3D mammography is an optional exam that anyone can have with a 2D mammogram. It doesn't replace or take the place of a 2D mammogram. 2D mammograms remain an effective screening test for all women.  Not all insurance companies cover the cost of a 3D mammogram. Check with your insurance.            Dec 05, 2018  4:30 PM CST   Return Sleep Patient with Charlie Serrano MD   [facility]    [address] [facility] [address] [phone]              Who to contact     If you have questions or need follow up information about today's clinic visit or your schedule please contact [facility] directly at [phone].  Normal or non-critical lab and imaging results will be communicated to you by [employer], letter or phone within 4 business days after the clinic has received the results. If you do not hear from us within 7 days, please contact the clinic through [employer] or phone. If you have a critical or abnormal lab result, we will notify you by phone as soon as possible.  Submit refill requests through [employer] or call your pharmacy and they will forward the refill request to us. Please allow 3 business days for your refill to be completed.          Additional Information About Your Visit        [employer] Information     [employer] gives you secure access to your electronic health record. If you see a primary care provider, you can also send messages to your care team and make appointments. If you have questions, please call your primary care clinic.  If " "you do not have a primary care provider, please call [phone] and they will assist you.        Care EveryWhere ID     This is your Care EveryWhere ID. This could be used by other organizations to access your [place] medical records  [id number]        Your Vitals Were     Pulse Respirations Height Last Period Pulse Oximetry BMI (Body Mass Index)    93 16 1.549 m (5' 1\") 08/30/2013 92% 40.25 kg/m2       Blood Pressure from Last 3 Encounters:   11/15/18 131/79   10/30/18 139/79   10/18/18 135/81    Weight from Last 3 Encounters:   11/15/18 96.6 kg (213 lb)   10/30/18 95.3 kg (210 lb)   09/17/18 96.9 kg (213 lb 11.2 oz)              We Performed the Following     NEUROLOGY ADULT REFERRAL        Primary Care Provider Office Phone # Fax #    David Gambino PA-C [phone] [address] [facility] [address]        Equal Access to Services     [facility]: Hadii aad ku hadasho Soomaali, waaxda luqadaha, qaybta kaalmada adeegyada, waxay idiin hayaan nely cope . So [facility] [phone].    ATENCIÓN: Si habla español, tiene a haywood disposición servicios gratuitos de asistencia lingüística. Ll[facility] [phone].    We comply with applicable federal civil rights laws and [place] laws. We do not discriminate on the basis of race, color, national origin, age, disability, sex, sexual orientation, or gender identity.            Thank you!     Thank you for choosing [facility]  for your care. Our goal is always to provide you with excellent care. Hearing back from our patients is one way we can continue to improve our services. Please take a few minutes to complete the written survey that you may receive in the mail after your visit with us. Thank you!             Your Updated Medication List - Protect others around you: Learn how to safely use, store and throw away your medicines at www.disposemymeds.org.          This list is accurate as of 11/15/18  9:42 AM.  Always " use your most recent med list.                   Brand Name Dispense Instructions for use Diagnosis    * albuterol (2.5 MG/3ML) 0.083% neb solution      Take 2.5 mg by nebulization every 6 hours as needed for shortness of breath / dyspnea or wheezing        * albuterol 108 (90 Base) MCG/ACT inhaler    PROAIR HFA/PROVENTIL HFA/VENTOLIN HFA    3 Inhaler    Inhale 2 puffs into the lungs every 6 hours as needed for shortness of breath / dyspnea or wheezing    Moderate persistent asthma without complication       amLODIPine 10 MG tablet    NORVASC    90 tablet    Take 1 tablet (10 mg) by mouth daily    Essential hypertension with goal blood pressure less than 140/90       aspirin 81 MG EC tablet      Take 1 tablet (81 mg) by mouth daily    NSTEMI (non-ST elevated myocardial infarction) (H)       atorvastatin 80 MG tablet    LIPITOR    90 tablet    Take 1 tablet (80 mg) by mouth daily    Coronary artery disease involving native heart without angina pectoris, unspecified vessel or lesion type, CARDIOVASCULAR SCREENING; LDL GOAL LESS THAN 130       cetirizine 10 MG tablet    zyrTEC     Take 10 mg by mouth daily        chlorthalidone 25 MG tablet    HYGROTON    45 tablet    Take 0.5 tablets (12.5 mg) by mouth daily    Essential hypertension with goal blood pressure less than 140/90, Lower extremity edema       citalopram 20 MG tablet    celeXA    90 tablet    Take 1 tablet (20 mg) by mouth daily    Adjustment disorder with anxiety       fluticasone-vilanterol 200-25 MCG/INH inhaler    BREO ELLIPTA    3 Inhaler    Inhale 1 puff into the lungs daily    Chronic obstructive pulmonary disease, unspecified COPD type (H)       furosemide 20 MG tablet    LASIX    30 tablet    Take 1 tablet (20 mg) by mouth daily    Lower extremity edema       hydrALAZINE 50 MG tablet    APRESOLINE    180 tablet    Take 1 tablet (50 mg) by mouth 2 times daily    Essential hypertension with goal blood pressure less than 140/90       ipratropium -  albuterol 0.5 mg/2.5 mg/3 mL 0.5-2.5 (3) MG/3ML neb solution    DUONEB    360 mL    Take 1 vial (3 mLs) by nebulization every 4 hours as needed for shortness of breath / dyspnea or wheezing    Chronic obstructive pulmonary disease with acute exacerbation (H), Moderate persistent asthma with acute exacerbation       nitroGLYcerin 0.4 MG sublingual tablet    NITROSTAT    25 tablet    Place 1 tablet (0.4 mg) under the tongue every 5 minutes as needed for chest pain    NSTEMI (non-ST elevated myocardial infarction) (H)       potassium chloride SA 10 MEQ CR tablet    K-DUR/KLOR-CON M    90 tablet    Take 1 tablet (10 mEq) by mouth daily    Hypokalemia       * Notice:  This list has 2 medication(s) that are the same as other medications prescribed for you. Read the directions carefully, and ask your doctor or other care provider to review them with you.

## 2018-12-05 ENCOUNTER — OFFICE VISIT (OUTPATIENT)
Dept: SLEEP MEDICINE | Facility: CLINIC | Age: 48
End: 2018-12-05
Payer: COMMERCIAL

## 2018-12-05 VITALS
HEART RATE: 81 BPM | OXYGEN SATURATION: 95 % | HEIGHT: 61 IN | RESPIRATION RATE: 18 BRPM | SYSTOLIC BLOOD PRESSURE: 130 MMHG | DIASTOLIC BLOOD PRESSURE: 81 MMHG | BODY MASS INDEX: 40.22 KG/M2 | WEIGHT: 213 LBS

## 2018-12-05 DIAGNOSIS — G47.39 COMPLEX SLEEP APNEA SYNDROME: ICD-10-CM

## 2018-12-05 DIAGNOSIS — G47.33 OSA (OBSTRUCTIVE SLEEP APNEA): Primary | ICD-10-CM

## 2018-12-05 PROCEDURE — 99213 OFFICE O/P EST LOW 20 MIN: CPT | Performed by: INTERNAL MEDICINE

## 2018-12-05 NOTE — MR AVS SNAPSHOT
"              After Visit Summary   12/5/2018    Marni Austin    MRN: [id number]           Patient Information     Date Of Birth          1970        Visit Information        Provider Department      12/5/2018 4:30 PM Charlie Serrano MD [facility]        Today's Diagnoses     JORGE (obstructive sleep apnea)    -  1    Complex sleep apnea syndrome          Care Instructions    MY TREATMENT INFORMATION FOR SLEEP APNEA-  Marni Austin    MY CONTACT NUMBERS ARE:  [phone]  DOCTOR : Charlie Serrano  SLEEP CENTER :  [facility]    You have sleep apnea/snoring: oral appliance is recommended for you.    Patient was advised not to drive if drowsy or sleepy.    Frequently asked questions:  1. What is Obstructive Sleep Apnea (JORGE)? JORGE is the most common type of sleep apnea. Apnea literally means, \"without breath.\" It is characterized by repetitive pauses in breathing, despite continued effort to breathe, and is usually associated with a reduction in blood oxygen saturation. Apneas can last 10 to over 60 seconds. It is caused by narrowing or collapse of the upper airway as muscles relax during sleep. Severity of sleep apnea is determined by frequency of breathing events and their effect on your sleep and oxygen levels determined during sleep testing.   2. What are the consequences of JORGE? Symptoms include: daytime sleepiness- possibly increasing the risk of falling asleep while driving, unrefreshing/restless sleep, snoring, insomnia, waking frequently to urinate, waking with heartburn or reflux, reduced concentration and memory, and morning headaches. Other health consequences may include development of high blood pressure and other cardiovascular disease in persons who are susceptible. Untreated JORGE  can contribute to heart disease, stroke and diabetes.   3. What are the treatment options? In most situations, sleep apnea is a lifelong disease that must be managed with daily " therapy. Medications are not effective for sleep apnea and surgery is generally not performed until other therapies have been tried. Therapy is usually tailored to the individual patient based on many factors including your wishes as well as severity of sleep apnea and severity of obesity. Continuous Positive Airway (CPAP) is the most reliable treatment. An oral device to hold your jaw forward is usually    Oral Appliance  What is oral appliance therapy?  An oral appliance is a small acrylic device that fits over the upper and lower teeth or tongue (similar to an orthodontic retainer or a mouth guard). This device slightly advances the lower jaw or tongue, which moves the base of the tongue forward, opens the airway, improves breathing and can effectively treat snoring and obstructive sleep apnea sleep apnea. The appliance is fabricated and customized by a qualified dentist with experience in treating snoring and sleep apnea. Oral appliances are usually well tolerated and have relatively high compliance by patients1, 2, 3.  When is an oral appliance indicated?  Oral appliance therapy is recommended as a first-line treatment for patients with primary snoring, mild sleep apnea, and for patients with moderate sleep apnea who prefer appliance therapy to use of CPAP4, 5. Severity of sleep apnea is determined by sleep testing and is based on the number of respiratory events per hour of sleep.   How successful is oral appliance therapy?  The success rate of oral appliance therapy in patients with mild sleep apnea is 75-80% while in patients with moderate sleep apnea it is 50-70%. The chance of success in patients with severe sleep apnea is 40-50%. The research also shows that oral appliances have a beneficial effect on the cardiovascular health of JORGE patients at the same magnitude as CPAP therapy7.  Oral appliances should be a second-line treatment in cases of severe sleep apnea, but if not completely successful then a  combination therapy utilizing CPAP plus oral appliance therapy may be effective. Oral appliances tend to be effective in a broad range of patients although studies show that the patients who have the highest success are females, younger patients, those with milder disease, and less severe obesity. 3, 6.   The chances of success are lower in patients who have more severe JORGE, are older, and those who are morbidly obese.     Example of an oral appliance   Finding a dentist that practices dental sleep medicine  Specific training is available through the American Academy of Dental Sleep Medicine for dentists interested in working in the field of sleep. To find a dentist who is educated in the field of sleep and the use of oral appliances, near you, visit the Web site of the American Academy of Dental Sleep Medicine; also see   http://www.accpstorage.org/newOrganization/patients/oralAppliances.pdf  To search for a dentist certified in these practices:  Http://aadsm.org/FindADentist.aspx?1  1. Herrera, et al. Objectively measured vs self-reported compliance during oral appliance therapy for sleep-disordered breathing. Chest 2013; 144(5): 3797-6572.  2. Cinthya, et al. Objective measurement of compliance during oral appliance therapy for sleep-disordered breathing. Thorax 2013; 68(1): 91-96.  3. Mira et al. Mandibular advancement devices in 620 men and women with JORGE and snoring: tolerability and predictors of treatment success. Chest 2004; 125: 9560-9997.  4. Norberto, et al. Oral appliances for snoring and JORGE: a review. Sleep 2006; 29: 244-262.  5. Ivy et al. Oral appliance treatment for JORGE: an update. J Clin Sleep Med 2014; 10(2): 215-227.  6. Beau, et al. Predictors of OSAH treatment outcome. J Dent Res 2007; 86: 9857-1173.    Oral Appliance  These are examples of two of many custom-made devices that are more likely to work in mild sleep apnea                                                       Oral appliances are dental mouth pieces that fit very much like a sports mouth guards or removable orthodontic retainers. They are used to treat snoring  And obstructive sleep apnea . The device prevents the airway from collapsing by either holding the tongue or supporting the jaw in a forward position. Since oral appliances are non-invasive and easy to use, they may be considered as an early treatment option. Oral appliance therapy (OAT) involves the customization, selection, fabrication, fitting, adjustments and long-term follow-up care of specially designed oral devices, worn during sleep, which reposition the lower jaw and tongue base forward to maintain an open airway.  Custom made oral appliances are proven to be more effective than over-the-counter devices. Therefore, the over-the-counter devices are recommended not to be used as a screening tool nor as a therapeutic option.     Who gets a dental device?  Oral appliance therapy can be used as an alternative to  CPAP therapy for the treatment of mild to moderate sleep apnea and for those patients who prefer OAT to CPAP. Oral appliance therapy is a first line therapy for the treatment of primary snoring. Additionally, OAT is an option for those that cannot tolerate CPAP as therapy or who have experienced insufficient surgical results.                 Possible side effects?  Frequent but minor side effects include: excessive salivation, dry mouth, discomfort of teeth and jaw and temporary changes in the patient s bite.  Potential complications include: jaw pain, permanent occlusal changes and TMJ symptoms.  The above mentioned side effects and complications can be recognized and managed by dentists trained in dental sleep medicine.   Finding a dentist that practices dental sleep medicine  Specific training is available through the American Academy of Dental Sleep Medicine for dentists interested in working in the field of sleep. To find a dentist who is  educated in the field of sleep and the use of oral appliances, near you, visit the Web site of the American Academy of Dental Sleep Medicine; also see http://www.accpstorage.org/newOrganization/patients/oralAppliances.pdf   To search for a dentist certified in these practices:  Http://aadsm.org/FindADentist.aspx?1  Http://www.accpstorage.org/newOrganization/patients/oralAppliances.pdf            Your BMI is Body mass index is 40.25 kg/(m^2).  Weight management is a personal decision.  If you are interested in exploring weight loss strategies, the following discussion covers the approaches that may be successful. Body mass index (BMI) is one way to tell whether you are at a healthy weight, overweight, or obese. It measures your weight in relation to your height.  A BMI of 18.5 to 24.9 is in the healthy range. A person with a BMI of 25 to 29.9 is considered overweight, and someone with a BMI of 30 or greater is considered obese. More than two-thirds of American adults are considered overweight or obese.  Being overweight or obese increases the risk for further weight gain. Excess weight may lead to heart disease and diabetes.  Creating and following plans for healthy eating and physical activity may help you improve your health.  Weight control is part of healthy lifestyle and includes exercise, emotional health, and healthy eating habits. Careful eating habits lifelong are the mainstay of weight control. Though there are significant health benefits from weight loss, long-term weight loss with diet alone may be very difficult to achieve- studies show long-term success with dietary management in less than 10% of people. Attaining a healthy weight may be especially difficult to achieve in those with severe obesity. In some cases, medications, devices and surgical management might be considered.  What can you do?  If you are overweight or obese and are interested in methods for weight loss, you should discuss this with  your provider.     Consider reducing daily calorie intake by 500 calories.     Keep a food journal.     Avoiding skipping meals, consider cutting portions instead.    Diet combined with exercise helps maintain muscle while optimizing fat loss. Strength training is particularly important for building and maintaining muscle mass. Exercise helps reduce stress, increase energy, and improves fitness. Increasing exercise without diet control, however, may not burn enough calories to loose weight.       Start walking three days a week 10-20 minutes at a time    Work towards walking thirty minutes five days a week     Eventually, increase the speed of your walking for 1-2 minutes at time    In addition, we recommend that you review healthy lifestyles and methods for weight loss available through the National Institutes of Health patient information sites:  http://win.niddk.nih.gov/publications/index.htm    And look into health and wellness programs that may be available through your health insurance provider, employer, local community center, or zuhair club.    Weight management plan: Patient was referred to their PCP to discuss a diet and exercise plan.     Your blood pressure was checked while you were in clinic today.  Please read the guidelines below about what these numbers mean and what you should do about them.  Your systolic blood pressure is the top number.  This is the pressure when the heart is pumping.  Your diastolic blood pressure is the bottom number.  This is the pressure in between beats.  If your systolic blood pressure is less than 120 and your diastolic blood pressure is less than 80, then your blood pressure is normal. There is nothing more that you need to do about it  If your systolic blood pressure is 120-139 or your diastolic blood pressure is 80-89, your blood pressure may be higher than it should be.  You should have your blood pressure re-checked within a year by a primary care provider.  If your  systolic blood pressure is 140 or greater or your diastolic blood pressure is 90 or greater, you may have high blood pressure.  High blood pressure is treatable, but if left untreated over time it can put you at risk for heart attack, stroke, or kidney failure.  You should have your blood pressure re-checked by a primary care provider within the next four weeks.              Follow-ups after your visit        Additional Services     SLEEP DENTAL REFERRAL       Dental appliance resources recommended by [facility]     Below is a list of dental appliance resources recommended by [facility]   If you wish to choose your own dental sleep dentist, you may identify a provider close to you: http://www.[email or web address]/[email or web address]    [facility]   Sleep Medicine [facility]   Jadiel Jordan DDS, [address]  [place] [address]   Appointments: [phone]  Fax: [phone]   Email: [email or web address]   [facility]   Semaj Faria, CLARISSA Kearney DDS   [address]   [place] [address]   Appointments: [phone]   Website: [email or web address]    Snoring and Sleep Apnea   [facility]   ADALBERTO Issa DDS  [address]   [place] [address]   Appointments: [phone]   Fax: [phone]   Email: [email or web address]  Website: [employer]     [facility]   Office 1   Abran Dong DDS - [DME Medicare]  [address], [address]   [place] [address]  Appointments: [phone]  Fax: [phone]  Website: [place] [facility]    [facility]   Office 2  Abran Dong DDS - [DME Medicare]  [address], [address]  [place] [address]  Appointments: [phone]  Fax: [phone]  Website:  [place]    [facility]  Axel Marco A, DDS  [address] [place] [address]  Appointments: [phone]    [facility]   [place] Office   Cezar Kearney DDS   [place] [address], [address]   [place] [address]   Appointments: [phone]   Fax: [phone]   Website: [employer]     [facility]   [place] Office   Maik Bojorquez DDS, MS - [DME Medicare]  [facility]   [address]   [place] [address]   Appointments: [phone]   Fax: [phone]   Website: [employer]     [facility]  Abundio Lindo DMD, MSD - [DME Medicare]  [address], [address]  [place] [address]  Appointments [phone]  Fax: [phone]  Website: [facility]    [facility]   [place] Office   Kallie Drake DDS, PhD, MS   [facility]   [address]   [place] [address]   Appointments: [phone]   Fax: [phone]   Website: [place]     [facility]   [place] Office   Kallie Drake DDS, PhD, MS   [facility]   [address], [address]   [place] [address]   Appointments: [phone]   Fax: [phone]   Website: [place]     [facility]  Sylvie Meneses DDS  [facility]  [address] [place] [address]  Appointments: [phone]   Fax: [phone]    If you wish to choose your own dental sleep dentist, you may identify a provider close to you: http://[email or web address]      AHI: 11.7/hr   Home sleep study DATE: 11/1818     Return to clinic in 6 months for Home Sleep Test to confirm adequacy of Treatment.    Other information regarding this referral: Mandibular repositioning appliance for JORGE. If your insurance asks for a CPT code, it is  ".    Please be aware that coverage of these services is subject to the terms and limitations of your health insurance plan.  Call member services at your health plan with any benefit or coverage questions.      Please bring the following to your appointment:    >>   List of current medications   >>   This referral request   >>   Any documents/labs given to you for this referral                  Who to contact     If you have questions or need follow up information about today's clinic visit or your schedule please contact [facility] directly at [phone].  Normal or non-critical lab and imaging results will be communicated to you by Fewzionhart, letter or phone within 4 business days after the clinic has received the results. If you do not hear from us within 7 days, please contact the clinic through LinQpayt or phone. If you have a critical or abnormal lab result, we will notify you by phone as soon as possible.  Submit refill requests through [employer] or call your pharmacy and they will forward the refill request to us. Please allow 3 business days for your refill to be completed.          Additional Information About Your Visit        Fewzionhart Information     [employer] gives you secure access to your electronic health record. If you see a primary care provider, you can also send messages to your care team and make appointments. If you have questions, please call your primary care clinic.  If you do not have a primary care provider, please call [phone] and they will assist you.        Care EveryWhere ID     This is your Care EveryWhere ID. This could be used by other organizations to access your [facility] medical records  [id number]        Your Vitals Were     Pulse Respirations Height Last Period Pulse Oximetry BMI (Body Mass Index)    81 18 1.549 m (5' 1\") 08/30/2013 95% 40.25 kg/m2       Blood Pressure from Last 3 Encounters:   12/05/18 130/81   11/15/18 131/79   10/30/18 139/79    " Weight from Last 3 Encounters:   12/05/18 96.6 kg (213 lb)   11/15/18 96.6 kg (213 lb)   10/30/18 95.3 kg (210 lb)              We Performed the Following     SLEEP DENTAL REFERRAL        Primary Care Provider Office Phone # Fax #    David Gambino PA-C [phone] [address]  [facility] [address]        Equal Access to Services     JOSE GUADALUPE GONZALEZ : Hadii aad ku hadasho Soomaali, waaxda luqadaha, qaybta kaalmada adeegyada, waxay idiin hayaan adeeg kharash la'tatyana . So [facility] [phone].    ATENCIÓN: Si habla español, tiene a haywood disposición servicios gratuitos de asistencia lingüística. Llame al [phone].    We comply with applicable federal civil rights laws and [place] laws. We do not discriminate on the basis of race, color, national origin, age, disability, sex, sexual orientation, or gender identity.            Thank you!     Thank you for choosing [facility]  for your care. Our goal is always to provide you with excellent care. Hearing back from our patients is one way we can continue to improve our services. Please take a few minutes to complete the written survey that you may receive in the mail after your visit with us. Thank you!             Your Updated Medication List - Protect others around you: Learn how to safely use, store and throw away your medicines at www.disposemymeds.org.          This list is accurate as of 12/5/18  4:35 PM.  Always use your most recent med list.                   Brand Name Dispense Instructions for use Diagnosis    * albuterol (2.5 MG/3ML) 0.083% neb solution    PROVENTIL     Take 2.5 mg by nebulization every 6 hours as needed for shortness of breath / dyspnea or wheezing        * albuterol 108 (90 Base) MCG/ACT inhaler    PROAIR HFA/PROVENTIL HFA/VENTOLIN HFA    3 Inhaler    Inhale 2 puffs into the lungs every 6 hours as needed for shortness of breath / dyspnea or wheezing    Moderate persistent asthma without  complication       amLODIPine 10 MG tablet    NORVASC    90 tablet    Take 1 tablet (10 mg) by mouth daily    Essential hypertension with goal blood pressure less than 140/90       aspirin 81 MG EC tablet    ASA     Take 1 tablet (81 mg) by mouth daily    NSTEMI (non-ST elevated myocardial infarction) (H)       atorvastatin 80 MG tablet    LIPITOR    90 tablet    Take 1 tablet (80 mg) by mouth daily    Coronary artery disease involving native heart without angina pectoris, unspecified vessel or lesion type, CARDIOVASCULAR SCREENING; LDL GOAL LESS THAN 130       cetirizine 10 MG tablet    zyrTEC     Take 10 mg by mouth daily        chlorthalidone 25 MG tablet    HYGROTON    45 tablet    Take 0.5 tablets (12.5 mg) by mouth daily    Essential hypertension with goal blood pressure less than 140/90, Lower extremity edema       citalopram 20 MG tablet    celeXA    90 tablet    Take 1 tablet (20 mg) by mouth daily    Adjustment disorder with anxiety       fluticasone-vilanterol 200-25 MCG/INH inhaler    BREO ELLIPTA    3 Inhaler    Inhale 1 puff into the lungs daily    Chronic obstructive pulmonary disease, unspecified COPD type (H)       furosemide 20 MG tablet    LASIX    30 tablet    Take 1 tablet (20 mg) by mouth daily    Lower extremity edema       hydrALAZINE 50 MG tablet    APRESOLINE    180 tablet    Take 1 tablet (50 mg) by mouth 2 times daily    Essential hypertension with goal blood pressure less than 140/90       ipratropium - albuterol 0.5 mg/2.5 mg/3 mL 0.5-2.5 (3) MG/3ML neb solution    DUONEB    360 mL    Take 1 vial (3 mLs) by nebulization every 4 hours as needed for shortness of breath / dyspnea or wheezing    Chronic obstructive pulmonary disease with acute exacerbation (H), Moderate persistent asthma with acute exacerbation       nitroGLYcerin 0.4 MG sublingual tablet    NITROSTAT    25 tablet    Place 1 tablet (0.4 mg) under the tongue every 5 minutes as needed for chest pain    NSTEMI (non-ST elevated  myocardial infarction) (H)       potassium chloride ER 10 MEQ CR tablet    K-DUR/KLOR-CON M    90 tablet    Take 1 tablet (10 mEq) by mouth daily    Hypokalemia       * Notice:  This list has 2 medication(s) that are the same as other medications prescribed for you. Read the directions carefully, and ask your doctor or other care provider to review them with you.

## 2018-12-05 NOTE — PATIENT INSTRUCTIONS
"MY TREATMENT INFORMATION FOR SLEEP APNEA-  Marni uAstin    MY CONTACT NUMBERS ARE:  [phone]  DOCTOR : Charlie MARTINEZ [facility]  SLEEP CENTER :  [facility]    You have sleep apnea/snoring: oral appliance is recommended for you.    Patient was advised not to drive if drowsy or sleepy.    Frequently asked questions:  1. What is Obstructive Sleep Apnea (JORGE)? JORGE is the most common type of sleep apnea. Apnea literally means, \"without breath.\" It is characterized by repetitive pauses in breathing, despite continued effort to breathe, and is usually associated with a reduction in blood oxygen saturation. Apneas can last 10 to over 60 seconds. It is caused by narrowing or collapse of the upper airway as muscles relax during sleep. Severity of sleep apnea is determined by frequency of breathing events and their effect on your sleep and oxygen levels determined during sleep testing.   2. What are the consequences of JORGE? Symptoms include: daytime sleepiness- possibly increasing the risk of falling asleep while driving, unrefreshing/restless sleep, snoring, insomnia, waking frequently to urinate, waking with heartburn or reflux, reduced concentration and memory, and morning headaches. Other health consequences may include development of high blood pressure and other cardiovascular disease in persons who are susceptible. Untreated JORGE  can contribute to heart disease, stroke and diabetes.   3. What are the treatment options? In most situations, sleep apnea is a lifelong disease that must be managed with daily therapy. Medications are not effective for sleep apnea and surgery is generally not performed until other therapies have been tried. Therapy is usually tailored to the individual patient based on many factors including your wishes as well as severity of sleep apnea and severity of obesity. Continuous Positive Airway (CPAP) is the most reliable treatment. An oral device to hold your jaw forward is usually    Oral " Appliance  What is oral appliance therapy?  An oral appliance is a small acrylic device that fits over the upper and lower teeth or tongue (similar to an orthodontic retainer or a mouth guard). This device slightly advances the lower jaw or tongue, which moves the base of the tongue forward, opens the airway, improves breathing and can effectively treat snoring and obstructive sleep apnea sleep apnea. The appliance is fabricated and customized by a qualified dentist with experience in treating snoring and sleep apnea. Oral appliances are usually well tolerated and have relatively high compliance by patients1, 2, 3.  When is an oral appliance indicated?  Oral appliance therapy is recommended as a first-line treatment for patients with primary snoring, mild sleep apnea, and for patients with moderate sleep apnea who prefer appliance therapy to use of CPAP4, 5. Severity of sleep apnea is determined by sleep testing and is based on the number of respiratory events per hour of sleep.   How successful is oral appliance therapy?  The success rate of oral appliance therapy in patients with mild sleep apnea is 75-80% while in patients with moderate sleep apnea it is 50-70%. The chance of success in patients with severe sleep apnea is 40-50%. The research also shows that oral appliances have a beneficial effect on the cardiovascular health of JORGE patients at the same magnitude as CPAP therapy7.  Oral appliances should be a second-line treatment in cases of severe sleep apnea, but if not completely successful then a combination therapy utilizing CPAP plus oral appliance therapy may be effective. Oral appliances tend to be effective in a broad range of patients although studies show that the patients who have the highest success are females, younger patients, those with milder disease, and less severe obesity. 3, 6.   The chances of success are lower in patients who have more severe JORGE, are older, and those who are morbidly  obese.     Example of an oral appliance   Finding a dentist that practices dental sleep medicine  Specific training is available through the American Academy of Dental Sleep Medicine for dentists interested in working in the field of sleep. To find a dentist who is educated in the field of sleep and the use of oral appliances, near you, visit the Web site of the American Academy of Dental Sleep Medicine; also see   http://www.accpstorage.org/newOrganization/patients/oralAppliances.pdf  To search for a dentist certified in these practices:  Http://aadsm.org/FindADentist.aspx?1  1. Herrera et al. Objectively measured vs self-reported compliance during oral appliance therapy for sleep-disordered breathing. Chest 2013; 144(5): 2838-7344.  2. Cinthya et al. Objective measurement of compliance during oral appliance therapy for sleep-disordered breathing. Thorax 2013; 68(1): 91-96.  3. Mira, et al. Mandibular advancement devices in 620 men and women with JORGE and snoring: tolerability and predictors of treatment success. Chest 2004; 125: 9619-8404.  4. Norberto, et al. Oral appliances for snoring and JORGE: a review. Sleep 2006; 29: 244-262.  5. Ivy et al. Oral appliance treatment for JORGE: an update. J Clin Sleep Med 2014; 10(2): 215-227.  6. Beau et al. Predictors of OSAH treatment outcome. J Dent Res 2007; 86: 3133-6532.    Oral Appliance  These are examples of two of many custom-made devices that are more likely to work in mild sleep apnea                                                      Oral appliances are dental mouth pieces that fit very much like a sports mouth guards or removable orthodontic retainers. They are used to treat snoring  And obstructive sleep apnea . The device prevents the airway from collapsing by either holding the tongue or supporting the jaw in a forward position. Since oral appliances are non-invasive and easy to use, they may be considered as an early treatment option.  Oral appliance therapy (OAT) involves the customization, selection, fabrication, fitting, adjustments and long-term follow-up care of specially designed oral devices, worn during sleep, which reposition the lower jaw and tongue base forward to maintain an open airway.  Custom made oral appliances are proven to be more effective than over-the-counter devices. Therefore, the over-the-counter devices are recommended not to be used as a screening tool nor as a therapeutic option.     Who gets a dental device?  Oral appliance therapy can be used as an alternative to  CPAP therapy for the treatment of mild to moderate sleep apnea and for those patients who prefer OAT to CPAP. Oral appliance therapy is a first line therapy for the treatment of primary snoring. Additionally, OAT is an option for those that cannot tolerate CPAP as therapy or who have experienced insufficient surgical results.                 Possible side effects?  Frequent but minor side effects include: excessive salivation, dry mouth, discomfort of teeth and jaw and temporary changes in the patient s bite.  Potential complications include: jaw pain, permanent occlusal changes and TMJ symptoms.  The above mentioned side effects and complications can be recognized and managed by dentists trained in dental sleep medicine.   Finding a dentist that practices dental sleep medicine  Specific training is available through the American Academy of Dental Sleep Medicine for dentists interested in working in the field of sleep. To find a dentist who is educated in the field of sleep and the use of oral appliances, near you, visit the Web site of the American Academy of Dental Sleep Medicine; also see http://www.accpstorage.org/newOrganization/patients/oralAppliances.pdf   To search for a dentist certified in these practices:  Http://aadsm.org/FindADentist.aspx?1  Http://www.accpstorage.org/newOrganization/patients/oralAppliances.pdf            Your BMI is Body mass  index is 40.25 kg/(m^2).  Weight management is a personal decision.  If you are interested in exploring weight loss strategies, the following discussion covers the approaches that may be successful. Body mass index (BMI) is one way to tell whether you are at a healthy weight, overweight, or obese. It measures your weight in relation to your height.  A BMI of 18.5 to 24.9 is in the healthy range. A person with a BMI of 25 to 29.9 is considered overweight, and someone with a BMI of 30 or greater is considered obese. More than two-thirds of American adults are considered overweight or obese.  Being overweight or obese increases the risk for further weight gain. Excess weight may lead to heart disease and diabetes.  Creating and following plans for healthy eating and physical activity may help you improve your health.  Weight control is part of healthy lifestyle and includes exercise, emotional health, and healthy eating habits. Careful eating habits lifelong are the mainstay of weight control. Though there are significant health benefits from weight loss, long-term weight loss with diet alone may be very difficult to achieve- studies show long-term success with dietary management in less than 10% of people. Attaining a healthy weight may be especially difficult to achieve in those with severe obesity. In some cases, medications, devices and surgical management might be considered.  What can you do?  If you are overweight or obese and are interested in methods for weight loss, you should discuss this with your provider.     Consider reducing daily calorie intake by 500 calories.     Keep a food journal.     Avoiding skipping meals, consider cutting portions instead.    Diet combined with exercise helps maintain muscle while optimizing fat loss. Strength training is particularly important for building and maintaining muscle mass. Exercise helps reduce stress, increase energy, and improves fitness. Increasing exercise  without diet control, however, may not burn enough calories to loose weight.       Start walking three days a week 10-20 minutes at a time    Work towards walking thirty minutes five days a week     Eventually, increase the speed of your walking for 1-2 minutes at time    In addition, we recommend that you review healthy lifestyles and methods for weight loss available through the National Institutes of Health patient information sites:  http://win.niddk.nih.gov/publications/index.htm    And look into health and wellness programs that may be available through your health insurance provider, employer, local community center, or zuh[employer] club.    Weight management plan: Patient was referred to their PCP to discuss a diet and exercise plan.     Your blood pressure was checked while you were in clinic today.  Please read the guidelines below about what these numbers mean and what you should do about them.  Your systolic blood pressure is the top number.  This is the pressure when the heart is pumping.  Your diastolic blood pressure is the bottom number.  This is the pressure in between beats.  If your systolic blood pressure is less than 120 and your diastolic blood pressure is less than 80, then your blood pressure is normal. There is nothing more that you need to do about it  If your systolic blood pressure is 120-139 or your diastolic blood pressure is 80-89, your blood pressure may be higher than it should be.  You should have your blood pressure re-checked within a year by a primary care provider.  If your systolic blood pressure is 140 or greater or your diastolic blood pressure is 90 or greater, you may have high blood pressure.  High blood pressure is treatable, but if left untreated over time it can put you at risk for heart attack, stroke, or kidney failure.  You should have your blood pressure re-checked by a primary care provider within the next four weeks.

## 2018-12-05 NOTE — NURSING NOTE
"Chief Complaint   Patient presents with     RECHECK     F/u hst 11/8       Initial /81  Pulse 81  Resp 18  Ht 1.549 m (5' 1\")  Wt 96.6 kg (213 lb)  LMP 08/30/2013  SpO2 95%  BMI 40.25 kg/m2 Estimated body mass index is 40.25 kg/(m^2) as calculated from the following:    Height as of this encounter: 1.549 m (5' 1\").    Weight as of this encounter: 96.6 kg (213 lb).    Medication Reconciliation: complete    Karli Cunningham LPN/VIRAJ    DME: N      "

## 2018-12-05 NOTE — PROGRESS NOTES
Sleep Study Follow-Up Visit:    Date on this visit: 12/5/2018    ASSESSMENT / PLAN:       JORGE (obstructive sleep apnea)  Complex sleep apnea syndrome  Discussed with patient the recent home sleep study showed an mild complex sleep apnea mild sleep related hypoxemia in setting of patient with diastolic congestive heart failure and COPD, we recommend all-night titration for treatment of CPAP therapy but patient declined it and she wants oral appliance instead for the treatment of her mild JORGE and/or socially disrupting snoring.  Sure about her insurance may not pay the sleep study.  Avoid sleeping supine position and weight loss as below.  Instructions given. Follow up 6 months after oral device use and repeat home sleep study to determine efficacy of oral device. If in-laboratory sleep study is ordered or required for oral appliance titration, make sure to bring your adjustment tool for the sleep study, and in addition will contact sleep dentist for the details of oral appliance titration.      All questions were answered.  The patient indicates understanding of the above issues and agrees with the plan set forth.    Orders Placed This Encounter   Procedures     SLEEP DENTAL REFERRAL       She will follow up with me in about 6 month(s).       BRIEF SUMMARY:    Marni Austin is a 48 year old female with history of COPD, asthma, chronic diastolic heart failure, hypertension, coronary disease, hyperlipidemia, chronic smoker and depression comes in today for follow-up of her sleep study done on 11/8/18 at the [facility] for result of sleep study. Please see H&P for his presenting sleep symptoms for additional details.    Home sleep study: 11/8/18   AHI 11.7/hr (supine 16.6/hr)  ROCIO: 4.6/hr  EVAN 7.9/hr  Snore index 0.6%  Lowest O 2 saturation is 81%  Time spent O 2 saturation below 88% was 5.6 minutes     These findings were reviewed with the patient and copy of the sleep study result was given.    Past  medical/surgical history, family history, social history, medications and allergies were reviewed.      Problem List:  Patient Active Problem List    Diagnosis Date Noted     CHF (congestive heart failure) (H) 08/06/2018     Priority: Medium     Moderate persistent asthma 08/02/2018     Priority: Medium     Morbid obesity (H) 07/31/2018     Priority: Medium     Non morbid obesity 04/12/2017     Priority: Medium     Elevated fasting glucose 03/30/2017     Priority: Medium     Chest pain, unspecified chest pain type 02/02/2016     Priority: Medium     CAD (coronary artery disease) 03/06/2015     Priority: Medium     COPD (chronic obstructive pulmonary disease) (H) 03/06/2015     Priority: Medium     Rectal bleeding 03/06/2015     Priority: Medium     COPD exacerbation (H) 02/27/2015     Priority: Medium     Health Care Home 09/17/2013     Priority: Medium       Care Coordinator:  Zahira Ko    See Letters for [facility] Care Plan           Tobacco use disorder 09/16/2013     Priority: Medium     Hypertension goal BP (blood pressure) < 140/90 09/16/2013     Priority: Medium     CARDIOVASCULAR SCREENING; LDL GOAL LESS THAN 130 10/31/2010     Priority: Medium     Menorrhagia 09/08/2009     Priority: Medium     Adjustment disorder with anxiety 01/25/2007     Priority: Medium             Medications:     Current Outpatient Prescriptions   Medication Sig     albuterol (2.5 MG/3ML) 0.083% neb solution Take 2.5 mg by nebulization every 6 hours as needed for shortness of breath / dyspnea or wheezing     albuterol (PROAIR HFA/PROVENTIL HFA/VENTOLIN HFA) 108 (90 BASE) MCG/ACT Inhaler Inhale 2 puffs into the lungs every 6 hours as needed for shortness of breath / dyspnea or wheezing     amLODIPine (NORVASC) 10 MG tablet Take 1 tablet (10 mg) by mouth daily     aspirin EC 81 MG EC tablet Take 1 tablet (81 mg) by mouth daily     atorvastatin (LIPITOR) 80 MG tablet Take 1 tablet (80 mg) by mouth daily     cetirizine  "(ZYRTEC) 10 MG tablet Take 10 mg by mouth daily     chlorthalidone (HYGROTON) 25 MG tablet Take 0.5 tablets (12.5 mg) by mouth daily     citalopram (CELEXA) 20 MG tablet Take 1 tablet (20 mg) by mouth daily     fluticasone-vilanterol (BREO ELLIPTA) 200-25 MCG/INH inhaler Inhale 1 puff into the lungs daily     furosemide (LASIX) 20 MG tablet Take 1 tablet (20 mg) by mouth daily     hydrALAZINE (APRESOLINE) 50 MG tablet Take 1 tablet (50 mg) by mouth 2 times daily     ipratropium - albuterol 0.5 mg/2.5 mg/3 mL (DUONEB) 0.5-2.5 (3) MG/3ML neb solution Take 1 vial (3 mLs) by nebulization every 4 hours as needed for shortness of breath / dyspnea or wheezing     nitroGLYcerin (NITROSTAT) 0.4 MG sublingual tablet Place 1 tablet (0.4 mg) under the tongue every 5 minutes as needed for chest pain     potassium chloride SA (K-DUR/KLOR-CON M) 10 MEQ CR tablet Take 1 tablet (10 mEq) by mouth daily     No current facility-administered medications for this visit.           PHYSICAL EXAMINATION:  /81  Pulse 81  Resp 18  Ht 1.549 m (5' 1\")  Wt 96.6 kg (213 lb)  LMP 08/30/2013  SpO2 95%  BMI 40.25 kg/m2          Data: All pertinent previous laboratory data reviewed     No results found for: PH, PHARTERIAL, PO2, NS3ZESYPTXK, SAT, PCO2, HCO3, BASEEXCESS, RASHAAD, BEB  Lab Results   Component Value Date    TSH 0.87 02/27/2015    TSH 0.77 07/03/2008     Lab Results   Component Value Date     (H) 10/18/2018     (H) 09/17/2018     Lab Results   Component Value Date    HGB 14.9 10/18/2018    HGB 14.2 07/20/2018     Lab Results   Component Value Date    BUN 16 10/18/2018    BUN 16 09/17/2018    CR 0.92 10/18/2018    CR 0.74 09/17/2018     Lab Results   Component Value Date    YENI 141 10/18/2018        Fifteen minutes spent with patient, all of which were spent face-to-face counseling, consulting, coordinating plan of care, going over sleep test results and chart review.          Charlie Serrano MD 12/5/2018 "   [facility]  [address] [place] [address]   [phone] Clinic      CC: No ref. provider found  Copy to: David Gambino

## 2018-12-20 NOTE — PROGRESS NOTES
SUBJECTIVE:   Marni Austin is a 48 year old female who presents to clinic today for the following health issues:    RESPIRATORY SYMPTOMS    Duration: 1-2 weeks     Description  nasal congestion and cough    Severity: moderate    Accompanying signs and symptoms: mucus production, weakness, fatigue     History (predisposing factors):  asthma, COPD and tobacco abuse    Precipitating or alleviating factors: None    Therapies tried and outcome:  Robitussin, somewhat effective     -Patient is a 49yo female who presents with a 1-2 week hx increased upper respiratory symptoms  -Around her grandson who was also sick  -She notes increased nasal congestion and sore throat   -throat worse in the morning   -nose constantly seems plugged  -increased cough, painful; sounds harsh  -it is productive; she is wheezing some  -using breo   -continues to remain abstinent from tobacco  -does not think her breathing has improved enough after stopping   -finding it hard to exercise because of this    Problem list and histories reviewed & adjusted, as indicated.  Additional history: as documented    Patient Active Problem List   Diagnosis     Adjustment disorder with anxiety     Menorrhagia     CARDIOVASCULAR SCREENING; LDL GOAL LESS THAN 130     Tobacco use disorder     Hypertension goal BP (blood pressure) < 140/90     Health Care Home     COPD exacerbation (H)     CAD (coronary artery disease)     COPD (chronic obstructive pulmonary disease) (H)     Rectal bleeding     Chest pain, unspecified chest pain type     Elevated fasting glucose     Non morbid obesity     Morbid obesity (H)     Moderate persistent asthma     CHF (congestive heart failure) (H)     Past Surgical History:   Procedure Laterality Date     CARDIAC SURGERY       TONSILLECTOMY       Tubal ligation surgery         Social History     Tobacco Use     Smoking status: Former Smoker     Packs/day: 0.50     Years: 26.00     Pack years: 13.00     Start date: 1/1/1979      "Last attempt to quit: 2018     Years since quittin.4     Smokeless tobacco: Never Used     Tobacco comment: stopped 2 days ago   Substance Use Topics     Alcohol use: No     Alcohol/week: 0.0 oz     Family History   Problem Relation Age of Onset     Hypertension Mother      Diabetes Father      Cerebrovascular Disease Maternal Grandmother      Cancer Maternal Grandfather            Reviewed and updated as needed this visit by clinical staff       Reviewed and updated as needed this visit by Provider         ROS:  Constitutional, HEENT, cardiovascular, pulmonary, gi and gu systems are negative, except as otherwise noted.    OBJECTIVE:     /82   Pulse 94   Temp 98.9  F (37.2  C) (Tympanic)   Resp 14   Ht 1.549 m (5' 1\")   Wt 95.9 kg (211 lb 8 oz)   LMP 2013   SpO2 93%   BMI 39.96 kg/m    Body mass index is 39.96 kg/m .  GENERAL: healthy, alert and no distress  EYES: Eyes grossly normal to inspection, PERRL and conjunctivae and sclerae normal  HENT: normal cephalic/atraumatic, ear canals and TM's normal, nasal mucosa edematous , rhinorrhea thick, oropharynx clear and sinuses: maxillary tenderness on both sides  NECK: no adenopathy  RESP: tight; quiet wheeze throughout  CV: regular rates and rhythm  MS: No peripheral edema   NEURO: mentation intact    Diagnostic Test Results:  none     ASSESSMENT/PLAN:   1. Chronic obstructive pulmonary disease with acute exacerbation (H)  1-2 week hx of symptoms with persistent worsening. Tight on exam today. Reviewed her treatments today. I am unclear why but she was using qvar in addition to her Breo. It was no longer on her med list. Instead, id like her to add anticholinergic Incruse. She can continue nebs, but with addition of incruse will hold the duoneb and use albuterol only. For the acute symptoms I would like her to start abx and prednisone. Long term, I think she'd really benefit from pulmonary rehab especially as she does not feel she is getting " the improvement of breathing with improved inhalers and not smoking. Referral is placed.   - PULMONARY REHAB REFERRAL; Future  - predniSONE (DELTASONE) 20 MG tablet; Take 40 mg by mouth daily for 5 days.  Dispense: 10 tablet; Refill: 0  - doxycycline monohydrate (ADOXA) 100 MG tablet; Take 1 tablet (100 mg) by mouth 2 times daily for 10 days  Dispense: 20 tablet; Refill: 0  - umeclidinium (INCRUSE ELLIPTA) 62.5 MCG/INH inhaler; Inhale 1 puff into the lungs daily  Dispense: 3 Inhaler; Refill: 0  - albuterol (PROVENTIL) (2.5 MG/3ML) 0.083% neb solution; Take 1 vial (2.5 mg) by nebulization every 6 hours as needed for shortness of breath / dyspnea or wheezing  Dispense: 1 Box; Refill: 1    David Gambino PA-C  [facility]

## 2018-12-21 ENCOUNTER — OFFICE VISIT (OUTPATIENT)
Dept: FAMILY MEDICINE | Facility: CLINIC | Age: 48
End: 2018-12-21
Payer: COMMERCIAL

## 2018-12-21 VITALS
HEART RATE: 94 BPM | TEMPERATURE: 98.9 F | HEIGHT: 61 IN | OXYGEN SATURATION: 93 % | BODY MASS INDEX: 39.93 KG/M2 | DIASTOLIC BLOOD PRESSURE: 82 MMHG | WEIGHT: 211.5 LBS | SYSTOLIC BLOOD PRESSURE: 128 MMHG | RESPIRATION RATE: 14 BRPM

## 2018-12-21 DIAGNOSIS — J44.1 CHRONIC OBSTRUCTIVE PULMONARY DISEASE WITH ACUTE EXACERBATION (H): Primary | ICD-10-CM

## 2018-12-21 PROCEDURE — 99214 OFFICE O/P EST MOD 30 MIN: CPT | Performed by: PHYSICIAN ASSISTANT

## 2018-12-21 RX ORDER — ALBUTEROL SULFATE 0.83 MG/ML
2.5 SOLUTION RESPIRATORY (INHALATION) EVERY 6 HOURS PRN
Qty: 1 BOX | Refills: 1 | Status: SHIPPED | OUTPATIENT
Start: 2018-12-21 | End: 2019-02-26

## 2018-12-21 RX ORDER — ALBUTEROL SULFATE 0.83 MG/ML
2.5 SOLUTION RESPIRATORY (INHALATION) EVERY 6 HOURS PRN
Qty: 1 BOX | Refills: 1 | Status: SHIPPED | OUTPATIENT
Start: 2018-12-21 | End: 2018-12-21

## 2018-12-21 RX ORDER — PREDNISONE 20 MG/1
40 TABLET ORAL DAILY
Qty: 10 TABLET | Refills: 0 | Status: SHIPPED | OUTPATIENT
Start: 2018-12-21 | End: 2019-06-03

## 2018-12-21 RX ORDER — DOXYCYCLINE 100 MG/1
100 TABLET ORAL 2 TIMES DAILY
Qty: 20 TABLET | Refills: 0 | Status: SHIPPED | OUTPATIENT
Start: 2018-12-21 | End: 2019-06-03

## 2018-12-21 ASSESSMENT — MIFFLIN-ST. JEOR: SCORE: 1516.74

## 2019-01-18 ENCOUNTER — OFFICE VISIT (OUTPATIENT)
Dept: FAMILY MEDICINE | Facility: CLINIC | Age: 49
End: 2019-01-18
Payer: COMMERCIAL

## 2019-01-18 ENCOUNTER — TELEPHONE (OUTPATIENT)
Dept: FAMILY MEDICINE | Facility: CLINIC | Age: 49
End: 2019-01-18

## 2019-01-18 ENCOUNTER — HOSPITAL ENCOUNTER (OUTPATIENT)
Dept: CT IMAGING | Facility: CLINIC | Age: 49
Discharge: HOME | End: 2019-01-18
Attending: FAMILY MEDICINE | Admitting: FAMILY MEDICINE
Payer: COMMERCIAL

## 2019-01-18 VITALS
SYSTOLIC BLOOD PRESSURE: 114 MMHG | TEMPERATURE: 97.9 F | OXYGEN SATURATION: 93 % | DIASTOLIC BLOOD PRESSURE: 70 MMHG | BODY MASS INDEX: 39.08 KG/M2 | HEIGHT: 61 IN | HEART RATE: 89 BPM | WEIGHT: 207 LBS

## 2019-01-18 DIAGNOSIS — K59.00 CONSTIPATION, UNSPECIFIED CONSTIPATION TYPE: ICD-10-CM

## 2019-01-18 DIAGNOSIS — J45.40 MODERATE PERSISTENT ASTHMA, UNSPECIFIED WHETHER COMPLICATED: ICD-10-CM

## 2019-01-18 DIAGNOSIS — E66.01 MORBID OBESITY (H): ICD-10-CM

## 2019-01-18 DIAGNOSIS — Z51.81 MEDICATION MONITORING ENCOUNTER: ICD-10-CM

## 2019-01-18 DIAGNOSIS — I25.10 CORONARY ARTERY DISEASE INVOLVING NATIVE HEART WITHOUT ANGINA PECTORIS, UNSPECIFIED VESSEL OR LESION TYPE: ICD-10-CM

## 2019-01-18 DIAGNOSIS — I10 HYPERTENSION GOAL BP (BLOOD PRESSURE) < 140/90: ICD-10-CM

## 2019-01-18 DIAGNOSIS — R19.7 DIARRHEA, UNSPECIFIED TYPE: ICD-10-CM

## 2019-01-18 DIAGNOSIS — R10.11 RUQ ABDOMINAL PAIN: ICD-10-CM

## 2019-01-18 DIAGNOSIS — J44.1 CHRONIC OBSTRUCTIVE PULMONARY DISEASE WITH ACUTE EXACERBATION (H): ICD-10-CM

## 2019-01-18 DIAGNOSIS — R10.11 RUQ ABDOMINAL PAIN: Primary | ICD-10-CM

## 2019-01-18 LAB
ALBUMIN SERPL-MCNC: 3.6 G/DL (ref 3.4–5)
ALBUMIN UR-MCNC: NEGATIVE MG/DL
ALP SERPL-CCNC: 138 U/L (ref 40–150)
ALT SERPL W P-5'-P-CCNC: 38 U/L (ref 0–50)
AMYLASE SERPL-CCNC: 40 U/L (ref 30–110)
ANION GAP SERPL CALCULATED.3IONS-SCNC: 13 MMOL/L (ref 3–14)
APPEARANCE UR: CLEAR
AST SERPL W P-5'-P-CCNC: 28 U/L (ref 0–45)
BACTERIA #/AREA URNS HPF: ABNORMAL /HPF
BASOPHILS # BLD AUTO: 0 10E9/L (ref 0–0.2)
BASOPHILS NFR BLD AUTO: 0.3 %
BILIRUB SERPL-MCNC: 0.4 MG/DL (ref 0.2–1.3)
BILIRUB UR QL STRIP: NEGATIVE
BUN SERPL-MCNC: 15 MG/DL (ref 7–30)
C DIFF TOX B STL QL: NEGATIVE
CALCIUM SERPL-MCNC: 9.2 MG/DL (ref 8.5–10.1)
CHLORIDE SERPL-SCNC: 103 MMOL/L (ref 94–109)
CO2 SERPL-SCNC: 23 MMOL/L (ref 20–32)
COLOR UR AUTO: YELLOW
CREAT SERPL-MCNC: 0.76 MG/DL (ref 0.52–1.04)
CRP SERPL-MCNC: 15 MG/L (ref 0–8)
DIFFERENTIAL METHOD BLD: NORMAL
EOSINOPHIL # BLD AUTO: 0.1 10E9/L (ref 0–0.7)
EOSINOPHIL NFR BLD AUTO: 1.4 %
ERYTHROCYTE [DISTWIDTH] IN BLOOD BY AUTOMATED COUNT: 13 % (ref 10–15)
ERYTHROCYTE [SEDIMENTATION RATE] IN BLOOD BY WESTERGREN METHOD: 15 MM/H (ref 0–30)
GFR SERPL CREATININE-BSD FRML MDRD: >90 ML/MIN/{1.73_M2}
GLUCOSE SERPL-MCNC: 144 MG/DL (ref 70–99)
GLUCOSE UR STRIP-MCNC: NEGATIVE MG/DL
HCT VFR BLD AUTO: 43.6 % (ref 35–47)
HGB BLD-MCNC: 15 G/DL (ref 11.7–15.7)
HGB UR QL STRIP: NEGATIVE
KETONES UR STRIP-MCNC: NEGATIVE MG/DL
LEUKOCYTE ESTERASE UR QL STRIP: ABNORMAL
LIPASE SERPL-CCNC: 83 U/L (ref 73–393)
LYMPHOCYTES # BLD AUTO: 1.8 10E9/L (ref 0.8–5.3)
LYMPHOCYTES NFR BLD AUTO: 19 %
MCH RBC QN AUTO: 29 PG (ref 26.5–33)
MCHC RBC AUTO-ENTMCNC: 34.4 G/DL (ref 31.5–36.5)
MCV RBC AUTO: 84 FL (ref 78–100)
MONOCYTES # BLD AUTO: 0.6 10E9/L (ref 0–1.3)
MONOCYTES NFR BLD AUTO: 6.8 %
NEUTROPHILS # BLD AUTO: 6.8 10E9/L (ref 1.6–8.3)
NEUTROPHILS NFR BLD AUTO: 72.5 %
NITRATE UR QL: NEGATIVE
NON-SQ EPI CELLS #/AREA URNS LPF: ABNORMAL /LPF
PH UR STRIP: 6.5 PH (ref 5–7)
PLATELET # BLD AUTO: 323 10E9/L (ref 150–450)
POTASSIUM SERPL-SCNC: 3.1 MMOL/L (ref 3.4–5.3)
PROT SERPL-MCNC: 7.7 G/DL (ref 6.8–8.8)
RBC # BLD AUTO: 5.18 10E12/L (ref 3.8–5.2)
RBC #/AREA URNS AUTO: ABNORMAL /HPF
SODIUM SERPL-SCNC: 139 MMOL/L (ref 133–144)
SOURCE: ABNORMAL
SP GR UR STRIP: 1.02 (ref 1–1.03)
SPECIMEN SOURCE: NORMAL
UROBILINOGEN UR STRIP-ACNC: 0.2 EU/DL (ref 0.2–1)
WBC # BLD AUTO: 9.4 10E9/L (ref 4–11)
WBC #/AREA URNS AUTO: ABNORMAL /HPF

## 2019-01-18 PROCEDURE — 82150 ASSAY OF AMYLASE: CPT | Performed by: FAMILY MEDICINE

## 2019-01-18 PROCEDURE — 36415 COLL VENOUS BLD VENIPUNCTURE: CPT | Performed by: FAMILY MEDICINE

## 2019-01-18 PROCEDURE — 85652 RBC SED RATE AUTOMATED: CPT | Performed by: FAMILY MEDICINE

## 2019-01-18 PROCEDURE — 99214 OFFICE O/P EST MOD 30 MIN: CPT | Performed by: FAMILY MEDICINE

## 2019-01-18 PROCEDURE — 25000128 H RX IP 250 OP 636: Performed by: FAMILY MEDICINE

## 2019-01-18 PROCEDURE — 80053 COMPREHEN METABOLIC PANEL: CPT | Performed by: FAMILY MEDICINE

## 2019-01-18 PROCEDURE — 85025 COMPLETE CBC W/AUTO DIFF WBC: CPT | Performed by: FAMILY MEDICINE

## 2019-01-18 PROCEDURE — 74177 CT ABD & PELVIS W/CONTRAST: CPT

## 2019-01-18 PROCEDURE — 83690 ASSAY OF LIPASE: CPT | Performed by: FAMILY MEDICINE

## 2019-01-18 PROCEDURE — 86140 C-REACTIVE PROTEIN: CPT | Performed by: FAMILY MEDICINE

## 2019-01-18 PROCEDURE — 87177 OVA AND PARASITES SMEARS: CPT | Performed by: FAMILY MEDICINE

## 2019-01-18 PROCEDURE — 87209 SMEAR COMPLEX STAIN: CPT | Performed by: FAMILY MEDICINE

## 2019-01-18 PROCEDURE — 81001 URINALYSIS AUTO W/SCOPE: CPT | Performed by: FAMILY MEDICINE

## 2019-01-18 PROCEDURE — 87493 C DIFF AMPLIFIED PROBE: CPT | Performed by: FAMILY MEDICINE

## 2019-01-18 PROCEDURE — 87329 GIARDIA AG IA: CPT | Performed by: FAMILY MEDICINE

## 2019-01-18 RX ORDER — IOPAMIDOL 755 MG/ML
500 INJECTION, SOLUTION INTRAVASCULAR ONCE
Status: COMPLETED | OUTPATIENT
Start: 2019-01-18 | End: 2019-01-18

## 2019-01-18 RX ADMIN — IOPAMIDOL 91 ML: 755 INJECTION, SOLUTION INTRAVENOUS at 14:29

## 2019-01-18 RX ADMIN — SODIUM CHLORIDE 52 ML: 9 INJECTION, SOLUTION INTRAVENOUS at 14:29

## 2019-01-18 ASSESSMENT — MIFFLIN-ST. JEOR: SCORE: 1496.33

## 2019-01-18 NOTE — TELEPHONE ENCOUNTER
Message handled by Nurse Triage with [facility] - provider name: MD JUANIS - CT negative. just a few small diverticula. Await lab results. Can check US if patient wants to rule out stones.    Called patient @ [phone]    Advised patient of results - stated she would like to wait for the rest of the labs then go from there.   Patient stated an understanding and agreed with plan.    Magalis Pan RN  [facility]

## 2019-01-18 NOTE — PROGRESS NOTES
SUBJECTIVE:   Marni Austin is a 48 year old female who presents to clinic today for the following health issues:    ABDOMINAL   PAIN     Onset: 2 weeks    Description:   Character: Sharp  Location: right upper quadrant  Radiation: Back    Intensity: 0/10    Progression of Symptoms:  improving    Accompanying Signs & Symptoms:  Fever/Chills?: YES  Gas/Bloating: YES  Nausea: YES  Vomitting: no   Diarrhea?: YES - had an accidental  Constipation:YES  Dysuria or Hematuria: no    History:   Trauma: no   Previous similar pain: no    Previous tests done: none    Precipitating factors:   Does the pain change with:     Food: YES- increases diarrhea     BM: YES- little    Urination: no     Alleviating factors:  none    Therapies Tried and outcome:  heat       Blowing nose constantly, coughing productive of yellow chunks    Patient reports she has had some diarrhea and then she takes Peptobismal which leads to constipation. Patient reports pain is worse when constipated. Patient reports some episodes of the chills. Patient reports the food she eats is going through her system in 10 minutes. Patient denies any recent travel and any recent antibiotic use. Patient reports increased fatigue. Pain is exacerbated by eating. Patient reports her stools are brown and very soft, she denies watery stools.     Problem list and histories reviewed & adjusted, as indicated.  Additional history: as documented    BP Readings from Last 3 Encounters:   01/18/19 114/70   12/21/18 128/82   12/05/18 130/81       body mass index is 39.11 kg/m .    Wt Readings from Last 4 Encounters:   01/18/19 93.9 kg (207 lb)   12/21/18 95.9 kg (211 lb 8 oz)   12/05/18 96.6 kg (213 lb)   11/15/18 96.6 kg (213 lb)       Health Maintenance    Health Maintenance Due   Topic Date Due     HF ACTION PLAN Q3 YR  1970     SPIROMETRY ONETIME  1968     COPD ACTION PLAN Q1 YR  1970     HIV SCREEN (SYSTEM ASSIGNED)  03/24/1986     LIPID MONITORING Q1  YEAR  02/27/2018     COLON CANCER SCREEN (SYSTEM ASSIGNED)  03/24/2018     ZOSTER IMMUNIZATION (1 of 2) 03/24/2018     ASTHMA ACTION PLAN Q1 YR  01/16/2019     PHQ-2 Q1 YR  02/09/2019       Current Problem List    Patient Active Problem List   Diagnosis     Adjustment disorder with anxiety     Menorrhagia     CARDIOVASCULAR SCREENING; LDL GOAL LESS THAN 130     Tobacco use disorder     Hypertension goal BP (blood pressure) < 140/90     Health Care Home     COPD exacerbation (H)     CAD (coronary artery disease)     COPD (chronic obstructive pulmonary disease) (H)     Rectal bleeding     Chest pain, unspecified chest pain type     Elevated fasting glucose     Non morbid obesity     Morbid obesity (H)     Moderate persistent asthma     CHF (congestive heart failure) (H)       Past Medical History    Past Medical History:   Diagnosis Date     CAD (coronary artery disease)      COPD (chronic obstructive pulmonary disease) (H)      Depression      Hypertension        Past Surgical History    Past Surgical History:   Procedure Laterality Date     CARDIAC SURGERY  2015    Cardiac stents x 1     TONSILLECTOMY Bilateral 1985     Tubal ligation surgery  1993       Current Medications    Current Outpatient Medications   Medication Sig Dispense Refill     albuterol (PROAIR HFA/PROVENTIL HFA/VENTOLIN HFA) 108 (90 BASE) MCG/ACT Inhaler Inhale 2 puffs into the lungs every 6 hours as needed for shortness of breath / dyspnea or wheezing 3 Inhaler 1     albuterol (PROVENTIL) (2.5 MG/3ML) 0.083% neb solution Take 1 vial (2.5 mg) by nebulization every 6 hours as needed for shortness of breath / dyspnea or wheezing 1 Box 1     amLODIPine (NORVASC) 10 MG tablet Take 1 tablet (10 mg) by mouth daily 90 tablet 1     aspirin EC 81 MG EC tablet Take 1 tablet (81 mg) by mouth daily       atorvastatin (LIPITOR) 80 MG tablet Take 1 tablet (80 mg) by mouth daily 90 tablet 1     cetirizine (ZYRTEC) 10 MG tablet Take 10 mg by mouth daily        chlorthalidone (HYGROTON) 25 MG tablet Take 0.5 tablets (12.5 mg) by mouth daily 45 tablet 1     citalopram (CELEXA) 20 MG tablet Take 1 tablet (20 mg) by mouth daily 90 tablet 1     fluticasone-vilanterol (BREO ELLIPTA) 200-25 MCG/INH inhaler Inhale 1 puff into the lungs daily 3 Inhaler 0     furosemide (LASIX) 20 MG tablet Take 1 tablet (20 mg) by mouth daily 30 tablet 0     hydrALAZINE (APRESOLINE) 50 MG tablet Take 1 tablet (50 mg) by mouth 2 times daily 180 tablet 1     nitroGLYcerin (NITROSTAT) 0.4 MG sublingual tablet Place 1 tablet (0.4 mg) under the tongue every 5 minutes as needed for chest pain 25 tablet 3     potassium chloride SA (K-DUR/KLOR-CON M) 10 MEQ CR tablet Take 1 tablet (10 mEq) by mouth daily 90 tablet 1     umeclidinium (INCRUSE ELLIPTA) 62.5 MCG/INH inhaler Inhale 1 puff into the lungs daily 3 Inhaler 0       Allergies    Allergies   Allergen Reactions     Zyban [Bupropion Hcl] Anaphylaxis     Bupropion Rash       Immunizations    Immunization History   Administered Date(s) Administered     Influenza Vaccine IM 3yrs+ 4 Valent IIV4 11/16/2016, 11/01/2018     Pneumococcal 23 valent 10/30/2018     TD (ADULT, 7+) 03/27/1998     TDAP Vaccine (Adacel) 03/21/2017       Family History    Family History   Problem Relation Age of Onset     Hypertension Mother      Heart Disease Mother      Alcoholism Mother      Diabetes Father      Coronary Artery Disease Father      Cerebrovascular Disease Father      Alzheimer Disease Maternal Grandmother      Heart Disease Maternal Grandmother      Lung Cancer Maternal Grandfather      Obesity Brother        Social History    Social History     Socioeconomic History     Marital status:      Spouse name: Julian     Number of children: 3     Years of education: Not on file     Highest education level: Not on file   Social Needs     Financial resource strain: Not on file     Food insecurity - worry: Not on file     Food insecurity - inability: Not on file      "Transportation needs - medical: Not on file     Transportation needs - non-medical: Not on file   Occupational History     Occupation:    Tobacco Use     Smoking status: Former Smoker     Packs/day: 0.50     Years: 26.00     Pack years: 13.00     Start date: 1979     Last attempt to quit: 2018     Years since quittin.5     Smokeless tobacco: Never Used     Tobacco comment: quit 2018   Substance and Sexual Activity     Alcohol use: No     Alcohol/week: 0.0 oz     Drug use: No     Sexual activity: No   Other Topics Concern      Service No     Blood Transfusions No     Caffeine Concern Yes     Comment: \"uses a lot of it\"     Occupational Exposure No     Hobby Hazards No     Sleep Concern Yes     Comment: trouble sleeping     Stress Concern Yes     Comment: work related     Weight Concern Yes     Special Diet No     Back Care No     Exercise No     Bike Helmet No     Seat Belt Yes     Self-Exams No     Parent/sibling w/ CABG, MI or angioplasty before 65F 55M? Not Asked   Social History Narrative     Not on file       All above reviewed and updated, all stable unless otherwise noted    Recent labs reviewed    ROS:  Constitutional, HEENT, cardiovascular, pulmonary, GI, , musculoskeletal, neuro, skin, endocrine and psych systems are negative, except as otherwise noted.    OBJECTIVE:                                                    /70   Pulse 89   Temp 97.9  F (36.6  C) (Oral)   Ht 1.549 m (5' 1\")   Wt 93.9 kg (207 lb)   LMP 2013   SpO2 93%   BMI 39.11 kg/m    Body mass index is 39.11 kg/m .  GENERAL: healthy, alert and no distress  EYES: Eyes grossly normal to inspection, extraocular movements - intact, and PERRL  HENT: ear canals- normal; TMs- normal; Nose- normal; Mouth- no ulcers, no lesions  NECK: no tenderness, no adenopathy, no asymmetry, no masses, no stiffness; thyroid- normal to palpation  RESP: lungs clear to auscultation - no rales, no rhonchi, no " wheezes  CV: regular rates and rhythm, normal S1 S2, no S3 or S4 and no murmur, no click or rub -  ABDOMEN: soft, generalized abdominal tenderness worse on right than left, no  hepatosplenomegaly, no masses, normal bowel sounds noted, no edema  SKIN: no suspicious lesions, no rashes  MS: extremities- no gross deformitiescnoashes  NEURO: strength and tone- rmal, sensory exam- grossly normal, mentation- intact, speech- normal  BACK: no CVA tenderness, no paralumbar tenderness  PSYCH: Alert and oriented times 3; speech- coherent , normal rate and volume; able to articulate logical thoughts, able to abstract reason, no tangential thoughts, no hallucinations or delusions, affect- normal    DIAGNOSTICS/PROCEDURES:                                                      No results found for this or any previous visit (from the past 24 hour(s)).     Labs Pending     ASSESSMENT/PLAN:                                                        ICD-10-CM    1. RUQ abdominal pain R10.11 Comprehensive metabolic panel     CBC with platelets and differential     Lipase     Amylase     Erythrocyte sedimentation rate auto     CRP inflammation     Fecal Lactoferrin     Enteric Bacteria and Virus Panel by CARSON Stool     Giardia antigen     Clostridium difficile Toxin B PCR     Ova and Parasite Exam Routine     Ova and Parasite Exam Routine     *UA reflex to Microscopic and Culture ([place] [facility] (except [place] and [place])     Urine Microscopic     Ova and Parasite Exam Routine     Clostridium difficile Toxin B PCR     Giardia antigen     CANCELED: CT Abdomen Pelvis w/o & w Contrast   2. Diarrhea, unspecified type R19.7 Comprehensive metabolic panel     CBC with platelets and differential     Lipase     Amylase     Erythrocyte sedimentation rate auto     CRP inflammation     Fecal Lactoferrin     Enteric Bacteria and Virus Panel by CARSON Stool     Giardia antigen     Clostridium difficile Toxin B PCR     Ova and Parasite Exam  Routine     Ova and Parasite Exam Routine     *UA reflex to Microscopic and Culture ([facility] (except [place] and [place])     Ova and Parasite Exam Routine     Clostridium difficile Toxin B PCR     Giardia antigen     CANCELED: CT Abdomen Pelvis w/o & w Contrast   3. Constipation, unspecified constipation type K59.00 Comprehensive metabolic panel     CBC with platelets and differential     Lipase     Amylase     Erythrocyte sedimentation rate auto     CRP inflammation     Fecal Lactoferrin     Enteric Bacteria and Virus Panel by CARSON Stool     Giardia antigen     Clostridium difficile Toxin B PCR     Ova and Parasite Exam Routine     Ova and Parasite Exam Routine     *UA reflex to Microscopic and Culture ([facility] (except [place] and [place])     Ova and Parasite Exam Routine     Clostridium difficile Toxin B PCR     Giardia antigen     CANCELED: CT Abdomen Pelvis w/o & w Contrast   4. Moderate persistent asthma, unspecified whether complicated J45.40    5. Hypertension goal BP (blood pressure) < 140/90 I10    6. Coronary artery disease involving native heart without angina pectoris, unspecified vessel or lesion type I25.10    7. Chronic obstructive pulmonary disease with acute exacerbation (H) J44.1    8. Morbid obesity (H) E66.01    9. Medication monitoring encounter Z51.81      Discussed treatment/modality options, including risk and benefits, she desires advised aspirin 81 mg po daily, advised 1 multivitamin per day, advised calcium 3909-2771 mg/d and Vitamin D 800-1200 IU/d, advised dentist every 6 months, advised diet and exercise and advised opthalmologist every 1-2 years. All diagnosis above reviewed and noted above, otherwise stable.  See [facility] orders for further details.  Follow up as needed.    1) Patient presented today with RUQ abdominal pain and diarrhea. Labs/Stool cultures ordered today. Abdominal CT ordered today.      2) Follow up if symptoms persist  or worsen.    Health Maintenance Due   Topic Date Due     HF ACTION PLAN Q3 YR  1970     SPIROMETRY ONETIME  1970     COPD ACTION PLAN Q1 YR  1968     HIV SCREEN (SYSTEM ASSIGNED)  03/24/1986     LIPID MONITORING Q1 YEAR  02/27/2018     COLON CANCER SCREEN (SYSTEM ASSIGNED)  03/24/2018     ZOSTER IMMUNIZATION (1 of 2) 03/24/2018     ASTHMA ACTION PLAN Q1 YR  01/16/2019     PHQ-2 Q1 YR  02/09/2019     This document serves as a record of the services and decisions personally performed and made by Graham Hare MD. It was created on his behalf by Eliud Umanzor, a trained medical scribe. The creation of this document is based on the provider's statements to the medical scribe.  Eliud Umanzor January 18, 2019 10:24 AM     The information in this document, created by the medical scribe for me, accurately reflects the services I personally performed and the decisions made by me. I have reviewed and approved this document for accuracy prior to leaving the patient care area.  January 18, 2019            Graham Hare MD [address]  [id number] [phone] Fax

## 2019-01-18 NOTE — PATIENT INSTRUCTIONS
Stool cultures and abdominal CT ordered today.     CT Scheduled for today, 01/18/2019, at [facility] ([address] [place], [place]) at 2:30pm (check-in at 2 pm)   Instructions: do not eat or drink anything 2 hours prior to appointment (11:30am)    [facility]                        To reach your care team during and after hours:   [phone]  To reach our pharmacy:        [phone]    Clinic Hours                        Our clinic hours are:    Monday   7:30 am to 7:00 pm                  Tuesday through Friday 7:30 am to 5:00 pm                             Saturday   8:00 am to 12:00 pm      Sunday   Closed      Pharmacy Hours                        Our pharmacy hours are:    Monday   8:30 am to 7:00 pm       Tuesday to Friday  8:30 am to 6:00 pm                       Saturday    9:00 am to 1:00 pm              Sunday    Closed              There is also information available at our web site:  [email or web address]    If your provider ordered any lab tests and you do not receive the results within 10 business days, please call the clinic.    If you need a medication refill please contact your pharmacy.  Please allow 2-3 business days for your refill to be completed.    Our clinic offers telephone visits and e visits.  Please ask one of your team members to explain more.      Use [employer] (secure email communication and access to your chart) to send your primary care provider a message or make an appointment. Ask someone on your Team how to sign up for [employer].  Immunizations                      Immunization History   Administered Date(s) Administered     Influenza Vaccine IM 3yrs+ 4 Valent IIV4 11/16/2016, 11/01/2018     Pneumococcal 23 valent 10/30/2018     TD (ADULT, 7+) 03/27/1998     TDAP Vaccine (Adacel) 03/21/2017        Health Maintenance                         Health Maintenance Due   Topic Date Due     Heart Failure Action Plan Reviewed Every 3 Years  1970      Breathing Capacity Test  1970     COPD ACTION PLAN Q1 YR  1968     HIV SCREEN (SYSTEM ASSIGNED)  03/24/1986     Cholesterol Lab - yearly  02/27/2018     Colon Cancer Screening - every 10 years.  03/24/2018     Zoster (Chicken Pox) Vaccine (1 of 2) 03/24/2018     Asthma Action Plan - yearly  01/16/2019     Depression Assessment 2 - yearly  02/09/2019

## 2019-01-21 LAB
G LAMBLIA AG STL QL IA: NORMAL
O+P STL MICRO: ABNORMAL
O+P STL MICRO: ABNORMAL
SPECIMEN SOURCE: ABNORMAL
SPECIMEN SOURCE: NORMAL

## 2019-01-27 DIAGNOSIS — J44.9 CHRONIC OBSTRUCTIVE PULMONARY DISEASE, UNSPECIFIED COPD TYPE (H): ICD-10-CM

## 2019-01-28 NOTE — TELEPHONE ENCOUNTER
"Requested Prescriptions   Pending Prescriptions Disp Refills     BREO ELLIPTA 200-25 MCG/INH Inhaler [Pharmacy Med Name: BREO  INH  ELLIPTA 200 25]  Last Written Prescription Date:  10/30/19  Last Fill Quantity: 3,  # refills: 0   Last office visit: 1/18/2019 with prescribing provider:  Graham Hare MD    Future Office Visit:           Sig: INHALE 1 PUFF DAILY    Inhaled Steroids Protocol Passed - 1/27/2019  8:15 PM       Passed - Patient is age 12 or older       Passed - Asthma control assessment score within normal limits in last 6 months    Please review ACT score.   ACT Total Scores 1/16/2018 7/31/2018 9/17/2018   ACT TOTAL SCORE - - -   ASTHMA ER VISITS - - -   ASTHMA HOSPITALIZATIONS - - -   ACT TOTAL SCORE (Goal Greater than or Equal to 20) 14 11 20   In the past 12 months, how many times did you visit the emergency room for your asthma without being admitted to the hospital? 0 (No Data) 0   In the past 12 months, how many times were you hospitalized overnight because of your asthma? 0 1 0              Passed - Medication is active on med list       Passed - Recent (6 mo) or future (30 days) visit within the authorizing provider's specialty    Patient had office visit in the last 6 months or has a visit in the next 30 days with authorizing provider or within the authorizing provider's specialty.  See \"Patient Info\" tab in inbasket, or \"Choose Columns\" in Meds & Orders section of the refill encounter.              "

## 2019-01-29 NOTE — TELEPHONE ENCOUNTER
Prescription approved per [facility], [facility] or [facility] refill protocol.  Celina HERNÁNDEZ RN - Triage  [facility]

## 2019-02-20 DIAGNOSIS — J44.1 CHRONIC OBSTRUCTIVE PULMONARY DISEASE WITH ACUTE EXACERBATION (H): ICD-10-CM

## 2019-02-20 NOTE — TELEPHONE ENCOUNTER
"Requested Prescriptions   Pending Prescriptions Disp Refills     INCRUSE ELLIPTA 62.5 MCG/INH Inhaler [Pharmacy Med Name: INCRUSE  62.5MCG  INH  ELLIPTA]  Last Written Prescription Date:  12/21/2018  Last Fill Quantity: 3 inhaler,  # refills: 0   Last office visit: 1/18/2019 with prescribing provider:  Graham Hare MD    Future Office Visit:           Sig: USE 1 PUFF DAILY    Asthma Maintenance Inhalers - Anticholinergics Passed - 2/20/2019  5:47 AM       Passed - Patient is age 12 years or older       Passed - Asthma control assessment score within normal limits in last 6 months    Please review ACT score.          Passed - Medication is active on med list       Passed - Recent (6 mo) or future (30 days) visit within the authorizing provider's specialty    Patient had office visit in the last 6 months or has a visit in the next 30 days with authorizing provider or within the authorizing provider's specialty.  See \"Patient Info\" tab in inbasket, or \"Choose Columns\" in Meds & Orders section of the refill encounter.              "

## 2019-02-21 DIAGNOSIS — R60.0 LOWER EXTREMITY EDEMA: ICD-10-CM

## 2019-02-21 DIAGNOSIS — I10 ESSENTIAL HYPERTENSION WITH GOAL BLOOD PRESSURE LESS THAN 140/90: ICD-10-CM

## 2019-02-21 NOTE — TELEPHONE ENCOUNTER
Routing refill request to provider for review/approval because:  Labs out of range:  Potassium  Result notes advised patient start supplement ordered by Dr. Hare in     Celina HERNÁNDEZ RN - Triage  [facility]

## 2019-02-21 NOTE — TELEPHONE ENCOUNTER
"Requested Prescriptions   Pending Prescriptions Disp Refills     chlorthalidone (HYGROTON) 25 MG tablet [Pharmacy Med Name: CHLORTHALIDONE  25MG  TAB]  Last Written Prescription Date:  10/17/18  Last Fill Quantity: 45,  # refills: 1   Last office visit: 1/18/2019 with prescribing provider:  Graham Hare MD    Future Office Visit:     45 tablet 1     Sig: TAKE ONE-HALF TABLET BY  MOUTH DAILY    Diuretics (Including Combos) Protocol Failed - 2/21/2019  5:24 AM       Failed - Normal serum potassium on file in past 12 months    Recent Labs   Lab Test 01/18/19  1057   POTASSIUM 3.1*                   Passed - Blood pressure under 140/90 in past 12 months    BP Readings from Last 3 Encounters:   01/18/19 114/70   12/21/18 128/82   12/05/18 130/81                Passed - Recent (12 mo) or future (30 days) visit within the authorizing provider's specialty    Patient had office visit in the last 12 months or has a visit in the next 30 days with authorizing provider or within the authorizing provider's specialty.  See \"Patient Info\" tab in inbasket, or \"Choose Columns\" in Meds & Orders section of the refill encounter.             Passed - Medication is active on med list       Passed - Patient is age 18 or older       Passed - No active pregancy on record       Passed - Normal serum creatinine on file in past 12 months    Recent Labs   Lab Test 01/18/19  1057   CR 0.76             Passed - Normal serum sodium on file in past 12 months    Recent Labs   Lab Test 01/18/19  1057                Passed - No positive pregnancy test in past 12 months          "

## 2019-02-21 NOTE — TELEPHONE ENCOUNTER
Prescription approved per [facility] Refill Protocol.    Grisel Paez RN Flex

## 2019-02-25 RX ORDER — CHLORTHALIDONE 25 MG/1
TABLET ORAL
Qty: 45 TABLET | Refills: 1 | Status: ON HOLD | OUTPATIENT
Start: 2019-02-25 | End: 2019-10-02

## 2019-04-23 DIAGNOSIS — I21.4 NSTEMI (NON-ST ELEVATED MYOCARDIAL INFARCTION) (H): ICD-10-CM

## 2019-04-26 RX ORDER — NITROGLYCERIN 0.4 MG/1
TABLET SUBLINGUAL
Qty: 100 TABLET
Start: 2019-04-26

## 2019-05-01 DIAGNOSIS — J44.1 CHRONIC OBSTRUCTIVE PULMONARY DISEASE WITH ACUTE EXACERBATION (H): ICD-10-CM

## 2019-05-02 NOTE — TELEPHONE ENCOUNTER
Medication is being filled for 1 time refill only due to:  Pt over due for office visit.     Shirley Andrade RN - Triage  [facility]

## 2019-05-02 NOTE — TELEPHONE ENCOUNTER
"Requested Prescriptions   Pending Prescriptions Disp Refills     INCRUSE ELLIPTA 62.5 MCG/INH Inhaler [Pharmacy Med Name: INCRUSE  62.5MCG  INH  ELLIPTA]       Sig: USE 1 INHALATION DAILY  Last Written Prescription Date:  02/21/219  Last Fill Quantity: 3 inhaler,  # refills: 0   Last Office Visit: 1/18/2019   Future Office Visit:         Asthma Maintenance Inhalers - Anticholinergics Failed - 5/1/2019  8:25 PM        Failed - Asthma control assessment score within normal limits in last 6 months     Please review ACT score.  ACT Total Scores 1/16/2018 7/31/2018 9/17/2018   ACT TOTAL SCORE - - -   ASTHMA ER VISITS - - -   ASTHMA HOSPITALIZATIONS - - -   ACT TOTAL SCORE (Goal Greater than or Equal to 20) 14 11 20   In the past 12 months, how many times did you visit the emergency room for your asthma without being admitted to the hospital? 0 (No Data) 0   In the past 12 months, how many times were you hospitalized overnight because of your asthma? 0 1 0              Passed - Patient is age 12 years or older        Passed - Medication is active on med list        Passed - Recent (6 mo) or future (30 days) visit within the authorizing provider's specialty     Patient had office visit in the last 6 months or has a visit in the next 30 days with authorizing provider or within the authorizing provider's specialty.  See \"Patient Info\" tab in inbasket, or \"Choose Columns\" in Meds & Orders section of the refill encounter.              "

## 2019-05-31 ENCOUNTER — OFFICE VISIT (OUTPATIENT)
Dept: FAMILY MEDICINE | Facility: CLINIC | Age: 49
End: 2019-05-31
Payer: COMMERCIAL

## 2019-05-31 VITALS
TEMPERATURE: 98.2 F | OXYGEN SATURATION: 94 % | SYSTOLIC BLOOD PRESSURE: 118 MMHG | WEIGHT: 200.9 LBS | BODY MASS INDEX: 37.93 KG/M2 | RESPIRATION RATE: 18 BRPM | DIASTOLIC BLOOD PRESSURE: 72 MMHG | HEIGHT: 61 IN | HEART RATE: 87 BPM

## 2019-05-31 DIAGNOSIS — E87.6 HYPOKALEMIA: ICD-10-CM

## 2019-05-31 DIAGNOSIS — I10 ESSENTIAL HYPERTENSION WITH GOAL BLOOD PRESSURE LESS THAN 140/90: ICD-10-CM

## 2019-05-31 DIAGNOSIS — R60.0 LOWER EXTREMITY EDEMA: ICD-10-CM

## 2019-05-31 DIAGNOSIS — I25.10 CORONARY ARTERY DISEASE INVOLVING NATIVE HEART WITHOUT ANGINA PECTORIS, UNSPECIFIED VESSEL OR LESION TYPE: ICD-10-CM

## 2019-05-31 DIAGNOSIS — S99.911A ANKLE INJURY, RIGHT, INITIAL ENCOUNTER: Primary | ICD-10-CM

## 2019-05-31 DIAGNOSIS — E66.01 MORBID OBESITY (H): ICD-10-CM

## 2019-05-31 DIAGNOSIS — F43.22 ADJUSTMENT DISORDER WITH ANXIETY: ICD-10-CM

## 2019-05-31 DIAGNOSIS — Z12.11 SPECIAL SCREENING FOR MALIGNANT NEOPLASMS, COLON: ICD-10-CM

## 2019-05-31 DIAGNOSIS — J44.1 CHRONIC OBSTRUCTIVE PULMONARY DISEASE WITH ACUTE EXACERBATION (H): ICD-10-CM

## 2019-05-31 LAB
ALBUMIN SERPL-MCNC: 3.9 G/DL (ref 3.4–5)
ALP SERPL-CCNC: 138 U/L (ref 40–150)
ALT SERPL W P-5'-P-CCNC: 35 U/L (ref 0–50)
ANION GAP SERPL CALCULATED.3IONS-SCNC: 9 MMOL/L (ref 3–14)
AST SERPL W P-5'-P-CCNC: 22 U/L (ref 0–45)
BILIRUB SERPL-MCNC: 0.4 MG/DL (ref 0.2–1.3)
BUN SERPL-MCNC: 19 MG/DL (ref 7–30)
CALCIUM SERPL-MCNC: 9.1 MG/DL (ref 8.5–10.1)
CHLORIDE SERPL-SCNC: 106 MMOL/L (ref 94–109)
CHOLEST SERPL-MCNC: 133 MG/DL
CO2 SERPL-SCNC: 25 MMOL/L (ref 20–32)
CREAT SERPL-MCNC: 0.83 MG/DL (ref 0.52–1.04)
GFR SERPL CREATININE-BSD FRML MDRD: 82 ML/MIN/{1.73_M2}
GLUCOSE SERPL-MCNC: 104 MG/DL (ref 70–99)
HDLC SERPL-MCNC: 37 MG/DL
LDLC SERPL CALC-MCNC: ABNORMAL MG/DL
LDLC SERPL DIRECT ASSAY-MCNC: 67 MG/DL
NONHDLC SERPL-MCNC: 96 MG/DL
POTASSIUM SERPL-SCNC: 3.1 MMOL/L (ref 3.4–5.3)
PROT SERPL-MCNC: 7.6 G/DL (ref 6.8–8.8)
SODIUM SERPL-SCNC: 140 MMOL/L (ref 133–144)
TRIGL SERPL-MCNC: 440 MG/DL

## 2019-05-31 PROCEDURE — 80061 LIPID PANEL: CPT | Performed by: PHYSICIAN ASSISTANT

## 2019-05-31 PROCEDURE — 99214 OFFICE O/P EST MOD 30 MIN: CPT | Performed by: PHYSICIAN ASSISTANT

## 2019-05-31 PROCEDURE — 36415 COLL VENOUS BLD VENIPUNCTURE: CPT | Performed by: PHYSICIAN ASSISTANT

## 2019-05-31 PROCEDURE — 83721 ASSAY OF BLOOD LIPOPROTEIN: CPT | Mod: 59 | Performed by: PHYSICIAN ASSISTANT

## 2019-05-31 PROCEDURE — 80053 COMPREHEN METABOLIC PANEL: CPT | Performed by: PHYSICIAN ASSISTANT

## 2019-05-31 RX ORDER — NAPROXEN 500 MG/1
500 TABLET ORAL 2 TIMES DAILY WITH MEALS
Qty: 14 TABLET | Refills: 0 | Status: SHIPPED | OUTPATIENT
Start: 2019-05-31 | End: 2019-08-27

## 2019-05-31 ASSESSMENT — ANXIETY QUESTIONNAIRES
2. NOT BEING ABLE TO STOP OR CONTROL WORRYING: SEVERAL DAYS
3. WORRYING TOO MUCH ABOUT DIFFERENT THINGS: MORE THAN HALF THE DAYS
6. BECOMING EASILY ANNOYED OR IRRITABLE: MORE THAN HALF THE DAYS
5. BEING SO RESTLESS THAT IT IS HARD TO SIT STILL: SEVERAL DAYS
GAD7 TOTAL SCORE: 9
7. FEELING AFRAID AS IF SOMETHING AWFUL MIGHT HAPPEN: NOT AT ALL
1. FEELING NERVOUS, ANXIOUS, OR ON EDGE: SEVERAL DAYS

## 2019-05-31 ASSESSMENT — MIFFLIN-ST. JEOR: SCORE: 1463.66

## 2019-05-31 ASSESSMENT — PATIENT HEALTH QUESTIONNAIRE - PHQ9
SUM OF ALL RESPONSES TO PHQ QUESTIONS 1-9: 9
5. POOR APPETITE OR OVEREATING: MORE THAN HALF THE DAYS

## 2019-05-31 NOTE — TELEPHONE ENCOUNTER
Reason for call:  Other   Patient called regarding (reason for call): appointment  Additional comments: Marni wanted to see if she can get in with URBAN Gambino sooner then the 10th for medication check.     Phone number to reach patient:  Home number on file [phone] (home)    Best Time:  Any    Can we leave a detailed message on this number?  YES

## 2019-05-31 NOTE — LETTER
My COPD Action Plan     Name: Marni Austin    YOB: 1970   Date: 5/31/2019    My doctor: David Gambino PA-C   My clinic: [address] [address] [phone]  My Controller Medicine: Vilanterol/fluticasone (Breo Ellipta), Incruse   Dose: 1 puff daily     My Rescue Medicine: Albuterol (Proair/Ventolin/Proventil) inhaler   Dose: as needed     My Flare Up Medicine: none   Dose:      My COPD Severity: Moderate = FeV1 < 79% -50%      Use of Oxygen: Oxygen Not Prescribed      Make sure you've had your pneumonia   vaccines.          GREEN ZONE       Doing well today      Usual level of activity and exercise    Usual amount of cough and mucus    No shortness of breath    Usual level of health (thinking clearly, sleeping well, feel like eating) Actions:      Take daily medicines    Use oxygen as prescribed    Follow regular exercise and diet plan    Avoid cigarette smoke and other irritants that harm the lungs           YELLOW ZONE          Having a bad day or flare up      Short of breath more than usual    A lot more sputum (mucus) than usual    Sputum looks yellow, green, tan, brown or bloody    More coughing or wheezing    Fever or chills    Less energy; trouble completing activities    Trouble thinking or focusing    Using quick relief inhaler or nebulizer more often    Poor sleep; symptoms wake me up    Do not feel like eating Actions:      Get plenty of rest    Take daily medicines    Use quick relief inhaler every 4 hours    If you use oxygen, call you doctor to see if you should adjust your oxygen    Do breathing exercises or other things to help you relax    Let a loved one, friend or neighbor know you are feeling worse    Call your care team if you have 2 or more symptoms.  Start taking steroids or antibiotics if directed by your care team           RED ZONE       Need medical care now      Severe shortness of breath (feel you can't  breathe)    Fever, chills    Not enough breath to do any activity    Trouble coughing up mucus, walking or talking    Blood in mucus    Frequent coughing   Rescue medicines are not working    Not able to sleep because of breathing    Feel confused or drowsy    Chest pain    Actions:      Call your health care team.  If you cannot reach your care team, call 911 or go to the emergency room.        Annual Reminders:  Meet with Care Team, Flu Shot every Fall  Pharmacy:    [facility] [address] [place], [place] - [address]  [facility] - [place], [place] - [address] [facility] [place] - [place], [place] - [address] [place] [address]

## 2019-05-31 NOTE — LETTER
My Asthma Action Plan  Name: Marni Austin   YOB: 1970  Date: 5/31/2019   My doctor: David Gambino PA-C   My clinic: [facility]        My Control Medicine: { :203744}  My Rescue Medicine: { :715565}  {AAP include Oral Steroid:082672} My Asthma Severity: { :372768}  Avoid your asthma triggers: { :854929}  COPD     {Is patient a child or adult?:009796}       GREEN ZONE   Good Control    I feel good    No cough or wheeze    Can work, sleep and play without asthma symptoms       Take your asthma control medicine every day.     1. If exercise triggers your asthma, take your rescue medication    15 minutes before exercise or sports, and    During exercise if you have asthma symptoms  2. Spacer to use with inhaler: If you have a spacer, make sure to use it with your inhaler             YELLOW ZONE Getting Worse  I have ANY of these:    I do not feel good    Cough or wheeze    Chest feels tight    Wake up at night   1. Keep taking your Green Zone medications  2. Start taking your rescue medicine:    every 20 minutes for up to 1 hour. Then every 4 hours for 24-48 hours.  3. If you stay in the Yellow Zone for more than 12-24 hours, contact your doctor.  4. If you do not return to the Green Zone in 12-24 hours or you get worse, start taking your oral steroid medicine if prescribed by your provider.           RED ZONE Medical Alert - Get Help  I have ANY of these:    I feel awful    Medicine is not helping    Breathing getting harder    Trouble walking or talking    Nose opens wide to breathe       1. Take your rescue medicine NOW  2. If your provider has prescribed an oral steroid medicine, start taking it NOW  3. Call your doctor NOW  4. If you are still in the Red Zone after 20 minutes and you have not reached your doctor:    Take your rescue medicine again and    Call 911 or go to the emergency room right away    See your regular doctor within 2 weeks of an Emergency Room or  Urgent Care visit for follow-up treatment.          Annual Reminders:  Meet with Asthma Educator,  Flu Shot in the Fall, consider Pneumonia Vaccination for patients with asthma (aged 19 and older).    Pharmacy:    [facility] [id number] [place] [address] [id number] [facility] AT [address]  [facility] - [place], [place] - [address] [facility] [place] - [address]                      Asthma Triggers  How To Control Things That Make Your Asthma Worse    Triggers are things that make your asthma worse.  Look at the list below to help you find your triggers and what you can do about them.  You can help prevent asthma flare-ups by staying away from your triggers.      Trigger                                                          What you can do   Cigarette Smoke  Tobacco smoke can make asthma worse. Do not allow smoking in your home, car or around you.  Be sure no one smokes at a child s day care or school.  If you smoke, ask your health care provider for ways to help you quit.  Ask family members to quit too.  Ask your health care provider for a referral to Quit Plan to help you quit smoking, or call 2-397-763-PLAN.     Colds, Flu, Bronchitis  These are common triggers of asthma. Wash your hands often.  Don t touch your eyes, nose or mouth.  Get a flu shot every year.     Dust Mites  These are tiny bugs that live in cloth or carpet. They are too small to see. Wash sheets and blankets in hot water every week.   Encase pillows and mattress in dust mite proof covers.  Avoid having carpet if you can. If you have carpet, vacuum weekly.   Use a dust mask and HEPA vacuum.   Pollen and Outdoor Mold  Some people are allergic to trees, grass, or weed pollen, or molds. Try to keep your windows closed.  Limit time out doors when pollen count is high.   Ask you health care provider about taking medicine during allergy season.     Animal Dander  Some people  are allergic to skin flakes, urine or saliva from pets with fur or feathers. Keep pets with fur or feathers out of your home.    If you can t keep the pet outdoors, then keep the pet out of your bedroom.  Keep the bedroom door closed.  Keep pets off cloth furniture and away from stuffed toys.     Mice, Rats, and Cockroaches  Some people are allergic to the waste from these pests.   Cover food and garbage.  Clean up spills and food crumbs.  Store grease in the refrigerator.   Keep food out of the bedroom.   Indoor Mold  This can be a trigger if your home has high moisture. Fix leaking faucets, pipes, or other sources of water.   Clean moldy surfaces.  Dehumidify basement if it is damp and smelly.   Smoke, Strong Odors, and Sprays  These can reduce air quality. Stay away from strong odors and sprays, such as perfume, powder, hair spray, paints, smoke incense, paint, cleaning products, candles and new carpet.   Exercise or Sports  Some people with asthma have this trigger. Be active!  Ask your doctor about taking medicine before sports or exercise to prevent symptoms.    Warm up for 5-10 minutes before and after sports or exercise.     Other Triggers of Asthma  Cold air:  Cover your nose and mouth with a scarf.  Sometimes laughing or crying can be a trigger.  Some medicines and food can trigger asthma.

## 2019-05-31 NOTE — LETTER
My Heart Failure Action Plan   Name: Marni Austin    YOB: 1970   Date: 5/31/2019    My doctor: David Gambino     [facility]     [address] [facility] [address] [phone]  My Diagnosis: Reduced (HFr)- EF:Over 50%   My Exercise Goal: 30 minutes daily  .     My Weight Plan:   Wt Readings from Last 2 Encounters:   05/31/19 91.1 kg (200 lb 14.4 oz)   01/18/19 93.9 kg (207 lb)     Weigh yourself daily using the same scale. If you gain more than 2 pounds in 24 hours or 5 pounds in a week call the clinic    My Diet Goal: No added salt    Emergency Room Visits:    Our goal is to improve your quality of life and help you avoid a visit to the emergency room or hospital.  If we work together, we can achieve this goal. But, if you feel you need to call 911 or go to the emergency room, please do so.  If you go to the emergency room, please bring your list of medicines and your daily weight chart with you.       GREEN ZONE     Doing well today    Weight gained is no more than 2 pounds a day or 5 pounds a week.    No swelling in feet, ankles, legs or stomach.    No more swelling than usual.    No more trouble breathing than usual.    No change in my sleep.    No other problems. Actions:    I am doing fine.  I will take my medicine, follow my diet, see my doctor, exercise, and watch for symptoms.           YELLOW ZONE         Having a bad day or flare up    Weight gain of more than 2 pounds in one day or 5 pounds in one week.    New swelling in ankle, leg, knee or thigh.    Bloating in belly, pants feel tighter.    Swelling in hands or face.    Coughing or trouble breathing while walking or talking.    Harder to breathe last night.    Have trouble sleeping, wake up short of breath.    Much more tired than usual.    Not eating.    Pain in my chest or bad leg cramps.    Feel weak or dizzy. Actions:    I need to take action and call my doctor or nurse today.                  RED ZONE         Need medical care now    Weight gain of 5 pounds overnight.    Chest pain or pressure that does not go away.    Feel less alert.    Wheezing or have trouble breathing when at rest.    Cannot sleep lying down.    Cannot take my water pill.    Pass out or faint. Actions:    I need to call my doctor or nurse now!    Call 911 if I have chest pain or cannot breathe.

## 2019-05-31 NOTE — PROGRESS NOTES
Subjective     Marni Austin is a 49 year old female who presents to clinic today for the following health issues:    HPI   Asthma/COPD Follow-Up    Was ACT completed today?    Yes    ACT Total Scores 9/17/2018   ACT TOTAL SCORE -   ASTHMA ER VISITS -   ASTHMA HOSPITALIZATIONS -   ACT TOTAL SCORE (Goal Greater than or Equal to 20) 20   In the past 12 months, how many times did you visit the emergency room for your asthma without being admitted to the hospital? 0   In the past 12 months, how many times were you hospitalized overnight because of your asthma? 0       How many days per week do you miss taking your asthma controller medication?  0    Please describe any recent triggers for your asthma: None    Have you had any Emergency Room Visits, Urgent Care Visits, or Hospital Admissions since your last office visit?  No      COPD Follow-Up    Overall, how are your COPD symptoms since your last clinic visit?  No change    How much fatigue or shortness of breath do you have when you are walking?  None    How much shortness of breath do you have when you are resting?  None    How often do you cough? Sometimes    Have you noticed any change in your sputum/phlegm?  No    Have you experienced a recent fever? No    Please describe how far you can walk without stopping to rest:  The length of 1-2 rooms    How many flights of stairs are you able to walk up without stopping?  3 or more    Have you had any Emergency Room Visits, Urgent Care Visits, or Hospital Admissions because of your COPD since your last office visit?  No    History   Smoking Status     Former Smoker     Packs/day: 0.50     Years: 26.00     Start date: 1/1/1979     Quit date: 7/16/2018   Smokeless Tobacco     Never Used     Comment: quit July 2018     No results found for: FEV1, YYH6OQY    Amount of exercise or physical activity: Mild     Problems taking medications regularly: No    Medication side effects: none    Diet: regular (no restrictions)    She  notes her breathing is ok  Continues on Incruse and Breo  Unfortunately she has started smoking again  Only around 1/2 pack daily; started again 2/2 significant stress  Active at work but no other activity      Medication Followup of Citalopram     Taking Medication as prescribed: yes    Side Effects: None    Medication Helping Symptoms: yes, but depression has increased    -Patient states she is doing ok  -Overall she notes all of her symptoms are based around her   -he is the most significant stressor  -there is no SI        Musculoskeletal problem/pain      Duration: 1 month     Description  Location: right ankle     Intensity:  Moderate to severe    Accompanying signs and symptoms: swelling and pain     History  Previous similar problem: no   Previous evaluation:  none    Precipitating or alleviating factors:  Trauma or overuse: YES - tripped and fell   Aggravating factors include: standing    Therapies tried and outcome: Ibuprofen and cold wraps     -She notes that she was chasing after her dog in the back yard and she stepped into a deep hole  -noted significant right ankle pain/swelling  -she has tried to ice the area, used some ibuprofen but not regularly  -the symptoms are worse after being on her feet all day    Patient Active Problem List   Diagnosis     Adjustment disorder with anxiety     Menorrhagia     CARDIOVASCULAR SCREENING; LDL GOAL LESS THAN 130     Tobacco use disorder     Hypertension goal BP (blood pressure) < 140/90     Health Care Home     COPD exacerbation (H)     CAD (coronary artery disease)     COPD (chronic obstructive pulmonary disease) (H)     Rectal bleeding     Chest pain, unspecified chest pain type     Elevated fasting glucose     Non morbid obesity     Morbid obesity (H)     Moderate persistent asthma     CHF (congestive heart failure) (H)     Past Surgical History:   Procedure Laterality Date     CARDIAC SURGERY  2015    Cardiac stents x 1     TONSILLECTOMY Bilateral 1985  "    Tubal ligation surgery         Social History     Tobacco Use     Smoking status: Former Smoker     Packs/day: 0.50     Years: 26.00     Pack years: 13.00     Start date: 1979     Last attempt to quit: 2018     Years since quittin.8     Smokeless tobacco: Never Used     Tobacco comment: quit 2018   Substance Use Topics     Alcohol use: No     Alcohol/week: 0.0 oz     Family History   Problem Relation Age of Onset     Hypertension Mother      Heart Disease Mother      Alcoholism Mother      Diabetes Father      Coronary Artery Disease Father      Cerebrovascular Disease Father      Alzheimer Disease Maternal Grandmother      Heart Disease Maternal Grandmother      Lung Cancer Maternal Grandfather      Obesity Brother              Reviewed and updated as needed this visit by Provider         Review of Systems   ROS COMP: Constitutional, HEENT, cardiovascular, pulmonary, gi and gu systems are negative, except as otherwise noted.      Objective    /72   Pulse 87   Temp 98.2  F (36.8  C) (Tympanic)   Resp 18   Ht 1.549 m (5' 1\")   Wt 91.1 kg (200 lb 14.4 oz)   LMP 2013   SpO2 94%   BMI 37.96 kg/m    Body mass index is 37.96 kg/m .  Physical Exam   GENERAL: healthy, alert and no distress  RESP: lungs clear to auscultation - no rales, rhonchi or wheezes  CV: regular rates and rhythm  MS: 1+ peripheral edema bilaterally; There is mild ttp over the right medial malleolus. There is no bruising noted. She is ambulating with some favoring. There is no crepitus or joint laxity  SKIN: no suspicious lesions or rashes  NEURO: Normal strength and tone, mentation intact and speech normal  PSYCH: mentation appears normal, affect normal/bright    Diagnostic Test Results:  Labs reviewed in Epic -NEEDS UPDATES        Assessment & Plan     1. Ankle injury, right, initial encounter  No evidence for fracture. I do think this would be excellently treated with physical therapy though she defers " "for now. Trial below, ice and stretching.   - naproxen (NAPROSYN) 500 MG tablet; Take 1 tablet (500 mg) by mouth 2 times daily (with meals)  Dispense: 14 tablet; Refill: 0    2. Lower extremity edema  Continue chlorthalidone    3. Chronic obstructive pulmonary disease with acute exacerbation (H)  Well controlled on current inhaler regimen. Plan to continue. At one point we discussed respiratory therapy however she declines this at this time as she is doing well. Incidentally, she has unfortunately started to smoke occasionally 2/2 life stress. She will try cold turkey but call if needing chantix. Ok to fill without appt    4. Morbid obesity (H)  Needs to continue to work at this. Discussed diet/exercise ideas    5. Adjustment disorder with anxiety  Life and financial stressors increased (spouse) but otherwise going ok. Continue celexa    6. Essential hypertension with goal blood pressure less than 140/90  Controlled. Continue present management.   - Comprehensive metabolic panel    7. Coronary artery disease involving native heart without angina pectoris, unspecified vessel or lesion type  Needs updated lipids. See #4 re smoking. Reviewed dangers she is aware   - Lipid panel reflex to direct LDL Fasting  - LDL cholesterol direct  - LDL cholesterol direct    8. Hypokalemia  Update levels. If still trending low consider increasing supplement to 20meq    9. Special screening for malignant neoplasms, colon  due  - Fecal colorectal cancer screen (FIT); Future     BMI:   Estimated body mass index is 37.96 kg/m  as calculated from the following:    Height as of this encounter: 1.549 m (5' 1\").    Weight as of this encounter: 91.1 kg (200 lb 14.4 oz).   Weight management plan: Discussed healthy diet and exercise guidelines        Return in about 6 months (around 11/30/2019) for Physical Exam.    David Gambino PA-C  [facility]      "

## 2019-06-01 PROCEDURE — 82274 ASSAY TEST FOR BLOOD FECAL: CPT | Performed by: PHYSICIAN ASSISTANT

## 2019-06-01 ASSESSMENT — ASTHMA QUESTIONNAIRES: ACT_TOTALSCORE: 23

## 2019-06-01 ASSESSMENT — ANXIETY QUESTIONNAIRES: GAD7 TOTAL SCORE: 9

## 2019-06-03 DIAGNOSIS — E87.6 HYPOKALEMIA: Primary | ICD-10-CM

## 2019-06-04 RX ORDER — POTASSIUM CHLORIDE 1500 MG/1
20 TABLET, EXTENDED RELEASE ORAL 2 TIMES DAILY
Qty: 90 TABLET | Refills: 1 | Status: SHIPPED | OUTPATIENT
Start: 2019-06-04 | End: 2019-08-14

## 2019-06-09 LAB — HEMOCCULT STL QL IA: NEGATIVE

## 2019-06-10 DIAGNOSIS — Z12.11 SPECIAL SCREENING FOR MALIGNANT NEOPLASMS, COLON: ICD-10-CM

## 2019-06-10 DIAGNOSIS — I10 ESSENTIAL HYPERTENSION WITH GOAL BLOOD PRESSURE LESS THAN 140/90: ICD-10-CM

## 2019-06-11 NOTE — TELEPHONE ENCOUNTER
"Requested Prescriptions   Pending Prescriptions Disp Refills     amLODIPine (NORVASC) 10 MG tablet [Pharmacy Med Name: AMLODIPINE  10MG  TAB]  Last Written Prescription Date:  2/28/19  Last Fill Quantity: 90,  # refills: 0    Last office visit: 5/31/2019 with prescribing provider:  David Gambino PA-C       Future Office Visit:     90 tablet 0     Sig: TAKE 1 TABLET BY MOUTH  DAILY       Calcium Channel Blockers Protocol  Passed - 6/10/2019  8:15 PM        Passed - Blood pressure under 140/90 in past 12 months     BP Readings from Last 3 Encounters:   05/31/19 118/72   01/18/19 114/70   12/21/18 128/82                 Passed - Recent (12 mo) or future (30 days) visit within the authorizing provider's specialty     Patient had office visit in the last 12 months or has a visit in the next 30 days with authorizing provider or within the authorizing provider's specialty.  See \"Patient Info\" tab in inbasket, or \"Choose Columns\" in Meds & Orders section of the refill encounter.              Passed - Medication is active on med list        Passed - Patient is age 18 or older        Passed - No active pregnancy on record        Passed - Normal serum creatinine on file in past 12 months     Recent Labs   Lab Test 05/31/19  1654   CR 0.83             Passed - No positive pregnancy test in past 12 months          "

## 2019-06-12 RX ORDER — AMLODIPINE BESYLATE 10 MG/1
TABLET ORAL
Qty: 90 TABLET | Refills: 1 | Status: ON HOLD | OUTPATIENT
Start: 2019-06-12 | End: 2019-10-02

## 2019-06-12 NOTE — TELEPHONE ENCOUNTER
Prescription approved per [facility] Refill Protocol.    Deneen KNUTSON RN

## 2019-06-13 DIAGNOSIS — F43.22 ADJUSTMENT DISORDER WITH ANXIETY: ICD-10-CM

## 2019-06-14 RX ORDER — CITALOPRAM HYDROBROMIDE 20 MG/1
TABLET ORAL
Qty: 90 TABLET | Refills: 0 | Status: SHIPPED | OUTPATIENT
Start: 2019-06-14 | End: 2019-08-24

## 2019-06-14 NOTE — TELEPHONE ENCOUNTER
"Requested Prescriptions   Pending Prescriptions Disp Refills     citalopram (CELEXA) 20 MG tablet [Pharmacy Med Name: CITALOPRAM  20MG  TAB]  Last Written Prescription Date:  2/28/19  Last Fill Quantity: 90,  # refills: 0    Last office visit: 5/31/2019 with prescribing provider:  David Gambino PA-C       Future Office Visit:     90 tablet 0     Sig: TAKE 1 TABLET BY MOUTH  DAILY       SSRIs Protocol Passed - 6/13/2019  8:18 PM  PHQ-9 SCORE 8/30/2017 2/9/2018 5/31/2019   PHQ-9 Total Score MyChart 13 (Moderate depression) 8 (Mild depression) -   PHQ-9 Total Score 13 8 9     JAMEEL-7 SCORE 6/2/2017 2/9/2018 5/31/2019   Total Score - 5 (mild anxiety) -   Total Score 5 5 9               Passed - Recent (12 mo) or future (30 days) visit within the authorizing provider's specialty     Patient had office visit in the last 12 months or has a visit in the next 30 days with authorizing provider or within the authorizing provider's specialty.  See \"Patient Info\" tab in inbasket, or \"Choose Columns\" in Meds & Orders section of the refill encounter.              Passed - Medication is active on med list        Passed - Patient is age 18 or older        Passed - No active pregnancy on record        Passed - No positive pregnancy test in last 12 months          "

## 2019-06-14 NOTE — TELEPHONE ENCOUNTER
Prescription approved per [facility] Refill Protocol.      Sigifredo Sylvester RN, BSN, PHN

## 2019-07-25 DIAGNOSIS — J44.1 CHRONIC OBSTRUCTIVE PULMONARY DISEASE WITH ACUTE EXACERBATION (H): ICD-10-CM

## 2019-07-26 NOTE — TELEPHONE ENCOUNTER
"Requested Prescriptions   Pending Prescriptions Disp Refills     chlorthalidone (HYGROTON) 25 MG tablet [Pharmacy Med Name: CHLORTHALIDONE  25MG  TAB] 45 tablet      Sig: TAKE ONE-HALF TABLET BY  MOUTH DAILY  Last Written Prescription Date:  2/25/19  Last Fill Quantity: 45 tab,  # refills: 1   Last office visit: 5/31/2019 with prescribing provider:  Immanuel   Future Office Visit:           Diuretics (Including Combos) Protocol Failed - 7/25/2019  8:26 PM        Failed - Normal serum potassium on file in past 12 months     Recent Labs   Lab Test 05/31/19  1654   POTASSIUM 3.1*                    Passed - Blood pressure under 140/90 in past 12 months     BP Readings from Last 3 Encounters:   05/31/19 118/72   01/18/19 114/70   12/21/18 128/82                 Passed - Recent (12 mo) or future (30 days) visit within the authorizing provider's specialty     Patient had office visit in the last 12 months or has a visit in the next 30 days with authorizing provider or within the authorizing provider's specialty.  See \"Patient Info\" tab in inbasket, or \"Choose Columns\" in Meds & Orders section of the refill encounter.              Passed - Medication is active on med list        Passed - Patient is age 18 or older        Passed - No active pregancy on record        Passed - Normal serum creatinine on file in past 12 months     Recent Labs   Lab Test 05/31/19  1654   CR 0.83              Passed - Normal serum sodium on file in past 12 months     Recent Labs   Lab Test 05/31/19  1654                 Passed - No positive pregnancy test in past 12 months        INCRUSE ELLIPTA 62.5 MCG/INH Inhaler [Pharmacy Med Name: INCRUSE  62.5MCG  INH  ELLIPTA]       Sig: INHALE 1 INHALATION BY  MOUTH DAILY  Last Written Prescription Date:  5/2/19  Last Fill Quantity: 3 inhaler,  # refills: 0   Last office visit: 5/31/2019 with prescribing provider:  Immanuel   Future Office Visit:           Asthma Maintenance Inhalers - Anticholinergics " "Passed - 7/25/2019  8:26 PM        Passed - Patient is age 12 years or older        Passed - Asthma control assessment score within normal limits in last 6 months     Please review ACT score.           Passed - Medication is active on med list        Passed - Recent (6 mo) or future (30 days) visit within the authorizing provider's specialty     Patient had office visit in the last 6 months or has a visit in the next 30 days with authorizing provider or within the authorizing provider's specialty.  See \"Patient Info\" tab in inbasket, or \"Choose Columns\" in Meds & Orders section of the refill encounter.              "

## 2019-07-29 RX ORDER — CHLORTHALIDONE 25 MG/1
TABLET ORAL
Qty: 45 TABLET | Refills: 0 | Status: ON HOLD | OUTPATIENT
Start: 2019-07-29 | End: 2019-10-02

## 2019-07-29 NOTE — TELEPHONE ENCOUNTER
Prescription approved per [facility] Refill Protocol.  Sony Melara, RN, BSN

## 2019-08-14 DIAGNOSIS — E87.6 HYPOKALEMIA: ICD-10-CM

## 2019-08-15 NOTE — TELEPHONE ENCOUNTER
"Requested Prescriptions   Pending Prescriptions Disp Refills     potassium chloride ER (K-DUR/KLOR-CON M) 20 MEQ CR tablet [Pharmacy Med Name: POTASSIUM  20MEQ CONTROLLED RELEASE TABLET  SR CHLORIDE MC TB] 180 tablet      Sig: TAKE 1 TABLET BY MOUTH TWO  TIMES DAILY   Last Written Prescription Date:  6/4/19  Last Fill Quantity: 90,  # refills: 1   Last office visit: 5/31/2019 with prescribing provider:  David Gambino PA-C    Future Office Visit:        Potassium Supplements Protocol Failed - 8/14/2019  8:20 PM        Failed - Normal serum potassium in past 12 months     Recent Labs   Lab Test 05/31/19  1654   POTASSIUM 3.1*                    Passed - Recent (12 mo) or future (30 days) visit within the authorizing provider's specialty     Patient had office visit in the last 12 months or has a visit in the next 30 days with authorizing provider or within the authorizing provider's specialty.  See \"Patient Info\" tab in inbasket, or \"Choose Columns\" in Meds & Orders section of the refill encounter.              Passed - Medication is active on med list        Passed - Patient is age 18 or older          "

## 2019-08-15 NOTE — TELEPHONE ENCOUNTER
Pending Prescriptions:                       Disp   Refills    potassium chloride ER (K-DUR/KLOR-CON M) 2*180 ta*         Sig: TAKE 1 TABLET BY MOUTH TWO  TIMES DAILY    Routing refill request to provider for review/approval because:  Labs out of range:    Potassium   Date Value Ref Range Status   05/31/2019 3.1 (L) 3.4 - 5.3 mmol/L Final     Sony Melara, RN, BSN      (Covering RN, please route response(s) to your care team for any follow up that is needed. Thank you!)

## 2019-08-16 RX ORDER — POTASSIUM CHLORIDE 1500 MG/1
20 TABLET, EXTENDED RELEASE ORAL DAILY
Qty: 180 TABLET | Refills: 0 | Status: ON HOLD | OUTPATIENT
Start: 2019-08-16 | End: 2019-10-02

## 2019-08-16 NOTE — TELEPHONE ENCOUNTER
Refilling potassium. Please make sure she is taking just ONE tab daily to equal 20meq. And sometime over the next few weeks i'd like her schedule a lab visit at any [facility] with a lab to recheck potassium. No need to be fasting.

## 2019-08-24 DIAGNOSIS — F43.22 ADJUSTMENT DISORDER WITH ANXIETY: ICD-10-CM

## 2019-08-27 RX ORDER — CITALOPRAM HYDROBROMIDE 20 MG/1
TABLET ORAL
Qty: 90 TABLET | Refills: 0 | Status: SHIPPED | OUTPATIENT
Start: 2019-08-27 | End: 2019-10-21

## 2019-08-27 NOTE — TELEPHONE ENCOUNTER
"Citalopram 20 mg   Last Written Prescription Date:  6/14/19  Last Fill Quantity: 90,  # refills: 0   Last office visit: 5/31/2019 with prescribing provider:  MACHO   Future Office Visit:    Requested Prescriptions   Pending Prescriptions Disp Refills     citalopram (CELEXA) 20 MG tablet [Pharmacy Med Name: CITALOPRAM  20MG  TAB] 90 tablet 0     Sig: TAKE 1 TABLET BY MOUTH  DAILY       SSRIs Protocol Passed - 8/24/2019  8:40 PM        Passed - Recent (12 mo) or future (30 days) visit within the authorizing provider's specialty     Patient had office visit in the last 12 months or has a visit in the next 30 days with authorizing provider or within the authorizing provider's specialty.  See \"Patient Info\" tab in inbasket, or \"Choose Columns\" in Meds & Orders section of the refill encounter.              Passed - Medication is active on med list        Passed - Patient is age 18 or older        Passed - No active pregnancy on record        Passed - No positive pregnancy test in last 12 months        Prescription approved per [facility] [place].    Deneen KNUTSON RN     "

## 2019-09-03 ENCOUNTER — ANCILLARY PROCEDURE (OUTPATIENT)
Dept: GENERAL RADIOLOGY | Facility: CLINIC | Age: 49
End: 2019-09-03
Attending: PHYSICIAN ASSISTANT
Payer: COMMERCIAL

## 2019-09-03 ENCOUNTER — OFFICE VISIT (OUTPATIENT)
Dept: FAMILY MEDICINE | Facility: CLINIC | Age: 49
End: 2019-09-03
Payer: COMMERCIAL

## 2019-09-03 VITALS
OXYGEN SATURATION: 95 % | SYSTOLIC BLOOD PRESSURE: 112 MMHG | HEIGHT: 62 IN | HEART RATE: 106 BPM | DIASTOLIC BLOOD PRESSURE: 64 MMHG | BODY MASS INDEX: 35.46 KG/M2 | RESPIRATION RATE: 38 BRPM | WEIGHT: 192.7 LBS | TEMPERATURE: 98.1 F

## 2019-09-03 DIAGNOSIS — Z13.6 CARDIOVASCULAR SCREENING; LDL GOAL LESS THAN 130: ICD-10-CM

## 2019-09-03 DIAGNOSIS — I25.10 CORONARY ARTERY DISEASE INVOLVING NATIVE HEART WITHOUT ANGINA PECTORIS, UNSPECIFIED VESSEL OR LESION TYPE: ICD-10-CM

## 2019-09-03 DIAGNOSIS — J44.1 CHRONIC OBSTRUCTIVE PULMONARY DISEASE WITH ACUTE EXACERBATION (H): Primary | ICD-10-CM

## 2019-09-03 DIAGNOSIS — E87.6 HYPOKALEMIA: ICD-10-CM

## 2019-09-03 DIAGNOSIS — J44.1 CHRONIC OBSTRUCTIVE PULMONARY DISEASE WITH ACUTE EXACERBATION (H): ICD-10-CM

## 2019-09-03 PROCEDURE — 99214 OFFICE O/P EST MOD 30 MIN: CPT | Mod: 25 | Performed by: PHYSICIAN ASSISTANT

## 2019-09-03 PROCEDURE — 36415 COLL VENOUS BLD VENIPUNCTURE: CPT | Performed by: PHYSICIAN ASSISTANT

## 2019-09-03 PROCEDURE — 80048 BASIC METABOLIC PNL TOTAL CA: CPT | Performed by: PHYSICIAN ASSISTANT

## 2019-09-03 PROCEDURE — 94640 AIRWAY INHALATION TREATMENT: CPT | Performed by: PHYSICIAN ASSISTANT

## 2019-09-03 PROCEDURE — 71046 X-RAY EXAM CHEST 2 VIEWS: CPT | Mod: FY

## 2019-09-03 RX ORDER — IPRATROPIUM BROMIDE AND ALBUTEROL SULFATE 2.5; .5 MG/3ML; MG/3ML
3 SOLUTION RESPIRATORY (INHALATION) ONCE
Status: COMPLETED | OUTPATIENT
Start: 2019-09-03 | End: 2019-09-03

## 2019-09-03 RX ORDER — PREDNISONE 20 MG/1
TABLET ORAL
Qty: 20 TABLET | Refills: 0 | Status: ON HOLD | OUTPATIENT
Start: 2019-09-03 | End: 2019-10-02

## 2019-09-03 RX ADMIN — IPRATROPIUM BROMIDE AND ALBUTEROL SULFATE 3 ML: 2.5; .5 SOLUTION RESPIRATORY (INHALATION) at 11:37

## 2019-09-03 ASSESSMENT — MIFFLIN-ST. JEOR: SCORE: 1434.39

## 2019-09-03 NOTE — PROGRESS NOTES
"Subjective     Marni Austin is a 49 year old female who presents to clinic today for the following health issues:    HPI   Acute Illness   Acute illness concerns: Chest Cold  Onset: Ongoing for a week    Fever: no    Chills/Sweats: YES- Both    Headache (location?): YES- Orbital     Sinus Pressure:YES    Conjunctivitis:  no    Ear Pain: no    Rhinorrhea: YES    Congestion: YES Nasal and Chest    Sore Throat: no     Cough: YES - Productive    Wheeze: YES    Decreased Appetite: YES    Nausea: no    Vomiting: no    Diarrhea:  no    Dysuria/Freq.: no    Fatigue/Achiness: YES    Sick/Strep Exposure: no     Therapies Tried and outcome: Robitussin and generic pseudoephidrine    Marni presents with a 3 day hx of upper respiratory symptoms  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 She recently started wearing a mask to help with this  Then developed simple cold-like symptoms but now most of her symptoms are solely in the chest  She has had chills/sweats but no fevers at home  She is experiencing increased breathing difficulty, more with activity  Feels like \"someone is standing on the chest\" but not chest pain otherwise  She has been doing a nebulizer (albuterol) q 4 hours  Continues on Breo and incruse one puff daily  She denies any extremity swelling or any weight changes      Patient Active Problem List   Diagnosis     Adjustment disorder with anxiety     Menorrhagia     CARDIOVASCULAR SCREENING; LDL GOAL LESS THAN 130     Tobacco use disorder     Hypertension goal BP (blood pressure) < 140/90     Health Care Home     COPD exacerbation (H)     CAD (coronary artery disease)     COPD (chronic obstructive pulmonary disease) (H)     Rectal bleeding     Chest pain, unspecified chest pain type     Elevated fasting glucose     Non morbid obesity     Morbid obesity (H)     Moderate persistent asthma     CHF (congestive heart failure) (H)     Past Surgical History:   Procedure " "Laterality Date     CARDIAC SURGERY  2015    Cardiac stents x 1     TONSILLECTOMY Bilateral 1985     Tubal ligation surgery         Social History     Tobacco Use     Smoking status: Former Smoker     Packs/day: 0.50     Years: 26.00     Pack years: 13.00     Start date: 1979     Last attempt to quit: 2018     Years since quittin.1     Smokeless tobacco: Never Used     Tobacco comment: quit 2018   Substance Use Topics     Alcohol use: No     Alcohol/week: 0.0 oz     Family History   Problem Relation Age of Onset     Hypertension Mother      Heart Disease Mother      Alcoholism Mother      Diabetes Father      Coronary Artery Disease Father      Cerebrovascular Disease Father      Alzheimer Disease Maternal Grandmother      Heart Disease Maternal Grandmother      Lung Cancer Maternal Grandfather      Obesity Brother            Reviewed and updated as needed this visit by Provider         Review of Systems   ROS COMP: CONSTITUTIONAL:POSITIVE  for chills and sweats  ENT/MOUTH: POSITIVE for congestion  RESP:POSITIVE for cough-productive, dyspnea on exertion, wheezing, SOB/dyspnea and  Hx COPD   CV: NEGATIVE for chest pain, palpitations or peripheral edema  : negative for frequency  NEURO: NEGATIVE for weakness, dizziness or paresthesias      Objective    /64 (BP Location: Right arm, Patient Position: Chair, Cuff Size: Adult Regular)   Pulse 106   Temp 98.1  F (36.7  C) (Oral)   Resp (!) 38   Ht 1.562 m (5' 1.5\")   Wt 87.4 kg (192 lb 11.2 oz)   LMP 2013   SpO2 90%   BMI 35.82 kg/m    Body mass index is 35.82 kg/m .  Physical Exam   GENERAL: healthy, alert; there is some mild tachypnea but she is NOT in distress  EYES: Eyes grossly normal to inspection, PERRL and conjunctivae and sclerae normal  HENT: ear canals and TM's normal, nose and mouth without ulcers or lesions  NECK: no adenopathy and no overt JVD  RESP: lungs are quiet throughout with hernan expiratory wheeze  CV: " "tachycardia and normal S1 S2, no S3  MS: No peripheral edema   NEURO: mentation intact    Diagnostic Test Results:  CXR - there does not appear to be acute cardiopulmonary disease        Assessment & Plan     1. Chronic obstructive pulmonary disease with acute exacerbation (H)  Marni presents with a 3-4 day hx of increased wheeze and difficulty breathing. She has been using inhalers and nebs as prescribed but did not seem to be improving. He sats were initially low but after nebulization she did improve to baseline. There was still tachypnea. I reviewed with her her hx of hospitalization and recommended she continue with care, although with a transfer to [facility] [place]. She declined despite acknowledging ths possible serious risks. There is no evidence for pneumonia today and I will hold on abx treatment for now. We will reach out to her tomorrow to see how things are going and if not improving or worsening will recommend once again seeking higher level care.  - XR Chest 2 Views; Future  - ipratropium - albuterol 0.5 mg/2.5 mg/3 mL (DUONEB) neb solution 3 mL  - predniSONE (DELTASONE) 20 MG tablet; Take 3 tabs by mouth daily x 3 days, then 2 tabs daily x 3 days, then 1 tab daily x 3 days, then 1/2 tab daily x 3 days.  Dispense: 20 tablet; Refill: 0    2. Hypokalemia  Reviewed wth her the dosing. WE had discussed her using 10meq twice daily to get 20 total. Now prescribing 20meq dose.   - **Basic metabolic panel FUTURE anytime     BMI:   Estimated body mass index is 35.82 kg/m  as calculated from the following:    Height as of this encounter: 1.562 m (5' 1.5\").    Weight as of this encounter: 87.4 kg (192 lb 11.2 oz).   Weight management plan: Discussed healthy diet and exercise guidelines      Return in about 1 day (around 9/4/2019) for recheck if symptoms are not improving..    David Gambino PA-C  [facility]    "

## 2019-09-03 NOTE — LETTER
[facility]  [address] [address]  Phone: [phone]    September 3, 2019        Marni Austin  [address] [address]          To whom it may concern:    RE: Marni Austin    Patient was seen and treated today at our clinic. She is having a significant COPD flare and I cannot recommend she work during this time - please excuse her absences as medical necessity. She can return to work as her breathing tolerates.    Please contact me for questions or concerns.      Sincerely,        David Gambino PA-C

## 2019-09-03 NOTE — TELEPHONE ENCOUNTER
Pt seen in clinic with Manny Gambino PA-C today. Issue addressed and labs completed while in clinic.    Serge Dickens CMA ([facility])

## 2019-09-03 NOTE — NURSING NOTE
The following nebulizer treatment was given:     MEDICATION: Duoneb  : Jymob  LOT #: [id number]  EXPIRATION DATE:  May 2020  NDC # 0884-9176-315     Nebulizer Start Time:  1122 am  Nebulizer Stop Time:  1132 am  See Vital Signs Flowsheet    Serge Dickens CMA ([facility])

## 2019-09-04 ENCOUNTER — TELEPHONE (OUTPATIENT)
Dept: FAMILY MEDICINE | Facility: CLINIC | Age: 49
End: 2019-09-04

## 2019-09-04 DIAGNOSIS — I10 HYPERTENSION GOAL BP (BLOOD PRESSURE) < 140/90: Primary | ICD-10-CM

## 2019-09-04 LAB
ANION GAP SERPL CALCULATED.3IONS-SCNC: 9 MMOL/L (ref 3–14)
BUN SERPL-MCNC: 13 MG/DL (ref 7–30)
CALCIUM SERPL-MCNC: 8.9 MG/DL (ref 8.5–10.1)
CHLORIDE SERPL-SCNC: 101 MMOL/L (ref 94–109)
CO2 SERPL-SCNC: 26 MMOL/L (ref 20–32)
CREAT SERPL-MCNC: 0.78 MG/DL (ref 0.52–1.04)
GFR SERPL CREATININE-BSD FRML MDRD: 88 ML/MIN/{1.73_M2}
GLUCOSE SERPL-MCNC: 112 MG/DL (ref 70–99)
POTASSIUM SERPL-SCNC: 3.3 MMOL/L (ref 3.4–5.3)
SODIUM SERPL-SCNC: 136 MMOL/L (ref 133–144)

## 2019-09-04 RX ORDER — ATORVASTATIN CALCIUM 80 MG/1
TABLET, FILM COATED ORAL
Qty: 90 TABLET | Refills: 0 | Status: SHIPPED | OUTPATIENT
Start: 2019-09-04 | End: 2019-10-31

## 2019-09-04 NOTE — TELEPHONE ENCOUNTER
Pt calls.      She said she is getting a little better, the prednisone is working, and the duonebs.  She is wondering if she can go back to work tomorrow or if she should wait until Friday.      Call pt on her home phone.      Will forward to Manny Gambino for advisal.

## 2019-09-04 NOTE — TELEPHONE ENCOUNTER
"Atorvastatin 80 mg    Last Written Prescription Date:  3/4/2019  Last Fill Quantity: 90,  # refills: 1   Last office visit: 9/3/2019 with prescribing provider:    Future Office Visit:      Requested Prescriptions   Pending Prescriptions Disp Refills     atorvastatin (LIPITOR) 80 MG tablet [Pharmacy Med Name: ATORVASTATIN  80MG  TAB] 90 tablet 1     Sig: TAKE 1 TABLET BY MOUTH  DAILY       Statins Protocol Passed - 9/3/2019  8:52 PM        Passed - LDL on file in past 12 months     Recent Labs   Lab Test 05/31/19  1654   LDL Cannot estimate LDL when triglyceride exceeds 400 mg/dL  67             Passed - No abnormal creatine kinase in past 12 months     No lab results found.             Passed - Recent (12 mo) or future (30 days) visit within the authorizing provider's specialty     Patient had office visit in the last 12 months or has a visit in the next 30 days with authorizing provider or within the authorizing provider's specialty.  See \"Patient Info\" tab in inbasket, or \"Choose Columns\" in Meds & Orders section of the refill encounter.              Passed - Medication is active on med list        Passed - Patient is age 18 or older        Passed - No active pregnancy on record        Passed - No positive pregnancy test in past 12 months        Direct LDL result 67 on 5/31/2019.    Prescription [place] per [facility] [place].      Nellie Heredia RN    "

## 2019-09-05 RX ORDER — HYDRALAZINE HYDROCHLORIDE 50 MG/1
TABLET, FILM COATED ORAL
Qty: 180 TABLET | Refills: 1 | Status: ON HOLD | OUTPATIENT
Start: 2019-09-05 | End: 2019-10-02

## 2019-09-05 NOTE — TELEPHONE ENCOUNTER
Ideally would wait until at least Friday to limit and further exposure to things that may exacerbate her symptoms. Ultimately she can decide

## 2019-09-05 NOTE — TELEPHONE ENCOUNTER
Patient went back to work today and her boss sent her home because she was winded when she went into office and had to sit down for a bit. Boss wants her to stay home for one more day.     Patient states she feels a lot better but stuff is still coming up and makes her cough so she has to stop and sit down to catch her breath.     Advised patient sometimes a hot shower can help with loosening secretions to help them drain/expel. Also to stay well hydrated to help thin secretions and be easier to expel. She will see how she is feeling tomorrow if good enough to go to work.    Patient is also confused why her potassium is still low when she was (accidentally) taking 40 mg daily for about 1 month (twice the prescribed amount) and why her glucose was still elevated when all she had before labs drawn was icewater, no food.     Huddled with PCP: If patient feels she is truly improving, then take a few more days and return to work on Monday. However, if she is so winded she needs to rest after just a short walk, she really should seek higher level care at ED. He will review the labs.     Called patient back: Gave provider's advice. Patient wonders since she takes the water pills could she be peeing out her potassium? She also state she constantly sweats a lot.   Patient sounded a bit short of breath talking on the phone. Advised she should proceed to ED. Patient wondering if [facility] an [facility]? She states she will consider going in there today if possible. Routing to PCP for further advice.    Celina PORTILLO, Triage RN

## 2019-09-05 NOTE — TELEPHONE ENCOUNTER
"Requested Prescriptions   Pending Prescriptions Disp Refills     hydrALAZINE (APRESOLINE) 50 MG tablet [Pharmacy Med Name: HYDRALAZINE  50MG  TAB] 180 tablet      Sig: TAKE 1 TABLET BY MOUTH TWO  TIMES DAILY   Last Written Prescription Date:  9/17/18 discontinued  Last Fill Quantity: 180,  # refills: 1   Last office visit: 9/3/2019 with prescribing provider:  David Gambino PA-C    Future Office Visit:        Vasodilators Passed - 9/4/2019  8:52 PM        Passed - Most recent BP less than 140/90 on record     BP Readings from Last 3 Encounters:   09/03/19 112/64   05/31/19 118/72   01/18/19 114/70                 Passed - Most recent encounter is not a hospital encounter. Patient has recent (12 mos) or future (1 mos) visit with authorizing provider's specialty     Patient's most recent encounter is NOT a hospital encounter and has had an office visit in the last 12 months or has a visit in the next 30 days with authorizing provider or within the authorizing provider's specialty.      See \"Patient Info\" tab in inbasket, or \"Choose Columns\" in Meds & Orders section of the refill encounter.      If most recent encounter is a hospital encounter AND the patient does NOT have an appointment scheduled with the authorizing provider or authorizing provider's specialty within the next 30 days, forward refill to authorizing provider for medication review.          Passed - Medication is active on med list        Passed - Patient is of age 18 years or older        Passed - Patient is not pregnant        Passed - Patient has not had a positive pregnancy test within the past 12 months          "

## 2019-09-05 NOTE — TELEPHONE ENCOUNTER
Called patient. She told me she is doing much better than when seen on Tuesday but still with some shortness of breath when moving about. The chest pressure is significantly reduced. Still, I reviewed concern with her re her previous hospitalizations and suggested [facility]. She would like to check in again in another day and if things not progressing is agreeable to further care. I think she'd benefit from continued nebs and monitoring at that point. She is desperate to stay out of the hospital, but mason reviewed the risks of not seeking appropriate care with her symptoms. We'll check in tomorrow.

## 2019-09-05 NOTE — TELEPHONE ENCOUNTER
Prescription approved per [facility] Refill Protocol.  Sony Melara, RN, BSN

## 2019-09-06 NOTE — TELEPHONE ENCOUNTER
Call to pt- she said that her SOB w/ exertion is better. She is not as winded. Went into work today- but is on desk duty. She is doing nebs q 4 hours and brought them to work as well. Chest pressure is gone.     Adv if symptoms worsen over the weekend she NEEDS to go to UC/ED. Pt verbalized understanding and is agreeable to plan.     Deneen KNUTSON RN

## 2019-10-01 ENCOUNTER — HOSPITAL ENCOUNTER (OUTPATIENT)
Facility: CLINIC | Age: 49
Setting detail: OBSERVATION
Discharge: HOME | End: 2019-10-02
Attending: EMERGENCY MEDICINE | Admitting: INTERNAL MEDICINE
Payer: COMMERCIAL

## 2019-10-01 DIAGNOSIS — R07.89 OTHER CHEST PAIN: ICD-10-CM

## 2019-10-01 DIAGNOSIS — I25.119 CORONARY ARTERY DISEASE INVOLVING NATIVE HEART WITH ANGINA PECTORIS, UNSPECIFIED VESSEL OR LESION TYPE (H): Primary | ICD-10-CM

## 2019-10-01 DIAGNOSIS — R42 LIGHTHEADEDNESS: ICD-10-CM

## 2019-10-01 DIAGNOSIS — R11.2 NON-INTRACTABLE VOMITING WITH NAUSEA, UNSPECIFIED VOMITING TYPE: ICD-10-CM

## 2019-10-01 LAB
ALBUMIN SERPL-MCNC: 3.7 G/DL (ref 3.4–5)
ALBUMIN UR-MCNC: NEGATIVE MG/DL
ALP SERPL-CCNC: 125 U/L (ref 40–150)
ALT SERPL W P-5'-P-CCNC: 27 U/L (ref 0–50)
AMORPH CRY #/AREA URNS HPF: ABNORMAL /HPF
ANION GAP SERPL CALCULATED.3IONS-SCNC: 6 MMOL/L (ref 3–14)
APPEARANCE UR: ABNORMAL
AST SERPL W P-5'-P-CCNC: 15 U/L (ref 0–45)
BASOPHILS # BLD AUTO: 0.1 10E9/L (ref 0–0.2)
BASOPHILS NFR BLD AUTO: 0.6 %
BILIRUB SERPL-MCNC: 0.4 MG/DL (ref 0.2–1.3)
BILIRUB UR QL STRIP: NEGATIVE
BUN SERPL-MCNC: 16 MG/DL (ref 7–30)
CALCIUM SERPL-MCNC: 9.1 MG/DL (ref 8.5–10.1)
CHLORIDE SERPL-SCNC: 104 MMOL/L (ref 94–109)
CO2 SERPL-SCNC: 28 MMOL/L (ref 20–32)
COLOR UR AUTO: YELLOW
CREAT SERPL-MCNC: 0.7 MG/DL (ref 0.52–1.04)
DIFFERENTIAL METHOD BLD: ABNORMAL
EOSINOPHIL # BLD AUTO: 0.1 10E9/L (ref 0–0.7)
EOSINOPHIL NFR BLD AUTO: 0.9 %
ERYTHROCYTE [DISTWIDTH] IN BLOOD BY AUTOMATED COUNT: 13.2 % (ref 10–15)
GFR SERPL CREATININE-BSD FRML MDRD: >90 ML/MIN/{1.73_M2}
GLUCOSE SERPL-MCNC: 150 MG/DL (ref 70–99)
GLUCOSE UR STRIP-MCNC: NEGATIVE MG/DL
HCT VFR BLD AUTO: 44.7 % (ref 35–47)
HGB BLD-MCNC: 15.2 G/DL (ref 11.7–15.7)
HGB UR QL STRIP: NEGATIVE
IMM GRANULOCYTES # BLD: 0.1 10E9/L (ref 0–0.4)
IMM GRANULOCYTES NFR BLD: 1.1 %
INR PPP: 0.94 (ref 0.86–1.14)
KETONES UR STRIP-MCNC: NEGATIVE MG/DL
LEUKOCYTE ESTERASE UR QL STRIP: NEGATIVE
LIPASE SERPL-CCNC: 70 U/L (ref 73–393)
LYMPHOCYTES # BLD AUTO: 1.6 10E9/L (ref 0.8–5.3)
LYMPHOCYTES NFR BLD AUTO: 13.8 %
MCH RBC QN AUTO: 29 PG (ref 26.5–33)
MCHC RBC AUTO-ENTMCNC: 34 G/DL (ref 31.5–36.5)
MCV RBC AUTO: 85 FL (ref 78–100)
MONOCYTES # BLD AUTO: 0.5 10E9/L (ref 0–1.3)
MONOCYTES NFR BLD AUTO: 4.4 %
MUCOUS THREADS #/AREA URNS LPF: PRESENT /LPF
NEUTROPHILS # BLD AUTO: 9.1 10E9/L (ref 1.6–8.3)
NEUTROPHILS NFR BLD AUTO: 79.2 %
NITRATE UR QL: NEGATIVE
NRBC # BLD AUTO: 0 10*3/UL
NRBC BLD AUTO-RTO: 0 /100
PH UR STRIP: 7.5 PH (ref 5–7)
PLATELET # BLD AUTO: 308 10E9/L (ref 150–450)
POTASSIUM SERPL-SCNC: 3.3 MMOL/L (ref 3.4–5.3)
PROT SERPL-MCNC: 7.3 G/DL (ref 6.8–8.8)
RBC # BLD AUTO: 5.24 10E12/L (ref 3.8–5.2)
RBC #/AREA URNS AUTO: 0 /HPF (ref 0–2)
SODIUM SERPL-SCNC: 138 MMOL/L (ref 133–144)
SOURCE: ABNORMAL
SP GR UR STRIP: 1.02 (ref 1–1.03)
SQUAMOUS #/AREA URNS AUTO: 4 /HPF (ref 0–1)
TROPONIN I BLD-MCNC: 0.01 UG/L (ref 0–0.08)
TROPONIN I SERPL-MCNC: <0.015 UG/L (ref 0–0.04)
UROBILINOGEN UR STRIP-MCNC: NORMAL MG/DL (ref 0–2)
WBC # BLD AUTO: 11.5 10E9/L (ref 4–11)
WBC #/AREA URNS AUTO: 0 /HPF (ref 0–5)

## 2019-10-01 PROCEDURE — 85025 COMPLETE CBC W/AUTO DIFF WBC: CPT | Performed by: EMERGENCY MEDICINE

## 2019-10-01 PROCEDURE — 94640 AIRWAY INHALATION TREATMENT: CPT

## 2019-10-01 PROCEDURE — 25000132 ZZH RX MED GY IP 250 OP 250 PS 637: Performed by: EMERGENCY MEDICINE

## 2019-10-01 PROCEDURE — 96374 THER/PROPH/DIAG INJ IV PUSH: CPT

## 2019-10-01 PROCEDURE — 84484 ASSAY OF TROPONIN QUANT: CPT | Performed by: EMERGENCY MEDICINE

## 2019-10-01 PROCEDURE — 93005 ELECTROCARDIOGRAM TRACING: CPT | Mod: 76

## 2019-10-01 PROCEDURE — 81001 URINALYSIS AUTO W/SCOPE: CPT | Performed by: EMERGENCY MEDICINE

## 2019-10-01 PROCEDURE — 85610 PROTHROMBIN TIME: CPT | Performed by: EMERGENCY MEDICINE

## 2019-10-01 PROCEDURE — 80053 COMPREHEN METABOLIC PANEL: CPT | Performed by: EMERGENCY MEDICINE

## 2019-10-01 PROCEDURE — 83690 ASSAY OF LIPASE: CPT | Performed by: EMERGENCY MEDICINE

## 2019-10-01 PROCEDURE — 96361 HYDRATE IV INFUSION ADD-ON: CPT

## 2019-10-01 PROCEDURE — 25000125 ZZHC RX 250: Performed by: EMERGENCY MEDICINE

## 2019-10-01 PROCEDURE — 93005 ELECTROCARDIOGRAM TRACING: CPT

## 2019-10-01 PROCEDURE — 84484 ASSAY OF TROPONIN QUANT: CPT

## 2019-10-01 PROCEDURE — 99285 EMERGENCY DEPT VISIT HI MDM: CPT | Mod: 25

## 2019-10-01 PROCEDURE — 25000128 H RX IP 250 OP 636: Performed by: EMERGENCY MEDICINE

## 2019-10-01 RX ORDER — ONDANSETRON 2 MG/ML
4 INJECTION INTRAMUSCULAR; INTRAVENOUS
Status: COMPLETED | OUTPATIENT
Start: 2019-10-01 | End: 2019-10-01

## 2019-10-01 RX ORDER — SODIUM CHLORIDE 9 MG/ML
1000 INJECTION, SOLUTION INTRAVENOUS CONTINUOUS
Status: DISCONTINUED | OUTPATIENT
Start: 2019-10-01 | End: 2019-10-02

## 2019-10-01 RX ORDER — IPRATROPIUM BROMIDE AND ALBUTEROL SULFATE 2.5; .5 MG/3ML; MG/3ML
3 SOLUTION RESPIRATORY (INHALATION) ONCE
Status: COMPLETED | OUTPATIENT
Start: 2019-10-01 | End: 2019-10-01

## 2019-10-01 RX ORDER — ASPIRIN 325 MG
325 TABLET ORAL ONCE
Status: COMPLETED | OUTPATIENT
Start: 2019-10-01 | End: 2019-10-01

## 2019-10-01 RX ADMIN — SODIUM CHLORIDE 1000 ML: 9 INJECTION, SOLUTION INTRAVENOUS at 20:51

## 2019-10-01 RX ADMIN — IPRATROPIUM BROMIDE AND ALBUTEROL SULFATE 3 ML: .5; 3 SOLUTION RESPIRATORY (INHALATION) at 20:51

## 2019-10-01 RX ADMIN — ONDANSETRON HYDROCHLORIDE 4 MG: 2 INJECTION, SOLUTION INTRAMUSCULAR; INTRAVENOUS at 20:50

## 2019-10-01 RX ADMIN — ASPIRIN 325 MG ORAL TABLET 325 MG: 325 PILL ORAL at 23:36

## 2019-10-01 ASSESSMENT — ENCOUNTER SYMPTOMS
DIZZINESS: 1
FEVER: 0
DIARRHEA: 0
VOMITING: 1
NAUSEA: 1
LIGHT-HEADEDNESS: 1
COUGH: 0

## 2019-10-01 ASSESSMENT — MIFFLIN-ST. JEOR: SCORE: 1423.29

## 2019-10-02 ENCOUNTER — APPOINTMENT (OUTPATIENT)
Dept: NUCLEAR MEDICINE | Facility: CLINIC | Age: 49
End: 2019-10-02
Attending: INTERNAL MEDICINE
Payer: COMMERCIAL

## 2019-10-02 VITALS
DIASTOLIC BLOOD PRESSURE: 72 MMHG | WEIGHT: 205 LBS | BODY MASS INDEX: 38.71 KG/M2 | SYSTOLIC BLOOD PRESSURE: 135 MMHG | HEIGHT: 61 IN | OXYGEN SATURATION: 95 % | TEMPERATURE: 97 F | HEART RATE: 86 BPM | RESPIRATION RATE: 20 BRPM

## 2019-10-02 PROBLEM — R42 LIGHT-HEADEDNESS: Status: ACTIVE | Noted: 2019-10-02

## 2019-10-02 PROBLEM — R94.39 POSITIVE CARDIAC STRESS TEST: Status: ACTIVE | Noted: 2019-10-02

## 2019-10-02 LAB
ANION GAP SERPL CALCULATED.3IONS-SCNC: 5 MMOL/L (ref 3–14)
BUN SERPL-MCNC: 13 MG/DL (ref 7–30)
CALCIUM SERPL-MCNC: 8.2 MG/DL (ref 8.5–10.1)
CHLORIDE SERPL-SCNC: 108 MMOL/L (ref 94–109)
CO2 SERPL-SCNC: 27 MMOL/L (ref 20–32)
CREAT SERPL-MCNC: 0.72 MG/DL (ref 0.52–1.04)
ERYTHROCYTE [DISTWIDTH] IN BLOOD BY AUTOMATED COUNT: 13 % (ref 10–15)
GFR SERPL CREATININE-BSD FRML MDRD: >90 ML/MIN/{1.73_M2}
GLUCOSE SERPL-MCNC: 122 MG/DL (ref 70–99)
HCT VFR BLD AUTO: 42.1 % (ref 35–47)
HGB BLD-MCNC: 13.7 G/DL (ref 11.7–15.7)
INTERPRETATION ECG - MUSE: NORMAL
INTERPRETATION ECG - MUSE: NORMAL
MCH RBC QN AUTO: 28.4 PG (ref 26.5–33)
MCHC RBC AUTO-ENTMCNC: 32.5 G/DL (ref 31.5–36.5)
MCV RBC AUTO: 87 FL (ref 78–100)
PLATELET # BLD AUTO: 281 10E9/L (ref 150–450)
POTASSIUM SERPL-SCNC: 3.2 MMOL/L (ref 3.4–5.3)
POTASSIUM SERPL-SCNC: 4 MMOL/L (ref 3.4–5.3)
RBC # BLD AUTO: 4.82 10E12/L (ref 3.8–5.2)
SODIUM SERPL-SCNC: 140 MMOL/L (ref 133–144)
TROPONIN I SERPL-MCNC: <0.015 UG/L (ref 0–0.04)
TROPONIN I SERPL-MCNC: <0.015 UG/L (ref 0–0.04)
WBC # BLD AUTO: 9.7 10E9/L (ref 4–11)

## 2019-10-02 PROCEDURE — 40000065 ZZH STATISTIC EKG NON-CHARGEABLE

## 2019-10-02 PROCEDURE — 84484 ASSAY OF TROPONIN QUANT: CPT | Performed by: INTERNAL MEDICINE

## 2019-10-02 PROCEDURE — 93010 ELECTROCARDIOGRAM REPORT: CPT | Performed by: INTERNAL MEDICINE

## 2019-10-02 PROCEDURE — 99204 OFFICE O/P NEW MOD 45 MIN: CPT | Mod: 25 | Performed by: INTERNAL MEDICINE

## 2019-10-02 PROCEDURE — G0378 HOSPITAL OBSERVATION PER HR: HCPCS

## 2019-10-02 PROCEDURE — 40000275 ZZH STATISTIC RCP TIME EA 10 MIN

## 2019-10-02 PROCEDURE — 93017 CV STRESS TEST TRACING ONLY: CPT

## 2019-10-02 PROCEDURE — 25800030 ZZH RX IP 258 OP 636: Performed by: INTERNAL MEDICINE

## 2019-10-02 PROCEDURE — 36415 COLL VENOUS BLD VENIPUNCTURE: CPT | Mod: XP | Performed by: PHYSICIAN ASSISTANT

## 2019-10-02 PROCEDURE — 78452 HT MUSCLE IMAGE SPECT MULT: CPT | Mod: 26 | Performed by: INTERNAL MEDICINE

## 2019-10-02 PROCEDURE — 96361 HYDRATE IV INFUSION ADD-ON: CPT

## 2019-10-02 PROCEDURE — 78452 HT MUSCLE IMAGE SPECT MULT: CPT

## 2019-10-02 PROCEDURE — 93005 ELECTROCARDIOGRAM TRACING: CPT

## 2019-10-02 PROCEDURE — 80048 BASIC METABOLIC PNL TOTAL CA: CPT | Performed by: INTERNAL MEDICINE

## 2019-10-02 PROCEDURE — 25000132 ZZH RX MED GY IP 250 OP 250 PS 637: Performed by: INTERNAL MEDICINE

## 2019-10-02 PROCEDURE — A9502 TC99M TETROFOSMIN: HCPCS | Performed by: INTERNAL MEDICINE

## 2019-10-02 PROCEDURE — 99236 HOSP IP/OBS SAME DATE HI 85: CPT | Performed by: INTERNAL MEDICINE

## 2019-10-02 PROCEDURE — 85027 COMPLETE CBC AUTOMATED: CPT | Performed by: INTERNAL MEDICINE

## 2019-10-02 PROCEDURE — 25000128 H RX IP 250 OP 636

## 2019-10-02 PROCEDURE — 93018 CV STRESS TEST I&R ONLY: CPT | Performed by: INTERNAL MEDICINE

## 2019-10-02 PROCEDURE — 84132 ASSAY OF SERUM POTASSIUM: CPT | Mod: 91 | Performed by: PHYSICIAN ASSISTANT

## 2019-10-02 PROCEDURE — 99207 ZZC APP CREDIT; MD BILLING SHARED VISIT: CPT | Performed by: INTERNAL MEDICINE

## 2019-10-02 PROCEDURE — 34300033 ZZH RX 343: Performed by: INTERNAL MEDICINE

## 2019-10-02 PROCEDURE — 36415 COLL VENOUS BLD VENIPUNCTURE: CPT | Performed by: INTERNAL MEDICINE

## 2019-10-02 PROCEDURE — 93016 CV STRESS TEST SUPVJ ONLY: CPT | Performed by: INTERNAL MEDICINE

## 2019-10-02 PROCEDURE — 99207 ZZC CDG-CODE CATEGORY CHANGED: CPT | Performed by: INTERNAL MEDICINE

## 2019-10-02 RX ORDER — NALOXONE HYDROCHLORIDE 0.4 MG/ML
.1-.4 INJECTION, SOLUTION INTRAMUSCULAR; INTRAVENOUS; SUBCUTANEOUS
Status: DISCONTINUED | OUTPATIENT
Start: 2019-10-02 | End: 2019-10-02 | Stop reason: HOSPADM

## 2019-10-02 RX ORDER — CITALOPRAM HYDROBROMIDE 20 MG/1
20 TABLET ORAL DAILY
Status: DISCONTINUED | OUTPATIENT
Start: 2019-10-02 | End: 2019-10-02 | Stop reason: HOSPADM

## 2019-10-02 RX ORDER — POTASSIUM CL/LIDO/0.9 % NACL 10MEQ/0.1L
10 INTRAVENOUS SOLUTION, PIGGYBACK (ML) INTRAVENOUS
Status: DISCONTINUED | OUTPATIENT
Start: 2019-10-02 | End: 2019-10-02 | Stop reason: HOSPADM

## 2019-10-02 RX ORDER — AMLODIPINE BESYLATE 10 MG/1
10 TABLET ORAL DAILY
Status: DISCONTINUED | OUTPATIENT
Start: 2019-10-02 | End: 2019-10-02

## 2019-10-02 RX ORDER — PROCHLORPERAZINE MALEATE 5 MG
10 TABLET ORAL EVERY 6 HOURS PRN
Status: DISCONTINUED | OUTPATIENT
Start: 2019-10-02 | End: 2019-10-02 | Stop reason: HOSPADM

## 2019-10-02 RX ORDER — HYDRALAZINE HYDROCHLORIDE 50 MG/1
50 TABLET, FILM COATED ORAL 2 TIMES DAILY
Status: DISCONTINUED | OUTPATIENT
Start: 2019-10-02 | End: 2019-10-02

## 2019-10-02 RX ORDER — POTASSIUM CHLORIDE 1500 MG/1
20-40 TABLET, EXTENDED RELEASE ORAL
Status: DISCONTINUED | OUTPATIENT
Start: 2019-10-02 | End: 2019-10-02 | Stop reason: HOSPADM

## 2019-10-02 RX ORDER — REGADENOSON 0.08 MG/ML
INJECTION, SOLUTION INTRAVENOUS
Status: COMPLETED
Start: 2019-10-02 | End: 2019-10-02

## 2019-10-02 RX ORDER — PROCHLORPERAZINE 25 MG
25 SUPPOSITORY, RECTAL RECTAL EVERY 12 HOURS PRN
Status: DISCONTINUED | OUTPATIENT
Start: 2019-10-02 | End: 2019-10-02 | Stop reason: HOSPADM

## 2019-10-02 RX ORDER — ASPIRIN 81 MG/1
243 TABLET ORAL ONCE
Status: COMPLETED | OUTPATIENT
Start: 2019-10-02 | End: 2019-10-02

## 2019-10-02 RX ORDER — ONDANSETRON 2 MG/ML
4 INJECTION INTRAMUSCULAR; INTRAVENOUS EVERY 6 HOURS PRN
Status: DISCONTINUED | OUTPATIENT
Start: 2019-10-02 | End: 2019-10-02 | Stop reason: HOSPADM

## 2019-10-02 RX ORDER — SODIUM CHLORIDE 9 MG/ML
INJECTION, SOLUTION INTRAVENOUS CONTINUOUS
Status: DISCONTINUED | OUTPATIENT
Start: 2019-10-02 | End: 2019-10-02 | Stop reason: HOSPADM

## 2019-10-02 RX ORDER — ALBUTEROL SULFATE 90 UG/1
2 AEROSOL, METERED RESPIRATORY (INHALATION) EVERY 4 HOURS PRN
Status: DISCONTINUED | OUTPATIENT
Start: 2019-10-02 | End: 2019-10-02 | Stop reason: HOSPADM

## 2019-10-02 RX ORDER — POTASSIUM CHLORIDE 1.5 G/1.58G
20-40 POWDER, FOR SOLUTION ORAL
Status: DISCONTINUED | OUTPATIENT
Start: 2019-10-02 | End: 2019-10-02 | Stop reason: HOSPADM

## 2019-10-02 RX ORDER — ONDANSETRON 4 MG/1
4 TABLET, ORALLY DISINTEGRATING ORAL EVERY 6 HOURS PRN
Qty: 10 TABLET | Refills: 0 | Status: SHIPPED | OUTPATIENT
Start: 2019-10-02 | End: 2021-08-26

## 2019-10-02 RX ORDER — NITROGLYCERIN 0.4 MG/1
0.4 TABLET SUBLINGUAL EVERY 5 MIN PRN
Status: DISCONTINUED | OUTPATIENT
Start: 2019-10-02 | End: 2019-10-02 | Stop reason: HOSPADM

## 2019-10-02 RX ORDER — ACETAMINOPHEN 325 MG/1
650 TABLET ORAL EVERY 4 HOURS PRN
Status: DISCONTINUED | OUTPATIENT
Start: 2019-10-02 | End: 2019-10-02 | Stop reason: HOSPADM

## 2019-10-02 RX ORDER — POTASSIUM CHLORIDE 29.8 MG/ML
20 INJECTION INTRAVENOUS
Status: DISCONTINUED | OUTPATIENT
Start: 2019-10-02 | End: 2019-10-02 | Stop reason: HOSPADM

## 2019-10-02 RX ORDER — ATORVASTATIN CALCIUM 40 MG/1
80 TABLET, FILM COATED ORAL DAILY
Status: DISCONTINUED | OUTPATIENT
Start: 2019-10-02 | End: 2019-10-02 | Stop reason: HOSPADM

## 2019-10-02 RX ORDER — ALBUTEROL SULFATE 0.83 MG/ML
2.5 SOLUTION RESPIRATORY (INHALATION) EVERY 4 HOURS PRN
Status: DISCONTINUED | OUTPATIENT
Start: 2019-10-02 | End: 2019-10-02 | Stop reason: HOSPADM

## 2019-10-02 RX ORDER — POTASSIUM CHLORIDE 7.45 MG/ML
10 INJECTION INTRAVENOUS
Status: DISCONTINUED | OUTPATIENT
Start: 2019-10-02 | End: 2019-10-02 | Stop reason: HOSPADM

## 2019-10-02 RX ORDER — ONDANSETRON 4 MG/1
4 TABLET, ORALLY DISINTEGRATING ORAL EVERY 6 HOURS PRN
Status: DISCONTINUED | OUTPATIENT
Start: 2019-10-02 | End: 2019-10-02 | Stop reason: HOSPADM

## 2019-10-02 RX ORDER — MECLIZINE HYDROCHLORIDE 25 MG/1
25 TABLET ORAL 3 TIMES DAILY PRN
Status: DISCONTINUED | OUTPATIENT
Start: 2019-10-02 | End: 2019-10-02 | Stop reason: HOSPADM

## 2019-10-02 RX ORDER — ASPIRIN 81 MG/1
81 TABLET ORAL DAILY
Status: DISCONTINUED | OUTPATIENT
Start: 2019-10-02 | End: 2019-10-02

## 2019-10-02 RX ADMIN — ASPIRIN 243 MG: 81 TABLET ORAL at 16:49

## 2019-10-02 RX ADMIN — REGADENOSON 0.4 MG: 0.08 INJECTION, SOLUTION INTRAVENOUS at 13:27

## 2019-10-02 RX ADMIN — POTASSIUM CHLORIDE 20 MEQ: 1500 TABLET, EXTENDED RELEASE ORAL at 07:19

## 2019-10-02 RX ADMIN — POTASSIUM CHLORIDE 40 MEQ: 1500 TABLET, EXTENDED RELEASE ORAL at 05:00

## 2019-10-02 RX ADMIN — TETROFOSMIN 11 MCI.: 1.38 INJECTION, POWDER, LYOPHILIZED, FOR SOLUTION INTRAVENOUS at 11:44

## 2019-10-02 RX ADMIN — SODIUM CHLORIDE: 9 INJECTION, SOLUTION INTRAVENOUS at 01:19

## 2019-10-02 RX ADMIN — MECLIZINE HYDROCHLORIDE 25 MG: 25 TABLET ORAL at 01:56

## 2019-10-02 RX ADMIN — TETROFOSMIN 33 MCI.: 1.38 INJECTION, POWDER, LYOPHILIZED, FOR SOLUTION INTRAVENOUS at 13:20

## 2019-10-02 ASSESSMENT — MIFFLIN-ST. JEOR: SCORE: 1482.25

## 2019-10-02 NOTE — PHARMACY-ADMISSION MEDICATION HISTORY
Admission medication history interview status for this patient is complete. See [facility] admission navigator for allergy information, prior to admission medications and immunization status.     Medication history interview source(s):Patient  Medication history resources (including written lists, pill bottles, clinic record):None  Primary pharmacy: [id number] [place], [place] - [address]    Changes made to PTA medication list:  Added: none  Deleted: duplicate chlorthalidone, duplicate hydralazine  Changed: none    Actions taken by pharmacist (provider contacted, etc):None   Additional medication history information: patient has her own albuterol inhaler with her  Medication reconciliation/reorder completed by provider prior to medication history? No         Prior to Admission medications    Medication Sig Last Dose Taking? Auth Provider   albuterol (PROVENTIL) (2.5 MG/3ML) 0.083% neb solution USE  1 VIAL BY NEBULIZATION EVERY 6 HOURS AS NEEDED FOR SHORTNESS OF BREATH /  DYSPNEA OR WHEEZING Past Month at Unknown time Yes David Gambino PA-C   amLODIPine (NORVASC) 10 MG tablet TAKE 1 TABLET BY MOUTH  DAILY 10/1/2019 at am Yes David Gambino PA-C   aspirin EC 81 MG EC tablet Take 1 tablet (81 mg) by mouth daily 10/1/2019 at am Yes Brent Aviles DO   atorvastatin (LIPITOR) 80 MG tablet TAKE 1 TABLET BY MOUTH  DAILY 10/1/2019 at am Yes David Gambino PA-C   BREO ELLIPTA 200-25 MCG/INH Inhaler INHALE 1 PUFF DAILY 10/1/2019 at am Yes David Gambino PA-C   cetirizine (ZYRTEC) 10 MG tablet Take 10 mg by mouth daily 10/1/2019 at am Yes Unknown, Entered By History   chlorthalidone (HYGROTON) 25 MG tablet TAKE ONE-HALF TABLET BY  MOUTH DAILY 10/1/2019 at am Yes David Gambino PA-C   citalopram (CELEXA) 20 MG tablet TAKE 1 TABLET BY MOUTH  DAILY 10/1/2019 at am Yes David Gambino PA-C   hydrALAZINE  (APRESOLINE) 50 MG tablet TAKE 1 TABLET BY MOUTH TWO  TIMES DAILY 10/1/2019 at x 1 Yes David Gambino PA-C   INCRUSE ELLIPTA 62.5 MCG/INH Inhaler INHALE 1 INHALATION BY  MOUTH DAILY 10/1/2019 at am Yes David Gambino PA-C   potassium chloride ER (K-DUR/KLOR-CON M) 20 MEQ CR tablet Take 1 tablet (20 mEq) by mouth daily 10/1/2019 at am Yes David Gambino PA-C   VENTOLIN  (90 Base) MCG/ACT inhaler INHALE 2 PUFFS INTO THE  LUNGS EVERY 6 HOURS AS  NEEDED FOR SHORTNESS OF  BREATH / DYSPNEA OR  WHEEZING Past Month at Unknown time Yes aDvid Gambino PA-C   nitroGLYcerin (NITROSTAT) 0.4 MG sublingual tablet SEE DIRECTIONS ON  MEDICATION INFORMATION  SHEET WITH INVOICE  PAPERWORK More than a month at Unknown time  David Gambino PA-C

## 2019-10-02 NOTE — ED NOTES
"Pt ambulated approximately 70 meters. Pt denied dizziness but \"felt tired\" with a stand-by assist. Notified MD.  "

## 2019-10-02 NOTE — H&P
[facility]  Hospitalist Admission Note  Name: Marni Austin    MRN: [id number]  YOB: 1970    Age: 49 year old  Date of admission: 10/1/2019  Primary care provider: David Gambino    Chief Complaint: Lightheadedness, nausea    Assessment and Plan:   Lightheadedness, nausea: Feeling unwell at work and then had lightheadedness and presyncope symptoms at 4 PM while driving followed by nausea and vomiting.  Nausea and lightheadedness persisting up until coming to the ED and receiving Zofran and IV fluids, now feeling better.  Etiology unclear although blood pressure is notably lower than normal for her at  systolic in the ED so possibly orthostatic.  She is on 3 antihypertensives including chlorthalidone.  Denies any poor oral intake although she has felt unwell the past month with respiratory symptoms up until this past week.  Has had vertigo in the past and when walking in the ED is describing some symptoms that may suggest this is an etiology.  Did have chest tightness following her nausea and vomiting although overall clinically less suspicious for ACS at this time and negative troponin.  Had a slight posterior headache that is since resolved, if this is persisting could consider intracranial etiology although no focal neuro deficits and symptoms nearly resolved now so will defer further imaging at this time.  -Zofran and Compazine as needed for nausea  -Continue IV fluids overnight  -Meclizine as needed if develops room spinning sensation  -Trend blood pressure and ambulate patient tomorrow to see if symptoms persisting or not  -Symptom-free tomorrow likely can discharge if serial troponin negative    Chest pressure, CAD, HTN, HLD, diastolic CHF: Episode of chest tightness and pressure sensation after developing lightheadedness and nausea followed by emesis in the car.  It did resolve with nitroglycerin.  She thinks it was due to getting very anxious about her  lightheadedness not resolving.  With history of stent to RCA in 2015 cardiac etiology for her symptoms in the differential, however initial troponin at 6 hours after symptom onset undetectable and serial EKG x2 fairly similar to previous EKGs on file although T wave inversion extending out to V4 from V2.  Has used nitroglycerin within the last year for chest pain.  Dobutamine stress echo and Lexiscan in 2/2016 were negative.  Of note her coronary angina gram from 2015 other than the RCA lesion did not show any significant obstructive disease more than 30% in other locations.  She has resumed smoking.  -Serial troponins, if negative and symptoms resolved consider outpatient cardiac stress test given she is had to use nitroglycerin earlier this year as well  -If chest pain or pressure sensation returns while here and rules out with serial troponin would perform either debridement stress echo or Lexiscan while here  -Blood pressure on the lower side as above which may be attributing to her symptoms of lightheadedness so hold her PTA chlorthalidone for now.  Have continued amlodipine 10 g daily and hydralazine 50 mg twice daily with hold parameters for low blood pressure  -Continue aspirin and atorvastatin    COPD: Chronic shortness of breath no worse than normal.  Some wheeze on exam although moving good air.  Not hypoxic on room air.  Unfortunately has resumed smoking.  -Hold PTA inhalers unless she has them with and she can use her own given observation status  -Albuterol nebs as needed for wheezing or shortness of breath    MDD, anxiety: She was very anxious when feeling lightheaded, this is improved now that her symptoms are improving.  Continue citalopram 20 mg daily.    Tobacco dependence: Has resumed smoking due to stress.  She has a quit date in mind for later this year.  Encouraged cessation.    Obesity: BMI 36.2.    Hypokalemia: Takes chlorthalidone and 20 mEq potassium supplement at baseline.  K is  3.3.  -Potassium replaced per protocol    DVT Prophylaxis: Low Risk/Ambulatory with no VTE prophylaxis indicated  Code Status: Full Code  FEN: Advance diet as tolerated to regular diet, normal saline at 100 ml/hr  Discharge Dispo: Home  Estimated Disch Date / # of Days until Disch: Admit observation for serial troponin and to make sure symptoms are resolving tomorrow.  If ACS ruled out, no return of chest pain, and lightheadedness resolved okay for discharge tomorrow.  If chest pain returns may need stress test.      History of Present Illness:  Marni Austin is a 49 year old female with PMH including CAD s/p previous stent to RCA, HTN, HLD, COPD, ongoing tobacco dependence, MDD, anxiety, diastolic CHF, obesity, and vertigo who presents with lightheadedness and dizziness.  The patient was feeling lightheaded while at work this afternoon and then as she was driving home around 4 PM she started to feel quite lightheaded like she might pass out.  She then developed some nausea and vomited.  She stopped the vehicle to see if her symptoms would pass.  They did not and then she developed some chest pressure and tightness sensation which she described as a muscle cramp or charley horse.  She thinks this was due to anxiety as she was getting very worked up about why she was feeling so lightheaded and also uses the word dizzy although it did not feel like anything was spinning.  She took a nitroglycerin and the chest tightness resolved.  She had her brother drive her to his home.  She fell asleep there for about an hour.  Lightheadedness and nausea symptoms were persisting so family insisted she come in for evaluation.  She has not had any return of the chest tightness or pressure sensation.  She has used nitroglycerin earlier in the year for chest tightness symptoms.  She was sick all last month with respiratory symptoms and received prednisone.  She was actually feeling well this past week until earlier today.  She  denies any abdominal pain, diarrhea, poor p.o. intake, cough.  She has chronic shortness of breath secondary to COPD.  She has resumed smoking.  Reports sick contacts of her  who has been very sick recently and does not seem to be getting better.    History obtained from patient, patient's daughter, medical record, and from Dr. Harris in the emergency department. BP on softer side at 104/58 with repeat 97/47.  Heart rate is in the 70s and temperature is 97.8.  Oxygen saturation in the low 90s on room air.  CMP normal except for potassium of 3.3.  Lipase not elevated.  Troponin is undetectable.  CBC notable for white blood cell count 11.5.  Urinalysis with 0 WBCs and not suspicious for infection.  EKG x2 shows NSR with some T wave inversion in V2-V4 fairly similar to previous EKGs on file.  She received a dose of Zofran, 1 L, aspirin 325 mg, and a DuoNeb.  Her nausea has resolved and she has not had any return of the chest tightness she had in her car.  Lightheadedness is improved although not resolved and she was holding onto things while walking in the ED so there is request for an observation bed and serial troponin to make sure symptoms not cardiac in etiology.     Past Medical History reviewed:  Past Medical History:   Diagnosis Date     CAD (coronary artery disease)      COPD (chronic obstructive pulmonary disease) (H)      Depression      Hypertension      Past Surgical History reviewed:  Past Surgical History:   Procedure Laterality Date     CARDIAC SURGERY      Cardiac stents x 1     TONSILLECTOMY Bilateral 1985     Tubal ligation surgery       Social History reviewed:  Social History     Tobacco Use     Smoking status: Former Smoker     Packs/day: 0.50     Years: 26.00     Pack years: 13.00     Start date: 1979     Last attempt to quit: 2018     Years since quittin.2     Smokeless tobacco: Never Used     Tobacco comment: quit 2018   Substance Use Topics     Alcohol use: No      Alcohol/week: 0.0 standard drinks     Social History     Patient does not qualify to have social determinant information on file (likely too young).   Social History Narrative     Not on file     Family History reviewed:  Family History   Problem Relation Age of Onset     Hypertension Mother      Heart Disease Mother      Alcoholism Mother      Diabetes Father      Coronary Artery Disease Father      Cerebrovascular Disease Father      Alzheimer Disease Maternal Grandmother      Heart Disease Maternal Grandmother      Lung Cancer Maternal Grandfather      Obesity Brother      Allergies:  Allergies   Allergen Reactions     Zyban [Bupropion Hcl] Anaphylaxis     Bupropion Rash     Medications:  Prior to Admission medications    Medication Sig Last Dose Taking? Auth Provider   albuterol (PROVENTIL) (2.5 MG/3ML) 0.083% neb solution USE  1 VIAL BY NEBULIZATION EVERY 6 HOURS AS NEEDED FOR SHORTNESS OF BREATH /  DYSPNEA OR WHEEZING   David Gambino PA-C   amLODIPine (NORVASC) 10 MG tablet TAKE 1 TABLET BY MOUTH  DAILY   David Gambino PA-C   aspirin EC 81 MG EC tablet Take 1 tablet (81 mg) by mouth daily   Brent Aviles DO   atorvastatin (LIPITOR) 80 MG tablet TAKE 1 TABLET BY MOUTH  DAILY   David Gambino PA-C   BREO ELLIPTA 200-25 MCG/INH Inhaler INHALE 1 PUFF DAILY   David Gambino PA-C   cetirizine (ZYRTEC) 10 MG tablet Take 10 mg by mouth daily   Unknown, Entered By History   chlorthalidone (HYGROTON) 25 MG tablet TAKE ONE-HALF TABLET BY  MOUTH DAILY   David Gambino PA-C   chlorthalidone (HYGROTON) 25 MG tablet TAKE ONE-HALF TABLET BY  MOUTH DAILY   David Gambino PA-C   citalopram (CELEXA) 20 MG tablet TAKE 1 TABLET BY MOUTH  DAILY   David Gambino PA-C   hydrALAZINE (APRESOLINE) 50 MG tablet TAKE 1 TABLET BY MOUTH TWO  TIMES DAILY   David Gambino PA-C   hydrALAZINE (APRESOLINE) 50 MG tablet TAKE 1 TABLET BY MOUTH TWO  TIMES DAILY   Immanuel  "David Mancilla PA-C   INCRUSE ELLIPTA 62.5 MCG/INH Inhaler INHALE 1 INHALATION BY  MOUTH DAILY   David Gambino PA-C   nitroGLYcerin (NITROSTAT) 0.4 MG sublingual tablet SEE DIRECTIONS ON  MEDICATION INFORMATION  SHEET WITH INVOICE  PAPERWORK   David Gambino PA-C   potassium chloride ER (K-DUR/KLOR-CON M) 20 MEQ CR tablet Take 1 tablet (20 mEq) by mouth daily   David Gambino PA-C   predniSONE (DELTASONE) 20 MG tablet Take 3 tabs by mouth daily x 3 days, then 2 tabs daily x 3 days, then 1 tab daily x 3 days, then 1/2 tab daily x 3 days.   David Gambino PA-C   VENTOLIN  (90 Base) MCG/ACT inhaler INHALE 2 PUFFS INTO THE  LUNGS EVERY 6 HOURS AS  NEEDED FOR SHORTNESS OF  BREATH / DYSPNEA OR  WHEEZING   David Gambino PA-C     Review of Systems:  A Comprehensive greater than 10 system review of systems was carried out.  Pertinent positives and negatives are noted above.  Otherwise negative.     Physical Exam:  Blood pressure 97/47, pulse 71, temperature 97.8  F (36.6  C), resp. rate 22, height 1.549 m (5' 1\"), weight 87.1 kg (192 lb), last menstrual period 08/30/2013, SpO2 92 %, not currently breastfeeding.  Wt Readings from Last 1 Encounters:   10/01/19 87.1 kg (192 lb)     Exam:  Constitutional: Awake, NAD  Eyes: sclera white   HEENT: MMM  Respiratory: no respiratory distress, some bilateral expiratory wheeze present, no crackles  Cardiovascular: RRR.  No murmur  GI: Obese, non-tender, not distended, bowel sounds present  Skin: no rash   Musculoskeletal/extremities: atraumatic, no major deformities.  Trace bilateral pedal edema  Neurologic: A&O, speech clear, strength equal bilaterally, light touch sensation intact  Psychiatric: calm, cooperative, normal affect    Lab and imaging data personally reviewed:  Labs:  Recent Labs   Lab 10/01/19  2025   WBC 11.5*   HGB 15.2   HCT 44.7   MCV 85        Recent Labs   Lab 10/01/19  2025      POTASSIUM 3.3*   CHLORIDE " 104   CO2 28   ANIONGAP 6   *   BUN 16   CR 0.70   GFRESTIMATED >90   GFRESTBLACK >90   RANDY 9.1   PROTTOTAL 7.3   ALBUMIN 3.7   BILITOTAL 0.4   ALKPHOS 125   AST 15   ALT 27     Recent Labs   Lab 10/01/19  2025   INR 0.94     Recent Labs   Lab 10/01/19  2229 10/01/19  2030   TROPONIN  --  0.01   TROPI <0.015  --      Recent Labs   Lab 10/01/19  2059   COLOR Yellow   APPEARANCE Cloudy   URINEGLC Negative   URINEBILI Negative   URINEKETONE Negative   SG 1.017   UBLD Negative   URINEPH 7.5*   PROTEIN Negative   NITRITE Negative   LEUKEST Negative   RBCU 0   WBCU 0       EKG 1 and 2: NSR, T wave inversions in V2-V4    Imaging:  No imaging obtained    Mariano Shafer MD  Hospitalist  [facility]

## 2019-10-02 NOTE — CONSULTS
[facility]    Cardiology Consultation     Date of Admission:  10/1/2019    Primary Care Physician   David Gambino     Consult Date:  10/02/2019      REASON FOR CONSULTATION:  Exertional chest pressure.      REFERRING PHYSICIAN:  Hospitalist Service.      IMPRESSION:   1.  Atypical chest discomfort.   2.  History of acute coronary syndrome with percutaneous revascularization of mid right coronary artery lesion by insertion of drug-eluting stent.  Minimal coronary artery disease in the remainder of coronary vasculature with preserved left ventricular systolic function.   3.  Positive stress nuclear study.  Small area of apical ischemia.   4.  Obesity.   5.  Recent flu-like illness.   6.  Chronic obstructive pulmonary disease with ongoing tobacco use.   7.  History of gastrointestinal bleeding.   8.  Depression and anxiety.      This is a delightful lady admitted with a several-day history of malaise, lightheadedness, decreased appetite likely due to a flu-like illness.  She described several seconds worth of chest discomfort when she walked this morning.  She has otherwise been well and denies exertional chest discomfort.  I think it is quite unlikely that this symptom is due to angina.      She has been having nausea and vomiting recently and it is quite conceivable that her chest discomfort may be upper GI in origin.      I note that her troponins are completely negative on this admission.  She does have T-wave inversion in the anterior leads on EKG, though I wonder whether this may be due to changes in autonomic tone from anxiety/depression as well as nausea and vomiting.      With her body habitus, there is a possibility that the small area of apical ischemia seen on her stress nuclear study represents a false positive finding.  Even if the scan finding is real, the amount of ischemic myocardium is very small and would be best served with medical management.      I strongly advised this young  lady to give up smoking.  She has a regular followup with her primary care physician.      At this point, I think her cardiac status is stable.  I did advise her that if she has consistent return of exertional chest discomfort, she should give us a call or present to the emergency room.      Thank you for involving me in the care of this delightful lady again.      HISTORY OF PRESENT ILLNESS:  This is a lady with known coronary artery disease.  In early 2015, she presented with typical symptoms of acute coronary syndrome and underwent coronary angiography.  This demonstrated mild nonobstructive disease with the exception of a mid right coronary artery lesion which was successfully revascularized with insertion of a drug-eluting stent.  She has several cardiac risk factors including positive family history of premature atherosclerotic disease, tobacco use, obesity.      She returned to the hospital several days after an episode of acute coronary syndrome.  She had symptoms of GI bleeding at that time as well as nausea and vomiting.  It was thought that she had acute gastroenteritis.      Since then, she has not followed up with the Cardiology Service as she has been well.  She tells me that she may have taken an occasional sublingual nitroglycerin for nonexertional chest discomfort.  Otherwise chest pain is not something which has been bothering her in the past year or two.  She has resumed smoking.      For the last several days prior to current admission, she has felt quite sick by which she means nausea.  She had no fevers.  She felt exhausted all the time.  Appetite has been poor.  She has vomited several times.  She has also become quite lightheaded.  She did not faint, but every time she turned her head to either the left or the right, she has become more dizzy.  Due to the persistent nature of these symptoms, she came to the emergency room and she was admitted for further evaluation.  Symptoms have improved  significantly even during the brief hospital stay.  She started walking this morning when she experienced several seconds worth of chest discomfort which has not recurred.      She is currently well and would like to go home.      HOME MEDICATIONS:  Reviewed.  She was on aspirin, high-dose atorvastatin, Celexa, amlodipine, chlorthalidone, hydralazine for hypertension.        Past Medical History   I have reviewed this patient's medical history and updated it with pertinent information if needed.   Past Medical History:   Diagnosis Date     CAD (coronary artery disease)      COPD (chronic obstructive pulmonary disease) (H)      Depression      Hypertension        Past Surgical History   I have reviewed this patient's surgical history and updated it with pertinent information if needed.  Past Surgical History:   Procedure Laterality Date     CARDIAC SURGERY  2015    Cardiac stents x 1     TONSILLECTOMY Bilateral 1985     Tubal ligation surgery  1993       Prior to Admission Medications   Prior to Admission Medications   Prescriptions Last Dose Informant Patient Reported? Taking?   BREO ELLIPTA 200-25 MCG/INH Inhaler 10/1/2019 at am  No Yes   Sig: INHALE 1 PUFF DAILY   INCRUSE ELLIPTA 62.5 MCG/INH Inhaler 10/1/2019 at am  No Yes   Sig: INHALE 1 INHALATION BY  MOUTH DAILY   VENTOLIN  (90 Base) MCG/ACT inhaler Past Month at Unknown time  No Yes   Sig: INHALE 2 PUFFS INTO THE  LUNGS EVERY 6 HOURS AS  NEEDED FOR SHORTNESS OF  BREATH / DYSPNEA OR  WHEEZING   albuterol (PROVENTIL) (2.5 MG/3ML) 0.083% neb solution Past Month at Unknown time  No Yes   Sig: USE  1 VIAL BY NEBULIZATION EVERY 6 HOURS AS NEEDED FOR SHORTNESS OF BREATH /  DYSPNEA OR WHEEZING   amLODIPine (NORVASC) 10 MG tablet 10/1/2019 at am  No No   Sig: TAKE 1 TABLET BY MOUTH  DAILY   aspirin EC 81 MG EC tablet 10/1/2019 at am  No Yes   Sig: Take 1 tablet (81 mg) by mouth daily   atorvastatin (LIPITOR) 80 MG tablet 10/1/2019 at am  No Yes   Sig: TAKE 1  TABLET BY MOUTH  DAILY   cetirizine (ZYRTEC) 10 MG tablet 10/1/2019 at am  Yes Yes   Sig: Take 10 mg by mouth daily   chlorthalidone (HYGROTON) 25 MG tablet 10/1/2019 at am  No No   Sig: TAKE ONE-HALF TABLET BY  MOUTH DAILY   citalopram (CELEXA) 20 MG tablet 10/1/2019 at am  No Yes   Sig: TAKE 1 TABLET BY MOUTH  DAILY   hydrALAZINE (APRESOLINE) 50 MG tablet 10/1/2019 at x 1  No No   Sig: TAKE 1 TABLET BY MOUTH TWO  TIMES DAILY   nitroGLYcerin (NITROSTAT) 0.4 MG sublingual tablet More than a month at Unknown time  No No   Sig: SEE DIRECTIONS ON  MEDICATION INFORMATION  SHEET WITH INVOICE  PAPERWORK   potassium chloride ER (K-DUR/KLOR-CON M) 20 MEQ CR tablet 10/1/2019 at am  No No   Sig: Take 1 tablet (20 mEq) by mouth daily      Facility-Administered Medications: None     Allergies   Allergies   Allergen Reactions     Zyban [Bupropion Hcl] Anaphylaxis     Bupropion Rash       Social History   I have reviewed this patient's social history and updated it with pertinent information if needed. Marni Austin  reports that she quit smoking about 14 months ago. She started smoking about 40 years ago. She has a 13.00 pack-year smoking history. She has never used smokeless tobacco. She reports that she does not drink alcohol or use drugs.    Family History   I have reviewed this patient's family history and updated it with pertinent information if needed.   Family History   Problem Relation Age of Onset     Hypertension Mother      Heart Disease Mother      Alcoholism Mother      Diabetes Father      Coronary Artery Disease Father      Cerebrovascular Disease Father      Alzheimer Disease Maternal Grandmother      Heart Disease Maternal Grandmother      Lung Cancer Maternal Grandfather      Obesity Brother        Review of Systems   The 10 point Review of Systems is negative other than noted in the HPI or here.     Physical Exam                      Vital Signs with Ranges    PHYSICAL EXAMINATION:   GENERAL:  A very  pleasant lady in no acute distress.   VITAL SIGNS:  Blood pressure 124/76.  She is afebrile.   HEENT:  Examination is unremarkable.  Mucous membranes appear normal.  She has no clubbing, no peripheral or central cyanosis.   NECK:  No raised JVP, no thyromegaly.   CARDIAC:  Cardiac apex is not palpable.  Heart sounds are normal.   CHEST:  Symmetrical expansion without the use of accessory muscles.  Breath sounds are normal.   ABDOMEN:  Obese, nontender, no hepatosplenomegaly.  The abdominal aorta is not palpable.   EXTREMITIES:  Her pulses are mildly diminished.  No significant peripheral edema.  No carotid bruits.   CENTRAL NERVOUS SYSTEM:  Grossly intact.   PSYCHIATRIC:  Mood appears normal.      LABORATORY INVESTIGATIONS:  Potassium is 3.2 on admission.  Subsequently it was 4.  Electrolytes within normal limits.  CBC within normal limits.  Troponins were negative.  EKG shows sinus rhythm with T-wave inversion and biphasic T waves in the anterior leads.  Stress nuclear study personally reviewed.  There is a tiny reversible apical defect suggesting possible ischemia.        205 lbs 0 oz    Data   No results found for this or any previous visit (from the past 24 hour(s)).         MAISHA SNIDER MD, FACC             D: 10/02/2019   T: 10/02/2019   MT:       Name:     MAYELA BOSE   MRN:      [id number]        Account:       [id number]   :      1970           Consult Date:  10/02/2019      Document: [id number]

## 2019-10-02 NOTE — DISCHARGE SUMMARY
"[facility]    Discharge Summary  Hospitalist    Date of Admission:  10/1/2019  Date of Discharge:  10/2/2019  Discharging Provider: Dora Tello MD  Date of Service (when I saw the patient): 10/02/19    Discharge Diagnoses   Chest pressure with mildly positive Lexiscan  History of CAD with previous stent to RCA  History of CHF with preserved EF, diastolic dysfunction  Hyperlipidemia  History of hypertension  Hypotension  Lightheadedness and nausea  Tobacco dependence  COPD without exacerbation  MDD  Anxiety  Obesity  Hypokalemia    History of Present Illness   Marni Austin is an 49 year old woman with PMH significant for CAD s/p previous stent to RCA, HTN, HLD, COPD, ongoing tobacco dependence, MDD, anxiety, diastolic CHF, obesity, and vertigo who presented with chest pressure associated with lightheadedness, nausea, and vomiting. The patient was feeling lightheaded while at work and then as she was driving home around 4 PM she started to feel quite lightheaded like she might pass out.  She then developed some nausea and vomited. She stopped the vehicle to see if her symptoms would pass.  They did not and then she developed some chest pressure and tightness, describing this as feeling like something was \"sitting on my chest.\" She took a nitroglycerin and the chest tightness resolved. Denied any spinning sensation. Eventually presented to the ER after lightheadedness and nausea continued. Patient did have recent COPD exacerbation 1 month ago, but has not had recurrent symptoms. Has been smoking again. Evaluation in the ER revealed patient to have lower blood pressures. K 3.3. Troponin negative. EKG with NSR, T wave inversion in V2-V4 fairly similar to previous EKGs on file. Patient given zofran, aspirin 325 mg, and a DuoNeb.  Patient was brought in to rule out ACS with eventual recurrence of chest pressure 10/2 morning with mild exertion.    Hospital Course   Marni Austin was admitted on " 10/1/2019.  The following problems were addressed during her hospitalization:    Chest pressure with mildly positive Lexiscan  History of CAD with previous stent to RCA  History of CHF with preserved EF, diastolic dysfunction  Hyperlipidemia  On  coronary angiogram, there was no significant obstructive disease more than 30% in other than RCA where stent placed.  Chest pressure 10/ associated with nausea, emesis, and lightheadedness. Resolved with nitroglycerin.   Patient given aspirin 325 mg x 1 and continued on 81 mg daily.   Continued on high intensity statin.  Encourage smoking cessation.  Patient reported recurrence of chest pressure 10/2 morning after ambulating minimal distance from bathroom. Resolved with rest.   Troponins negative.  Obtained lexiscan which revealed mild distal anteroapical ischemia.  Note that EF hyperdynamic, > 80% per Elana.  Cardiology consulted. Patient seen by Dr. Meadows on 10/2/19 afternoon. No plan for cardiac cath. Dr. Meadows recommended that patient be discharged with plan for medical management and outpatient follow up with primary care. Will note that cardiology note is pending at time of discharge.   Outpatient cardiac rehab referral placed.  Patient has nitroglycerin available (not ) at time of discharge. No refills indicated.  Follow up with primary care per cardiology.  Would recommend that PCP consider adding low dose beta blocker (coreg or metoprolol succinate) in next week if BP will tolerate.     History of hypertension  Hypotension  Holding PTA chlorthalidone, amlodipine, and hydralazine at this time.  BP continues to be lower than normal. Will discharge off these medications and recommend follow up with PCP within the next week to consider resuming low doses. Would favor initiation of a low dose beta blocker if BP will allow given CAD.  Continue to monitor.     Lightheadedness and nausea  Onset at work on 10/1. Suspect secondary to combination of mild cardiac ischemia  and hypotension.  Holding antihypertensives as above.  Medical management recommended per cardiology.  Patient without any spinning sensation.  Follow up with primary care.    Tobacco dependence  Encourage continued efforts at cessation. Patient planning to quit.     COPD without exacerbation  Chronic shortness of breath no worse than normal.  Continue PTA regimen on discharge.       MDD  Anxiety  Continue citalopram 20 mg daily.     Obesity  BMI 36.2. Patient does endorse recent weight loss secondary to increased activity. Encourage continued efforts.      Hypokalemia  Takes chlorthalidone and 20 mEq potassium supplement at baseline.    Potassium replaced per protocol.  Chlorthalidone not being ordered on discharge.  K should be checked within 1 week with PCP.    Dora Tello MD FACP  Hospitalist Service  [facility]      Significant Results and Procedures   10/2/19 Nuclear cardiac stress test / Lexiscan. Mild distal anteroapical ischemia demonstrated.    Pending Results   None, but cardiology consultation note is pending at time of discharge though did discuss plan with cardiology. This should be followed by primary care.     Code Status   Full Code       Primary Care Physician   David Gambino    Physical Exam   Temp: 97  F (36.1  C) Temp src: Oral BP: 124/76 Pulse: 72 Heart Rate: 68 Resp: 20 SpO2: 95 % O2 Device: None (Room air) Oxygen Delivery: 2 LPM  Vitals:    10/01/19 2025 10/02/19 0103   Weight: 87.1 kg (192 lb) 93 kg (205 lb)     Vital Signs with Ranges  Temp:  [95.8  F (35.4  C)-97.8  F (36.6  C)] 97  F (36.1  C)  Pulse:  [69-79] 72  Heart Rate:  [68-87] 68  Resp:  [20-22] 20  BP: ()/(47-86) 124/76  SpO2:  [87 %-97 %] 95 %  No intake/output data recorded.    Constitutional: Alert and oriented x3. Patient sitting up in bed. Tearful intermittently. Chest pressure resolved. Otherwise no acute distress. Non-toxic. Several family members at bedside.   HEENT: NCAT. EOMI. Moist  oral mucosa.   Respiratory: Clear to auscultation bilaterally. No crackles or wheezes.  Cardiovascular: Regular rate and rhythm. No murmur. Trace lower extremity edema.  GI: Soft, nontender, nondistended. Normoactive bowel sounds.   Musculoskeletal: No gross deformities.   Neurologic: Alert and oriented x3. No focal neurologic deficits.     Discharge Disposition   Discharged to home  Condition at discharge: Fair    Consultations This Hospital Stay   CARDIOLOGY IP CONSULT    Time Spent on this Encounter   I, Dora Tello MD, personally saw the patient today and spent greater than 30 minutes discharging this patient.    Discharge Orders      CARDIAC REHAB REFERRAL      Reason for your hospital stay    Chest pressure, nausea, vomiting, low blood pressure     Follow-up and recommended labs and tests     Follow up with primary care provider, David Gambino, within 5 days to evaluate medication change, for hospital follow- up, regarding new diagnosis and to follow up on results.  The following labs/tests are recommended: CBC, BMP, magnesium.     Monitor and record    blood pressure daily and weight every day. Keep log and report changes to primary care.     Activity    Your activity upon discharge: activity as tolerated. Outpatient cardiac rehab order has been placed to help guide activity level.     Full Code     Diet    Follow this diet upon discharge: Low Saturated Fat, Sodium < 2400 mg/day     Discharge Medications   Current Discharge Medication List      START taking these medications    Details   ondansetron (ZOFRAN-ODT) 4 MG ODT tab Take 1 tablet (4 mg) by mouth every 6 hours as needed for nausea or vomiting  Qty: 10 tablet, Refills: 0    Associated Diagnoses: Coronary artery disease involving native heart with angina pectoris, unspecified vessel or lesion type (H)         CONTINUE these medications which have NOT CHANGED    Details   albuterol (PROVENTIL) (2.5 MG/3ML) 0.083% neb solution USE  1 VIAL BY  NEBULIZATION EVERY 6 HOURS AS NEEDED FOR SHORTNESS OF BREATH /  DYSPNEA OR WHEEZING  Qty: 180 mL, Refills: 1    Associated Diagnoses: Chronic obstructive pulmonary disease with acute exacerbation (H)      aspirin EC 81 MG EC tablet Take 1 tablet (81 mg) by mouth daily    Associated Diagnoses: NSTEMI (non-ST elevated myocardial infarction) (H)      atorvastatin (LIPITOR) 80 MG tablet TAKE 1 TABLET BY MOUTH  DAILY  Qty: 90 tablet, Refills: 0    Associated Diagnoses: Coronary artery disease involving native heart without angina pectoris, unspecified vessel or lesion type; CARDIOVASCULAR SCREENING; LDL GOAL LESS THAN 130      BREO ELLIPTA 200-25 MCG/INH Inhaler INHALE 1 PUFF DAILY  Qty: 3 Inhaler, Refills: 1    Associated Diagnoses: Chronic obstructive pulmonary disease, unspecified COPD type (H)      cetirizine (ZYRTEC) 10 MG tablet Take 10 mg by mouth daily      citalopram (CELEXA) 20 MG tablet TAKE 1 TABLET BY MOUTH  DAILY  Qty: 90 tablet, Refills: 0    Associated Diagnoses: Adjustment disorder with anxiety      INCRUSE ELLIPTA 62.5 MCG/INH Inhaler INHALE 1 INHALATION BY  MOUTH DAILY  Qty: 90 each, Refills: 0    Associated Diagnoses: Chronic obstructive pulmonary disease with acute exacerbation (H)      VENTOLIN  (90 Base) MCG/ACT inhaler INHALE 2 PUFFS INTO THE  LUNGS EVERY 6 HOURS AS  NEEDED FOR SHORTNESS OF  BREATH / DYSPNEA OR  WHEEZING  Qty: 54 g, Refills: 1    Associated Diagnoses: Moderate persistent asthma without complication      nitroGLYcerin (NITROSTAT) 0.4 MG sublingual tablet SEE DIRECTIONS ON  MEDICATION INFORMATION  SHEET WITH INVOICE  PAPERWORK  Qty: 75 tablet, Refills: 1    Associated Diagnoses: NSTEMI (non-ST elevated myocardial infarction) (H)         STOP taking these medications       amLODIPine (NORVASC) 10 MG tablet Comments:   Reason for Stopping:         chlorthalidone (HYGROTON) 25 MG tablet Comments:   Reason for Stopping:         hydrALAZINE (APRESOLINE) 50 MG tablet Comments:    Reason for Stopping:         potassium chloride ER (K-DUR/KLOR-CON M) 20 MEQ CR tablet Comments:   Reason for Stopping:             Allergies   Allergies   Allergen Reactions     Zyban [Bupropion Hcl] Anaphylaxis     Bupropion Rash     Data   Most Recent 3 CBC's:  Recent Labs   Lab Test 10/02/19  0533 10/01/19  2025 01/18/19  1057   WBC 9.7 11.5* 9.4   HGB 13.7 15.2 15.0   MCV 87 85 84    308 323      Most Recent 3 BMP's:  Recent Labs   Lab Test 10/02/19  1149 10/02/19  0533 10/01/19  2025 09/03/19  1214   NA  --  140 138 136   POTASSIUM 4.0 3.2* 3.3* 3.3*   CHLORIDE  --  108 104 101   CO2  --  27 28 26   BUN  --  13 16 13   CR  --  0.72 0.70 0.78   ANIONGAP  --  5 6 9   RANDY  --  8.2* 9.1 8.9   GLC  --  122* 150* 112*     Most Recent 2 LFT's:  Recent Labs   Lab Test 10/01/19  2025 05/31/19  1654   AST 15 22   ALT 27 35   ALKPHOS 125 138   BILITOTAL 0.4 0.4     Most Recent INR's and Anticoagulation Dosing History:  Anticoagulation Dose History     Recent Dosing and Labs Latest Ref Rng & Units 9/22/2008 2/27/2015 10/1/2019    INR 0.86 - 1.14 0.96 0.90 0.94        Most Recent 3 Troponin's:  Recent Labs   Lab Test 10/02/19  0533 10/02/19  0225 10/01/19  2229 10/01/19  2030  07/18/18  1240  02/02/16  1026   TROPI <0.015 <0.015 <0.015  --    < >  --    < > <0.015  The 99th percentile for upper reference range is 0.045 ug/L.  Troponin values in   the range of 0.045 - 0.120 ug/L may be associated with risks of adverse   clinical events.     TROPONIN  --   --   --  0.01  --  0.00  --  0.00    < > = values in this interval not displayed.     Most Recent Cholesterol Panel:  Recent Labs   Lab Test 05/31/19  1654   CHOL 133   LDL Cannot estimate LDL when triglyceride exceeds 400 mg/dL  67   HDL 37*   TRIG 440*     Most Recent 6 Bacteria Isolates From Any Culture (See EPIC Reports for Culture Details):  Recent Labs   Lab Test 03/06/15  1610 02/27/15  0956   CULT No Salmonella, Shigella, Campylobacter, E. coli O157,  Aeromonas, or Plesiomonas   isolated.   No Beta Streptococcus isolated     Most Recent TSH, T4 and A1c Labs:  Recent Labs   Lab Test 02/02/16  1026  02/27/15  0956   TSH  --   --  0.87   A1C 6.2*   < >  --     < > = values in this interval not displayed.     Results for orders placed or performed during the hospital encounter of 10/01/19   NM MPI w Lexiscan    Narrative    GATED MYOCARDIAL PERFUSION SCINTIGRAPHY WITH INTRAVENOUS PHARMACOLOGIC  VASODILATATION LEXISCAN -ONE DAY STUDY     10/2/2019 2:38 PM  MAYELA BOSE  49 years  Female  1970.    Indication/Clinical History: Chest pain    Impression  1.  Myocardial perfusion imaging using single isotope technique  demonstrated mild distal anteroapical ischemia.   2. Gated images demonstrated normal LV cavity size with hyperdynamic  LV systolic function.  The left ventricular systolic function is 80%.  3. Compared to the prior study from no prior study .    Procedure  Pharmacologic stress testing was performed with Lexiscan at a rate of  0.08 mg/ml rapid bolus injection, for 15 seconds, 0.4 mg/5ml  intravenously. Low-level exercise was not performed along with the  vasodilator infusion.  The heart rate was 75 at baseline and maddie to  113 beats per minute during the Lexiscan infusion. The rest blood  pressure was 112/74 mmHg and was 121/64 mm Hg during Lexiscan  infusion. The patient experienced shortness of breath  during the  test.    Myocardial perfusion imaging was performed at rest, approximately 45  minutes after the injection intravenously of 11 mCi of Tc-99m Myoview.  At peak pharmacologic effect, 10-20 seconds after Lexiscan,  the  patient was injected intravenously with 33 mCi of  Tc-99m Myoview. The  post-stress tomographic imaging was performed approximately 60 minutes  after stress.    EKG Findings  The resting EKG demonstrated normal sinus rhythm. The stress EKG  demonstrated 1 mm ST segment depression in the lateral leads and  inferior  leads.    Tomographic Findings  Overall, the study quality is technically difficult with significant  visceral radiotracer activity and soft tissue attenuation . On the  stress images, mild count reduction is seen in the distal anteroapical  wall and mild count reduction seen in the basal inferior wall. On the  rest images, reversibility seen in the distal anteroapical wall with  similar mild count reduction in the basal inferior wall results  suggest mild distal anteroapical ischemia . Gated images demonstrated  normal LV cavity size with end-diastolic volumes measured at 70 and 72  mL and rest and stress respectively. The LV systolic function is  hyperdynamic. The left ventricular ejection fraction was calculated to  be 80%. TID was 1.13.    ANIRUDH ARITA MD

## 2019-10-02 NOTE — ED NOTES
[facility]  ED Nurse Handoff Report    Marni Austin is a 49 year old female   ED Chief complaint: No chief complaint on file.  . ED Diagnosis:   Final diagnoses:   Lightheadedness   Non-intractable vomiting with nausea, unspecified vomiting type   Other chest pain     Allergies:   Allergies   Allergen Reactions     Zyban [Bupropion Hcl] Anaphylaxis     Bupropion Rash       Code Status: Full Code  Activity level - Baseline/Home:  Independent. Activity Level - Current:   Stand by Assist. Lift room needed: No. Bariatric: No   Needed: No   Isolation: No. Infection: Not Applicable.     Vital Signs:   Vitals:    10/01/19 2200 10/01/19 2215 10/01/19 2230 10/01/19 2337   BP: 109/64 119/70 109/61 97/47   Pulse: 78 77 71    Resp:       Temp:       SpO2: 92% 91% 92% 93%   Weight:       Height:           Cardiac Rhythm:  ,      Pain level:    Patient confused: No. Patient Falls Risk: Yes.   Elimination Status: Has voided   Patient Report - Initial Complaint: Patient reports developing dizziness approx 1600. Denies HA. HX COPD   Focused Assessment: Cognitive - Cognitive/Neuro/Behavioral WDL: -WDL except (sudden onset of vetigo and dizziness accompanied by nausea with emesis at 1600)  Tests Performed: 12 lead EKG, ortho BP, labs, UA  Abnormal Results:   Labs Ordered and Resulted from Time of ED Arrival Up to the Time of Departure from the ED   CBC WITH PLATELETS DIFFERENTIAL - Abnormal; Notable for the following components:       Result Value    WBC 11.5 (*)     RBC Count 5.24 (*)     Absolute Neutrophil 9.1 (*)     All other components within normal limits   COMPREHENSIVE METABOLIC PANEL - Abnormal; Notable for the following components:    Potassium 3.3 (*)     Glucose 150 (*)     All other components within normal limits   LIPASE - Abnormal; Notable for the following components:    Lipase 70 (*)     All other components within normal limits   ROUTINE UA WITH MICROSCOPIC - Abnormal; Notable for the  following components:    pH Urine 7.5 (*)     Squamous Epithelial /HPF Urine 4 (*)     Mucous Urine Present (*)     Amorphous Crystals Moderate (*)     All other components within normal limits   INR   TROPONIN I   VITAL SIGNS   PULSE OXIMETRY NURSING   ORTHOSTATIC BLOOD PRESSURE AND PULSE   PERIPHERAL IV CATHETER   ISTAT TROPONIN NURSING POCT   TROPONIN POCT       Treatments provided: 1L NS bolus, ASA., Neb  Family Comments: Daughter at bedside.  OBS brochure/video discussed/provided to patient:  Yes  ED Medications:   Medications   0.9% sodium chloride BOLUS (0 mLs Intravenous Stopped 10/1/19 2226)     Followed by   sodium chloride 0.9% infusion (has no administration in time range)   ipratropium - albuterol 0.5 mg/2.5 mg/3 mL (DUONEB) neb solution 3 mL (3 mLs Nebulization Given 10/1/19 2051)   ondansetron (ZOFRAN) injection 4 mg (4 mg Intravenous Given 10/1/19 2050)   aspirin (ASA) tablet 325 mg (325 mg Oral Given 10/1/19 2336)     Drips infusing:  No  For the majority of the shift, the patient's behavior Green. Interventions performed were N/A.     Severe Sepsis OR Septic Shock Diagnosis Present: No      ED Nurse Name/Phone Number: Mana Du RN,   11:43 PM    RECEIVING UNIT ED HANDOFF REVIEW    Above ED Nurse Handoff Report was reviewed: Yes  Reviewed by: Arielle Solares RN on October 2, 2019 at 12:48 AM

## 2019-10-02 NOTE — ED PROVIDER NOTES
"  History     Chief Complaint:    Dizziness    The history is provided by the patient.      Marni Austin is a 49 year old female with a history of coronary artery disease, hypertension, and hyperlipidemia who presents with dizziness. The patient began feeling dizzy and lightheaded at 1555 this afternoon before leaving working. While driving home, she began feeling nauseated and vomited; her brother had to come get her as she could not finish driving home. The patient denies any fevers, diarrhea, or cough. She also reports feeling some chest pressure when driving, but took some nitro and it seemed to clear it up. While in the ER, she also began experiencing a \"charley horse\" or muscle cramp/spasm in her chest.    Allergies:  Zyban [Bupropion Hcl]  Bupropion     Medications:    Proventil  Norvasc  Aspirin 81 mg  Lipitor  Breo Ellipta  Zyrtec  Hygroton  Celexa  Apresoline  Incruse Ellipta  Nitrostat  K-Dur  Deltasone  Ventolin HFA    Past Medical History:    COPD exacerbation  Rectal bleeding  Morbid obesity  Mild persistent asthma  Congestive heart failure.  Coronary artery disease  COPD  Depression  Hypertension  Hyperlipidemia  Menorrhagia  Adjustment disorder with anxiety  Tobacco use disorder  Bronchitis with bronchospasm    Past Surgical History:    Cardiac stent placement  Tonsillectomy  Tubal ligation    Family History:    Hypertension - mother  Heart disease - mother  Alcoholism - mother  Diabetes - father  Coronary artery disease - father  Cerebrovascular disease - father  Obesity - brother    Social History:  Negative for tobacco use - former smoker, quit 2018.  Negative for alcohol use.  Negative for drug use.  Patient accompanied to the ER by a family member.  Marital Status:   [2]     Review of Systems   Constitutional: Negative for fever.   Respiratory: Negative for cough.    Cardiovascular: Positive for chest pain.   Gastrointestinal: Positive for nausea and vomiting. Negative for diarrhea. " "  Neurological: Positive for dizziness and light-headedness.   All other systems reviewed and are negative.    Physical Exam     Patient Vitals for the past 24 hrs:   BP Temp Pulse Heart Rate Resp SpO2 Height Weight   10/01/19 2230 109/61 -- 71 -- -- 92 % -- --   10/01/19 2215 119/70 -- 77 -- -- 91 % -- --   10/01/19 2200 109/64 -- 78 -- -- 92 % -- --   10/01/19 2145 104/58 -- 76 -- -- 91 % -- --   10/01/19 2130 -- -- -- -- -- 97 % -- --   10/01/19 2115 134/86 -- 73 -- -- (!) 89 % -- --   10/01/19 2100 122/86 -- 77 -- -- 94 % -- --   10/01/19 2045 114/69 -- 74 -- -- 95 % -- --   10/01/19 2030 119/75 -- 79 -- -- 94 % -- --   10/01/19 2025 114/82 -- -- -- -- -- 1.549 m (5' 1\") 87.1 kg (192 lb)   10/01/19 2015 -- -- -- -- -- 95 % -- --   10/01/19 2006 -- 97.8  F (36.6  C) -- 87 22 97 % -- --     Physical Exam    General: The patient is alert, in no respiratory distress. Appears uncomfortable complaining of nausea.    HENT: Mucous membranes moist.    Cardiovascular: Regular rate and rhythm. Good pulses in all four extremities. Normal capillary refill and skin turgor.     Respiratory: No nasal flaring. No retractions. Wheezing, no crackles.    Gastrointestinal: Abdomen soft. No guarding, no rebound. No palpable hernias. No abdominal tenderness.    Musculoskeletal: No gross deformity.     Skin: No rashes or petechiae.     Neurologic: The patient is alert and oriented x3. GCS 15. No testable cranial nerve deficit. Follows commands with clear and appropriate speech. Gives appropriate answers. Good strength in all extremities. No gross neurologic deficit. Gross sensation intact. Pupils are round and reactive. No meningismus.     Lymphatic: No cervical adenopathy. No lower extremity swelling.    Psychiatric: The patient is non-tearful.    Emergency Department Course     ECG (20:09:52):  Rate 80 bpm. CA interval 144. QRS duration 98. QT/QTc 402/463. P-R-T axes 40 69 59. Normal sinus rhythm. Nonspecific T wave similar to " previous EKG dated 7/18/18. Interpreted at 2010 by Bakari Harris MD.    ECG (23:07:17):  Rate 69 bpm. MN interval 166. QRS duration 106. QT/QTc 426/456. P-R-T axes 69 70 73. Normal sinus rhythm. T wave abnormality still. Interpreted at 2308 by Bakari Harris MD.    Laboratory:  Laboratory findings were communicated with the patient and family who voiced understanding of the findings.    CBC: WBC 11.5 H o/w WNL. (HGB 15.2, )   INR: 0.94  CMP: Potassium 3.3 L, Glucose 150 H o/w WNL (Creatinine 0.70)  Lipase: 70 L  Troponin I (2229): <0.015  Troponin POCT: 0.01    UA: clear, yellow urine , pH 7.5 H, squamous epithelial 4 H, mucous present, amorphous crystals moderate o/w negative    Interventions:  2050: Zofran 4 mg IV  2051: 0.9% sodium chloride BOLUS 1 L IV  2051: Duoneb 3 mL Nebulization   2336:  mg PO    Emergency Department Course:  Past medical records, nursing notes, and vitals reviewed.    2007: I performed an exam of the patient as documented above.     EKG obtained in the ED, see results above.     IV was inserted and blood was drawn for laboratory testing, results above.    The patient provided a urine sample here in the emergency department. This was sent for laboratory testing, findings above.    2057: Patient rechecked and updated.      2130: Patient rechecked and updated.     2151: Patient rechecked and updated.    2310: Patient rechecked and updated.    2320: Patient rechecked and updated.    I personally reviewed the laboratory and EKG results with the Patient and answered all related questions prior to admission.     Findings and plan explained to the Patient who consents to admission.     0002: Discussed the patient with Dr. Shafer, who will admit the patient to an observation bed for further monitoring, evaluation, and treatment.     Impression & Plan     Medical Decision Making:  The patient has a known history of coronary artery disease as well as COPD.  She  presented complaining of dizziness that started after lightheadedness and vomiting leaving from work.  She did have some element of vertigo and that she said when she was driving and she turned she felt a spinning sensation but she mainly described as she was going to pass out.  There was some chest pressure associated with this that was not reproducible.  I did consider ACS but checked a 2-hour troponin which was negative but the patient was still feeling lightheaded and was admitted to observation bed to both trend her troponin as well as to continue hydration until such point she can go home. There is no sign of ongoing cardiac ischemia.    Discharge Diagnosis:    ICD-10-CM   1. Lightheadedness R42   2. Non-intractable vomiting with nausea, unspecified vomiting type R11.2   3. Other chest pain R07.89     Disposition:  Admitted to observation bed.    Scribe Disclosure:  I, Kerline Crandall, am serving as a scribe at 8:12 PM on 10/1/2019 to document services personally performed by Bakari Harris MD based on my observations and the provider's statements to me.     Kerline Crandall  10/1/2019   [facility] EMERGENCY DEPARTMENT       Bakari Harris MD  10/02/19 0003

## 2019-10-02 NOTE — PROGRESS NOTES
Respiratory Therapy Note        An EKG was completed on the pt, with no complications noted during the procedure.  EKG placed in chart.    October 2, 2019 11:33 AM  Eros Armendariz RT

## 2019-10-02 NOTE — PROGRESS NOTES
Pre-procedure:  Are you having any pain or shortness of breath (prior to starting)? no  Initial vital signs: /74, HR 75, RR 16  Allergies reviewed: yes   Rhythm:   Medications taken within 48 hours of procedure:    Any nitrates within the last 48 hours:  Last Caffeine:   Lung sounds: CTA, no wheezing, crackles or rtx  Health History (COPD, Asthma, etc): astma, copd           Procedure: Lexiscan  Reaction/symptoms after receiving Elana injection: Shortness of breath  Intensity of Pain:   Rhythm:   1. Vital Signs:/69, , RR 19  2. Vital Signs:/64, , RR 18     Reversal agent:     Post:   Resolution of symptoms?: YES  Vital signs: /61, HR 95, RR 16  Vital signs: /65, HR 86, RR 15  Rhythm:   Walk: NO  Comment:   Return to Radiology

## 2019-10-03 ENCOUNTER — TELEPHONE (OUTPATIENT)
Dept: FAMILY MEDICINE | Facility: CLINIC | Age: 49
End: 2019-10-03

## 2019-10-03 LAB — INTERPRETATION ECG - MUSE: NORMAL

## 2019-10-03 NOTE — TELEPHONE ENCOUNTER
Please contact patient for In-patient follow up.  [phone] (home)     Visit date: 10/02/2019  Diagnosis listed:Lightheadedness, Coronary Artery Disease Involving Native Heart with Angina Pectoris, Unspecified Vessel or Lesion T  Number of visits in past 12 months:1/0

## 2019-10-03 NOTE — TELEPHONE ENCOUNTER
Huddled with PCP, Manny Gambino: When Triage reaches the patient, ok to schedule hosp f/u visit using 2 MARTY slots (needs more attention than 1 MARTY will allow for).    ED / Discharge Outreach Protocol    Patient Contact    Attempt # 1    Was call answered?  No.  Left message on voicemail with information to call me back.    Celina PORTILLO, Triage RN

## 2019-10-04 NOTE — PROGRESS NOTES
Subjective     Marni Austin is a 49 year old female who presents to clinic today for the following health issues:    [facility]       Hospital Follow-up Visit:    Hospital/Nursing Home/IP Rehab Facility: [facility]  Date of Admission: 10/1/19  Date of Discharge: 10/2/19  Reason(s) for Admission: Dizziness, vomiting             Problems taking medications regularly:  None       Medication changes since discharge: Stopped Potassium and BP meds        Problems adhering to non-medication therapy:  None      Summary of hospitalization:  [facility] discharge summary reviewed  Diagnostic Tests/Treatments reviewed.  Follow up needed: CBC, BMP, Magnesium  Other Healthcare Providers Involved in Patient s Care:         None  Update since discharge: improved.   She does note that she started to experience leg swelling and so restarted her 1/2 chlorthalidone       Post Discharge Medication Reconciliation: discharge medications reconciled and changed, per note/orders (see AVS).  Plan of care communicated with patient     Coding guidelines for this visit:  Type of Medical   Decision Making Face-to-Face Visit       within 7 Days of discharge Face-to-Face Visit        within 14 days of discharge   Moderate Complexity [id number]   High Complexity [id number]              Patient Active Problem List   Diagnosis     Adjustment disorder with anxiety     Menorrhagia     CARDIOVASCULAR SCREENING; LDL GOAL LESS THAN 130     Tobacco use disorder     Hypertension goal BP (blood pressure) < 140/90     Health Care Home     COPD exacerbation (H)     CAD (coronary artery disease)     COPD (chronic obstructive pulmonary disease) (H)     Rectal bleeding     Chest pain, unspecified chest pain type     Elevated fasting glucose     Non morbid obesity     Morbid obesity (H)     Moderate persistent asthma     CHF (congestive heart failure) (H)     Light-headedness     Positive cardiac stress test     Past Surgical History:  "  Procedure Laterality Date     CARDIAC SURGERY  2015    Cardiac stents x 1     TONSILLECTOMY Bilateral 1985     Tubal ligation surgery         Social History     Tobacco Use     Smoking status: Former Smoker     Packs/day: 0.50     Years: 26.00     Pack years: 13.00     Start date: 1979     Last attempt to quit: 2018     Years since quittin.2     Smokeless tobacco: Never Used     Tobacco comment: quit 2018   Substance Use Topics     Alcohol use: No     Alcohol/week: 0.0 standard drinks     Family History   Problem Relation Age of Onset     Hypertension Mother      Heart Disease Mother      Alcoholism Mother      Diabetes Father      Coronary Artery Disease Father      Cerebrovascular Disease Father      Alzheimer Disease Maternal Grandmother      Heart Disease Maternal Grandmother      Lung Cancer Maternal Grandfather      Obesity Brother          Reviewed and updated as needed this visit by Provider         Review of Systems   ROS COMP: Constitutional, HEENT, cardiovascular, pulmonary, gi and gu systems are negative, except as otherwise noted.      Objective    BP (!) 140/82   Pulse 85   Temp 98.2  F (36.8  C) (Tympanic)   Resp 18   Ht 1.549 m (5' 1\")   Wt 91.6 kg (201 lb 14.4 oz)   LMP 2013   SpO2 92%   BMI 38.15 kg/m    Body mass index is 38.15 kg/m .  Physical Exam   GENERAL: healthy, alert and no distress  RESP: lungs clear to auscultation - no rales, rhonchi or wheezes  CV: regular rates and rhythm without ectopy or murmur  MS: limited 1+ pitting to the malleolar spaces bilaterally   SKIN: no suspicious lesions or rashes  NEURO: Normal strength and tone, mentation intact and speech normal  PSYCH: mentation appears normal, affect normal/bright    Diagnostic Test Results:  Labs reviewed in Epic  See A/P for updates today        Assessment & Plan     1. Coronary artery disease involving native heart without angina pectoris, unspecified vessel or lesion type  2. Hypertension " "goal BP (blood pressure) < 140/90  3. Hypokalemia  Marni presents for hospital follow up s/p lightheadedness, dizziness, vomiting and some hypotension. She was admitted and monitored. Cardiology work up and consult did not suspect any further concerns. All of her cardiac medications were surprisingly stopped other than asa81 and statin. Following her hospitalization she started to develop increased LE edema and just yesterday started chlorthalidone (1/2 tab) once again. This has helped some. She feels well. She is motivated to quit smoking. BP is just marginal so will trial a course of toprol. SHe will need rechecks to ensure control given the stenting hx. Labs updated today. Follow up no later than 3 months and for bp checks. We will have her restart potassium  - chlorthalidone (HYGROTON) 25 MG tablet; TAKE ONE-HALF TABLET BY  MOUTH DAILY  Dispense: 45 tablet; Refill: 0  - potassium chloride ER (K-TAB/KLOR-CON) 10 MEQ CR tablet; Take 1 tablet (10 mEq) by mouth 2 times daily  Dispense: 90 tablet; Refill: 0  - metoprolol succinate ER (TOPROL-XL) 25 MG 24 hr tablet; Take 1 tablet (25 mg) by mouth daily  Dispense: 90 tablet; Refill: 0  - Basic metabolic panel  - CBC with platelets  - Magnesium  - WBC Differential         BMI:   Estimated body mass index is 38.15 kg/m  as calculated from the following:    Height as of this encounter: 1.549 m (5' 1\").    Weight as of this encounter: 91.6 kg (201 lb 14.4 oz).   Weight management plan: Discussed healthy diet and exercise guidelines      Return in about 3 months (around 1/7/2020).    David Gambino PA-C  [facility]      "

## 2019-10-07 ENCOUNTER — OFFICE VISIT (OUTPATIENT)
Dept: FAMILY MEDICINE | Facility: CLINIC | Age: 49
End: 2019-10-07
Payer: COMMERCIAL

## 2019-10-07 VITALS
HEART RATE: 85 BPM | RESPIRATION RATE: 18 BRPM | DIASTOLIC BLOOD PRESSURE: 82 MMHG | SYSTOLIC BLOOD PRESSURE: 140 MMHG | TEMPERATURE: 98.2 F | HEIGHT: 61 IN | WEIGHT: 201.9 LBS | OXYGEN SATURATION: 92 % | BODY MASS INDEX: 38.12 KG/M2

## 2019-10-07 DIAGNOSIS — I25.10 CORONARY ARTERY DISEASE INVOLVING NATIVE HEART WITHOUT ANGINA PECTORIS, UNSPECIFIED VESSEL OR LESION TYPE: Primary | ICD-10-CM

## 2019-10-07 DIAGNOSIS — I10 HYPERTENSION GOAL BP (BLOOD PRESSURE) < 140/90: ICD-10-CM

## 2019-10-07 DIAGNOSIS — E87.6 HYPOKALEMIA: ICD-10-CM

## 2019-10-07 LAB
DIFFERENTIAL METHOD BLD: NORMAL
EOSINOPHIL NFR BLD AUTO: 1 %
ERYTHROCYTE [DISTWIDTH] IN BLOOD BY AUTOMATED COUNT: 13.2 % (ref 10–15)
HCT VFR BLD AUTO: 44.8 % (ref 35–47)
HGB BLD-MCNC: 15.1 G/DL (ref 11.7–15.7)
LYMPHOCYTES NFR BLD AUTO: 22 %
MCH RBC QN AUTO: 28.5 PG (ref 26.5–33)
MCHC RBC AUTO-ENTMCNC: 33.7 G/DL (ref 31.5–36.5)
MCV RBC AUTO: 85 FL (ref 78–100)
MONOCYTES NFR BLD AUTO: 6 %
NEUTROPHILS NFR BLD AUTO: 71 %
PLATELET # BLD AUTO: 279 10E9/L (ref 150–450)
RBC # BLD AUTO: 5.3 10E12/L (ref 3.8–5.2)
WBC # BLD AUTO: 13.1 10E9/L (ref 4–11)

## 2019-10-07 PROCEDURE — 36415 COLL VENOUS BLD VENIPUNCTURE: CPT | Performed by: PHYSICIAN ASSISTANT

## 2019-10-07 PROCEDURE — 80048 BASIC METABOLIC PNL TOTAL CA: CPT | Performed by: PHYSICIAN ASSISTANT

## 2019-10-07 PROCEDURE — 85027 COMPLETE CBC AUTOMATED: CPT | Performed by: PHYSICIAN ASSISTANT

## 2019-10-07 PROCEDURE — 99214 OFFICE O/P EST MOD 30 MIN: CPT | Performed by: PHYSICIAN ASSISTANT

## 2019-10-07 PROCEDURE — 83735 ASSAY OF MAGNESIUM: CPT | Performed by: PHYSICIAN ASSISTANT

## 2019-10-07 RX ORDER — CHLORTHALIDONE 25 MG/1
TABLET ORAL
Qty: 45 TABLET | Refills: 0
Start: 2019-10-07 | End: 2019-12-31

## 2019-10-07 RX ORDER — POTASSIUM CHLORIDE 750 MG/1
10 TABLET, EXTENDED RELEASE ORAL 2 TIMES DAILY
Qty: 90 TABLET | Refills: 0
Start: 2019-10-07 | End: 2020-03-24

## 2019-10-07 RX ORDER — METOPROLOL SUCCINATE 25 MG/1
25 TABLET, EXTENDED RELEASE ORAL DAILY
Qty: 90 TABLET | Refills: 0 | Status: SHIPPED | OUTPATIENT
Start: 2019-10-07 | End: 2019-11-01

## 2019-10-07 ASSESSMENT — MIFFLIN-ST. JEOR: SCORE: 1468.19

## 2019-10-08 LAB
ANION GAP SERPL CALCULATED.3IONS-SCNC: 9 MMOL/L (ref 3–14)
BUN SERPL-MCNC: 23 MG/DL (ref 7–30)
CALCIUM SERPL-MCNC: 8.9 MG/DL (ref 8.5–10.1)
CHLORIDE SERPL-SCNC: 103 MMOL/L (ref 94–109)
CO2 SERPL-SCNC: 24 MMOL/L (ref 20–32)
CREAT SERPL-MCNC: 0.78 MG/DL (ref 0.52–1.04)
GFR SERPL CREATININE-BSD FRML MDRD: 88 ML/MIN/{1.73_M2}
GLUCOSE SERPL-MCNC: 137 MG/DL (ref 70–99)
MAGNESIUM SERPL-MCNC: 2.5 MG/DL (ref 1.6–2.3)
POTASSIUM SERPL-SCNC: 3.6 MMOL/L (ref 3.4–5.3)
SODIUM SERPL-SCNC: 136 MMOL/L (ref 133–144)

## 2019-10-21 DIAGNOSIS — F43.22 ADJUSTMENT DISORDER WITH ANXIETY: ICD-10-CM

## 2019-10-22 RX ORDER — CITALOPRAM HYDROBROMIDE 20 MG/1
TABLET ORAL
Qty: 90 TABLET | Refills: 0 | Status: SHIPPED | OUTPATIENT
Start: 2019-10-22 | End: 2020-01-15

## 2019-10-22 NOTE — TELEPHONE ENCOUNTER
Prescription approved per [facility] Refill Protocol - depression is not on the problem list

## 2019-10-22 NOTE — TELEPHONE ENCOUNTER
"Requested Prescriptions   Pending Prescriptions Disp Refills     citalopram (CELEXA) 20 MG tablet [Pharmacy Med Name: CITALOPRAM  20MG  TAB] 90 tablet 0     Sig: TAKE 1 TABLET BY MOUTH  DAILY   Last Written Prescription Date:  8/27/19  Last Fill Quantity: 90,  # refills: 0   Last office visit: 10/7/2019 with prescribing provider:     David Gambino PA-C         Future Office Visit:        SSRIs Protocol Passed - 10/21/2019  8:31 PM   PHQ-9 SCORE 8/30/2017 2/9/2018 5/31/2019   PHQ-9 Total Score MyChart 13 (Moderate depression) 8 (Mild depression) -   PHQ-9 Total Score 13 8 9     JAMEEL-7 SCORE 6/2/2017 2/9/2018 5/31/2019   Total Score - 5 (mild anxiety) -   Total Score 5 5 9            Passed - Recent (12 mo) or future (30 days) visit within the authorizing provider's specialty     Patient has had an office visit with the authorizing provider or a provider within the authorizing providers department within the previous 12 mos or has a future within next 30 days. See \"Patient Info\" tab in inbasket, or \"Choose Columns\" in Meds & Orders section of the refill encounter.              Passed - Medication is active on med list        Passed - Patient is age 18 or older        Passed - No active pregnancy on record        Passed - No positive pregnancy test in last 12 months         "

## 2019-10-29 DIAGNOSIS — I25.10 CORONARY ARTERY DISEASE INVOLVING NATIVE HEART WITHOUT ANGINA PECTORIS, UNSPECIFIED VESSEL OR LESION TYPE: ICD-10-CM

## 2019-10-29 DIAGNOSIS — Z13.6 CARDIOVASCULAR SCREENING; LDL GOAL LESS THAN 130: ICD-10-CM

## 2019-10-30 RX ORDER — ATORVASTATIN CALCIUM 80 MG/1
TABLET, FILM COATED ORAL
Qty: 90 TABLET | Refills: 0
Start: 2019-10-30

## 2019-10-31 DIAGNOSIS — I25.10 CORONARY ARTERY DISEASE INVOLVING NATIVE HEART WITHOUT ANGINA PECTORIS, UNSPECIFIED VESSEL OR LESION TYPE: ICD-10-CM

## 2019-10-31 DIAGNOSIS — Z13.6 CARDIOVASCULAR SCREENING; LDL GOAL LESS THAN 130: ICD-10-CM

## 2019-10-31 RX ORDER — AMLODIPINE BESYLATE 10 MG/1
TABLET ORAL
OUTPATIENT
Start: 2019-10-31

## 2019-10-31 NOTE — TELEPHONE ENCOUNTER
Routing refill request to provider for review/approval because:  Drug not active on patient's medication list. Amlodipine d/c'd from med list on 10/2/19.    SONIDO NelsonN, RN

## 2019-10-31 NOTE — TELEPHONE ENCOUNTER
"Requested Prescriptions   Pending Prescriptions Disp Refills     amLODIPine (NORVASC) 10 MG tablet [Pharmacy Med Name: AMLODIPINE  10MG  TAB] 90 tablet      Sig: TAKE 1 TABLET BY MOUTH  DAILY   Last Written Prescription Date:  6/12/19 (discontinued)  Last Fill Quantity: 90,  # refills: 1   Last office visit: 10/7/2019 with prescribing provider:  David Gambino PA-C   Future Office Visit:        Calcium Channel Blockers Protocol  Failed - 10/30/2019  8:31 PM        Failed - Blood pressure under 140/90 in past 12 months     BP Readings from Last 3 Encounters:   10/07/19 (!) 140/82   10/02/19 135/72   09/03/19 112/64                 Failed - Medication is active on med list        Passed - Recent (12 mo) or future (30 days) visit within the authorizing provider's specialty     Patient has had an office visit with the authorizing provider or a provider within the authorizing providers department within the previous 12 mos or has a future within next 30 days. See \"Patient Info\" tab in inbasket, or \"Choose Columns\" in Meds & Orders section of the refill encounter.              Passed - Patient is age 18 or older        Passed - No active pregnancy on record        Passed - Normal serum creatinine on file in past 12 months     Recent Labs   Lab Test 10/07/19  1651   CR 0.78             Passed - No positive pregnancy test in past 12 months         "

## 2019-10-31 NOTE — TELEPHONE ENCOUNTER
"Requested Prescriptions   Pending Prescriptions Disp Refills     atorvastatin (LIPITOR) 80 MG tablet [Pharmacy Med Name: ATORVASTATIN  80MG  TAB] 90 tablet 0     Sig: TAKE 1 TABLET BY MOUTH  DAILY   Last Written Prescription Date:  9/4/19  Last Fill Quantity: 90,  # refills: 0   Last office visit: 10/7/2019 with prescribing provider:  David Gambino PA-C   Future Office Visit:        Statins Protocol Passed - 10/31/2019  3:44 PM        Passed - LDL on file in past 12 months     Recent Labs   Lab Test 05/31/19  1654   LDL Cannot estimate LDL when triglyceride exceeds 400 mg/dL  67             Passed - No abnormal creatine kinase in past 12 months     No lab results found.             Passed - Recent (12 mo) or future (30 days) visit within the authorizing provider's specialty     Patient has had an office visit with the authorizing provider or a provider within the authorizing providers department within the previous 12 mos or has a future within next 30 days. See \"Patient Info\" tab in inbasket, or \"Choose Columns\" in Meds & Orders section of the refill encounter.              Passed - Medication is active on med list        Passed - Patient is age 18 or older        Passed - No active pregnancy on record        Passed - No positive pregnancy test in past 12 months         "

## 2019-11-01 DIAGNOSIS — I25.10 CORONARY ARTERY DISEASE INVOLVING NATIVE HEART WITHOUT ANGINA PECTORIS, UNSPECIFIED VESSEL OR LESION TYPE: ICD-10-CM

## 2019-11-01 DIAGNOSIS — I10 HYPERTENSION GOAL BP (BLOOD PRESSURE) < 140/90: ICD-10-CM

## 2019-11-01 RX ORDER — METOPROLOL SUCCINATE 25 MG/1
25 TABLET, EXTENDED RELEASE ORAL DAILY
Qty: 90 TABLET | Refills: 0 | Status: SHIPPED | OUTPATIENT
Start: 2019-11-01 | End: 2020-01-07

## 2019-11-01 RX ORDER — ATORVASTATIN CALCIUM 80 MG/1
TABLET, FILM COATED ORAL
Qty: 90 TABLET | Refills: 0 | Status: SHIPPED | OUTPATIENT
Start: 2019-11-01 | End: 2020-01-23

## 2019-11-01 NOTE — TELEPHONE ENCOUNTER
New pharmacy requesting [facility]     Disp Refills Start End PAULIE   metoprolol succinate ER (TOPROL-XL) 25 MG 24 hr tablet 90 tablet 0 10/7/2019  --   Sig - Route: Take 1 tablet (25 mg) by mouth daily - Oral   Sent to pharmacy as: Metoprolol Succinate ER 25 MG Oral Tablet Extended Release 24 Hour (TOPROL-XL)   Class: E-Prescribe

## 2019-11-07 ENCOUNTER — ALLIED HEALTH/NURSE VISIT (OUTPATIENT)
Dept: FAMILY MEDICINE | Facility: CLINIC | Age: 49
End: 2019-11-07
Payer: COMMERCIAL

## 2019-11-07 VITALS — SYSTOLIC BLOOD PRESSURE: 126 MMHG | DIASTOLIC BLOOD PRESSURE: 82 MMHG

## 2019-11-07 DIAGNOSIS — Z01.30 BP CHECK: Primary | ICD-10-CM

## 2019-11-07 PROCEDURE — 99207 ZZC NO CHARGE NURSE ONLY: CPT | Performed by: PHYSICIAN ASSISTANT

## 2019-11-07 NOTE — PROGRESS NOTES
Marni Austin was evaluated at [facility] on November 7, 2019 at which time her blood pressure was:    BP Readings from Last 3 Encounters:   11/07/19 126/82   10/07/19 (!) 140/82   10/02/19 135/72     Pulse Readings from Last 3 Encounters:   10/07/19 85   10/02/19 86   09/03/19 106       Reviewed lifestyle modifications for blood pressure control and reduction: including making healthy food choices, managing weight, getting regular exercise, smoking cessation, reducing alcohol consumption, monitoring blood pressure regularly.     Symptoms: None    BP Goal:< 140/90 mmHg    BP Assessment:  BP at goal    Potential Reasons for BP too high: NA - Not applicable    BP Follow-Up Plan: Recheck BP in 6 months at pharmacy    Recommendation to Provider: n/a     Note completed by: Thank you!    Halima Gaspar, PharmD  Float Pharmacist  [facility]    on behalf of: [facility].

## 2019-11-17 RX ORDER — AMLODIPINE BESYLATE 10 MG/1
TABLET ORAL
Qty: 90 TABLET | OUTPATIENT
Start: 2019-11-17

## 2019-11-17 NOTE — TELEPHONE ENCOUNTER
Routing refill request to provider for review/approval because:  Drug not active on patient's medication list    No appointment pending at this time.  Routing to provider to advise.    Beckie Roldan BSN, RN

## 2019-11-18 NOTE — TELEPHONE ENCOUNTER
Called patient and LM for patient to return call to clinic. Need clarification on what BP med(s) she is taking. See provider note below for more information.   Ena Loaiza MA

## 2019-11-19 ENCOUNTER — MYC MEDICAL ADVICE (OUTPATIENT)
Dept: FAMILY MEDICINE | Facility: CLINIC | Age: 49
End: 2019-11-19

## 2019-11-19 NOTE — TELEPHONE ENCOUNTER
Sent pt a [employer] message to confirm what medication she is currently taking.  Ena Loaiza MA

## 2019-11-21 NOTE — TELEPHONE ENCOUNTER
Patient replied and stated that she will sent pictures of what she is taking through [employer].  Ena Loaiza MA

## 2019-12-16 ENCOUNTER — HEALTH MAINTENANCE LETTER (OUTPATIENT)
Age: 49
End: 2019-12-16

## 2019-12-28 DIAGNOSIS — I25.10 CORONARY ARTERY DISEASE INVOLVING NATIVE HEART WITHOUT ANGINA PECTORIS, UNSPECIFIED VESSEL OR LESION TYPE: ICD-10-CM

## 2019-12-28 DIAGNOSIS — I10 HYPERTENSION GOAL BP (BLOOD PRESSURE) < 140/90: ICD-10-CM

## 2019-12-30 NOTE — TELEPHONE ENCOUNTER
"Requested Prescriptions   Pending Prescriptions Disp Refills     chlorthalidone (HYGROTON) 25 MG tablet [Pharmacy Med Name: CHLORTHALIDONE  25MG  TAB] 45 tablet 0     Sig: TAKE ONE-HALF TABLET BY  MOUTH DAILY   Last Written Prescription Date:  10/7/19  Last Fill Quantity: 45,  # refills: 0   Last office visit: 10/7/2019 with prescribing provider:     David Gambino PA-C         Future Office Visit:        Diuretics (Including Combos) Protocol Passed - 12/28/2019  8:21 PM        Passed - Blood pressure under 140/90 in past 12 months     BP Readings from Last 3 Encounters:   11/07/19 126/82   10/07/19 (!) 140/82   10/02/19 135/72                 Passed - Recent (12 mo) or future (30 days) visit within the authorizing provider's specialty     Patient has had an office visit with the authorizing provider or a provider within the authorizing providers department within the previous 12 mos or has a future within next 30 days. See \"Patient Info\" tab in inbasket, or \"Choose Columns\" in Meds & Orders section of the refill encounter.              Passed - Medication is active on med list        Passed - Patient is age 18 or older        Passed - No active pregancy on record        Passed - Normal serum creatinine on file in past 12 months     Recent Labs   Lab Test 10/07/19  1651   CR 0.78              Passed - Normal serum potassium on file in past 12 months     Recent Labs   Lab Test 10/07/19  1651   POTASSIUM 3.6                    Passed - Normal serum sodium on file in past 12 months     Recent Labs   Lab Test 10/07/19  1651                 Passed - No positive pregnancy test in past 12 months         "

## 2019-12-31 RX ORDER — CHLORTHALIDONE 25 MG/1
TABLET ORAL
Qty: 45 TABLET | Refills: 0 | Status: SHIPPED | OUTPATIENT
Start: 2019-12-31 | End: 2020-03-23

## 2020-01-03 DIAGNOSIS — E87.6 HYPOKALEMIA: ICD-10-CM

## 2020-01-05 ENCOUNTER — ANCILLARY PROCEDURE (OUTPATIENT)
Dept: GENERAL RADIOLOGY | Facility: CLINIC | Age: 50
End: 2020-01-05
Attending: NURSE PRACTITIONER
Payer: COMMERCIAL

## 2020-01-05 ENCOUNTER — OFFICE VISIT (OUTPATIENT)
Dept: URGENT CARE | Facility: URGENT CARE | Age: 50
End: 2020-01-05
Payer: COMMERCIAL

## 2020-01-05 VITALS
DIASTOLIC BLOOD PRESSURE: 82 MMHG | SYSTOLIC BLOOD PRESSURE: 124 MMHG | BODY MASS INDEX: 37.98 KG/M2 | TEMPERATURE: 98.4 F | OXYGEN SATURATION: 96 % | HEART RATE: 94 BPM | RESPIRATION RATE: 16 BRPM | WEIGHT: 201 LBS

## 2020-01-05 DIAGNOSIS — M25.562 ACUTE PAIN OF LEFT KNEE: Primary | ICD-10-CM

## 2020-01-05 PROCEDURE — 99213 OFFICE O/P EST LOW 20 MIN: CPT | Performed by: NURSE PRACTITIONER

## 2020-01-05 PROCEDURE — 73560 X-RAY EXAM OF KNEE 1 OR 2: CPT | Mod: LT

## 2020-01-05 NOTE — PROGRESS NOTES
"SUBJECTIVE:  Chief Complaint   Patient presents with     Urgent Care     Knee Pain     Twisted it on an airplane Friday night, left knee pain      Marni Austin is a 49 year old female presents with a chief complaint of left knee pain, swelling and decreased range of motion.  The injury occurred 2 day(s) ago.   The injury happened while she was getting up sfom a seated postion in a plane and twisting. States heard a \"pop\" in the left knee and had immediate pain, delayed swelling.  The patient complained of moderate pain  and has had decreased ROM.  Pain exacerbated by walking, weight-bearing, movement and flexion/extension.  Relieved by rest, ice and elevation.  She treated it initially with ice and Ibuprofen. This is the first time this type of injury has occurred to this patient.     Past Medical History:   Diagnosis Date     CAD (coronary artery disease)      COPD (chronic obstructive pulmonary disease) (H)      Depression      Hypertension      Current Outpatient Medications   Medication Sig Dispense Refill     albuterol (PROVENTIL) (2.5 MG/3ML) 0.083% neb solution USE  1 VIAL BY NEBULIZATION EVERY 6 HOURS AS NEEDED FOR SHORTNESS OF BREATH /  DYSPNEA OR WHEEZING 180 mL 1     aspirin EC 81 MG EC tablet Take 1 tablet (81 mg) by mouth daily       atorvastatin (LIPITOR) 80 MG tablet TAKE 1 TABLET BY MOUTH  DAILY 90 tablet 0     BREO ELLIPTA 200-25 MCG/INH Inhaler INHALE 1 PUFF DAILY 3 Inhaler 1     cetirizine (ZYRTEC) 10 MG tablet Take 10 mg by mouth daily       chlorthalidone (HYGROTON) 25 MG tablet TAKE ONE-HALF TABLET BY  MOUTH DAILY 45 tablet 0     citalopram (CELEXA) 20 MG tablet TAKE 1 TABLET BY MOUTH  DAILY 90 tablet 0     INCRUSE ELLIPTA 62.5 MCG/INH Inhaler INHALE 1 INHALATION BY  MOUTH DAILY 90 each 0     metoprolol succinate ER (TOPROL-XL) 25 MG 24 hr tablet Take 1 tablet (25 mg) by mouth daily 90 tablet 0     nitroGLYcerin (NITROSTAT) 0.4 MG sublingual tablet SEE DIRECTIONS ON  MEDICATION INFORMATION "  SHEET WITH INVOICE  PAPERWORK 75 tablet 1     ondansetron (ZOFRAN-ODT) 4 MG ODT tab Take 1 tablet (4 mg) by mouth every 6 hours as needed for nausea or vomiting 10 tablet 0     potassium chloride ER (K-TAB/KLOR-CON) 10 MEQ CR tablet Take 1 tablet (10 mEq) by mouth 2 times daily 90 tablet 0     VENTOLIN  (90 Base) MCG/ACT inhaler INHALE 2 PUFFS INTO THE  LUNGS EVERY 6 HOURS AS  NEEDED FOR SHORTNESS OF  BREATH / DYSPNEA OR  WHEEZING 54 g 1     Social History     Tobacco Use     Smoking status: Former Smoker     Packs/day: 0.50     Years: 26.00     Pack years: 13.00     Start date: 1979     Last attempt to quit: 2018     Years since quittin.4     Smokeless tobacco: Never Used     Tobacco comment: quit 2018   Substance Use Topics     Alcohol use: No     Alcohol/week: 0.0 standard drinks       ROS:  Review of systems negative except as stated above.    EXAM:   /82   Pulse 94   Temp 98.4  F (36.9  C) (Oral)   Resp 16   Wt 91.2 kg (201 lb)   LMP 2013   SpO2 96%   BMI 37.98 kg/m    Gen: healthy,alert,no distress  Extremity: let knee has swelling, point tenderness medal aspect of the knee and pain with movememt.   There is not compromise to the distal circulation.  Pulses are +2 and CRT is brisk  GENERAL APPEARANCE: healthy, alert and no distress  EXTREMITIES: peripheral pulses normal  SKIN: no suspicious lesions or rashes  NEURO: Normal strength and tone, sensory exam grossly normal, mentation intact and speech normal    X-RAY was done. Effusion left knee. No fractures with posterior intra-articular body.     ASSESSMENT:   Left knee effusion, derangement Left knee    PLAN:  1) Rest, Ice, Compress, Elevate, Ibuprofen q 6 hrs for 3-5 days, Ortho referral and no use till evaluated by ortho. Crutches and brace given today.

## 2020-01-05 NOTE — PATIENT INSTRUCTIONS
Patient Education     Reducing Knee Pain and Swelling    Many treatments can help reduce pain and swelling in your knee. Your healthcare provider or physical therapist may suggest one or more of the following treatments:    Icing your knee. This helps reduce swelling. You may be asked to ice your knee once a day or more. Apply ice for about 15 to 20 minutes at a time, with at least 40 minutes between sessions. To make an ice pack, put ice cubes in a plastic bag that seals at the top. Wrap the bag in a clean, thin towel or cloth. Never put ice or an ice pack directly on the skin.    Keeping your leg raised above your heart. This helps excess fluid flow out of your knee joint to reduce swelling.    Compression. This means wrapping an elastic bandage or neoprene sleeve snugly around your knees. It keeps fluid from collecting in and around your knee joint.    Electrical stimulation. This is done by a physical therapist or . It can help reduce excess fluid in your knee joint.    Anti-inflammatory medicines. These may be prescribed by your healthcare provider. You may take pills or get shots (injections) in your knee.    Isometric (karena) exercises. These strengthen the muscles that support your knee joint. They also help reduce excess fluid in your knee.    Massage. This helps fluid drain away from your knee.  Date Last Reviewed: 5/1/2018 2000-2019 [employer] [address] [place] [address], [place] [address]. All rights reserved. This information is not intended as a substitute for professional medical care. Always follow your healthcare professional's instructions.

## 2020-01-06 NOTE — TELEPHONE ENCOUNTER
"Requested Prescriptions   Pending Prescriptions Disp Refills     potassium chloride ER (K-DUR/KLOR-CON M) 20 MEQ CR tablet [Pharmacy Med Name: POTASSIUM  20MEQ CONTROLLED RELEASE TABLET  SR CHLORIDE MC TB] 90 tablet      Sig: TAKE 1 TABLET BY MOUTH  DAILY  Last Written Prescription Date:  10/7/19  Last Fill Quantity: 90 tab,  # refills: 0   Last office visit: 10/7/2019 with prescribing provider:  Immanuel   Future Office Visit:         Potassium Supplements Protocol Failed - 1/3/2020  8:36 PM        Failed - Medication is active on med list        Passed - Recent (12 mo) or future (30 days) visit within the authorizing provider's department     Patient has had an office visit with the authorizing provider or a provider within the authorizing providers department within the previous 12 mos or has a future within next 30 days. See \"Patient Info\" tab in inbasket, or \"Choose Columns\" in Meds & Orders section of the refill encounter.              Passed - Patient is age 18 or older        Passed - Normal serum potassium in past 12 months     Recent Labs   Lab Test 10/07/19  1651   POTASSIUM 3.6                       "

## 2020-01-07 RX ORDER — POTASSIUM CHLORIDE 750 MG/1
10 TABLET, EXTENDED RELEASE ORAL DAILY
Qty: 90 TABLET | Refills: 2 | Status: SHIPPED | OUTPATIENT
Start: 2020-01-07 | End: 2020-11-13

## 2020-01-07 NOTE — TELEPHONE ENCOUNTER
No print out on 10/7/19  LRF 8/16/19     Per ov of 10/7/19 - pt was to restart her potassium    Routing refill request to provider for review/approval because:  Directions in chart varying from sig from [facility] - is the pt to take 10 meq 2 times daily?  Or 20 meq daily -     Will forward to the station, please try and help the pt schedule a f/u appt.  Thanks!

## 2020-01-07 NOTE — TELEPHONE ENCOUNTER
Called patient and informed, she was wondering if there was any way of getting a smaller pill sent in?   Ena Loaiza MA

## 2020-01-11 ENCOUNTER — OFFICE VISIT (OUTPATIENT)
Dept: ORTHOPEDICS | Facility: CLINIC | Age: 50
End: 2020-01-11
Payer: COMMERCIAL

## 2020-01-11 VITALS
WEIGHT: 201 LBS | SYSTOLIC BLOOD PRESSURE: 122 MMHG | BODY MASS INDEX: 37.95 KG/M2 | HEIGHT: 61 IN | DIASTOLIC BLOOD PRESSURE: 80 MMHG

## 2020-01-11 DIAGNOSIS — M17.12 PRIMARY OSTEOARTHRITIS OF LEFT KNEE: Primary | ICD-10-CM

## 2020-01-11 PROCEDURE — 99204 OFFICE O/P NEW MOD 45 MIN: CPT | Mod: 25 | Performed by: FAMILY MEDICINE

## 2020-01-11 PROCEDURE — 20611 DRAIN/INJ JOINT/BURSA W/US: CPT | Mod: LT | Performed by: FAMILY MEDICINE

## 2020-01-11 RX ORDER — METHYLPREDNISOLONE ACETATE 40 MG/ML
80 INJECTION, SUSPENSION INTRA-ARTICULAR; INTRALESIONAL; INTRAMUSCULAR; SOFT TISSUE
Status: SHIPPED | OUTPATIENT
Start: 2020-01-11

## 2020-01-11 RX ORDER — MELOXICAM 15 MG/1
15 TABLET ORAL DAILY
Qty: 30 TABLET | Refills: 1 | Status: SHIPPED | OUTPATIENT
Start: 2020-01-11 | End: 2020-05-12

## 2020-01-11 RX ADMIN — METHYLPREDNISOLONE ACETATE 80 MG: 40 INJECTION, SUSPENSION INTRA-ARTICULAR; INTRALESIONAL; INTRAMUSCULAR; SOFT TISSUE at 08:37

## 2020-01-11 ASSESSMENT — MIFFLIN-ST. JEOR: SCORE: 1464.11

## 2020-01-11 NOTE — PATIENT INSTRUCTIONS
1. Primary osteoarthritis of left knee      -Patient has left knee pain and swelling due to moderate arthritis  -Patient patient tolerated cortisone injection today without complications.  Patient was given postprocedure instructions  -Patient will start formal physical therapy and home exercise program.  -Patient will start meloxicam 15 mg p.o. daily.  -Patient may discontinue knee immobilizer and may purchase an over-the-counter compression sleeve  -Patient will follow-up in 4 weeks for reevaluation and progression of activity.  -Call direct clinic number [[phone]] at any time with questions or concerns.    Albert Yeo MD CAQSM  [facility]  [facility] [place]

## 2020-01-11 NOTE — PROGRESS NOTES
"ASSESSMENT & PLAN  Patient Instructions     1. Primary osteoarthritis of left knee      -Patient has left knee pain and swelling due to moderate arthritis  -Patient patient tolerated cortisone injection today without complications.  Patient was given postprocedure instructions  -Patient will start formal physical therapy and home exercise program.  -Patient will start meloxicam 15 mg p.o. daily.  -Patient may discontinue knee immobilizer and may purchase an over-the-counter compression sleeve  -Patient will follow-up in 4 weeks for reevaluation and progression of activity.  -Call direct clinic number [[phone]] at any time with questions or concerns.    Albert Yeo MD [facility]  [facility]          -----    SUBJECTIVE  Marni Austin is a/an 49 year old female who is seen in consultation at the request of  Kathryn Stafford N.P. for evaluation of left knee pain. The patient is seen by themselves.    Onset: 1 week(s) ago. Patient describes injury as twisted her knee while getting out her seat in an airplaine. [place] \"pop\".   Location of Pain: left posterior knee pain  Rating of Pain at worst: 7/10  Rating of Pain Currently: 0/10  Worsened by: putting weight on it, bending it,   Better with: icing, elevation, rest  Treatments tried: rest/activity avoidance, elevation, ice, ibuprofen, previous imaging (xray 1/5/2020) and casting/splinting/bracing, was given crutches at , but stopped using them after the 2nd day.  Associated symptoms: swelling, weakness of leg, locking or catching and feeling of instability  Orthopedic history: NO  Relevant surgical history: NO  Social history: social history: works at office job.     Past Medical History:   Diagnosis Date     CAD (coronary artery disease)      COPD (chronic obstructive pulmonary disease) (H)      Depression      Hypertension      Social History     Socioeconomic History     Marital status:      " "Spouse name: Julian     Number of children: 3     Years of education: Not on file     Highest education level: Not on file   Occupational History     Occupation:    Social Needs     Financial resource strain: Not on file     Food insecurity:     Worry: Not on file     Inability: Not on file     Transportation needs:     Medical: Not on file     Non-medical: Not on file   Tobacco Use     Smoking status: Former Smoker     Packs/day: 0.50     Years: 26.00     Pack years: 13.00     Start date: 1979     Last attempt to quit: 2018     Years since quittin.4     Smokeless tobacco: Never Used     Tobacco comment: quit 2018   Substance and Sexual Activity     Alcohol use: No     Alcohol/week: 0.0 standard drinks     Drug use: No     Sexual activity: Yes     Partners: Male   Lifestyle     Physical activity:     Days per week: Not on file     Minutes per session: Not on file     Stress: Not on file   Relationships     Social connections:     Talks on phone: Not on file     Gets together: Not on file     Attends Oriental orthodox service: Not on file     Active member of club or organization: Not on file     Attends meetings of clubs or organizations: Not on file     Relationship status: Not on file     Intimate partner violence:     Fear of current or ex partner: Not on file     Emotionally abused: Not on file     Physically abused: Not on file     Forced sexual activity: Not on file   Other Topics Concern      Service No     Blood Transfusions No     Caffeine Concern Yes     Comment: \"uses a lot of it\"     Occupational Exposure No     Hobby Hazards No     Sleep Concern Yes     Comment: trouble sleeping     Stress Concern Yes     Comment: work related     Weight Concern Yes     Special Diet No     Back Care No     Exercise No     Bike Helmet No     Seat Belt Yes     Self-Exams No     Parent/sibling w/ CABG, MI or angioplasty before 65F 55M? Not Asked   Social History Narrative     Not on file " "        Patient's past medical, surgical, social, and family histories were reviewed today and no changes are noted.    REVIEW OF SYSTEMS:  10 point ROS is negative other than symptoms noted above in HPI, Past Medical History or as stated below  Constitutional: NEGATIVE for fever, chills, change in weight  Skin: NEGATIVE for worrisome rashes, moles or lesions  GI/: NEGATIVE for bowel or bladder changes  Neuro: NEGATIVE for weakness, dizziness or paresthesias    OBJECTIVE:  /80   Ht 1.549 m (5' 1\")   Wt 91.2 kg (201 lb)   LMP 08/30/2013   BMI 37.98 kg/m     General: healthy, alert and in no distress  HEENT: no scleral icterus or conjunctival erythema  Skin: no suspicious lesions or rash. No jaundice.  CV: no pedal edema  Resp: normal respiratory effort without conversational dyspnea   Psych: normal mood and affect  Gait: normal steady gait with appropriate coordination and balance  Neuro: Normal light sensory exam of lower extremity  MSK:  LEFT KNEE  Inspection:    normal alignment  Palpation:    Tender about the lateral patellar facet, medial patellar facet, lateral joint line and medial joint line. Remainder of bony and ligamentous landmarks are nontender.    Moderate effusion is present    Patellofemoral crepitus is Present  Range of Motion:     50 extension to 1000 flexion  Strength:    Quadriceps 5-/5    Extensor mechanism intact  Special Tests:    Positive: Patellar grind, Melania's    Negative: MCL/valgus stress (0 & 30 deg), LCL/varus stress (0 & 30 deg), Lachman's, anterior drawer, posterior drawer    Independent visualization of the below image:  No results found for this or any previous visit (from the past 24 hour(s)).   Recent Results (from the past 744 hour(s))   XR Knee Standing Left 2 Views    Narrative    XR KNEE STANDING LT 2 VW  1/5/2020 9:09 AM     HISTORY: Acute pain of left knee    COMPARISON: None.      Impression    IMPRESSION:  Moderate to advanced medial compartment narrowing. " Mild  medial compartment and hypertrophic change. Minimal hypertrophic  change in the lateral compartment. Large knee joint effusion. Small  posterior intra-articular body. No fractures are evident.     KEVIN LIMA MD       Large Joint Injection/Arthocentesis: L knee joint  Date/Time: 1/11/2020 8:37 AM  Performed by: Yeo, Albert, MD  Authorized by: Yeo, Albert, MD     Indications:  Pain and osteoarthritis  Needle Size:  25 G  Guidance: ultrasound    Approach:  Superolateral  Location:  Knee      Medications:  80 mg methylPREDNISolone 40 MG/ML  Aspirate amount (mL):  30  Aspirate:  Serous and yellow  Outcome:  Tolerated well, no immediate complications  Procedure discussed: discussed risks, benefits, and alternatives    Consent Given by:  Patient  Timeout: timeout called immediately prior to procedure    Prep: patient was prepped and draped in usual sterile fashion                Albert Yeo MD [facility]

## 2020-01-11 NOTE — LETTER
"    1/11/2020         RE: Marni Austin  [address]  [facility] [address]        Dear Colleague,    Thank you for referring your patient, Marni Austin, to the [facility]. Please see a copy of my visit note below.    ASSESSMENT & PLAN  Patient Instructions     1. Primary osteoarthritis of left knee      -Patient has left knee pain and swelling due to moderate arthritis  -Patient patient tolerated cortisone injection today without complications.  Patient was given postprocedure instructions  -Patient will start formal physical therapy and home exercise program.  -Patient will start meloxicam 15 mg p.o. daily.  -Patient may discontinue knee immobilizer and may purchase an over-the-counter compression sleeve  -Patient will follow-up in 4 weeks for reevaluation and progression of activity.  -Call direct clinic number [[phone]] at any time with questions or concerns.    Albert Yeo MD [facility]  [facility]          -----    SUBJECTIVE  Marni Austin is a/an 49 year old female who is seen in consultation at the request of  Kathryn Stafford N.P. for evaluation of left knee pain. The patient is seen by themselves.    Onset: 1 week(s) ago. Patient describes injury as twisted her knee while getting out her seat in an airplaine. [place] \"pop\".   Location of Pain: left posterior knee pain  Rating of Pain at worst: 7/10  Rating of Pain Currently: 0/10  Worsened by: putting weight on it, bending it,   Better with: icing, elevation, rest  Treatments tried: rest/activity avoidance, elevation, ice, ibuprofen, previous imaging (xray 1/5/2020) and casting/splinting/bracing, was given crutches at , but stopped using them after the 2nd day.  Associated symptoms: swelling, weakness of leg, locking or catching and feeling of instability  Orthopedic history: NO  Relevant surgical history: NO  Social history: social history: works at " "office job.     Past Medical History:   Diagnosis Date     CAD (coronary artery disease)      COPD (chronic obstructive pulmonary disease) (H)      Depression      Hypertension      Social History     Socioeconomic History     Marital status:      Spouse name: Julian     Number of children: 3     Years of education: Not on file     Highest education level: Not on file   Occupational History     Occupation:    Social Needs     Financial resource strain: Not on file     Food insecurity:     Worry: Not on file     Inability: Not on file     Transportation needs:     Medical: Not on file     Non-medical: Not on file   Tobacco Use     Smoking status: Former Smoker     Packs/day: 0.50     Years: 26.00     Pack years: 13.00     Start date: 1979     Last attempt to quit: 2018     Years since quittin.4     Smokeless tobacco: Never Used     Tobacco comment: quit 2018   Substance and Sexual Activity     Alcohol use: No     Alcohol/week: 0.0 standard drinks     Drug use: No     Sexual activity: Yes     Partners: Male   Lifestyle     Physical activity:     Days per week: Not on file     Minutes per session: Not on file     Stress: Not on file   Relationships     Social connections:     Talks on phone: Not on file     Gets together: Not on file     Attends Orthodoxy service: Not on file     Active member of club or organization: Not on file     Attends meetings of clubs or organizations: Not on file     Relationship status: Not on file     Intimate partner violence:     Fear of current or ex partner: Not on file     Emotionally abused: Not on file     Physically abused: Not on file     Forced sexual activity: Not on file   Other Topics Concern      Service No     Blood Transfusions No     Caffeine Concern Yes     Comment: \"uses a lot of it\"     Occupational Exposure No     Hobby Hazards No     Sleep Concern Yes     Comment: trouble sleeping     Stress Concern Yes     Comment: work " "related     Weight Concern Yes     Special Diet No     Back Care No     Exercise No     Bike Helmet No     Seat Belt Yes     Self-Exams No     Parent/sibling w/ CABG, MI or angioplasty before 65F 55M? Not Asked   Social History Narrative     Not on file         Patient's past medical, surgical, social, and family histories were reviewed today and no changes are noted.    REVIEW OF SYSTEMS:  10 point ROS is negative other than symptoms noted above in HPI, Past Medical History or as stated below  Constitutional: NEGATIVE for fever, chills, change in weight  Skin: NEGATIVE for worrisome rashes, moles or lesions  GI/: NEGATIVE for bowel or bladder changes  Neuro: NEGATIVE for weakness, dizziness or paresthesias    OBJECTIVE:  /80   Ht 1.549 m (5' 1\")   Wt 91.2 kg (201 lb)   LMP 08/30/2013   BMI 37.98 kg/m      General: healthy, alert and in no distress  HEENT: no scleral icterus or conjunctival erythema  Skin: no suspicious lesions or rash. No jaundice.  CV: no pedal edema  Resp: normal respiratory effort without conversational dyspnea   Psych: normal mood and affect  Gait: normal steady gait with appropriate coordination and balance  Neuro: Normal light sensory exam of lower extremity  MSK:  LEFT KNEE  Inspection:    normal alignment  Palpation:    Tender about the lateral patellar facet, medial patellar facet, lateral joint line and medial joint line. Remainder of bony and ligamentous landmarks are nontender.    Moderate effusion is present    Patellofemoral crepitus is Present  Range of Motion:     50 extension to 1000 flexion  Strength:    Quadriceps 5-/5    Extensor mechanism intact  Special Tests:    Positive: Patellar grind, Melania's    Negative: MCL/valgus stress (0 & 30 deg), LCL/varus stress (0 & 30 deg), Lachman's, anterior drawer, posterior drawer    Independent visualization of the below image:  No results found for this or any previous visit (from the past 24 hour(s)).   Recent Results (from " the past 744 hour(s))   XR Knee Standing Left 2 Views    Narrative    XR KNEE STANDING LT 2 VW  1/5/2020 9:09 AM     HISTORY: Acute pain of left knee    COMPARISON: None.      Impression    IMPRESSION:  Moderate to advanced medial compartment narrowing. Mild  medial compartment and hypertrophic change. Minimal hypertrophic  change in the lateral compartment. Large knee joint effusion. Small  posterior intra-articular body. No fractures are evident.     KEVIN LIMA MD       Large Joint Injection/Arthocentesis: L knee joint  Date/Time: 1/11/2020 8:37 AM  Performed by: Yeo, Albert, MD  Authorized by: Yeo, Albert, MD     Indications:  Pain and osteoarthritis  Needle Size:  25 G  Guidance: ultrasound    Approach:  Superolateral  Location:  Knee      Medications:  80 mg methylPREDNISolone 40 MG/ML  Aspirate amount (mL):  30  Aspirate:  Serous and yellow  Outcome:  Tolerated well, no immediate complications  Procedure discussed: discussed risks, benefits, and alternatives    Consent Given by:  Patient  Timeout: timeout called immediately prior to procedure    Prep: patient was prepped and draped in usual sterile fashion                Albert Yeo MD [facility]      Again, thank you for allowing me to participate in the care of your patient.        Sincerely,        Albert Yeo, MD

## 2020-01-14 DIAGNOSIS — F43.22 ADJUSTMENT DISORDER WITH ANXIETY: ICD-10-CM

## 2020-01-15 ENCOUNTER — THERAPY VISIT (OUTPATIENT)
Dept: PHYSICAL THERAPY | Facility: CLINIC | Age: 50
End: 2020-01-15
Payer: COMMERCIAL

## 2020-01-15 DIAGNOSIS — M25.562 ACUTE PAIN OF LEFT KNEE: ICD-10-CM

## 2020-01-15 DIAGNOSIS — M17.12 PRIMARY OSTEOARTHRITIS OF LEFT KNEE: ICD-10-CM

## 2020-01-15 PROCEDURE — 97161 PT EVAL LOW COMPLEX 20 MIN: CPT | Mod: GP | Performed by: PHYSICAL THERAPIST

## 2020-01-15 PROCEDURE — 97110 THERAPEUTIC EXERCISES: CPT | Mod: GP | Performed by: PHYSICAL THERAPIST

## 2020-01-15 RX ORDER — CITALOPRAM HYDROBROMIDE 20 MG/1
TABLET ORAL
Qty: 90 TABLET | Refills: 0 | Status: SHIPPED | OUTPATIENT
Start: 2020-01-15 | End: 2020-03-24

## 2020-01-15 ASSESSMENT — ACTIVITIES OF DAILY LIVING (ADL)
LIMPING: THE SYMPTOM AFFECTS MY ACTIVITY SLIGHTLY
DEEP_SQUATTING: NO DIFFICULTY AT ALL
WALKING_APPROXIMATELY_10_MINUTES: NO DIFFICULTY AT ALL
WALKING_UP_STEEP_HILLS: NO DIFFICULTY AT ALL
WALKING_15_MINUTES_OR_GREATER: NO DIFFICULTY AT ALL
KNEEL ON THE FRONT OF YOUR KNEE: ACTIVITY IS MINIMALLY DIFFICULT
WALK: ACTIVITY IS NOT DIFFICULT
KNEE_ACTIVITY_OF_DAILY_LIVING_SCORE: 82.86
STAND: ACTIVITY IS MINIMALLY DIFFICULT
GIVING WAY, BUCKLING OR SHIFTING OF KNEE: THE SYMPTOM AFFECTS MY ACTIVITY SLIGHTLY
HEAVY_WORK: NO DIFFICULTY AT ALL
GOING_UP_1_FLIGHT_OF_STAIRS: NO DIFFICULTY AT ALL
HOS_ADL_ITEM_SCORE_TOTAL: 68
AS_A_RESULT_OF_YOUR_KNEE_INJURY,_HOW_WOULD_YOU_RATE_YOUR_CURRENT_LEVEL_OF_DAILY_ACTIVITY?: NEARLY NORMAL
SITTING_FOR_15_MINUTES: NO DIFFICULTY AT ALL
PAIN: I HAVE THE SYMPTOM BUT IT DOES NOT AFFECT MY ACTIVITY
WALKING_INITIALLY: NO DIFFICULTY AT ALL
TWISTING/PIVOTING_ON_INVOLVED_LEG: NO DIFFICULTY AT ALL
GETTING_INTO_AND_OUT_OF_A_BATHTUB: NO DIFFICULTY AT ALL
RISE FROM A CHAIR: ACTIVITY IS NOT DIFFICULT
LIGHT_TO_MODERATE_WORK: NO DIFFICULTY AT ALL
GO UP STAIRS: ACTIVITY IS MINIMALLY DIFFICULT
HOW_WOULD_YOU_RATE_THE_OVERALL_FUNCTION_OF_YOUR_KNEE_DURING_YOUR_USUAL_DAILY_ACTIVITIES?: NEARLY NORMAL
HOW_WOULD_YOU_RATE_YOUR_CURRENT_LEVEL_OF_FUNCTION_DURING_YOUR_USUAL_ACTIVITIES_OF_DAILY_LIVING_FROM_0_TO_100_WITH_100_BEING_YOUR_LEVEL_OF_FUNCTION_PRIOR_TO_YOUR_HIP_PROBLEM_AND_0_BEING_THE_INABILITY_TO_PERFORM_ANY_OF_YOUR_USUAL_DAILY_ACTIVITIES?: 99
STIFFNESS: I HAVE THE SYMPTOM BUT IT DOES NOT AFFECT MY ACTIVITY
SWELLING: I HAVE THE SYMPTOM BUT IT DOES NOT AFFECT MY ACTIVITY
KNEE_ACTIVITY_OF_DAILY_LIVING_SUM: 58
ROLLING_OVER_IN_BED: NO DIFFICULTY AT ALL
HOS_ADL_HIGHEST_POTENTIAL_SCORE: 68
SIT WITH YOUR KNEE BENT: ACTIVITY IS NOT DIFFICULT
WALKING_DOWN_STEEP_HILLS: NO DIFFICULTY AT ALL
PUTTING_ON_SOCKS_AND_SHOES: NO DIFFICULTY AT ALL
RAW_SCORE: 58
RECREATIONAL_ACTIVITIES: NO DIFFICULTY AT ALL
HOS_ADL_COUNT: 17
STANDING_FOR_15_MINUTES: NO DIFFICULTY AT ALL
WEAKNESS: I DO NOT HAVE THE SYMPTOM
GO DOWN STAIRS: ACTIVITY IS MINIMALLY DIFFICULT
GETTING_INTO_AND_OUT_OF_AN_AVERAGE_CAR: NO DIFFICULTY AT ALL
SQUAT: ACTIVITY IS MINIMALLY DIFFICULT
STEPPING_UP_AND_DOWN_CURBS: NO DIFFICULTY AT ALL
HOS_ADL_SCORE(%): 100
GOING_DOWN_1_FLIGHT_OF_STAIRS: NO DIFFICULTY AT ALL

## 2020-01-15 NOTE — PROGRESS NOTES
[facility] Initial Evaluation  Subjective:  Pt injured her left knee 1-3-20 twisting her leg to get out of an airplane seat. Pt wore an immobilizer for one week.  Pt seen by Dr. Yeo on 1-11-20 who injected the knee and referred pt for physical therapy. Pt has noted decreased pain since injection knee.    The history is provided by the patient. No  was used.   Type of problem:  Left knee   Condition occurred with:  A twist. This is a new condition   Problem details: Pt rates pain as a 8/10.   Patient reports pain:  Medial, lateral and posterior. Radiates to:  Thigh. Associated symptoms:  Loss of motion/stiffness and loss of strength. Symptoms are exacerbated by ascending stairs, descending stairs, kneeling and transfers and relieved by ice, analgesics and NSAID's.                      Objective:    Gait:  Decreased pelvic stability                                                          Knee Evaluation:  ROM:    AROM    Hyperextension: Left:  0    Right:  Extension: Left: 0    Right:   Flexion: Left: 120   Right:  PROM    Hyperextension: Left: 0   Right:   Extension: Left: 0   Right:   Flexion: Left: 125   Right:       Strength:     Extension:  Left: 4-/5   Pain:        Flexion:  Left: 4+/5   Pain:                Palpation:    Left knee tenderness present at:  Medial Joint Line and Lateral Joint Line    Right knee tenderness not present at:  Medial Joint Line and Lateral Joint Line            General     ROS    Assessment/Plan:    Patient is a 49 year old female with left side knee complaints.    Patient has the following significant findings with corresponding treatment plan.                Diagnosis 1:  Left knee pain/ OA  Pain -  hot/cold therapy, self management, education and home program  Decreased ROM/flexibility - manual therapy, therapeutic exercise, therapeutic activity and home program  Decreased strength - therapeutic exercise, therapeutic activities and home  program    Therapy Evaluation Codes:   1) History comprised of:   Personal factors that impact the plan of care:      None.    Comorbidity factors that impact the plan of care are:      Asthma, Depression, High blood pressure, Overweight, Smoking, Weakness and chest pain.     Medications impacting care: Anti-depressant and Anti-inflammatory.  2) Examination of Body Systems comprised of:   Body structures and functions that impact the plan of care:      Knee.   Activity limitations that impact the plan of care are:      Bending, Squatting/kneeling, Stairs, Standing and Walking.  3) Clinical presentation characteristics are:   Stable/Uncomplicated.  4) Decision-Making    Low complexity using standardized patient assessment instrument and/or measureable assessment of functional outcome.  Cumulative Therapy Evaluation is: Low complexity.    Previous and current functional limitations:  (See Goal Flow Sheet for this information)    Short term and Long term goals: (See Goal Flow Sheet for this information)     Communication ability:  Patient appears to be able to clearly communicate and understand verbal and written communication and follow directions correctly.  Treatment Explanation - The following has been discussed with the patient:   RX ordered/plan of care  Anticipated outcomes  Possible risks and side effects  This patient would benefit from PT intervention to resume normal activities.   Rehab potential is good.    Frequency:  1 X week, once daily  Duration:  for 6 weeks  Discharge Plan:  Achieve all LTG.  Independent in home treatment program.  Reach maximal therapeutic benefit.    Please refer to the daily flowsheet for treatment today, total treatment time and time spent performing 1:1 timed codes.

## 2020-01-15 NOTE — TELEPHONE ENCOUNTER
"Tried to approve and get appt scheduled- MEDICATION WARNING comes up.     Citalopram 20 mg  Last Written Prescription Date:  10/22/19  Last Fill Quantity: 90,  # refills: 0   Last office visit: 10/7/2019 with prescribing provider:  MACHO   Future Office Visit:    Requested Prescriptions   Pending Prescriptions Disp Refills     citalopram (CELEXA) 20 MG tablet [Pharmacy Med Name: CITALOPRAM  20MG  TAB] 90 tablet 0     Sig: TAKE 1 TABLET BY MOUTH  DAILY       SSRIs Protocol Passed - 1/14/2020  8:38 PM        Passed - Recent (12 mo) or future (30 days) visit within the authorizing provider's specialty     Patient has had an office visit with the authorizing provider or a provider within the authorizing providers department within the previous 12 mos or has a future within next 30 days. See \"Patient Info\" tab in inbasket, or \"Choose Columns\" in Meds & Orders section of the refill encounter.              Passed - Medication is active on med list        Passed - Patient is age 18 or older        Passed - No active pregnancy on record        Passed - No positive pregnancy test in last 12 months        Medication is being filled for 1 time refill only due to:  Due to be seen 1/7/2020   Will route to TC's to assist in scheduling pt w/ DM.     Deneen KNUTSON RN       "

## 2020-01-22 ENCOUNTER — THERAPY VISIT (OUTPATIENT)
Dept: PHYSICAL THERAPY | Facility: CLINIC | Age: 50
End: 2020-01-22
Payer: COMMERCIAL

## 2020-01-22 DIAGNOSIS — Z13.6 CARDIOVASCULAR SCREENING; LDL GOAL LESS THAN 130: ICD-10-CM

## 2020-01-22 DIAGNOSIS — I25.10 CORONARY ARTERY DISEASE INVOLVING NATIVE HEART WITHOUT ANGINA PECTORIS, UNSPECIFIED VESSEL OR LESION TYPE: ICD-10-CM

## 2020-01-22 DIAGNOSIS — M25.562 ACUTE PAIN OF LEFT KNEE: ICD-10-CM

## 2020-01-22 PROCEDURE — 97110 THERAPEUTIC EXERCISES: CPT | Mod: GP | Performed by: PHYSICAL THERAPIST

## 2020-01-22 PROCEDURE — 97530 THERAPEUTIC ACTIVITIES: CPT | Mod: GP | Performed by: PHYSICAL THERAPIST

## 2020-01-22 PROCEDURE — 97112 NEUROMUSCULAR REEDUCATION: CPT | Mod: GP | Performed by: PHYSICAL THERAPIST

## 2020-01-23 RX ORDER — ATORVASTATIN CALCIUM 80 MG/1
TABLET, FILM COATED ORAL
Qty: 90 TABLET | Refills: 0 | Status: SHIPPED | OUTPATIENT
Start: 2020-01-23 | End: 2020-04-17

## 2020-01-23 NOTE — TELEPHONE ENCOUNTER
"Routing refill request to provider for review/approval because:  Orin given x1 and patient did not follow up, please advise  Patient needs to be seen because:  Follow up previous visit in Oct     Lipitor  Last Written Prescription Date:  11/1/2019  Last Fill Quantity: 90,  # refills: 0   Last office visit: 10/7/2019 with prescribing provider:  Immanuel   Future Office Visit:      Requested Prescriptions   Pending Prescriptions Disp Refills     atorvastatin (LIPITOR) 80 MG tablet [Pharmacy Med Name: ATORVASTATIN  80MG  TAB] 90 tablet 0     Sig: TAKE 1 TABLET BY MOUTH  DAILY       Statins Protocol Passed - 1/22/2020  8:18 PM        Passed - LDL on file in past 12 months     Recent Labs   Lab Test 05/31/19  1654   LDL Cannot estimate LDL when triglyceride exceeds 400 mg/dL  67             Passed - No abnormal creatine kinase in past 12 months     No lab results found.             Passed - Recent (12 mo) or future (30 days) visit within the authorizing provider's specialty     Patient has had an office visit with the authorizing provider or a provider within the authorizing providers department within the previous 12 mos or has a future within next 30 days. See \"Patient Info\" tab in inbasket, or \"Choose Columns\" in Meds & Orders section of the refill encounter.              Passed - Medication is active on med list        Passed - Patient is age 18 or older        Passed - No active pregnancy on record        Passed - No positive pregnancy test in past 12 months        Estrellita Vides RN on 1/23/2020 at 8:01 AM    "

## 2020-01-29 ENCOUNTER — THERAPY VISIT (OUTPATIENT)
Dept: PHYSICAL THERAPY | Facility: CLINIC | Age: 50
End: 2020-01-29
Payer: COMMERCIAL

## 2020-01-29 DIAGNOSIS — M25.562 ACUTE PAIN OF LEFT KNEE: ICD-10-CM

## 2020-01-29 PROCEDURE — 97530 THERAPEUTIC ACTIVITIES: CPT | Mod: GP | Performed by: PHYSICAL THERAPIST

## 2020-01-29 PROCEDURE — 97110 THERAPEUTIC EXERCISES: CPT | Mod: GP | Performed by: PHYSICAL THERAPIST

## 2020-01-29 NOTE — TELEPHONE ENCOUNTER
Type of outreach:  Sent [employer] message.  Health Maintenance Due   Topic Date Due     SPIROMETRY  1970     HIV SCREENING  03/24/1983     PREVENTIVE CARE VISIT  02/27/2018     ZOSTER IMMUNIZATION (1 of 2) 03/24/2018     MAMMO SCREENING  11/15/2019     ASTHMA CONTROL TEST  11/30/2019     PHQ-2  01/01/2020     Was due for follow-up apt in January with PCP, for mental health/asthma/copd, also due for fasting px, mammo.  Luis Gee CMA ([facility])

## 2020-01-30 ENCOUNTER — TELEPHONE (OUTPATIENT)
Dept: ORTHOPEDICS | Facility: CLINIC | Age: 50
End: 2020-01-30

## 2020-01-30 NOTE — PROGRESS NOTES
Subjective:  HPI                    Objective:  System    Physical Exam    General     ROS    Assessment/Plan:    SUBJECTIVE  Subjective changes as noted by pt:  Pt notes increased strength and decreased pain     Current pain level: 3/10     Changes in function:  Pt reported one incident of knee buckling 4 days ago. Pt notes increased ease with ambulation and stairs.      Adverse reaction to treatment or activity:  None    OBJECTIVE  Changes in objective findings:  Pt remains weak in quadriceps with leg press and squats. Pt notes difficulty with balancing on both legs        ASSESSMENT  Marni continues to require intervention to meet STG and LTG's: PT  Patient's symptoms are resolving.  Response to therapy has shown an improvement in  function  Progress made towards STG/LTG?  Yes,     PLAN  Current treatment program is being advanced to more complex exercises.    PTA/ATC plan:  N/A    Please refer to the daily flowsheet for treatment today, total treatment time and time spent performing 1:1 timed codes.

## 2020-01-30 NOTE — TELEPHONE ENCOUNTER
Sent my chart message asking if she had picked up the intial prescription for the meloxicam at the [facility] pharmacy. Or if she wants the prescription thru [facility].     Will wait for a reply.    Karlie Velasco MS, ATC

## 2020-02-05 ENCOUNTER — THERAPY VISIT (OUTPATIENT)
Dept: PHYSICAL THERAPY | Facility: CLINIC | Age: 50
End: 2020-02-05
Payer: COMMERCIAL

## 2020-02-05 DIAGNOSIS — M25.562 ACUTE PAIN OF LEFT KNEE: ICD-10-CM

## 2020-02-05 PROCEDURE — 97110 THERAPEUTIC EXERCISES: CPT | Mod: GP | Performed by: PHYSICAL THERAPIST

## 2020-02-05 PROCEDURE — 97112 NEUROMUSCULAR REEDUCATION: CPT | Mod: GP | Performed by: PHYSICAL THERAPIST

## 2020-02-05 ASSESSMENT — ACTIVITIES OF DAILY LIVING (ADL)
GIVING WAY, BUCKLING OR SHIFTING OF KNEE: I HAVE THE SYMPTOM BUT IT DOES NOT AFFECT MY ACTIVITY
KNEE_ACTIVITY_OF_DAILY_LIVING_SUM: 67
WEAKNESS: I HAVE THE SYMPTOM BUT IT DOES NOT AFFECT MY ACTIVITY
AS_A_RESULT_OF_YOUR_KNEE_INJURY,_HOW_WOULD_YOU_RATE_YOUR_CURRENT_LEVEL_OF_DAILY_ACTIVITY?: NORMAL
GO DOWN STAIRS: ACTIVITY IS NOT DIFFICULT
RISE FROM A CHAIR: ACTIVITY IS NOT DIFFICULT
KNEE_ACTIVITY_OF_DAILY_LIVING_SCORE: 95.71
GO UP STAIRS: ACTIVITY IS NOT DIFFICULT
SQUAT: ACTIVITY IS NOT DIFFICULT
HOW_WOULD_YOU_RATE_THE_OVERALL_FUNCTION_OF_YOUR_KNEE_DURING_YOUR_USUAL_DAILY_ACTIVITIES?: NORMAL
LIMPING: I DO NOT HAVE THE SYMPTOM
WALK: ACTIVITY IS NOT DIFFICULT
SWELLING: I DO NOT HAVE THE SYMPTOM
SIT WITH YOUR KNEE BENT: ACTIVITY IS MINIMALLY DIFFICULT
RAW_SCORE: 67
HOW_WOULD_YOU_RATE_THE_CURRENT_FUNCTION_OF_YOUR_KNEE_DURING_YOUR_USUAL_DAILY_ACTIVITIES_ON_A_SCALE_FROM_0_TO_100_WITH_100_BEING_YOUR_LEVEL_OF_KNEE_FUNCTION_PRIOR_TO_YOUR_INJURY_AND_0_BEING_THE_INABILITY_TO_PERFORM_ANY_OF_YOUR_USUAL_DAILY_ACTIVITIES?: 95
KNEEL ON THE FRONT OF YOUR KNEE: ACTIVITY IS NOT DIFFICULT
STAND: ACTIVITY IS NOT DIFFICULT
STIFFNESS: I DO NOT HAVE THE SYMPTOM
PAIN: I DO NOT HAVE THE SYMPTOM

## 2020-02-05 NOTE — PROGRESS NOTES
DISCHARGE  REPORT    Progress reporting period is from Harshad 15, 2020 to Feb 5, 2020.       SUBJECTIVE  Subjective changes noted by patient: Everything is going well. Feels like she can do most things normally. She notes that she does get tired easily. Feels that the balance is getting better. Reports 95% improvement overall, just a small amount of pain with kneeling.     Current Pain level: 0/10.     Initial Pain level: 8/10.   Changes in function:  Yes (See Goal flowsheet attached for changes in current functional level)  Adverse reaction to treatment or activity: None    OBJECTIVE  Changes noted in objective findings:  Yes, Patient demonstrates improved ROM and balance    SL Balance: mild, exhibits more pronation on the right foot than the left         AROM: (* indicates patient's pain)      R  L   Hyperextension 5 8   Extension 0 0   Flexion 133 135     Ankle AROM (PROM): (* indicates patient's pain)   ROM R ROM L   Plantarflexion 60 67   DF, knee straight 17 15       ASSESSMENT/PLAN  Updated problem list and treatment plan: Diagnosis 1:  Left knee pain/ OA    STG/LTGs have been met or progress has been made towards goals:  Yes (See Goal flow sheet completed today.)  Assessment of Progress: The patient's condition is improving.  The patient's condition has potential to improve.  Patient is meeting short term goals and is progressing towards long term goals.  Self Management Plans:  Patient is independent in a home treatment program.  Patient is independent in self management of symptoms.  I have re-evaluated this patient and find that the nature, scope, duration and intensity of the therapy is appropriate for the medical condition of the patient.  Marni continues to require the following intervention to meet STG and LTG's:  PT intervention is no longer required to meet STG/LTG.    Recommendations:  This patient is ready to be discharged from therapy and continue their home treatment program.    Please refer to  the daily flowsheet for treatment today, total treatment time and time spent performing 1:1 timed codes.

## 2020-03-10 DIAGNOSIS — J44.1 CHRONIC OBSTRUCTIVE PULMONARY DISEASE WITH ACUTE EXACERBATION (H): ICD-10-CM

## 2020-03-11 NOTE — TELEPHONE ENCOUNTER
"  Routing refill request to provider for review/approval because:  Protocol failed. Called pt and scheduled.   Deneen KNUTSON RN   Next 5 appointments (look out 90 days)    Mar 24, 2020  4:20 PM CDT  [facility] Visit with David Gambino PA-C  [facility] [address] [address] [phone]            Incruse   Last Written Prescription Date:  7/29/19  Last Fill Quantity: 90,  # refills: 0   Last office visit: 10/7/2019 with prescribing provider:  MACHO   Future Office Visit:    Requested Prescriptions   Pending Prescriptions Disp Refills     INCRUSE ELLIPTA 62.5 MCG/INH Inhaler [Pharmacy Med Name: INCRUSE  62.5MCG  INH  ELLIPTA]       Sig: USE 1 INHALATION BY MOUTH  DAILY       Asthma Maintenance Inhalers - Anticholinergics Failed - 3/10/2020  8:38 PM        Failed - Asthma control assessment score within normal limits in last 6 months     Please review ACT score.           Passed - Patient is age 12 years or older        Passed - Medication is active on med list        Passed - Recent (6 mo) or future (30 days) visit within the authorizing provider's specialty     Patient had office visit in the last 6 months or has a visit in the next 30 days with authorizing provider or within the authorizing provider's specialty.  See \"Patient Info\" tab in inbasket, or \"Choose Columns\" in Meds & Orders section of the refill encounter.                 "

## 2020-03-22 ENCOUNTER — HEALTH MAINTENANCE LETTER (OUTPATIENT)
Age: 50
End: 2020-03-22

## 2020-03-24 ENCOUNTER — VIRTUAL VISIT (OUTPATIENT)
Dept: FAMILY MEDICINE | Facility: CLINIC | Age: 50
End: 2020-03-24
Payer: COMMERCIAL

## 2020-03-24 DIAGNOSIS — I25.10 CORONARY ARTERY DISEASE INVOLVING NATIVE HEART WITHOUT ANGINA PECTORIS, UNSPECIFIED VESSEL OR LESION TYPE: ICD-10-CM

## 2020-03-24 DIAGNOSIS — F43.22 ADJUSTMENT DISORDER WITH ANXIETY: ICD-10-CM

## 2020-03-24 DIAGNOSIS — J44.9 CHRONIC OBSTRUCTIVE PULMONARY DISEASE, UNSPECIFIED COPD TYPE (H): Primary | ICD-10-CM

## 2020-03-24 DIAGNOSIS — J45.40 MODERATE PERSISTENT ASTHMA WITHOUT COMPLICATION: ICD-10-CM

## 2020-03-24 DIAGNOSIS — F17.200 TOBACCO USE DISORDER: ICD-10-CM

## 2020-03-24 DIAGNOSIS — Z13.6 CARDIOVASCULAR SCREENING; LDL GOAL LESS THAN 130: ICD-10-CM

## 2020-03-24 PROCEDURE — 99214 OFFICE O/P EST MOD 30 MIN: CPT | Mod: TEL | Performed by: PHYSICIAN ASSISTANT

## 2020-03-24 RX ORDER — ALBUTEROL SULFATE 90 UG/1
1-2 AEROSOL, METERED RESPIRATORY (INHALATION) EVERY 4 HOURS PRN
Qty: 54 G | Refills: 1 | Status: SHIPPED | OUTPATIENT
Start: 2020-03-24 | End: 2021-05-17

## 2020-03-24 RX ORDER — CITALOPRAM HYDROBROMIDE 40 MG/1
40 TABLET ORAL DAILY
Qty: 90 TABLET | Refills: 1 | Status: SHIPPED | OUTPATIENT
Start: 2020-03-24 | End: 2020-08-10
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
## 2020-04-16 DIAGNOSIS — I25.10 CORONARY ARTERY DISEASE INVOLVING NATIVE HEART WITHOUT ANGINA PECTORIS, UNSPECIFIED VESSEL OR LESION TYPE: ICD-10-CM

## 2020-04-16 DIAGNOSIS — Z13.6 CARDIOVASCULAR SCREENING; LDL GOAL LESS THAN 130: ICD-10-CM

## 2020-04-17 RX ORDER — ATORVASTATIN CALCIUM 80 MG/1
TABLET, FILM COATED ORAL
Qty: 90 TABLET | Refills: 1 | Status: SHIPPED | OUTPATIENT
Start: 2020-04-17 | End: 2021-01-29

## 2020-04-17 NOTE — TELEPHONE ENCOUNTER
Prescription approved per [facility] Refill Protocol.    Jennifer Hammer RN, BSN  [facility]

## 2020-04-17 NOTE — TELEPHONE ENCOUNTER
"Requested Prescriptions   Pending Prescriptions Disp Refills     atorvastatin (LIPITOR) 80 MG tablet [Pharmacy Med Name: ATORVASTATIN  80MG  TAB]  Last Written Prescription Date:  1/23/2020  Last Fill Quantity: 90,  # refills: 0   Last office visit: 3/24/2020 with prescribing provider:  Immanuel   Future Office Visit:     90 tablet 0     Sig: TAKE 1 TABLET BY MOUTH  DAILY       Statins Protocol Passed - 4/16/2020  8:37 PM        Passed - LDL on file in past 12 months     Recent Labs   Lab Test 05/31/19  1654   LDL Cannot estimate LDL when triglyceride exceeds 400 mg/dL  67             Passed - No abnormal creatine kinase in past 12 months     No lab results found.             Passed - Recent (12 mo) or future (30 days) visit within the authorizing provider's specialty     Patient has had an office visit with the authorizing provider or a provider within the authorizing providers department within the previous 12 mos or has a future within next 30 days. See \"Patient Info\" tab in inbasket, or \"Choose Columns\" in Meds & Orders section of the refill encounter.              Passed - Medication is active on med list        Passed - Patient is age 18 or older        Passed - No active pregnancy on record        Passed - No positive pregnancy test in past 12 months             "

## 2020-04-30 ENCOUNTER — MYC MEDICAL ADVICE (OUTPATIENT)
Dept: FAMILY MEDICINE | Facility: CLINIC | Age: 50
End: 2020-04-30

## 2020-04-30 NOTE — TELEPHONE ENCOUNTER
I am unable to write a note excusing her from wearing a face mask. She should contact her employer and work with them for any accommodations. I will leave for PCP to review as well.    Mary Grace Diamond PA-C

## 2020-05-01 ENCOUNTER — MYC MEDICAL ADVICE (OUTPATIENT)
Dept: FAMILY MEDICINE | Facility: CLINIC | Age: 50
End: 2020-05-01

## 2020-05-01 NOTE — TELEPHONE ENCOUNTER
"Please  see suzan encounter 4/30;      \"Denny Bryant. I just have question. I have COPD and Asthma, I can not wear a mask, I can not breath in them. I need a written letter stating that I am unable to wear one, because of my medical condition. We at work starting Monday are required to wear them now. I do not know what to do?  Also the depression medication, am I suppose to be taking 2 a day or just one. I thought you said 2, but I wasnt sure, when I recieved the medication it just said one.\"    Nellie Heredia RN      "

## 2020-05-01 NOTE — LETTER
[facility]  [address] [address]  Phone: [phone]    May 5, 2020        Marni Austin  [address] [address]          To whom it may concern:    RE: Marni Austin    Patient was seen and treated today regularly at our clinic for chronic medical disease. She suffers from both asthma and COPD and notes that wearing a mask increases symptoms associated with this disease, including difficulty breathing. Please consider alternative accommodations for her in this case.     Please contact me for questions or concerns.      Sincerely,        David Gambino PA-C

## 2020-05-05 NOTE — TELEPHONE ENCOUNTER
She should only take one celexa as I increased the dose of the tablet.    I cannot specifically tell an employer what to do but can write a letter saying that a mask increases your symptoms and can they provide alternative safe accommodations for her. However, if she can try to wear it, I would suggest that because of her chronic lung conditions she would benefit from the added protection. It is in my OB.

## 2020-05-12 ENCOUNTER — VIRTUAL VISIT (OUTPATIENT)
Dept: FAMILY MEDICINE | Facility: CLINIC | Age: 50
End: 2020-05-12
Payer: COMMERCIAL

## 2020-05-12 DIAGNOSIS — M62.838 MUSCLE SPASM: Primary | ICD-10-CM

## 2020-05-12 PROCEDURE — 99213 OFFICE O/P EST LOW 20 MIN: CPT | Mod: 95 | Performed by: PHYSICIAN ASSISTANT

## 2020-05-12 RX ORDER — CYCLOBENZAPRINE HCL 5 MG
5 TABLET ORAL 3 TIMES DAILY PRN
Qty: 30 TABLET | Refills: 0 | Status: SHIPPED | OUTPATIENT
Start: 2020-05-12 | End: 2020-11-24

## 2020-05-12 ASSESSMENT — ANXIETY QUESTIONNAIRES
1. FEELING NERVOUS, ANXIOUS, OR ON EDGE: NOT AT ALL
6. BECOMING EASILY ANNOYED OR IRRITABLE: NOT AT ALL
GAD7 TOTAL SCORE: 11
3. WORRYING TOO MUCH ABOUT DIFFERENT THINGS: NEARLY EVERY DAY
5. BEING SO RESTLESS THAT IT IS HARD TO SIT STILL: NEARLY EVERY DAY
2. NOT BEING ABLE TO STOP OR CONTROL WORRYING: NEARLY EVERY DAY
7. FEELING AFRAID AS IF SOMETHING AWFUL MIGHT HAPPEN: NOT AT ALL
IF YOU CHECKED OFF ANY PROBLEMS ON THIS QUESTIONNAIRE, HOW DIFFICULT HAVE THESE PROBLEMS MADE IT FOR YOU TO DO YOUR WORK, TAKE CARE OF THINGS AT HOME, OR GET ALONG WITH OTHER PEOPLE: SOMEWHAT DIFFICULT

## 2020-05-12 ASSESSMENT — PATIENT HEALTH QUESTIONNAIRE - PHQ9
SUM OF ALL RESPONSES TO PHQ QUESTIONS 1-9: 11
5. POOR APPETITE OR OVEREATING: MORE THAN HALF THE DAYS

## 2020-05-12 NOTE — PROGRESS NOTES
"Marni Austin is a 50 year old female who is being evaluated via a billable telephone visit.      The patient has been notified of following:     \"This telephone visit will be conducted via a call between you and your physician/provider. We have found that certain health care needs can be provided without the need for a physical exam.  This service lets us provide the care you need with a short phone conversation.  If a prescription is necessary we can send it directly to your pharmacy.  If lab work is needed we can place an order for that and you can then stop by our lab to have the test done at a later time.    Telephone visits are billed at different rates depending on your insurance coverage. During this emergency period, for some insurers they may be billed the same as an in-person visit.  Please reach out to your insurance provider with any questions.    If during the course of the call the physician/provider feels a telephone visit is not appropriate, you will not be charged for this service.\"    Patient has given verbal consent for Telephone visit?  Yes    What phone number would you like to be contacted at? [phone]    How would you like to obtain your AVS? MyChart    Subjective     Marni Austin is a 50 year old female who presents to clinic today for the following health issues:    HPI  Musculoskeletal problem/pain      Duration: Ongoing x3 years, worse within last couple months     Description  Location: Rib cage area that goes into back     Intensity:  severe    Accompanying signs and symptoms: hard time breathing when pain is in rib cage, radiation of pain from rib cage into back.    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 and thinks this is what caused the recent flare  Over the winter they were much improved  They are painful and can cause some painful respiration but do not cause shortness of breath   Can be both left or right, lately more the right  Estimates greatest severity at the bra line  Has needed muscle relaxants in the past and these were helpful         Patient Active Problem List   Diagnosis     Adjustment disorder with anxiety     Menorrhagia     CARDIOVASCULAR SCREENING; LDL GOAL LESS THAN 130     Tobacco use disorder     Hypertension goal BP (blood pressure) < 140/90     Health Care Home     COPD exacerbation (H)     CAD (coronary artery disease)     COPD (chronic obstructive pulmonary disease) (H)     Rectal bleeding     Chest pain, unspecified chest pain type     Elevated fasting glucose     Non morbid obesity     Morbid obesity (H)     Moderate persistent asthma     CHF (congestive heart failure) (H)     Light-headedness     Positive cardiac stress test     Past Surgical History:   Procedure Laterality Date     CARDIAC SURGERY  2015    Cardiac stents x 1     TONSILLECTOMY Bilateral 1985     Tubal ligation surgery         Social History     Tobacco Use     Smoking status: Former Smoker     Packs/day: 0.50     Years: 26.00     Pack years: 13.00     Start date: 1979     Last attempt to quit: 2018     Years since quittin.8     Smokeless tobacco: Never Used     Tobacco comment: quit 2018   Substance Use Topics     Alcohol use: No     Alcohol/week: 0.0 standard drinks     Family History   Problem Relation Age of Onset     Hypertension Mother      Heart Disease Mother      Alcoholism  Mother      Diabetes Father      Coronary Artery Disease Father      Cerebrovascular Disease Father      Alzheimer Disease Maternal Grandmother      Heart Disease Maternal Grandmother      Lung Cancer Maternal Grandfather      Obesity Brother            Reviewed and updated as needed this visit by Provider         Review of Systems   Constitutional, HEENT, cardiovascular, pulmonary, gi and gu systems are negative, except as otherwise noted.       Objective   Reported vitals:  LMP 08/30/2013    healthy, alert and no distress  PSYCH: Alert and oriented times 3; coherent speech, normal   rate and volume, able to articulate logical thoughts, able   to abstract reason, no tangential thoughts, no hallucinations   or delusions  Her affect is normal  RESP: No cough, no audible wheezing, able to talk in full sentences  Remainder of exam unable to be completed due to telephone visits    Diagnostic Test Results:  none         Assessment/Plan:  1. Muscle spasm  Increased lately without chest pain or shortness of breath. She has had these periodically over the past few years. Treated successfully with muscle reslaxants. Discussed r/b/se. No driving/etoh/working heavy machinery   - cyclobenzaprine (FLEXERIL) 5 MG tablet; Take 1 tablet (5 mg) by mouth 3 times daily as needed for muscle spasms  Dispense: 30 tablet; Refill: 0    No follow-ups on file.      Phone call duration:  8 minutes    David Gambino PA-C

## 2020-05-13 ASSESSMENT — ANXIETY QUESTIONNAIRES: GAD7 TOTAL SCORE: 11

## 2020-06-11 ENCOUNTER — ANCILLARY PROCEDURE (OUTPATIENT)
Dept: GENERAL RADIOLOGY | Facility: CLINIC | Age: 50
End: 2020-06-11
Attending: PHYSICIAN ASSISTANT
Payer: COMMERCIAL

## 2020-06-11 ENCOUNTER — OFFICE VISIT (OUTPATIENT)
Dept: FAMILY MEDICINE | Facility: CLINIC | Age: 50
End: 2020-06-11
Payer: COMMERCIAL

## 2020-06-11 VITALS
BODY MASS INDEX: 40.02 KG/M2 | RESPIRATION RATE: 18 BRPM | TEMPERATURE: 98.8 F | OXYGEN SATURATION: 94 % | HEART RATE: 78 BPM | HEIGHT: 61 IN | DIASTOLIC BLOOD PRESSURE: 80 MMHG | SYSTOLIC BLOOD PRESSURE: 120 MMHG | WEIGHT: 212 LBS

## 2020-06-11 DIAGNOSIS — G89.29 CHRONIC PAIN OF RIGHT ANKLE: Primary | ICD-10-CM

## 2020-06-11 DIAGNOSIS — R21 RASH AND NONSPECIFIC SKIN ERUPTION: ICD-10-CM

## 2020-06-11 DIAGNOSIS — M25.571 CHRONIC PAIN OF RIGHT ANKLE: Primary | ICD-10-CM

## 2020-06-11 PROCEDURE — 99214 OFFICE O/P EST MOD 30 MIN: CPT | Performed by: PHYSICIAN ASSISTANT

## 2020-06-11 PROCEDURE — 73610 X-RAY EXAM OF ANKLE: CPT | Mod: RT

## 2020-06-11 RX ORDER — CLOTRIMAZOLE 1 %
CREAM (GRAM) TOPICAL 2 TIMES DAILY
Qty: 60 G | Refills: 1 | Status: SHIPPED | OUTPATIENT
Start: 2020-06-11 | End: 2023-11-17

## 2020-06-11 ASSESSMENT — MIFFLIN-ST. JEOR: SCORE: 1509.01

## 2020-06-11 NOTE — PROGRESS NOTES
Subjective     Marni Austin is a 50 year old female who presents to clinic today for the following health issues:    HPI   Musculoskeletal problem/pain Right Ankle      Duration: over 1 year    Description: Right ankle    Location:Intensity:  moderate    Accompanying signs and symptoms: none    History  Previous similar problem: YES 1 yr  Previous evaluation: no imaging in past    Precipitating or alleviating factors:  Trauma or overuse: no new trauma, fell and twisted ankle 1 year ago  Aggravating factors include: walking    Therapies tried and outcome: heat, ice and ibuprofen    Seen for right ankle pain after fall with twisting ankle about 1 year ago on 5/31/19, thought strain at that time. Physical therapy recommended at that time but patient declined. She was treated wtih ice, stretching, naproxen. She is still having persistent pain, not constant pain but pain every day. Pain in right ankle medially, sometimes seems swollen. Pain is worse in mornings and worse after she has been walking. Using 800 mg ibuprofen 2-3 times daily a few times a week, also uses ice and has been doing some home stretching exercises. No numbness/tingling, weakness. Ankle sometimes feels unstable when she has worse pain. Works in an office, mostly sitting. No new injuries or trauma. No other new joint pain.    Rash     Duration: 2 mo    Description: rash  Location: abdomen under left breast and groin  Itching: moderate - severe    Intensity: moderate - severe    Accompanying signs and symptoms: None    History (similar episodes/previous evaluation): Happened once before about 4 years ago    Precipitating or alleviating factors:  New exposures:  None  Recent travel: no      Therapies tried and outcome: Can not remember what she was treated with but it was effective.    Has had itchy rash just below the belly in the intertriginous region for the past few months. Also an area under the left breast. Not spreading, just not improving.  "No signs of overlying infection - no spreading redness, warmth, tenderness, drainage. No fever or chills. No recent illness. Otherwise feeling well. She has not tried anything to treat this rash. Reports similar rash a few years ago, treated with antifungal. She thinks this is a \"heat rash\" and notes she sweats a lot and the skin on her stomach rubs together. No new exposures.    Patient Active Problem List   Diagnosis     Adjustment disorder with anxiety     Menorrhagia     CARDIOVASCULAR SCREENING; LDL GOAL LESS THAN 130     Tobacco use disorder     Hypertension goal BP (blood pressure) < 140/90     Health Care Home     COPD exacerbation (H)     CAD (coronary artery disease)     COPD (chronic obstructive pulmonary disease) (H)     Rectal bleeding     Chest pain, unspecified chest pain type     Elevated fasting glucose     Non morbid obesity     Morbid obesity (H)     Moderate persistent asthma     CHF (congestive heart failure) (H)     Light-headedness     Positive cardiac stress test     Past Surgical History:   Procedure Laterality Date     CARDIAC SURGERY      Cardiac stents x 1     TONSILLECTOMY Bilateral 1985     Tubal ligation surgery         Social History     Tobacco Use     Smoking status: Current Every Day Smoker     Packs/day: 0.50     Years: 26.00     Pack years: 13.00     Start date: 1979     Last attempt to quit: 2018     Years since quittin.9     Smokeless tobacco: Never Used     Tobacco comment: quit 2018, started again 2020   Substance Use Topics     Alcohol use: No     Alcohol/week: 0.0 standard drinks     Family History   Problem Relation Age of Onset     Hypertension Mother      Heart Disease Mother      Alcoholism Mother      Diabetes Father      Coronary Artery Disease Father      Cerebrovascular Disease Father      Alzheimer Disease Maternal Grandmother      Heart Disease Maternal Grandmother      Lung Cancer Maternal Grandfather      Obesity Brother      "     Current Outpatient Medications   Medication Sig Dispense Refill     albuterol (PROVENTIL) (2.5 MG/3ML) 0.083% neb solution USE  1 VIAL BY NEBULIZATION EVERY 6 HOURS AS NEEDED FOR SHORTNESS OF BREATH /  DYSPNEA OR WHEEZING 180 mL 1     albuterol (VENTOLIN HFA) 108 (90 Base) MCG/ACT inhaler Inhale 1-2 puffs into the lungs every 4 hours as needed for shortness of breath / dyspnea or wheezing 54 g 1     aspirin EC 81 MG EC tablet Take 1 tablet (81 mg) by mouth daily       atorvastatin (LIPITOR) 80 MG tablet TAKE 1 TABLET BY MOUTH  DAILY 90 tablet 1     cetirizine (ZYRTEC) 10 MG tablet Take 10 mg by mouth daily       chlorthalidone (HYGROTON) 25 MG tablet TAKE ONE-HALF TABLET BY  MOUTH DAILY 45 tablet 0     citalopram (CELEXA) 40 MG tablet Take 1 tablet (40 mg) by mouth daily 90 tablet 1     clotrimazole (LOTRIMIN) 1 % external cream Apply topically 2 times daily 60 g 1     cyclobenzaprine (FLEXERIL) 5 MG tablet Take 1 tablet (5 mg) by mouth 3 times daily as needed for muscle spasms 30 tablet 0     INCRUSE ELLIPTA 62.5 MCG/INH Inhaler USE 1 INHALATION BY MOUTH  DAILY 1 Inhaler 5     metoprolol succinate ER (TOPROL-XL) 25 MG 24 hr tablet TAKE 1 TABLET BY MOUTH  DAILY 90 tablet 1     nitroGLYcerin (NITROSTAT) 0.4 MG sublingual tablet SEE DIRECTIONS ON  MEDICATION INFORMATION  SHEET WITH INVOICE  PAPERWORK 75 tablet 1     ondansetron (ZOFRAN-ODT) 4 MG ODT tab Take 1 tablet (4 mg) by mouth every 6 hours as needed for nausea or vomiting 10 tablet 0     potassium chloride ER (K-DUR/KLOR-CON M) 10 MEQ CR tablet Take 1 tablet (10 mEq) by mouth daily 90 tablet 2     Allergies   Allergen Reactions     Zyban [Bupropion Hcl] Anaphylaxis     Bupropion Rash       Reviewed and updated as needed this visit by Provider  Tobacco  Allergies  Meds  Problems  Med Hx  Surg Hx  Soc Hx        Review of Systems   Constitutional, HEENT, cardiovascular, pulmonary, gi and gu systems are negative, except as otherwise noted.      Objective   "  /80 (BP Location: Right arm, Patient Position: Sitting, Cuff Size: Adult Large)   Pulse 78   Temp 98.8  F (37.1  C) (Oral)   Resp 18   Ht 1.549 m (5' 1\")   Wt 96.2 kg (212 lb)   LMP 08/30/2013   SpO2 94%   BMI 40.06 kg/m    Body mass index is 40.06 kg/m .  Physical Exam   GENERAL: healthy, alert and no distress  EYES: Eyes grossly normal to inspection, PERRL and conjunctivae and sclerae normal  RESP: lungs clear to auscultation - no rales, rhonchi or wheezes  CV: regular rate and rhythm  ABDOMEN: soft, nontender  MS: right ankle: mild swelling to medial aspect, no skin changes, tenderness with palpation of medial malleolus and superior to medial malleolus; full ROM but pain with ankle inversion, flexion, and extension; full strength and sensation; cap refill normal; DP and PT pulses normal and symmetric bilaterally.  SKIN: Erythematous patch across low abdomen in skin fold with some papules. No spreading redness, warmth, tenderness, drainage. Similar area of erythema with some papules underneath left breast.    Diagnostic Test Results:  XR Ankle Right 3 Views: no fracture per my read. Radiology read: \"Mortise and talar dome are intact. No evidence of fracture. Small plantar calcaneal spur.\"        Assessment & Plan     1. Chronic pain of right ankle  - XR Ankle Right G/E 3 Views  - Orthopedic & Spine  Referral  Chronic right ankle pain for past year, started after she fell and twisted the ankle. Xray shows no fracture, small plantar calcaneal spur. Given chronicity of pain, recommend further evaluation with ortho podiatry, referral placed.    2. Rash and nonspecific skin eruption  - clotrimazole (LOTRIMIN) 1 % external cream; Apply topically 2 times daily  Dispense: 60 g; Refill: 1  Rash in intertriginous area of low abdomen and under left breast. No signs of overlying infection. Suspect candida intertrigo, will treat for yeast. Discussed keeping skin cool and dry and allowing skin to air " out. Follow-up if no improvement or signs of overlying infection.    Risks and benefits of treatment plan discussed. Patient and/or parent acknowledges and agrees with plan of care, all questions answered.       Tobacco Cessation:   reports that she has been smoking. She started smoking about 41 years ago. She has a 13.00 pack-year smoking history. She has never used smokeless tobacco.  Tobacco Cessation Action Plan: Information offered: Patient not interested at this time      Return in about 1 week (around 6/18/2020).    Mary Grace Diamond PA-C  [facility]

## 2020-06-11 NOTE — PATIENT INSTRUCTIONS
For rash, apply topical antifungal twice daily for 2 weeks. Let us know if no improvement in 1 week. You can use topical hydrocortisone if needed for itching. Keep the skin cool, dry, allow to air out.    For ankle, we will call you if the radiologist notes any abnormalities on your xray. Follow-up with ortho fur further evaluation. Can continue ice, rest, elevation, ibuprofen as needed.

## 2020-06-15 DIAGNOSIS — I10 HYPERTENSION GOAL BP (BLOOD PRESSURE) < 140/90: ICD-10-CM

## 2020-06-15 DIAGNOSIS — I25.10 CORONARY ARTERY DISEASE INVOLVING NATIVE HEART WITHOUT ANGINA PECTORIS, UNSPECIFIED VESSEL OR LESION TYPE: ICD-10-CM

## 2020-06-16 RX ORDER — CHLORTHALIDONE 25 MG/1
TABLET ORAL
Qty: 45 TABLET | Refills: 1 | Status: SHIPPED | OUTPATIENT
Start: 2020-06-16 | End: 2020-12-01

## 2020-07-09 ENCOUNTER — NURSE TRIAGE (OUTPATIENT)
Dept: NURSING | Facility: CLINIC | Age: 50
End: 2020-07-09

## 2020-07-09 ENCOUNTER — OFFICE VISIT (OUTPATIENT)
Dept: FAMILY MEDICINE | Facility: CLINIC | Age: 50
End: 2020-07-09
Payer: COMMERCIAL

## 2020-07-09 VITALS
HEIGHT: 61 IN | WEIGHT: 211 LBS | HEART RATE: 104 BPM | TEMPERATURE: 98.4 F | DIASTOLIC BLOOD PRESSURE: 70 MMHG | OXYGEN SATURATION: 95 % | SYSTOLIC BLOOD PRESSURE: 132 MMHG | BODY MASS INDEX: 39.84 KG/M2

## 2020-07-09 DIAGNOSIS — J44.1 COPD EXACERBATION (H): Primary | ICD-10-CM

## 2020-07-09 DIAGNOSIS — R73.9 ELEVATED BLOOD SUGAR: ICD-10-CM

## 2020-07-09 DIAGNOSIS — R61 NIGHT SWEATS: ICD-10-CM

## 2020-07-09 LAB
BASOPHILS # BLD AUTO: 0 10E9/L (ref 0–0.2)
BASOPHILS NFR BLD AUTO: 0.4 %
DIFFERENTIAL METHOD BLD: ABNORMAL
EOSINOPHIL # BLD AUTO: 0.2 10E9/L (ref 0–0.7)
EOSINOPHIL NFR BLD AUTO: 1.9 %
ERYTHROCYTE [DISTWIDTH] IN BLOOD BY AUTOMATED COUNT: 13.3 % (ref 10–15)
HCT VFR BLD AUTO: 46.8 % (ref 35–47)
HGB BLD-MCNC: 15.9 G/DL (ref 11.7–15.7)
LYMPHOCYTES # BLD AUTO: 1.7 10E9/L (ref 0.8–5.3)
LYMPHOCYTES NFR BLD AUTO: 15.9 %
MCH RBC QN AUTO: 29.6 PG (ref 26.5–33)
MCHC RBC AUTO-ENTMCNC: 34 G/DL (ref 31.5–36.5)
MCV RBC AUTO: 87 FL (ref 78–100)
MONOCYTES # BLD AUTO: 0.6 10E9/L (ref 0–1.3)
MONOCYTES NFR BLD AUTO: 5.5 %
NEUTROPHILS # BLD AUTO: 8 10E9/L (ref 1.6–8.3)
NEUTROPHILS NFR BLD AUTO: 76.3 %
PLATELET # BLD AUTO: 202 10E9/L (ref 150–450)
RBC # BLD AUTO: 5.37 10E12/L (ref 3.8–5.2)
WBC # BLD AUTO: 10.4 10E9/L (ref 4–11)

## 2020-07-09 PROCEDURE — 85025 COMPLETE CBC W/AUTO DIFF WBC: CPT | Performed by: NURSE PRACTITIONER

## 2020-07-09 PROCEDURE — 80048 BASIC METABOLIC PNL TOTAL CA: CPT | Performed by: NURSE PRACTITIONER

## 2020-07-09 PROCEDURE — 84443 ASSAY THYROID STIM HORMONE: CPT | Performed by: NURSE PRACTITIONER

## 2020-07-09 PROCEDURE — 36415 COLL VENOUS BLD VENIPUNCTURE: CPT | Performed by: NURSE PRACTITIONER

## 2020-07-09 PROCEDURE — 99214 OFFICE O/P EST MOD 30 MIN: CPT | Performed by: NURSE PRACTITIONER

## 2020-07-09 RX ORDER — PREDNISONE 20 MG/1
60 TABLET ORAL DAILY
Qty: 15 TABLET | Refills: 0 | Status: SHIPPED | OUTPATIENT
Start: 2020-07-09 | End: 2020-08-17

## 2020-07-09 RX ORDER — AZITHROMYCIN 250 MG/1
TABLET, FILM COATED ORAL
Qty: 6 TABLET | Refills: 0 | Status: SHIPPED | OUTPATIENT
Start: 2020-07-09 | End: 2020-08-17

## 2020-07-09 ASSESSMENT — MIFFLIN-ST. JEOR: SCORE: 1504.47

## 2020-07-09 NOTE — LETTER
July 9, 2020      Marni Ausitn  [id number] [id number]        To Whom It May Concern:    Marni Austin was seen in our clinic. Patient will return to work once symptoms resolved for 24 hours.         Sincerely,            Karlie Morel, CNP

## 2020-07-09 NOTE — TELEPHONE ENCOUNTER
"Pt reports COPD exacerbation.  Occurred yesterday during high humidity levels in afternoon.  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  No phlegm.  Pt does however speak in short phrases with audible wheezing.    Pt has not used rescue inhaler nor nebs on a scheduled basis -> states \"I thought I could only use one or the other, and then just once a day.\"  Explained the Sig instructions on each.  Advised immediate neb treatment now, then repeat after 20 mins if breathing is not sufficiently improved.  Also add warmed apple juice, 6 oz per hour to relax tight airway.    Has clinic appt in-person within a few hours -> appears ideal timing.  No suspicion of covid symptoms.    Christiane QUESADA [facility] Nurse Advisor   ____________________________    Provided precautionary measures per current Covid19 protocols:  COVID 19 Nurse Triage Plan/Patient Instructions    Please be aware that novel coronavirus (COVID-19) may be circulating in the community. If you develop symptoms such as fever, cough, or SOB or if you have concerns about the presence of another infection including coronavirus (COVID-19), please contact your health care provider or visit [email or web address].     Disposition/Instructions    Additional COVID19 information to add for patients.   How can I protect others?  If you have symptoms (fever, cough, body aches or trouble breathing): Stay home and away from others (self-isolate) until:    At least 10 days have passed since your symptoms started. And     You ve had no fever--and no medicine that reduces fever--for 3 full days (72 hours). And      Your other symptoms have resolved (gotten better).     If you don t have symptoms, but a test showed that you have COVID-19 (you tested positive):    Stay home and away from others (self-isolate) until at least 10 days have passed since " the date of your first positive COVID-19 test.    During this time:    Stay in your own room, even for meals. Use your own bathroom if you can.     Stay away from others in your home. No hugging, kissing or shaking hands. No visitors.    Don t go to work, school or anywhere else.     Clean  high touch  surfaces often (doorknobs, counters, handles, etc.). Use a household cleaning spray or wipes. You ll find a full list on the EPA website:  www.epa.gov/pesticide-registration/list-n-disinfectants-use-against-sars-cov-2.    Cover your mouth and nose with a mask, tissue or washcloth to avoid spreading germs.    Wash your hands and face often. Use soap and water.    Caregivers in these groups are at risk for severe illness due to COVID-19:  o People 65 years and older  o People who live in a nursing home or long-term care facility  o People with chronic disease (lung, heart, cancer, diabetes, kidney, liver, immunologic)  o People who have a weakened immune system, including those who:  - Are in cancer treatment  - Take medicine that weakens the immune system, such as corticosteroids  - Had a bone marrow or organ transplant  - Have an immune deficiency  - Have poorly controlled HIV or AIDS  - Are obese (body mass index of 40 or higher)  - Smoke regularly    Caregivers should wear gloves while washing dishes, handling laundry and cleaning bedrooms and bathrooms.    Use caution when washing and drying laundry: Don t shake dirty laundry, and use the warmest water setting that you can.    For more tips, go to www.cdc.gov/coronavirus/2019-ncov/downloads/10Things.pdf.    How can I take care of myself?  1. Get lots of rest. Drink extra fluids (unless a doctor has told you not to).     2. Take Tylenol (acetaminophen) for fever or pain. If you have liver or kidney problems, ask your family doctor if it s okay to take Tylenol.     Adults can take either:     650 mg (two 325 mg pills) every 4 to 6 hours, or     1,000 mg (two 500 mg  pills) every 8 hours as needed.     Note: Don t take more than 3,000 mg in one day.   Acetaminophen is found in many medicines (both prescribed and over-the-counter medicines). Read all labels to be sure you don t take too much.     For children, check the Tylenol bottle for the right dose. The dose is based on the child s age or weight.    3. If you have other health problems (like cancer, heart failure, an organ transplant or severe kidney disease): Call your specialty clinic if you don t feel better in the next 2 days.    4. Know when to call 911: Emergency warning signs include:    Trouble breathing or shortness of breath    Pain or pressure in the chest that doesn t go away    Feeling confused like you haven t felt before, or not being able to wake up    Bluish-colored lips or face    What are the symptoms of COVID-19?     The most common symptoms are cough, fever and trouble breathing.     Less common symptoms include body aches, chills, diarrhea (loose, watery poops), fatigue (feeling very tired), headache, runny nose, sore throat and loss of smell.    COVID-19 can cause severe coughing (bronchitis) and lung infection (pneumonia).    How does it spread?     The virus may spread when a person coughs or sneezes into the air. The virus can travel about 6 feet this way, and it can live on surfaces.      Common  (household disinfectants) will kill the virus.    Who is at risk?  Anyone can catch COVID-19 if they re around someone who has the virus.    How can others protect themselves?     Stay away from people who have COVID-19 (or symptoms of COVID-19).    Wash hands often with soap and water. Or, use hand  with at least 60% alcohol.    Avoid touching the eyes, nose or mouth.     Wear a face mask when you go out in public, when sick or when caring for a sick person.    Where can I get more information?     [employer]: About COVID-19: [email or web address]    CDC: What to Do If  You re Sick: www.cdc.gov/coronavirus/2019-ncov/about/steps-when-sick.html    CDC: Ending Home Isolation: www.cdc.gov/coronavirus/2019-ncov/hcp/disposition-in-home-patients.html     CDC: Caring for Someone: www.cdc.gov/coronavirus/2019-ncov/if-you-are-sick/care-for-someone.html     [facility]: Interim Guidance for Hospital Discharge to Home: www.[facility]/diseases/coronavirus/hcp/hospdischarge.pdf    [facility] clinical trials (COVID-19 research studies): [email or web address]-clinical-trials     Below are the COVID-19 hotlines at the [place] Department [facility] ([facility]). Interpreters are available.   o For health questions: Call 975-455-5211 or 1-626.526.4471 (7 a.m. to 7 p.m.)  o For questions about schools and childcare: Call 808-867-9363 or 1-872.569.4730 (7 a.m. to 7 p.m.)          Thank you for taking steps to prevent the spread of this virus.  o Limit your contact with others.  o Wear a simple mask to cover your cough.  o Wash your hands well and often.    [facility]: About COVID-19: [email or web address]    CDC: What to Do If You're Sick: www.cdc.gov/coronavirus/2019-ncov/about/steps-when-sick.html    CDC: Ending Home Isolation: www.cdc.gov/coronavirus/2019-ncov/hcp/disposition-in-home-patients.html     CDC: Caring for Someone: www.cdc.gov/coronavirus/2019-ncov/if-you-are-sick/care-for-someone.html     [facility]: Interim Guidance for Hospital Discharge to Home: www.[facility]/diseases/coronavirus/hcp/hospdischarge.pdf    [facility] clinical trials (COVID-19 research studies): [email or web address]-clinical-trials     Below are the COVID-19 hotlines at the [place] Department [facility] ([facility]). Interpreters are available.   o For health questions: Call 298-643-9566 or 1-196.269.8580 (7 a.m. to 7 p.m.)  o For questions about schools and childcare: Call 232-440-2165 or 1-571.879.1127 (7 a.m. to 7 p.m.)                       Additional  Information    Negative: MODERATE asthma attack (e.g., SOB at rest, speaks in phrases, audible wheezes) and not resolved after 2 nebulizer or inhaler treatments given 20 minutes apart    Negative: Peak flow rate 50-80% of baseline level (YELLOW zone) after using 2 nebulizer or inhaler treatments given 20 minutes) apart    Negative: SEVERE wheezing or coughing and doesn't have nebulizer or inhaler available    Negative: Peak flow rate less than 50% of baseline level (RED zone)    Negative: Hospitalized before with asthma; now feels same    Negative: Chest pain    Negative: Patient sounds very sick or weak to the triager    Negative: SEVERE asthma attack (e.g., very SOB at rest, speaks in single words, loud wheezes)    Negative: Severe difficulty breathing (e.g., struggling for each breath, speaks in single words)    Negative: Bluish (or gray) lips or face    Negative: Wheezing started suddenly after medicine, an allergic food, or bee sting    Negative: Passed out (i.e., fainted, collapsed and was not responding)    Negative: Sounds like a life-threatening emergency to the triager    MILD asthma attack (e.g., no SOB at rest, mild SOB with walking, speaks normally in sentences, mild wheezing) and persists > 24 hours on appropriate treatment    Negative: Fever > 103 F (39.4 C)    Negative: Fever > 101 F (38.3 C) and over 60 years of age    Negative: Continuous (nonstop) coughing that keeps patient from working or sleeping, and not improved after inhaler or nebulizer    Negative: Asthma medicine (nebulizer or inhaler) is needed more frequently than every 4 hours to keep you comfortable    Negative: Fever present > 3 days (72 hours)    Negative: Patient wants to be seen    Protocols used: ASTHMA ATTACK-A-OH

## 2020-07-09 NOTE — PROGRESS NOTES
"Subjective   Marni Austin is a 50 year old female who presents to clinic today for the following health issues:    HPI       Patient does note that she does feel improved after doing two neb treatments and drinking water with syrup because she did not have apple juice.       Triage Note:  Grisel Bojorquez, RN  7/9/20 8:24 AM      Pt reports COPD exacerbation.  Occurred yesterday during high humidity levels in afternoon.  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  No phlegm.  Pt does however speak in short phrases with audible wheezing.     Pt has not used rescue inhaler nor nebs on a scheduled basis -> states \"I thought I could only use one or the other, and then just once a day.\"  Explained the Sig instructions on each.  Advised immediate neb treatment now, then repeat after 20 mins if breathing is not sufficiently improved.  Also add warmed apple juice, 6 oz per hour to relax tight airway.     Has clinic appt in-person within a few hours -> appears ideal timing.  No suspicion of covid symptoms.     Christiane QUESADA [facility] Nurse Advisor           Patient did better after nebulizer. Has both duoneb and albuterol nebs at home.   Feels SOB related to increasing heat and humidity. This is typically a trigger for her. Has noticed audible wheezing.  No fever.   Lately has felt very sweaty on and off for a few months. Has been post menopausal for several years.     Reviewed and updated as needed this visit by provider:  Tobacco  Allergies  Meds  Problems  Med Hx  Surg Hx  Fam Hx         Review of Systems   Constitutional, HEENT, cardiovascular, pulmonary, GI, , musculoskeletal, neuro, skin, endocrine and psych systems are negative, except as otherwise noted per HPI.      Objective   /70 (BP Location: Right arm, Cuff Size: Adult Large)   Pulse 104   Temp 98.4 " " F (36.9  C) (Tympanic)   Ht 1.549 m (5' 1\")   Wt 95.7 kg (211 lb)   LMP 08/30/2013   SpO2 95%   BMI 39.87 kg/m   Body mass index is 39.87 kg/m .  Physical Exam   GENERAL: healthy, alert, well nourished, well hydrated, no distress  HENT: ear canals- normal; TMs- normal; Nose- normal; Mouth- no ulcers, no lesions  NECK: no tenderness, no adenopathy, no asymmetry, no masses, no stiffness; thyroid- normal to palpation  RESP: lungs clear to auscultation - no rales, no rhonchi, дмитрий  CV: regular rates and rhythm, normal S1 S2, no S3 or S4 and no murmur, no click or rub -  ABDOMEN: soft, no tenderness, no  hepatosplenomegaly, no masses, normal bowel sounds    Diagnostic Test Results  Pending      Assessment & Plan   Marni was seen today for copd.    Diagnoses and all orders for this visit:    COPD exacerbation (H)  Treat as below, follow labs. Lungs sounded clear with audible wheezes only. sats stable today. Patient to follow up for worsening dyspnea.   -     CBC with platelets and differential  -     predniSONE (DELTASONE) 20 MG tablet; Take 3 tablets (60 mg) by mouth daily for 5 days  -     azithromycin (ZITHROMAX) 250 MG tablet; Two tablets first day, then one tablet daily for four days    Night sweats  Check labs today to rule out thyroid issue, elevated bg.  -     TSH with free T4 reflex  -     Basic metabolic panel  (Ca, Cl, CO2, Creat, Gluc, K, Na, BUN)           BMI:   Estimated body mass index is 40.06 kg/m  as calculated from the following:    Height as of 6/11/20: 1.549 m (5' 1\").    Weight as of 6/11/20: 96.2 kg (212 lb).   Weight management plan: Discussed healthy diet and exercise guidelines        See Patient Instructions    No follow-ups on file.            MARGARETH Ca     [address] [address]  [email or web address]   Office: [phone]           "

## 2020-07-10 LAB
ANION GAP SERPL CALCULATED.3IONS-SCNC: 9 MMOL/L (ref 3–14)
BUN SERPL-MCNC: 15 MG/DL (ref 7–30)
CALCIUM SERPL-MCNC: 8.6 MG/DL (ref 8.5–10.1)
CHLORIDE SERPL-SCNC: 103 MMOL/L (ref 94–109)
CO2 SERPL-SCNC: 24 MMOL/L (ref 20–32)
CREAT SERPL-MCNC: 0.76 MG/DL (ref 0.52–1.04)
GFR SERPL CREATININE-BSD FRML MDRD: >90 ML/MIN/{1.73_M2}
GLUCOSE SERPL-MCNC: 258 MG/DL (ref 70–99)
POTASSIUM SERPL-SCNC: 3.8 MMOL/L (ref 3.4–5.3)
SODIUM SERPL-SCNC: 136 MMOL/L (ref 133–144)
TSH SERPL DL<=0.005 MIU/L-ACNC: 0.73 MU/L (ref 0.4–4)

## 2020-08-11 DIAGNOSIS — J44.1 CHRONIC OBSTRUCTIVE PULMONARY DISEASE WITH ACUTE EXACERBATION (H): ICD-10-CM

## 2020-08-12 ENCOUNTER — MYC MEDICAL ADVICE (OUTPATIENT)
Dept: FAMILY MEDICINE | Facility: CLINIC | Age: 50
End: 2020-08-12

## 2020-08-12 NOTE — TELEPHONE ENCOUNTER
Routing refill request to provider for review/approval because:  Failed protocol for incruse ellipta inhaler - due for ACT    Will forward to the station, please update ACT.  Thanks!

## 2020-08-15 ASSESSMENT — ASTHMA QUESTIONNAIRES: ACT_TOTALSCORE: 14

## 2020-08-17 ENCOUNTER — OFFICE VISIT (OUTPATIENT)
Dept: FAMILY MEDICINE | Facility: CLINIC | Age: 50
End: 2020-08-17
Payer: COMMERCIAL

## 2020-08-17 VITALS
HEIGHT: 61 IN | DIASTOLIC BLOOD PRESSURE: 76 MMHG | BODY MASS INDEX: 40.06 KG/M2 | HEART RATE: 66 BPM | SYSTOLIC BLOOD PRESSURE: 122 MMHG | RESPIRATION RATE: 20 BRPM | TEMPERATURE: 97.3 F | OXYGEN SATURATION: 96 % | WEIGHT: 212.2 LBS

## 2020-08-17 DIAGNOSIS — F17.200 TOBACCO USE DISORDER: ICD-10-CM

## 2020-08-17 DIAGNOSIS — J42 CHRONIC BRONCHITIS, UNSPECIFIED CHRONIC BRONCHITIS TYPE (H): Primary | ICD-10-CM

## 2020-08-17 DIAGNOSIS — Z12.11 SCREENING FOR COLON CANCER: ICD-10-CM

## 2020-08-17 DIAGNOSIS — Z12.31 ENCOUNTER FOR SCREENING MAMMOGRAM FOR BREAST CANCER: ICD-10-CM

## 2020-08-17 DIAGNOSIS — J45.40 MODERATE PERSISTENT ASTHMA, UNSPECIFIED WHETHER COMPLICATED: ICD-10-CM

## 2020-08-17 PROCEDURE — 99214 OFFICE O/P EST MOD 30 MIN: CPT | Performed by: PHYSICIAN ASSISTANT

## 2020-08-17 RX ORDER — FLUTICASONE PROPIONATE 110 UG/1
1 AEROSOL, METERED RESPIRATORY (INHALATION) 2 TIMES DAILY
Qty: 1 INHALER | Refills: 0 | Status: SHIPPED | OUTPATIENT
Start: 2020-08-17 | End: 2020-09-17

## 2020-08-17 RX ORDER — VARENICLINE TARTRATE 1 MG/1
1 TABLET, FILM COATED ORAL 2 TIMES DAILY
Qty: 180 TABLET | Refills: 0 | Status: SHIPPED | OUTPATIENT
Start: 2020-08-17 | End: 2021-03-23

## 2020-08-17 ASSESSMENT — MIFFLIN-ST. JEOR: SCORE: 1509.91

## 2020-08-17 NOTE — PATIENT INSTRUCTIONS
"To schedule your mammogram at any of the [place] locations, call [phone].      Varenicline Tartrate (Chantix):  Chantix tablets Days Dosage  0.5 mg once daily for the first three days.  0.5 mg  twice daily for the next four days.  1.0 mg twice daily from day 8 and forward.    How to take:  You will start taking Chantix one week before your quit date while you are still smoking. At first you will use the Chantix starter pack. Once you are finished with that, you will be on a \"maintenance dose\" that will probably stay the same for the rest of your treatment.  After a week of slowly increasing your dose, you will take Chantix twice a day. Take each dose after eating and with a full glass of water. Taking Chantix with food and water decreases nausea. That is also why you begin on a lower dose and slowly increase it. Once you begin the twice a day dosage, your 2 doses should be at least 8-10 hours apart and at least 4-5 hours before bedtime.     Duration of therapy: 12 weeks. An additional course of 12 weeks of treatment is recommended if the first 12 weeks were successful to improve long term abstinence.    Adverse reactions: The most common adverse reaction associated with varenicline treatment is nausea. Other common adverse effects include sleep disturbance, constipation, flatulence and vomiting.    There have been warnings from the FDA to be on the lookout for erratic behavior, suicidal ideation, or suicidal behavior. There is one case report of a death, though it appears alcohol consumption may have played a part in that case. Please report any behavior or mood changes as well as being aware of drowsiness. Be cautious when operating motor vehicles or dangerous machinery until the medication's effect on you is clear.    "

## 2020-08-17 NOTE — PROGRESS NOTES
Subjective     Marni Austin is a 50 year old female who presents to clinic today for the following health issues:    HPI       Asthma Follow-Up    Was ACT completed today?    Yes    ACT Total Scores 8/14/2020   ACT TOTAL SCORE -   ASTHMA ER VISITS -   ASTHMA HOSPITALIZATIONS -   ACT TOTAL SCORE (Goal Greater than or Equal to 20) 14   In the past 12 months, how many times did you visit the emergency room for your asthma without being admitted to the hospital? 1   In the past 12 months, how many times were you hospitalized overnight because of your asthma? 0         How many days per week do you miss taking your asthma controller medication?  0    Please describe any recent triggers for your asthma: humidity    Have you had any Emergency Room Visits, Urgent Care Visits, or Hospital Admissions since your last office visit?  No    COPD Follow-Up    Overall, how are your COPD symptoms since your last clinic visit?  No change    How much fatigue or shortness of breath do you have when you are walking?  Same as usual    How much shortness of breath do you have when you are resting?  Same as usual    How often do you cough? Often    Have you noticed any change in your sputum/phlegm?  No    Have you experienced a recent fever? No    Please describe how far you can walk without stopping to rest: 50-75 feet     How many flights of stairs are you able to walk up without stopping?  3 or more    Have you had any Emergency Room Visits, Urgent Care Visits, or Hospital Admissions because of your COPD since your last office visit?  No    History   Smoking Status     Current Every Day Smoker     Packs/day: 0.50     Years: 26.00     Start date: 1/1/1979     Last attempt to quit: 7/16/2018   Smokeless Tobacco     Never Used     Comment: quit July 2018, started again March 2020     No results found for: FEV1, [id number]      How many servings of fruits and vegetables do you eat daily?  0-1    On average, how many sweetened beverages do  you drink each day (Examples: soda, juice, sweet tea, etc.  Do NOT count diet or artificially sweetened beverages)? 1-2    How many days per week do you exercise enough to make your heart beat faster? 3 or less    How many minutes a day do you exercise enough to make your heart beat faster? 9 or less    How many days per week do you miss taking your medication? 0    Marni Austin is a 50 year old female who presents today for asthma/copd check  She was seen earlier this summer for a flare; improved  Has had a hard time wearing masks in stores and with the humidity   -Feels like any time going more than 50 feet with a mask has to stop and take deeper breaths  She is ok when immobile   Still on incruse daily; smoking 1/2 pack daily  Has restarted qvar as well  Breo too $$      Patient Active Problem List   Diagnosis     Adjustment disorder with anxiety     Menorrhagia     CARDIOVASCULAR SCREENING; LDL GOAL LESS THAN 130     Tobacco use disorder     Hypertension goal BP (blood pressure) < 140/90     Health Care Home     COPD exacerbation (H)     CAD (coronary artery disease)     COPD (chronic obstructive pulmonary disease) (H)     Rectal bleeding     Chest pain, unspecified chest pain type     Elevated fasting glucose     Non morbid obesity     Morbid obesity (H)     Moderate persistent asthma     CHF (congestive heart failure) (H)     Light-headedness     Positive cardiac stress test     Past Surgical History:   Procedure Laterality Date     CARDIAC SURGERY  2015    Cardiac stents x 1     TONSILLECTOMY Bilateral 1985     Tubal ligation surgery         Social History     Tobacco Use     Smoking status: Current Every Day Smoker     Packs/day: 0.50     Years: 26.00     Pack years: 13.00     Start date: 1979     Last attempt to quit: 2018     Years since quittin.0     Smokeless tobacco: Never Used     Tobacco comment: quit 2018, started again 2020   Substance Use Topics     Alcohol use: No  "    Alcohol/week: 0.0 standard drinks     Family History   Problem Relation Age of Onset     Hypertension Mother      Heart Disease Mother      Alcoholism Mother      Diabetes Father      Coronary Artery Disease Father      Cerebrovascular Disease Father      Alzheimer Disease Maternal Grandmother      Heart Disease Maternal Grandmother      Lung Cancer Maternal Grandfather      Obesity Brother            Reviewed and updated as needed this visit by Provider         Review of Systems   Constitutional, HEENT, cardiovascular, pulmonary, gi and gu systems are negative, except as otherwise noted.      Objective    /76   Pulse 66   Temp 97.3  F (36.3  C) (Tympanic)   Resp 20   Ht 1.549 m (5' 1\")   Wt 96.3 kg (212 lb 3.2 oz)   LMP 08/30/2013   SpO2 96%   BMI 40.09 kg/m    Body mass index is 40.09 kg/m .  Physical Exam   GENERAL: healthy, alert and no distress  EYES: Eyes grossly normal to inspection, PERRL and conjunctivae and sclerae normal  HENT: ear canals and TM's normal, nose and mouth without ulcers or lesions  RESP: decreased breath sounds throughout; tight; no crackles  CV: regular rates and rhythm  MS: No peripheral edema      Diagnostic Test Results:  Labs reviewed in Epic  Needs updating with annual        Assessment & Plan     1. Chronic bronchitis, unspecified chronic bronchitis type (H)  2. Moderate persistent asthma, unspecified whether complicated  Under treated, mostly 2/2 cost. She has restarted qvar as in hindsight she notes when on incruse and breo both her breathing was excellent. She's smoking more as well. She is fairly knowledgeable about coverage and so in short term, we'll try to at flovent to ensure triple therapy. Consider combo + lama if improved. See #2. Given her significant breathing issues, I am ok with parking disability sticker  - fluticasone (FLOVENT HFA) 110 MCG/ACT inhaler; Inhale 1 puff into the lungs 2 times daily  Dispense: 1 Inhaler; Refill: 0  - beclomethasone HFA " (QVAR REDIHALER) 80 MCG/ACT inhaler; Inhale 1 puff into the lungs 2 times daily  Dispense: 1 Inhaler; Refill: 0    3. Tobacco use disorder  Restarting. Discussed r/b/se   - varenicline (CHANTIX HANDY) 0.5 MG X 11 & 1 MG X 42 tablet; Take 0.5 mg tab daily for 3 days, THEN 0.5 mg tab twice daily for 4 days, THEN 1 mg twice daily.  Dispense: 53 tablet; Refill: 0  - varenicline (CHANTIX) 1 MG tablet; Take 1 tablet (1 mg) by mouth 2 times daily  Dispense: 180 tablet; Refill: 0    4. Screening for colon cancer  - Fecal colorectal cancer screen (FIT); Future    5. Encounter for screening mammogram for breast cancer  - *MA Screening Digital Bilateral; Future     Return in about 3 months (around 11/17/2020) for Physical Exam.    David Gambino PA-C  [facility]

## 2020-08-18 ENCOUNTER — TELEPHONE (OUTPATIENT)
Dept: FAMILY MEDICINE | Facility: CLINIC | Age: 50
End: 2020-08-18

## 2020-08-18 NOTE — TELEPHONE ENCOUNTER
PA Initiation    Medication: varenicline (CHANTIX HANDY) 0.5 MG X 11 & 1 MG X 42 tablet   Insurance Company: [employer] ([facility]) - Phone [phone] Fax [phone]  Pharmacy Filling the Rx: [facility] - [address]  Filling Pharmacy Phone: [phone]  Filling Pharmacy Fax: [phone]  Start Date: 8/18/2020

## 2020-08-18 NOTE — TELEPHONE ENCOUNTER
PA Initiation    Medication: varenicline (CHANTIX) 1 MG tablet   Insurance Company: [employer] ([facility]) - Phone [phone] Fax [phone]  Pharmacy Filling the Rx: [facility] - [address]  Filling Pharmacy Phone: [phone]  Filling Pharmacy Fax: [phone]  Start Date: 8/18/2020

## 2020-08-18 NOTE — TELEPHONE ENCOUNTER
Prior Authorization Retail Medication Request     Medication/Dose: varenicline (CHANTIX) 1 MG tablet and varenicline (CHANTIX HANDY) 0.5 MG X 11 & 1 MG X 42 tablet   ICD code (if different than what is on RX): F17.200  Previously Tried and Failed:    Rationale:      Insurance Name:  Insurance ID:         Pharmacy Information (if different than what is on RX)  Name: [facility]   Phone: [phone]  Ena Loaiza MA

## 2020-08-18 NOTE — TELEPHONE ENCOUNTER
Prior Authorization Retail Medication Request    Medication/Dose: varenicline (CHANTIX) 1 MG tablet and varenicline (CHANTIX HANDY) 0.5 MG X 11 & 1 MG X 42 tablet   ICD code (if different than what is on RX): F17.200  Previously Tried and Failed:    Rationale:     Insurance Name:  Insurance ID:       Pharmacy Information (if different than what is on RX)  Name: [facility]   Phone: [phone]  Ena Loaiza MA

## 2020-08-19 NOTE — TELEPHONE ENCOUNTER
Prior Authorization Approval    Authorization Effective Date: 8/18/2020  Authorization Expiration Date: 8/18/2021  Medication: varenicline (CHANTIX HANDY) 0.5 MG X 11 & 1 MG X 42 tablet   Approved Dose/Quantity:   Reference #: [id number]   Insurance Company: [employer]. ([facility]) - Phone [phone] Fax [phone]  Expected CoPay:       CoPay Card Available:      Foundation Assistance Needed:    Which Pharmacy is filling the prescription (Not needed for infusion/clinic administered): [email or web address] - [address]  Pharmacy Notified: Yes  Patient Notified: Yes, **Instructed pharmacy to notify patient when script is ready to /ship.**

## 2020-08-19 NOTE — TELEPHONE ENCOUNTER
Prior Authorization Approval    Authorization Effective Date: 8/18/2020  Authorization Expiration Date: 8/18/2021  Medication: varenicline (CHANTIX) 1 MG tablet   Approved Dose/Quantity:   Reference #: [id number]   Insurance Company: [employer] ([facility]) - Phone [phone] Fax [phone]  Expected CoPay:       CoPay Card Available:      Foundation Assistance Needed:    Which Pharmacy is filling the prescription (Not needed for infusion/clinic administered): [facility] - [address]  Pharmacy Notified: Yes  Patient Notified: Yes, **Instructed pharmacy to notify patient when script is ready to /ship.**

## 2020-09-17 DIAGNOSIS — J45.40 MODERATE PERSISTENT ASTHMA, UNSPECIFIED WHETHER COMPLICATED: ICD-10-CM

## 2020-09-17 DIAGNOSIS — J42 CHRONIC BRONCHITIS, UNSPECIFIED CHRONIC BRONCHITIS TYPE (H): ICD-10-CM

## 2020-09-17 RX ORDER — DEXAMETHASONE 4 MG/1
TABLET ORAL
Qty: 12 G | Refills: 5 | Status: SHIPPED | OUTPATIENT
Start: 2020-09-17 | End: 2020-11-24

## 2020-10-31 DIAGNOSIS — F43.22 ADJUSTMENT DISORDER WITH ANXIETY: ICD-10-CM

## 2020-11-01 RX ORDER — CITALOPRAM HYDROBROMIDE 40 MG/1
TABLET ORAL
Qty: 90 TABLET | Refills: 0 | Status: SHIPPED | OUTPATIENT
Start: 2020-11-01 | End: 2021-01-21

## 2020-11-03 PROCEDURE — 82274 ASSAY TEST FOR BLOOD FECAL: CPT | Performed by: PHYSICIAN ASSISTANT

## 2020-11-05 DIAGNOSIS — Z12.11 SCREENING FOR COLON CANCER: ICD-10-CM

## 2020-11-05 LAB — HEMOCCULT STL QL IA: NEGATIVE

## 2020-11-12 DIAGNOSIS — E87.6 HYPOKALEMIA: ICD-10-CM

## 2020-11-13 RX ORDER — POTASSIUM CHLORIDE 750 MG/1
TABLET, EXTENDED RELEASE ORAL
Qty: 90 TABLET | Refills: 0 | Status: SHIPPED | OUTPATIENT
Start: 2020-11-13 | End: 2021-01-25

## 2020-11-13 NOTE — TELEPHONE ENCOUNTER
Prescription approved per [facility] [place] [facility].    Routing to team to assist with making annual visit.     Grisel Paez RN Flex

## 2020-11-24 ENCOUNTER — TELEPHONE (OUTPATIENT)
Dept: FAMILY MEDICINE | Facility: CLINIC | Age: 50
End: 2020-11-24

## 2020-11-24 ENCOUNTER — VIRTUAL VISIT (OUTPATIENT)
Dept: FAMILY MEDICINE | Facility: CLINIC | Age: 50
End: 2020-11-24
Payer: COMMERCIAL

## 2020-11-24 DIAGNOSIS — J42 CHRONIC BRONCHITIS, UNSPECIFIED CHRONIC BRONCHITIS TYPE (H): ICD-10-CM

## 2020-11-24 DIAGNOSIS — M62.838 MUSCLE SPASM: Primary | ICD-10-CM

## 2020-11-24 DIAGNOSIS — J45.40 MODERATE PERSISTENT ASTHMA, UNSPECIFIED WHETHER COMPLICATED: Primary | ICD-10-CM

## 2020-11-24 DIAGNOSIS — J45.40 MODERATE PERSISTENT ASTHMA, UNSPECIFIED WHETHER COMPLICATED: ICD-10-CM

## 2020-11-24 PROCEDURE — 99214 OFFICE O/P EST MOD 30 MIN: CPT | Mod: 95 | Performed by: PHYSICIAN ASSISTANT

## 2020-11-24 RX ORDER — MULTIVITAMIN WITH IRON
1 TABLET ORAL DAILY
Qty: 90 TABLET | Refills: 0 | Status: SHIPPED | OUTPATIENT
Start: 2020-11-24 | End: 2021-03-23

## 2020-11-24 RX ORDER — CYCLOBENZAPRINE HCL 5 MG
5 TABLET ORAL 3 TIMES DAILY PRN
Qty: 30 TABLET | Refills: 0 | Status: SHIPPED | OUTPATIENT
Start: 2020-11-24 | End: 2022-02-09

## 2020-11-24 NOTE — PROGRESS NOTES
"Marni Austin is a 50 year old female who is being evaluated via a billable video visit.      The patient has been notified of following:     \"This video visit will be conducted via a call between you and your physician/provider. We have found that certain health care needs can be provided without the need for an in-person physical exam.  This service lets us provide the care you need with a video conversation.  If a prescription is necessary we can send it directly to your pharmacy.  If lab work is needed we can place an order for that and you can then stop by our lab to have the test done at a later time.    Video visits are billed at different rates depending on your insurance coverage.  Please reach out to your insurance provider with any questions.    If during the course of the call the physician/provider feels a video visit is not appropriate, you will not be charged for this service.\"    Patient has given verbal consent for Video visit? Yes  How would you like to obtain your AVS? MyChart  If you are dropped from the video visit, the video invite should be resent to: cell [phone]  Will anyone else be joining your video visit? No  {If patient encounters technical issues they should call [phone] :[id number]}    Subjective     Marni Austin is a 50 year old female who presents today via video visit for the following health issues:    HPI     Medication Followup of Potassium,qvar    Taking Medication as prescribed: yes    Side Effects:  None    Medication Helping Symptoms:  yes    Would like to discuss right elbow, arm pain.    {PEDS Chronic and Acute Problems (Optional):173080}     Video Start Time: {video visit start/end time for provider to select:565851}    {additonal problems for provider to add (Optional):243760}    Review of Systems   {ROS COMP (Optional):194140}      Objective           Vitals:  No vitals were obtained today due to virtual visit.    Physical Exam     {video visit exam brief " "selected:925387::\"GENERAL: Healthy, alert and no distress\",\"EYES: Eyes grossly normal to inspection.  No discharge or erythema, or obvious scleral/conjunctival abnormalities.\",\"RESP: No audible wheeze, cough, or visible cyanosis.  No visible retractions or increased work of breathing.  \",\"SKIN: Visible skin clear. No significant rash, abnormal pigmentation or lesions.\",\"NEURO: Cranial nerves grossly intact.  Mentation and speech appropriate for age.\",\"PSYCH: Mentation appears normal, affect normal/bright, judgement and insight intact, normal speech and appearance well-groomed.\"}      {Diagnostic Test Results (Optional):742722}        {PROVIDER CHARTING PREFERENCE:945875}      Video-Visit Details    Type of service:  Video Visit    Video End Time:{video visit start/end time for provider to select:495567}    Originating Location (pt. Location): {video visit patient location:537465::\"Home\"}    Distant Location (provider location):  [facility]     Platform used for Video Visit: {Virtual Visit Platforms:485138::\"FormotusWell\"}        "

## 2020-11-24 NOTE — PROGRESS NOTES
"Marni Austin is a 50 year old female who is being evaluated via a billable telephone visit.      The patient has been notified of following:     \"This telephone visit will be conducted via a call between you and your physician/provider. We have found that certain health care needs can be provided without the need for a physical exam.  This service lets us provide the care you need with a short phone conversation.  If a prescription is necessary we can send it directly to your pharmacy.  If lab work is needed we can place an order for that and you can then stop by our lab to have the test done at a later time.    Telephone visits are billed at different rates depending on your insurance coverage. During this emergency period, for some insurers they may be billed the same as an in-person visit.  Please reach out to your insurance provider with any questions.    If during the course of the call the physician/provider feels a telephone visit is not appropriate, you will not be charged for this service.\"    Patient has given verbal consent for Telephone visit?  Yes    What phone number would you like to be contacted at? [phone]    How would you like to obtain your AVS? MyChart    Subjective     Marni Austin is a 50 year old female who presents via phone visit today for the following health issues:    HPI     Medication Followup of Potassium, Flovent    Taking Medication as prescribed: yes    Side Effects:  None    Medication Helping Symptoms:  yes     Marni Austin is a 50 year old female who presents today for TELEPHONE med check  She is \"hanging in there\"   Getting increased carlos horses again   Tried flexeril at work but got too sleepy  This time was in the ribs; occasionally gets in her legs too  She denies any coughing or shortness of breath during the episode but did seem to follow her reaching under the computer desk  She continued to take fluids    She ended up getting flovent after we spoke " in [place]  It was too expensive  She continues to think combo was the best for her alongside her incruse  Affordability is the biggest piece       Review of Systems   Constitutional, HEENT, cardiovascular, pulmonary, gi and gu systems are negative, except as otherwise noted.       Objective          Vitals:  No vitals were obtained today due to virtual visit.    healthy, alert and no distress  PSYCH: Alert and oriented times 3; coherent speech, normal   rate and volume, able to articulate logical thoughts, able   to abstract reason, no tangential thoughts, no hallucinations   or delusions  Her affect is normal  RESP: No cough, no audible wheezing, able to talk in full sentences  Remainder of exam unable to be completed due to telephone visits          Assessment/Plan:    Assessment & Plan     Muscle spasm  Try adding magnesium. Ok to use flexeril. Follow-up as needed  - magnesium 250 MG tablet; Take 1 tablet (250 mg) by mouth daily  - cyclobenzaprine (FLEXERIL) 5 MG tablet; Take 1 tablet (5 mg) by mouth 3 times daily as needed for muscle spasms    Chronic bronchitis, unspecified chronic bronchitis type (H)  Moderate persistent asthma, unspecified whether complicated  Breathing ok but still could be better. She needs combo but cannot afford and doesn't qualify for assistance. Recommend working with insurance to see if using ICS/LABA separately would be more affordable. Can call for Rx depending on what she discusovers  - beclomethasone HFA (QVAR REDIHALER) 80 MCG/ACT inhaler; Inhale 1 puff into the lungs 2 times daily        No follow-ups on file.    David Gambino PA-C  [facility]    Phone call duration:  18 minutes

## 2020-11-25 RX ORDER — FLUTICASONE PROPIONATE 220 UG/1
2 AEROSOL, METERED RESPIRATORY (INHALATION) 2 TIMES DAILY
Qty: 1 INHALER | Refills: 5 | Status: SHIPPED | OUTPATIENT
Start: 2020-11-25 | End: 2021-03-23

## 2020-11-25 NOTE — TELEPHONE ENCOUNTER
LMTCB- see message below.     Deneen KNUTSON RN       Sent [facility] too. If she gets/reads the MC can close this encounter

## 2020-11-25 NOTE — TELEPHONE ENCOUNTER
Called [facility] mail order      Tier 1 (cheapest) Flovent hfa or diskus  Then   Pulmicort flexhaler  Then   Arnuity ellitpa (most expensive)    If more information is needed [facility] said pt would have to call her insurance.     Hope this helps.     Deneen KNUTSON RN

## 2020-11-25 NOTE — TELEPHONE ENCOUNTER
Thanks for calling. Sent in [employer]. Please let patient know this is the one that is covered. Can start with 1 puff twice daily and increased to 2 p twice daily if needed. Thanks!

## 2020-12-01 ENCOUNTER — OFFICE VISIT (OUTPATIENT)
Dept: URGENT CARE | Facility: URGENT CARE | Age: 50
End: 2020-12-01
Payer: COMMERCIAL

## 2020-12-01 ENCOUNTER — ANCILLARY PROCEDURE (OUTPATIENT)
Dept: GENERAL RADIOLOGY | Facility: CLINIC | Age: 50
End: 2020-12-01
Attending: FAMILY MEDICINE
Payer: COMMERCIAL

## 2020-12-01 VITALS
OXYGEN SATURATION: 96 % | SYSTOLIC BLOOD PRESSURE: 149 MMHG | RESPIRATION RATE: 16 BRPM | HEIGHT: 61 IN | WEIGHT: 212 LBS | HEART RATE: 89 BPM | DIASTOLIC BLOOD PRESSURE: 91 MMHG | BODY MASS INDEX: 40.02 KG/M2 | TEMPERATURE: 98.1 F

## 2020-12-01 DIAGNOSIS — J18.9 PNEUMONIA OF RIGHT LOWER LOBE DUE TO INFECTIOUS ORGANISM: ICD-10-CM

## 2020-12-01 DIAGNOSIS — R05.8 DRY COUGH: Primary | ICD-10-CM

## 2020-12-01 DIAGNOSIS — J42 CHRONIC BRONCHITIS, UNSPECIFIED CHRONIC BRONCHITIS TYPE (H): ICD-10-CM

## 2020-12-01 DIAGNOSIS — R12 HEARTBURN: ICD-10-CM

## 2020-12-01 DIAGNOSIS — R19.7 DIARRHEA, UNSPECIFIED TYPE: ICD-10-CM

## 2020-12-01 LAB
BASOPHILS # BLD AUTO: 0.1 10E9/L (ref 0–0.2)
BASOPHILS NFR BLD AUTO: 0.7 %
DIFFERENTIAL METHOD BLD: ABNORMAL
EOSINOPHIL # BLD AUTO: 0.1 10E9/L (ref 0–0.7)
EOSINOPHIL NFR BLD AUTO: 1.4 %
ERYTHROCYTE [DISTWIDTH] IN BLOOD BY AUTOMATED COUNT: 12.7 % (ref 10–15)
HCT VFR BLD AUTO: 46.4 % (ref 35–47)
HGB BLD-MCNC: 16.1 G/DL (ref 11.7–15.7)
LYMPHOCYTES # BLD AUTO: 2.3 10E9/L (ref 0.8–5.3)
LYMPHOCYTES NFR BLD AUTO: 23.4 %
MCH RBC QN AUTO: 29.5 PG (ref 26.5–33)
MCHC RBC AUTO-ENTMCNC: 34.7 G/DL (ref 31.5–36.5)
MCV RBC AUTO: 85 FL (ref 78–100)
MONOCYTES # BLD AUTO: 0.5 10E9/L (ref 0–1.3)
MONOCYTES NFR BLD AUTO: 4.9 %
NEUTROPHILS # BLD AUTO: 6.7 10E9/L (ref 1.6–8.3)
NEUTROPHILS NFR BLD AUTO: 69.6 %
PLATELET # BLD AUTO: 238 10E9/L (ref 150–450)
RBC # BLD AUTO: 5.45 10E12/L (ref 3.8–5.2)
WBC # BLD AUTO: 9.7 10E9/L (ref 4–11)

## 2020-12-01 PROCEDURE — 36415 COLL VENOUS BLD VENIPUNCTURE: CPT | Performed by: FAMILY MEDICINE

## 2020-12-01 PROCEDURE — U0003 INFECTIOUS AGENT DETECTION BY NUCLEIC ACID (DNA OR RNA); SEVERE ACUTE RESPIRATORY SYNDROME CORONAVIRUS 2 (SARS-COV-2) (CORONAVIRUS DISEASE [COVID-19]), AMPLIFIED PROBE TECHNIQUE, MAKING USE OF HIGH THROUGHPUT TECHNOLOGIES AS DESCRIBED BY CMS-2020-01-R: HCPCS | Performed by: FAMILY MEDICINE

## 2020-12-01 PROCEDURE — 85025 COMPLETE CBC W/AUTO DIFF WBC: CPT | Performed by: FAMILY MEDICINE

## 2020-12-01 PROCEDURE — 99214 OFFICE O/P EST MOD 30 MIN: CPT | Performed by: FAMILY MEDICINE

## 2020-12-01 PROCEDURE — 71046 X-RAY EXAM CHEST 2 VIEWS: CPT | Performed by: RADIOLOGY

## 2020-12-01 RX ORDER — DOXYCYCLINE 100 MG/1
100 CAPSULE ORAL 2 TIMES DAILY
Qty: 14 CAPSULE | Refills: 0 | Status: SHIPPED | OUTPATIENT
Start: 2020-12-01 | End: 2020-12-08

## 2020-12-01 ASSESSMENT — MIFFLIN-ST. JEOR: SCORE: 1509.01

## 2020-12-01 NOTE — PROGRESS NOTES
SUBJECTIVE:   Marni Austin is a 50 year old female with history of COPD, coronary artery disease, depression, hypertension presenting with a chief complaint of dry coughing along with some wheezing symptoms for last 7 days she also has noted some diarrhea for last 3 days and started noticing heartburn for 1 day.  She denies any fever chills or any change in her taste or smell sensation.  Has been exposed to Covid in the past at work.  But not recently.   She is an established patient of [facility].  Onset of symptoms was 7 day(s) ago.  Course of illness is waxing and waning.    Severity moderate  Current and Associated symptoms: cough - non-productive, diarrhea and heartburn  Treatment measures tried include None tried.  Predisposing factors include HX of COPD.  Full PPE was worn to see the pt  Past Medical History:   Diagnosis Date     CAD (coronary artery disease)      COPD (chronic obstructive pulmonary disease) (H)      Depression      Hypertension      Current Outpatient Medications   Medication Sig Dispense Refill     albuterol (PROVENTIL) (2.5 MG/3ML) 0.083% neb solution USE  1 VIAL BY NEBULIZATION EVERY 6 HOURS AS NEEDED FOR SHORTNESS OF BREATH /  DYSPNEA OR WHEEZING 180 mL 1     albuterol (VENTOLIN HFA) 108 (90 Base) MCG/ACT inhaler Inhale 1-2 puffs into the lungs every 4 hours as needed for shortness of breath / dyspnea or wheezing 54 g 1     aspirin EC 81 MG EC tablet Take 1 tablet (81 mg) by mouth daily       atorvastatin (LIPITOR) 80 MG tablet TAKE 1 TABLET BY MOUTH  DAILY 90 tablet 1     beclomethasone HFA (QVAR REDIHALER) 80 MCG/ACT inhaler Inhale 1 puff into the lungs 2 times daily 1 Inhaler 0     cetirizine (ZYRTEC) 10 MG tablet Take 10 mg by mouth daily       citalopram (CELEXA) 40 MG tablet TAKE 1 TABLET BY MOUTH  DAILY 90 tablet 0     clotrimazole (LOTRIMIN) 1 % external cream Apply topically 2 times daily 60 g 1     fluticasone (FLOVENT HFA) 220 MCG/ACT inhaler Inhale 2 puffs into the lungs 2  times daily 1 Inhaler 5     INCRUSE ELLIPTA 62.5 MCG/INH Inhaler USE 1 INHALATION BY MOUTH  DAILY 2 each 3     magnesium 250 MG tablet Take 1 tablet (250 mg) by mouth daily 90 tablet 0     metoprolol succinate ER (TOPROL-XL) 25 MG 24 hr tablet TAKE 1 TABLET BY MOUTH  DAILY 90 tablet 0     nitroGLYcerin (NITROSTAT) 0.4 MG sublingual tablet SEE DIRECTIONS ON  MEDICATION INFORMATION  SHEET WITH INVOICE  PAPERWORK 75 tablet 1     potassium chloride ER (KLOR-CON M) 10 MEQ CR tablet TAKE 1 TABLET BY MOUTH  DAILY 90 tablet 0     varenicline (CHANTIX) 1 MG tablet Take 1 tablet (1 mg) by mouth 2 times daily 180 tablet 0     chlorthalidone (HYGROTON) 25 MG tablet TAKE ONE-HALF TABLET BY  MOUTH DAILY 45 tablet 0     cyclobenzaprine (FLEXERIL) 5 MG tablet Take 1 tablet (5 mg) by mouth 3 times daily as needed for muscle spasms (Patient not taking: Reported on 2020) 30 tablet 0     ondansetron (ZOFRAN-ODT) 4 MG ODT tab Take 1 tablet (4 mg) by mouth every 6 hours as needed for nausea or vomiting (Patient not taking: Reported on 2020) 10 tablet 0     varenicline (CHANTIX HANDY) 0.5 MG X 11 & 1 MG X 42 tablet Take 0.5 mg tab daily for 3 days, THEN 0.5 mg tab twice daily for 4 days, THEN 1 mg twice daily. (Patient not taking: Reported on 2020) 53 tablet 0     Social History     Tobacco Use     Smoking status: Current Every Day Smoker     Packs/day: 0.50     Years: 26.00     Pack years: 13.00     Start date: 1979     Last attempt to quit: 2018     Years since quittin.3     Smokeless tobacco: Never Used     Tobacco comment: quit 2018, started again 2020   Substance Use Topics     Alcohol use: No     Alcohol/week: 0.0 standard drinks     Family History   Problem Relation Age of Onset     Hypertension Mother      Heart Disease Mother      Alcoholism Mother      Diabetes Father      Coronary Artery Disease Father      Cerebrovascular Disease Father      Alzheimer Disease Maternal Grandmother      Heart  "Disease Maternal Grandmother      Lung Cancer Maternal Grandfather      Obesity Brother          ROS:    10 point ROS of systems including Constitutional, Eyes,  Cardiovascular,  Genitourinary, Integumentary, Muscularskeletal, Psychiatric were all negative except for pertinent positives noted in my HPI         OBJECTIVE:  BP (!) 149/91 (BP Location: Right arm, Patient Position: Chair, Cuff Size: Adult Large)   Pulse 89   Temp 98.1  F (36.7  C)   Resp 16   Ht 1.549 m (5' 1\")   Wt 96.2 kg (212 lb)   LMP 08/30/2013   SpO2 96%   Breastfeeding No   BMI 40.06 kg/m    GENERAL APPEARANCE: healthy, alert and no distress  EYES: EOMI,  PERRL, conjunctiva clear  HENT: ear canals and TM's normal.  Nose and mouth without ulcers, erythema or lesions  NP swab was done for Covid by myself  NECK: supple, nontender, no lymphadenopathy  RESP: lungs good air entry positve for rales, rhonchi or wheezes  CV: regular rates and rhythm, normal S1 S2, no murmur noted  ABDOMEN:  soft, nontender, no HSM or masses and bowel sounds normal  SKIN: no suspicious lesions or rashes  PSYCH: mentation appears normal  Physical Exam      X-Ray was done, my findings are: RLL shows opacity     Medical Decision Making:    Differential Diagnosis:  URI Adult/Peds:  Asthma exacerbation, Bronchitis-viral, Influenza, Pneumonia, Strep pharyngitis, Viral pharyngitis, Viral syndrome, Viral upper respiratory illness and COPD /covid   GERD /diarrhea     ASSESSMENT:  Marni was seen today for cough.    Diagnoses and all orders for this visit:    Dry cough  -     Symptomatic COVID-19 Virus (Coronavirus) by PCR  -     CBC with platelets and differential  -     XR Chest 2 Views    Heartburn  -     lidocaine (XYLOCAINE) 2 % 15 mL, alum & mag hydroxide-simethicone (MAALOX) 15 mL GI Cocktail    Diarrhea, unspecified type  -     Symptomatic COVID-19 Virus (Coronavirus) by PCR    Chronic bronchitis, unspecified chronic bronchitis type (H)  -     XR Chest 2 " Views          PLAN:  Tylenol, Fluids, Rest, Saline gargles, Saline nasal spray and Vaporizer  Discussed about findings with pt   She did feel better with the GI cocktail for her GERD   Reviewed diarrhea symptoms with pt   Discussed about pushing enough fluids   Will treat her with antibiotic   covid test is pending   Follow up if  symptoms fail to improve or worsens   Pt understood and agreed with plan       Georgiana Horn MD     See orders in Epic

## 2020-12-01 NOTE — PATIENT INSTRUCTIONS
Patient Education     Treating Diarrhea    Diarrhea happens when you have loose, watery, or frequent bowel movements. It is a common problem with many causes. Most cases of diarrhea clear up on their own. But certain cases may need treatment. Be sure to see your healthcare provider if your symptoms don't get better in a few days.   Getting relief  Treatment of diarrhea depends on its cause. Diarrhea caused by bacterial or parasite infection is often treated with antibiotics. Diarrhea caused by other factors, such as a stomach virus, often improves with simple home treatment. The tips below may also help ease your symptoms.     Drink plenty of fluids. This helps prevent too much fluid loss (dehydration). Water, clear soups, and electrolyte solutions are good choices. Don't take alcohol, coffee, tea, or milk. These can irritate your intestines and make symptoms worse.    Suck on ice chips if drinking makes you queasy.    Return to your normal diet slowly. You may want to eat bland foods at first, such as rice and toast. Also, you may need to stay away from certain foods for a while, such as dairy products. These can make symptoms worse. Ask your healthcare provider if there are any other foods you should stay away from.    If you were prescribed antibiotics, take them as directed.    Don't take anti-diarrhea medicines without asking your provider first.  Call your healthcare provider   Call your healthcare provider if you have any of the following:     A fever of 100.4  F ( 38.0 C) or higher, or as directed by your provider    Chills    Severe pain    Worsening diarrhea or diarrhea for more than 2 days    Bloody vomit or stool    Signs of dehydration (dizziness, dry mouth and tongue, rapid pulse, dark urine)  [employer] last reviewed this educational content on 6/1/2019 2000-2020 [employer]. [address] [place] [address], [place] [address]. All rights reserved. This information is not intended as a  substitute for professional medical care. Always follow your healthcare professional's instructions.           Patient Education     Diarrhea with Uncertain Cause (Adult)    Diarrhea is when stools are loose and watery. This can be caused by:    Viral infections    Bacterial infections    Food poisoning    Parasites    Irritable bowel syndrome (IBS)    Inflammatory bowel diseases such as ulcerative colitis, Crohn's disease, and celiac disease    Food intolerance, such as to lactose, the sugar found in milk and milk products    Reaction to medicines like antibiotics, laxatives, cancer drugs, and antacids  Along with diarrhea, you may also have:    Abdominal pain and cramping    Nausea and vomiting    Loss of bowel control    Fever and chills    Bloody stools  In some cases, antibiotics may help to treat diarrhea. You may have a stool sample test. This is done to see what is causing your diarrhea, and if antibiotics will help treat it. The results of a stool sample test may take up to 2 days. The healthcare provider may not give you antibiotics until he or she has the stool test results.  Diarrhea can cause dehydration. This is the loss of too much water and other fluids from the body. When this occurs, body fluid must be replaced. This can be done with oral rehydration solutions. Oral rehydration solutions are available at drugstores and grocery stores without a prescription. Sports drinks are not the best choice if you are very dehydrated. They have too much sugar and not enough electrolytes.  Home care  Follow all instructions given by your healthcare provider. Rest at home for the next 24 hours, or until you feel better. Avoid caffeine, tobacco, and alcohol. These can make diarrhea, cramping, and pain worse.  If taking medicines:    Over-the-counter nausea and diarrhea medicines are generally OK unless you experience fever or blood stool. Check with your doctor first in those circumstances.    You may use  acetaminophen or NSAID medicines like ibuprofen or naproxen to reduce pain and fever. Don t use these if you have chronic liver or kidney disease, or ever had a stomach ulcer or gastrointestinal bleeding. Don't use NSAID medicines if you are already taking one for another condition (like arthritis) or are on daily aspirin therapy (such as for heart disease or after a stroke). Talk with your healthcare provider first.    If antibiotics were prescribed, be sure you take them until they are finished. Don t stop taking them even when you feel better. Antibiotics must be taken as a full course.  To prevent the spread of illness:    Remember that washing with soap and water and using alcohol-based  is the best way to prevent the spread of infection. Dry your hands with a single use towel (like a paper towel).    Clean the toilet after each use.    Wash your hands before eating.    Wash your hands before and after preparing food. Keep in mind that people with diarrhea or vomiting should not prepare food for others.    Wash your hands after using cutting boards, countertops, and knives that have been in contact with raw foods.    Wash and then peel fruits and vegetables.    Keep uncooked meats away from cooked and ready-to-eat foods.    Use a food thermometer when cooking. Cook poultry to at least 165 F (74 C). Cook ground meat (beef, veal, pork, lamb) to at least 160 F (71 C). Cook fresh beef, veal, lamb, and pork to at least 145 F (63 C).    Don t eat raw or undercooked eggs (poached or lopez side up), poultry, meat, or unpasteurized milk and juices.  Food and drinks  The main goal while treating vomiting or diarrhea is to prevent dehydration. This is done by taking small amounts of liquids often.    Keep in mind that liquids are more important than food right now.    Drink only small amounts of liquids at a time.    Don t force yourself to eat, especially if you are having cramping, vomiting, or diarrhea. Don t  eat large amounts at a time, even if you are hungry.    If you eat, avoid fatty, greasy, spicy, or fried foods.    Don t eat dairy foods or drink milk if you have diarrhea. These can make diarrhea worse.  During the first 24 hours you can try:    Oral rehydration solutions.  Sports drinks may be used if you are not too dehydrated and are otherwise healthy.    Soft drinks without caffeine    Ginger ale    Water (plain or flavored)    Decaf tea or coffee    Clear broth, consommé, or bouillon    Gelatin, popsicles, or frozen fruit juice bars  The second 24 hours, if you are feeling better, you can add:    Hot cereal, plain toast, bread, rolls, or crackers    Plain noodles, rice, mashed potatoes, chicken noodle soup, or rice soup    Unsweetened canned fruit (no pineapple)    Bananas  As you recover:    Limit fat intake to less than 15 grams per day. Don t eat margarine, butter, oils, mayonnaise, sauces, gravies, fried foods, peanut butter, meat, poultry, or fish.    Limit fiber. Don t eat raw or cooked vegetables, fresh fruits except bananas, or bran cereals.    Limit caffeine and chocolate.    Limit dairy.    Don t use spices or seasonings except salt.    Go back to your normal diet over time, as you feel better and your symptoms improve.    If the symptoms come back, go back to a simple diet or clear liquids.  Follow-up care  Follow up with your healthcare provider, or as advised. If a stool sample was taken or cultures were done, call the healthcare provider for the results as instructed.  Call 911  Call 911 if you have any of these symptoms:    Trouble breathing    Confusion    Extreme drowsiness or trouble walking    Loss of consciousness    Rapid heart rate    Chest pain    Stiff neck    Seizure  When to seek medical advice  Call your healthcare provider right away if any of these occur:    Abdominal pain that gets worse    Constant lower right abdominal pain    Continued vomiting and inability to keep liquids  down    Diarrhea more than 5 times a day    Blood in vomit or stool    Dark urine or no urine for 8 hours, dry mouth and tongue, tiredness, weakness, or dizziness    Drowsiness    New rash    You don t get better in 2 to 3 days    Fever of 100.4 F (38 C) or higher, or as directed by your healthcare provider  [employer] last reviewed this educational content on 6/1/2018 2000-2020 [employer]. [address] [place] [address], [place] [address]. All rights reserved. This information is not intended as a substitute for professional medical care. Always follow your healthcare professional's instructions.

## 2020-12-02 LAB
SARS-COV-2 RNA SPEC QL NAA+PROBE: NOT DETECTED
SPECIMEN SOURCE: NORMAL

## 2020-12-11 DIAGNOSIS — I25.10 CORONARY ARTERY DISEASE INVOLVING NATIVE HEART WITHOUT ANGINA PECTORIS, UNSPECIFIED VESSEL OR LESION TYPE: ICD-10-CM

## 2020-12-11 DIAGNOSIS — I10 HYPERTENSION GOAL BP (BLOOD PRESSURE) < 140/90: ICD-10-CM

## 2020-12-14 ENCOUNTER — MYC MEDICAL ADVICE (OUTPATIENT)
Dept: FAMILY MEDICINE | Facility: CLINIC | Age: 50
End: 2020-12-14

## 2020-12-14 RX ORDER — METOPROLOL SUCCINATE 25 MG/1
TABLET, EXTENDED RELEASE ORAL
Qty: 90 TABLET | Refills: 3 | Status: SHIPPED | OUTPATIENT
Start: 2020-12-14 | End: 2021-11-10

## 2020-12-14 NOTE — TELEPHONE ENCOUNTER
Routing refill request to provider for review/approval because:  Elevated BP    BP Readings from Last 3 Encounters:   12/01/20 (!) 149/91   08/17/20 122/76   07/09/20 132/70     Verna ENNIS RN, BSN

## 2020-12-15 VITALS — DIASTOLIC BLOOD PRESSURE: 78 MMHG | SYSTOLIC BLOOD PRESSURE: 126 MMHG

## 2020-12-17 ENCOUNTER — MYC MEDICAL ADVICE (OUTPATIENT)
Dept: FAMILY MEDICINE | Facility: CLINIC | Age: 50
End: 2020-12-17

## 2020-12-17 DIAGNOSIS — F17.200 TOBACCO USE DISORDER: Primary | ICD-10-CM

## 2020-12-30 RX ORDER — VARENICLINE TARTRATE 1 MG/1
1 TABLET, FILM COATED ORAL 2 TIMES DAILY
Qty: 180 TABLET | Refills: 0 | Status: SHIPPED | OUTPATIENT
Start: 2020-12-30 | End: 2021-09-23

## 2021-01-05 ENCOUNTER — TELEPHONE (OUTPATIENT)
Dept: FAMILY MEDICINE | Facility: CLINIC | Age: 51
End: 2021-01-05

## 2021-01-05 NOTE — TELEPHONE ENCOUNTER
Prior Authorization Retail Medication Request    Medication/Dose: varenicline (CHANTIX) 1 MG tablet  ICD code (if different than what is on RX):  F17.200  Previously Tried and Failed:    Rationale:      Insurance Name:   Insurance ID:       Pharmacy Information (if different than what is on RX)  Name: [facility]   Phone: [phone]    Ena Loaiza MA

## 2021-01-06 NOTE — TELEPHONE ENCOUNTER
Central Prior Authorization Team   Phone: [phone]      Prior Authorization Not Needed per Insurance    01/06/2021  Medication: varenicline (CHANTIX) 1 MG tablet - NOT NEEDED  Insurance Company: [employer] ([facility]) - Phone [phone] Fax [phone]  Expected CoPay:      Pharmacy Filling the Rx: [facility] - [address]  Pharmacy Notified: Yes  Patient Notified: Yes    Per [employer] representative, medication has shipped - est. delivery 01/08/2021.

## 2021-01-15 ENCOUNTER — HEALTH MAINTENANCE LETTER (OUTPATIENT)
Age: 51
End: 2021-01-15

## 2021-01-20 DIAGNOSIS — F43.22 ADJUSTMENT DISORDER WITH ANXIETY: ICD-10-CM

## 2021-01-21 RX ORDER — CITALOPRAM HYDROBROMIDE 40 MG/1
TABLET ORAL
Qty: 90 TABLET | Refills: 0 | Status: SHIPPED | OUTPATIENT
Start: 2021-01-21 | End: 2021-03-23

## 2021-01-21 NOTE — TELEPHONE ENCOUNTER
Prescription approved per [facility] Refill Protocol.    Nellie Heredia RN

## 2021-01-24 DIAGNOSIS — E87.6 HYPOKALEMIA: ICD-10-CM

## 2021-01-25 RX ORDER — POTASSIUM CHLORIDE 750 MG/1
TABLET, EXTENDED RELEASE ORAL
Qty: 90 TABLET | Refills: 2 | Status: SHIPPED | OUTPATIENT
Start: 2021-01-25 | End: 2021-09-30

## 2021-01-25 NOTE — TELEPHONE ENCOUNTER
Prescription approved per [facility] Refill Protocol.  Isabel Ramirez RN

## 2021-01-28 DIAGNOSIS — Z13.6 CARDIOVASCULAR SCREENING; LDL GOAL LESS THAN 130: ICD-10-CM

## 2021-01-28 DIAGNOSIS — I25.10 CORONARY ARTERY DISEASE INVOLVING NATIVE HEART WITHOUT ANGINA PECTORIS, UNSPECIFIED VESSEL OR LESION TYPE: ICD-10-CM

## 2021-01-29 RX ORDER — ATORVASTATIN CALCIUM 80 MG/1
TABLET, FILM COATED ORAL
Qty: 90 TABLET | Refills: 3 | Status: SHIPPED | OUTPATIENT
Start: 2021-01-29 | End: 2021-12-27

## 2021-01-29 NOTE — TELEPHONE ENCOUNTER
Routing refill request to provider for review/approval because:  Labs not current:    LDL Cholesterol Calculated   Date Value Ref Range Status   05/31/2019  <100 mg/dL Final    Cannot estimate LDL when triglyceride exceeds 400 mg/dL     LDL Cholesterol Direct   Date Value Ref Range Status   05/31/2019 67 <100 mg/dL Final     Comment:     Desirable:       <100 mg/dl     Please send 3 moth mail order if approved.    Please route to TC to schedule lab only.    Nellie Heredia RN

## 2021-02-10 DIAGNOSIS — J44.1 CHRONIC OBSTRUCTIVE PULMONARY DISEASE WITH ACUTE EXACERBATION (H): ICD-10-CM

## 2021-02-12 NOTE — TELEPHONE ENCOUNTER
INCRUSE ELLIPTA 62.5 MCG/INH Inhaler  Last Written Prescription Date:  8/12/20  Last Fill Quantity: 2,   # refills: 3  Last Office Visit: 11/24/20  Future Office visit:       Routing refill request to provider for review/approval because:  Asthma control assessment score within normal limits in last 6 months    Francisca Butts RN on 2/12/2021 at 1:16 PM

## 2021-03-18 ENCOUNTER — IMMUNIZATION (OUTPATIENT)
Dept: NURSING | Facility: CLINIC | Age: 51
End: 2021-03-18
Payer: COMMERCIAL

## 2021-03-18 PROCEDURE — 0011A PR COVID VAC MODERNA 100 MCG/0.5 ML IM: CPT

## 2021-03-18 PROCEDURE — 91301 PR COVID VAC MODERNA 100 MCG/0.5 ML IM: CPT

## 2021-03-23 ENCOUNTER — OFFICE VISIT (OUTPATIENT)
Dept: FAMILY MEDICINE | Facility: CLINIC | Age: 51
End: 2021-03-23
Payer: COMMERCIAL

## 2021-03-23 VITALS
WEIGHT: 216.4 LBS | SYSTOLIC BLOOD PRESSURE: 122 MMHG | OXYGEN SATURATION: 95 % | RESPIRATION RATE: 22 BRPM | BODY MASS INDEX: 40.86 KG/M2 | DIASTOLIC BLOOD PRESSURE: 70 MMHG | HEART RATE: 68 BPM | TEMPERATURE: 98.1 F | HEIGHT: 61 IN

## 2021-03-23 DIAGNOSIS — M25.561 RIGHT KNEE PAIN, UNSPECIFIED CHRONICITY: ICD-10-CM

## 2021-03-23 DIAGNOSIS — F43.22 ADJUSTMENT DISORDER WITH ANXIETY: ICD-10-CM

## 2021-03-23 DIAGNOSIS — J42 CHRONIC BRONCHITIS, UNSPECIFIED CHRONIC BRONCHITIS TYPE (H): ICD-10-CM

## 2021-03-23 DIAGNOSIS — I25.119 CORONARY ARTERY DISEASE INVOLVING NATIVE HEART WITH ANGINA PECTORIS, UNSPECIFIED VESSEL OR LESION TYPE (H): ICD-10-CM

## 2021-03-23 DIAGNOSIS — J45.40 MODERATE PERSISTENT ASTHMA, UNSPECIFIED WHETHER COMPLICATED: Primary | ICD-10-CM

## 2021-03-23 PROCEDURE — 99214 OFFICE O/P EST MOD 30 MIN: CPT | Performed by: PHYSICIAN ASSISTANT

## 2021-03-23 RX ORDER — FLUTICASONE PROPIONATE 220 UG/1
2 AEROSOL, METERED RESPIRATORY (INHALATION) 2 TIMES DAILY
Qty: 12 G | Refills: 5 | Status: SHIPPED | OUTPATIENT
Start: 2021-03-23 | End: 2021-09-09

## 2021-03-23 RX ORDER — CITALOPRAM HYDROBROMIDE 40 MG/1
40 TABLET ORAL DAILY
Qty: 90 TABLET | Refills: 1 | Status: SHIPPED | OUTPATIENT
Start: 2021-03-23 | End: 2021-10-07

## 2021-03-23 ASSESSMENT — ENCOUNTER SYMPTOMS
DIARRHEA: 0
ABDOMINAL PAIN: 0
COUGH: 1
EYE PAIN: 0
CONSTIPATION: 0
DIZZINESS: 0
HEARTBURN: 0
FEVER: 0
FREQUENCY: 0
NERVOUS/ANXIOUS: 0
CHILLS: 0
JOINT SWELLING: 0
ARTHRALGIAS: 1
HEMATOCHEZIA: 0
HEMATURIA: 0
HEADACHES: 0

## 2021-03-23 ASSESSMENT — MIFFLIN-ST. JEOR: SCORE: 1528.96

## 2021-03-23 NOTE — PATIENT INSTRUCTIONS
Inhalers Categories:    LABA (long acting albuterol) - salmeterol    LAMA: (incruse); tiotropium (spiriva)    COMBO: anoro (combo of the two above)    (Flovent is a steroid - ICS)

## 2021-03-23 NOTE — PROGRESS NOTES
"    Assessment & Plan     Moderate persistent asthma, unspecified whether complicated  Chronic bronchitis, unspecified chronic bronchitis type (H)  Salma lungs are clear but there is limited inspiratory/expiratory phases. Additionally, she simply feels crummy with her breathing. The incruse is effective to help symptomatically but it is not covered by insurance. The flovent is affordable. She will check prices on spiriva, and simply check coverages for LABA, Sheeba and combos. Trelegy would be excellent but in the past has not been covered. She is continuing to work at smoking cessation. She is close. I am hopeful she will try some pulmonary rehab which may really facilitate benefit as well. She can call with what she finds out and I will prescribe. Finally, we should consider rechecking an ECHO and pursuing work up for any CHF worsening though there is no evidence for exacerbation.  - fluticasone (FLOVENT HFA) 220 MCG/ACT inhaler; Inhale 2 puffs into the lungs 2 times daily  - PULMONARY REHAB REFERRAL; Future    Adjustment disorder with anxiety  Controlled. Continue present management.   - citalopram (CELEXA) 40 MG tablet; Take 1 tablet (40 mg) by mouth daily    Right knee pain, unspecified chronicity  Suspect osteoarthritis. Discussed weight loss and continued monitoring    Coronary artery disease involving native heart with angina pectoris, unspecified vessel or lesion type (H)  Continue with work towards risk reduction. BP controlled. On BB, diuretic, asa and statin. Needs updated labs    BMI:   Estimated body mass index is 40.89 kg/m  as calculated from the following:    Height as of this encounter: 1.549 m (5' 1\").    Weight as of this encounter: 98.2 kg (216 lb 6.4 oz).   Weight management plan: Discussed healthy diet and exercise guidelines    Return in about 6 months (around 9/23/2021) for Physical Exam, Lab Work.    David Gambino PA-C  [facility] VERNON Ludwigjosh is a " 50 year old who presents for the following health issues     HPI     Depression Followup    How are you doing with your depression since your last visit? Improved     Are you having other symptoms that might be associated with depression? No    Have you had a significant life event?  No     Are you feeling anxious or having panic attacks?   No    Do you have any concerns with your use of alcohol or other drugs? No    Social History     Tobacco Use     Smoking status: Current Every Day Smoker     Packs/day: 0.50     Years: 26.00     Pack years: 13.00     Start date: 1979     Last attempt to quit: 2018     Years since quittin.6     Smokeless tobacco: Never Used     Tobacco comment: quit 2018, started again 2020   Substance Use Topics     Alcohol use: No     Alcohol/week: 0.0 standard drinks     Drug use: No     PHQ 2018   PHQ-9 Total Score 8 9 11   Q9: Thoughts of better off dead/self-harm past 2 weeks Not at all Not at all Not at all     JAMEEL-7 SCORE 2018   Total Score 5 (mild anxiety) - -   Total Score 5 9 11       PHQ 2018   PHQ-9 Total Score 8 9 11   Q9: Thoughts of better off dead/self-harm past 2 weeks Not at all Not at all Not at all     JAMEEL-7 SCORE 2018   Total Score 5 (mild anxiety) - -   Total Score 5 9 11         Moods are going well  She has been living with her father for the past year  Does think this has helped a lot      COPD Follow-Up    Overall, how are your COPD symptoms since your last clinic visit?  No change    How much fatigue or shortness of breath do you have when you are walking?  More than usual    How much shortness of breath do you have when you are resting?  Same as usual    How often do you cough? Often    Have you noticed any change in your sputum/phlegm?  No    Have you experienced a recent fever? No    Please describe how far you can walk without stopping to rest:   The length of 3-5 rooms    How many flights of stairs are you able to walk up without stopping?  2    Have you had any Emergency Room Visits, Urgent Care Visits, or Hospital Admissions because of your COPD since your last office visit?  No    History   Smoking Status     Current Every Day Smoker     Packs/day: 0.50     Years: 26.00     Start date: 1/1/1979     Last attempt to quit: 7/16/2018   Smokeless Tobacco     Never Used     Comment: quit July 2018, started again March 2020     Breathing continues to be so so  Good when on inhalers but when active will have increased shortness of breath  Cannot afford incruse - will be off of the insurance plan this year  Smoking on occasion; mostly only if around someone who is smoking - 1 pack per month  But can go without for days if watching grandson  Does flovent twice daily  Denies any swelling in the feet    No results found for: FEV1, MPT5AEN      How many servings of fruits and vegetables do you eat daily?  2-3    On average, how many sweetened beverages do you drink each day (Examples: soda, juice, sweet tea, etc.  Do NOT count diet or artificially sweetened beverages)?   2    How many days per week do you exercise enough to make your heart beat faster? 3 or less    How many minutes a day do you exercise enough to make your heart beat faster? 9 or less    How many days per week do you miss taking your medication? 0    Knee and hand pain:  Recent injury to right knee has seen orthopedics in the past. was seen by orthopedics last year and knee drainged and believes she was given a steroid injection.  Reinjured about 6 weeks ago  pain is intermittent and at times it feels like it is going to give out on her.     HANDS:   She is noting occasional sharp pain in the left palm  Shoots up into the finger (middle and ring); often sudden  Fingers not locking up    Review of Systems   ROS negative otherwise except noted elsewhere        Objective    /70   Pulse 68   Temp  "98.1  F (36.7  C) (Oral)   Resp 22   Ht 1.549 m (5' 1\")   Wt 98.2 kg (216 lb 6.4 oz)   LMP 08/30/2013   SpO2 95%   BMI 40.89 kg/m    Body mass index is 40.89 kg/m .  Physical Exam   GENERAL: healthy, alert and no distress  EYES: Eyes grossly normal to inspection, PERRL and conjunctivae and sclerae normal  RESP: lungs are clear though there is limited inspiratory and expiratory phases  CV: regular rates and rhythm; no ectopy; there is no peripheral edema  ABDOMEN: obese, soft, nontender  MS: there is minimal knee space edema. Negative meniscal provocation or ligamentous laxity. Flexion and extension are strong against resistance.   SKIN: no suspicious lesions or rashes  PSYCH: mentation appears normal, affect normal/bright          "

## 2021-03-24 PROBLEM — I50.9 CHF (CONGESTIVE HEART FAILURE) (H): Status: ACTIVE | Noted: 2018-08-06

## 2021-04-15 ENCOUNTER — IMMUNIZATION (OUTPATIENT)
Dept: NURSING | Facility: CLINIC | Age: 51
End: 2021-04-15
Attending: INTERNAL MEDICINE
Payer: COMMERCIAL

## 2021-04-15 PROCEDURE — 91301 PR COVID VAC MODERNA 100 MCG/0.5 ML IM: CPT

## 2021-04-15 PROCEDURE — 0012A PR COVID VAC MODERNA 100 MCG/0.5 ML IM: CPT

## 2021-04-22 ENCOUNTER — ANCILLARY PROCEDURE (OUTPATIENT)
Dept: GENERAL RADIOLOGY | Facility: CLINIC | Age: 51
End: 2021-04-22
Attending: PHYSICIAN ASSISTANT
Payer: COMMERCIAL

## 2021-04-22 ENCOUNTER — OFFICE VISIT (OUTPATIENT)
Dept: FAMILY MEDICINE | Facility: CLINIC | Age: 51
End: 2021-04-22
Payer: COMMERCIAL

## 2021-04-22 VITALS
RESPIRATION RATE: 18 BRPM | OXYGEN SATURATION: 97 % | DIASTOLIC BLOOD PRESSURE: 86 MMHG | TEMPERATURE: 98.1 F | WEIGHT: 214.6 LBS | SYSTOLIC BLOOD PRESSURE: 138 MMHG | BODY MASS INDEX: 40.55 KG/M2 | HEART RATE: 74 BPM

## 2021-04-22 DIAGNOSIS — M79.672 PAIN OF LEFT HEEL: Primary | ICD-10-CM

## 2021-04-22 DIAGNOSIS — R73.9 ELEVATED RANDOM BLOOD GLUCOSE LEVEL: Primary | ICD-10-CM

## 2021-04-22 DIAGNOSIS — M25.561 RIGHT KNEE PAIN, UNSPECIFIED CHRONICITY: ICD-10-CM

## 2021-04-22 DIAGNOSIS — I10 HYPERTENSION GOAL BP (BLOOD PRESSURE) < 140/90: ICD-10-CM

## 2021-04-22 DIAGNOSIS — I50.9 CONGESTIVE HEART FAILURE, UNSPECIFIED HF CHRONICITY, UNSPECIFIED HEART FAILURE TYPE (H): ICD-10-CM

## 2021-04-22 DIAGNOSIS — M79.672 PAIN OF LEFT HEEL: ICD-10-CM

## 2021-04-22 LAB
ALBUMIN SERPL-MCNC: 3.4 G/DL (ref 3.4–5)
ALP SERPL-CCNC: 147 U/L (ref 40–150)
ALT SERPL W P-5'-P-CCNC: 39 U/L (ref 0–50)
ANION GAP SERPL CALCULATED.3IONS-SCNC: 2 MMOL/L (ref 3–14)
AST SERPL W P-5'-P-CCNC: 28 U/L (ref 0–45)
BILIRUB SERPL-MCNC: 0.4 MG/DL (ref 0.2–1.3)
BUN SERPL-MCNC: 13 MG/DL (ref 7–30)
CALCIUM SERPL-MCNC: 8.7 MG/DL (ref 8.5–10.1)
CHLORIDE SERPL-SCNC: 101 MMOL/L (ref 94–109)
CHOLEST SERPL-MCNC: 139 MG/DL
CO2 SERPL-SCNC: 31 MMOL/L (ref 20–32)
CREAT SERPL-MCNC: 0.81 MG/DL (ref 0.52–1.04)
GFR SERPL CREATININE-BSD FRML MDRD: 83 ML/MIN/{1.73_M2}
GLUCOSE SERPL-MCNC: 234 MG/DL (ref 70–99)
HDLC SERPL-MCNC: 26 MG/DL
LDLC SERPL CALC-MCNC: 49 MG/DL
NONHDLC SERPL-MCNC: 113 MG/DL
POTASSIUM SERPL-SCNC: 3.8 MMOL/L (ref 3.4–5.3)
PROT SERPL-MCNC: 6.9 G/DL (ref 6.8–8.8)
SODIUM SERPL-SCNC: 134 MMOL/L (ref 133–144)
TRIGL SERPL-MCNC: 322 MG/DL

## 2021-04-22 PROCEDURE — 80053 COMPREHEN METABOLIC PANEL: CPT | Performed by: PHYSICIAN ASSISTANT

## 2021-04-22 PROCEDURE — 73562 X-RAY EXAM OF KNEE 3: CPT | Mod: RT | Performed by: RADIOLOGY

## 2021-04-22 PROCEDURE — 99214 OFFICE O/P EST MOD 30 MIN: CPT | Performed by: PHYSICIAN ASSISTANT

## 2021-04-22 PROCEDURE — 80061 LIPID PANEL: CPT | Performed by: PHYSICIAN ASSISTANT

## 2021-04-22 PROCEDURE — 36415 COLL VENOUS BLD VENIPUNCTURE: CPT | Performed by: PHYSICIAN ASSISTANT

## 2021-04-22 PROCEDURE — 73650 X-RAY EXAM OF HEEL: CPT | Mod: LT | Performed by: RADIOLOGY

## 2021-04-22 RX ORDER — LOSARTAN POTASSIUM 25 MG/1
25 TABLET ORAL DAILY
Qty: 90 TABLET | Refills: 0 | Status: SHIPPED | OUTPATIENT
Start: 2021-04-22 | End: 2021-05-19

## 2021-04-22 ASSESSMENT — PATIENT HEALTH QUESTIONNAIRE - PHQ9
SUM OF ALL RESPONSES TO PHQ QUESTIONS 1-9: 5
5. POOR APPETITE OR OVEREATING: SEVERAL DAYS

## 2021-04-22 ASSESSMENT — ANXIETY QUESTIONNAIRES
2. NOT BEING ABLE TO STOP OR CONTROL WORRYING: SEVERAL DAYS
3. WORRYING TOO MUCH ABOUT DIFFERENT THINGS: SEVERAL DAYS
GAD7 TOTAL SCORE: 6
1. FEELING NERVOUS, ANXIOUS, OR ON EDGE: SEVERAL DAYS
5. BEING SO RESTLESS THAT IT IS HARD TO SIT STILL: SEVERAL DAYS
7. FEELING AFRAID AS IF SOMETHING AWFUL MIGHT HAPPEN: NOT AT ALL
IF YOU CHECKED OFF ANY PROBLEMS ON THIS QUESTIONNAIRE, HOW DIFFICULT HAVE THESE PROBLEMS MADE IT FOR YOU TO DO YOUR WORK, TAKE CARE OF THINGS AT HOME, OR GET ALONG WITH OTHER PEOPLE: SOMEWHAT DIFFICULT
6. BECOMING EASILY ANNOYED OR IRRITABLE: SEVERAL DAYS

## 2021-04-22 NOTE — PROGRESS NOTES
Assessment & Plan     Pain of left heel  This is somewhat confusing for me. The picture is not that of achilles strain or plantar fasciitis. While the pain is a burning, there is no overt radicuular symptoms otherwise. Id like her to see podiatry for their take  - XR Calcaneus Left G/E 2 Views; Future  - Orthopedic & Spine  Referral; Future    Right knee pain, unspecified chronicity  Marni continues to swear up and down that her injury a year or so ago was to this knee as well but all I see is multiple LEFT knee treatment. Regarless, this increased over time though no overt injury. There is some moderate space narrowing. Sending to ortho to consider steroid injection and likely she should do therapy as well  - XR Knee Right 3 Views; Future  - Orthopedic & Spine  Referral; Future    Hypertension goal BP (blood pressure) < 140/90  Congestive heart failure, unspecified HF chronicity, unspecified heart failure type (H)  With Salma hx of symptoms and difficulty with inhalation treatments, hoping to take another look at medical therapies. In this case maximize CHF/HTN treatments. There is a question of ace allergy previously but this is less likely in a note review. Trial of ARB. Consider addition of spironolactone instead of chlortalidone (with close eyes on potassium) down the road. Hoping for BP control and symptomatic improvement  - losartan (COZAAR) 25 MG tablet; Take 1 tablet (25 mg) by mouth daily  - Lipid panel reflex to direct LDL Fasting  - Comprehensive metabolic panel (BMP + Alb, Alk Phos, ALT, AST, Total. Bili, TP)    - Lipid panel reflex to direct LDL Fasting  - Comprehensive metabolic panel (BMP + Alb, Alk Phos, ALT, AST, Total. Bili, TP)      Return in about 6 weeks (around 6/3/2021) for Lab Work, BP Recheck.    KASIA Villagomez [facility] VERNON Grider is a 51 year old who presents for the following health issues     HPI      Musculoskeletal problem/pain  Onset/Duration: 1 Year (knee)  1 month (heel)  Description  Location: right knee and left heel  Swelling: YES  Redness: no  Pain: YES  Warmth: YES  Intensity:  moderate, severe  Progression of Symptoms:  worsening  Accompanying signs and symptoms:   Fevers: no  Numbness/tingling/weakness: no  History  Trauma to the area: no (heel)  Yes (Knee)   Recent illness:  no  Previous similar problem: YES- Patient has discussed knee prior  Previous evaluation:  YES  Precipitating or alleviating factors:  Aggravating factors include: walking  Therapies tried and outcome: ice, support wrap and Ibuprofen    She mentions chronic right knee pain  Limping now consistently  Feels like it will give way  No new injury just progressive pain  She is adamant this is the knee she injured before despite multiple visits showing LEFT knee and even an image of the LEFT knee      Left heel pain  Hurts with walking and sitting ie will have pain even when not standing  Occurs suddenly, like a burning  No other foot or leg pain  Denies any significant back pain  Ice seems to help      COPD/ASTHMA/SOB  She has not been able to confirm her INCRUSE rx; working with [facility].  To review, she feels a great benefit from the LAMA  On ICS, nebs;   Using at home incentive spirometry instead of pulm rehab for now - just didn't think it would work in schedule for now    Review of Systems   Constitutional, HEENT, cardiovascular, pulmonary, gi and gu systems are negative, except as otherwise noted.      Objective    BP (!) 140/88 (BP Location: Right arm, Patient Position: Chair, Cuff Size: Adult Large)   Pulse 74   Temp 98.1  F (36.7  C) (Oral)   Resp 18   Wt 97.3 kg (214 lb 9.6 oz)   LMP 08/30/2013   SpO2 97%   BMI 40.55 kg/m    Body mass index is 40.55 kg/m .  Physical Exam   GENERAL: healthy, alert and no distress  RESP: lungs clear to auscultation - no rales, rhonchi or wheezes; prolonged expiratory phase  CV:  regular rates and rhythm  MS: there is some edema surrounding the RIGHT knee space. No ligamentous laxity. Patella is stable. Meniscal provocation negative.   The LEFT heel is normal in appearance. There is no erythema or warmth. There is no increase of pain with pressure along the dorsum of the heel. Squeeze test causes increased pain. SLR is negative  SKIN: no suspicious lesions or rashes    Xray - Reviewed and interpreted by me.  Medial space narrowing of the right knee; effusion noted; patellar spacing ok; grossly normal calcaneal imaging

## 2021-04-23 ASSESSMENT — ANXIETY QUESTIONNAIRES: GAD7 TOTAL SCORE: 6

## 2021-05-05 ENCOUNTER — OFFICE VISIT (OUTPATIENT)
Dept: ORTHOPEDICS | Facility: CLINIC | Age: 51
End: 2021-05-05
Payer: COMMERCIAL

## 2021-05-05 VITALS
SYSTOLIC BLOOD PRESSURE: 128 MMHG | WEIGHT: 214 LBS | HEIGHT: 61 IN | DIASTOLIC BLOOD PRESSURE: 80 MMHG | BODY MASS INDEX: 40.4 KG/M2

## 2021-05-05 DIAGNOSIS — M25.561 RIGHT KNEE PAIN, UNSPECIFIED CHRONICITY: ICD-10-CM

## 2021-05-05 DIAGNOSIS — M17.11 PRIMARY OSTEOARTHRITIS OF RIGHT KNEE: Primary | ICD-10-CM

## 2021-05-05 DIAGNOSIS — F17.200 TOBACCO USE DISORDER: ICD-10-CM

## 2021-05-05 DIAGNOSIS — M79.672 PAIN OF LEFT HEEL: ICD-10-CM

## 2021-05-05 PROCEDURE — 99214 OFFICE O/P EST MOD 30 MIN: CPT | Performed by: FAMILY MEDICINE

## 2021-05-05 RX ORDER — DICLOFENAC SODIUM AND MISOPROSTOL 75; 200 MG/1; UG/1
1 TABLET, DELAYED RELEASE ORAL 2 TIMES DAILY
Qty: 60 TABLET | Refills: 0 | Status: SHIPPED | OUTPATIENT
Start: 2021-05-05 | End: 2022-07-19

## 2021-05-05 ASSESSMENT — MIFFLIN-ST. JEOR: SCORE: 1513.08

## 2021-05-05 NOTE — PROGRESS NOTES
ASSESSMENT & PLAN  Patient Instructions     1. Primary osteoarthritis of right knee    2. Pain of left heel    3. Right knee pain, unspecified chronicity      -Patient has right knee pain and swelling due to aggravation of arthritis.  Patient has left heel pain possibly due to a calcaneal stress fracture  -Patient does have a 40-pack-year history of smoking which can increase her risk for bone injuries.  Counseled the patient on the adverse effects of smoking and encouraged her to quit as soon as possible  -We will get an MRI of the left ankle for further evaluation.Your MRI has been ordered. You may call [phone] to schedule over the phone. Once you know the date of your MRI, please call my office and schedule a follow-up visit for 2 days after that.  -We will review MRI results of the left ankle and drain and inject the right knee with cortisone next visit  -Patient will start Arthrotec twice a day as needed for pain and swelling.  -Call direct clinic number [[phone]] at any time with questions or concerns.    Albert Yeo MD [facility] and [facility]  [facility]          -----    SUBJECTIVE  Marni Austin is a/an 51 year old female who is seen in consultation at the request of  David Gambino PA-C for evaluation of right knee and left pain. The patient is seen by themselves. Had 2nd covid vaccine on 4/15/21    Knee   Onset: 1 years(s) ago. Patient describes injury as was in airplane and foot was stuck and fell. And then last summer fell off some stairs.   Location of Pain: right medial aspect of the knee and feels swollen.   Rating of Pain at worst: 8/10  Rating of Pain Currently: 3/10  Worsened by: sitting to standing, walking, stairs  Better with: ice  Treatments tried: ice, ibuprofen, previous imaging (xray 4/22/21) and massage, and ace wrap  Associated symptoms: swelling, tingling, warmth, locking or catching and feeling of instability  Orthopedic  history: NO  Relevant surgical history: NO  Social history: social history: works at in an office    Left foot  Onset: 2 month(s) ago. Reports insidious onset without acute precipitating event.  Location of Pain: left heel pain  Rating of Pain at worst: 9/10  Rating of Pain Currently: 5/10  Worsened by: touching, walking, sitting  Better with: nothing  Treatments tried: ice and previous imaging (xray 21)  Associated symptoms: swelling and warmth  Orthopedic history: NO  Relevant surgical history: NO    Past Medical History:   Diagnosis Date     CAD (coronary artery disease)      COPD (chronic obstructive pulmonary disease) (H)      Depression      Hypertension      Social History     Socioeconomic History     Marital status:      Spouse name: Julian     Number of children: 3     Years of education: Not on file     Highest education level: Not on file   Occupational History     Occupation:    Social Needs     Financial resource strain: Not on file     Food insecurity     Worry: Not on file     Inability: Not on file     Transportation needs     Medical: Not on file     Non-medical: Not on file   Tobacco Use     Smoking status: Current Some Day Smoker     Packs/day: 0.10     Years: 26.00     Pack years: 2.60     Start date: 1979     Last attempt to quit: 2018     Years since quittin.8     Smokeless tobacco: Never Used     Tobacco comment: quit 2018, started again 2020 - she is down to 1 pack per month   Substance and Sexual Activity     Alcohol use: No     Alcohol/week: 0.0 standard drinks     Drug use: No     Sexual activity: Yes     Partners: Male   Lifestyle     Physical activity     Days per week: Not on file     Minutes per session: Not on file     Stress: Not on file   Relationships     Social connections     Talks on phone: Not on file     Gets together: Not on file     Attends Restorationist service: Not on file     Active member of club or organization: Not on file     " Attends meetings of clubs or organizations: Not on file     Relationship status: Not on file     Intimate partner violence     Fear of current or ex partner: Not on file     Emotionally abused: Not on file     Physically abused: Not on file     Forced sexual activity: Not on file   Other Topics Concern      Service No     Blood Transfusions No     Caffeine Concern Yes     Comment: \"uses a lot of it\"     Occupational Exposure No     Hobby Hazards No     Sleep Concern Yes     Comment: trouble sleeping     Stress Concern Yes     Comment: work related     Weight Concern Yes     Special Diet No     Back Care No     Exercise No     Bike Helmet No     Seat Belt Yes     Self-Exams No     Parent/sibling w/ CABG, MI or angioplasty before 65F 55M? Not Asked   Social History Narrative     Not on file         Patient's past medical, surgical, social, and family histories were reviewed today and no changes are noted.    REVIEW OF SYSTEMS:  10 point ROS is negative other than symptoms noted above in HPI, Past Medical History or as stated below  Constitutional: NEGATIVE for fever, chills, change in weight  Skin: NEGATIVE for worrisome rashes, moles or lesions  GI/: NEGATIVE for bowel or bladder changes  Neuro: NEGATIVE for weakness, dizziness or paresthesias    OBJECTIVE:  /80   Ht 1.549 m (5' 1\")   Wt 97.1 kg (214 lb)   LMP 08/30/2013   BMI 40.43 kg/m     General: healthy, alert and in no distress  HEENT: no scleral icterus or conjunctival erythema  Skin: no suspicious lesions or rash. No jaundice.  CV: no pedal edema  Resp: normal respiratory effort without conversational dyspnea   Psych: normal mood and affect  Gait: normal steady gait with appropriate coordination and balance  Neuro: Normal light sensory exam of lower extremity  MSK:  RIGHT KNEE  Inspection:    normal alignment  Palpation:    Tender about the lateral patellar facet, medial patellar facet and medial joint line. Remainder of bony and " ligamentous landmarks are nontender.    Mild effusion is present    Patellofemoral crepitus is Present  Range of Motion:     00 extension to 1100 flexion  Strength:    Quadriceps grossly intact    Extensor mechanism intact  Special Tests:    Positive: none    Negative: MCL/valgus stress (0 & 30 deg), LCL/varus stress (0 & 30 deg), Lachman's, anterior drawer, posterior drawer    LEFT FOOT  Inspection:    no swelling over the dorsal calcaneus  Palpation:    Tender about the medial cuneiform, navicular and calcaneus  Range of Motion:     Active toe flexion and extension  - afrom    Active ankle range of motion - afrom    Passive ankle range of motion - FROM  Strength:    Intrinsic foot musculature strength - grossly intact    Ankle strength - grossly intact  Special Tests:    Positive: calcaneal squeeze        Independent visualization of the below image:  No results found for this or any previous visit (from the past 24 hour(s)).  CALCANEUS LEFT TWO OR MORE VIEWS  4/22/2021 8:40 AM      HISTORY: Pain of left heel.     COMPARISON: None.                                                                      IMPRESSION: Bones are normally aligned. Small inferior calcaneal heel  spur. No acute fracture.     MAISHA MCCALL MD    KNEE RIGHT THREE VIEWS  4/22/2021 8:38 AM      HISTORY: Right knee pain, unspecified chronicity.     COMPARISON: None.                                                                      IMPRESSION: Minimal to mild joint space narrowing in the medial knee  compartment with minimal osteophyte formation. Mild-to-moderate knee  effusion. Patellofemoral articulation is intact. No acute fracture.     BRIAN DECESARE, MD Albert Yeo MD [facility]

## 2021-05-05 NOTE — PATIENT INSTRUCTIONS
1. Primary osteoarthritis of right knee    2. Pain of left heel    3. Right knee pain, unspecified chronicity      -Patient has right knee pain and swelling due to aggravation of arthritis.  Patient has left heel pain possibly due to a calcaneal stress fracture  -Patient does have a 40-pack-year history of smoking which can increase her risk for bone injuries.  Counseled the patient on the adverse effects of smoking and encouraged her to quit as soon as possible  -We will get an MRI of the left ankle for further evaluation.Your MRI has been ordered. You may call [phone] to schedule over the phone. Once you know the date of your MRI, please call my office and schedule a follow-up visit for 2 days after that.  -We will review MRI results of the left ankle and drain and inject the right knee with cortisone next visit  -Patient will start Arthrotec twice a day as needed for pain and swelling.  -Call direct clinic number [[phone]] at any time with questions or concerns.    [facility] MD [facility]  [facility] [place]

## 2021-05-05 NOTE — LETTER
5/5/2021         RE: Marni Austin  [address] [facility] [address]        Dear Colleague,    Thank you for referring your patient, Marni Austin, to the [facility] [place]. Please see a copy of my visit note below.    ASSESSMENT & PLAN  Patient Instructions     1. Primary osteoarthritis of right knee    2. Pain of left heel    3. Right knee pain, unspecified chronicity      -Patient has right knee pain and swelling due to aggravation of arthritis.  Patient has left heel pain possibly due to a calcaneal stress fracture  -Patient does have a 40-pack-year history of smoking which can increase her risk for bone injuries.  Counseled the patient on the adverse effects of smoking and encouraged her to quit as soon as possible  -We will get an MRI of the left ankle for further evaluation.Your MRI has been ordered. You may call [phone] to schedule over the phone. Once you know the date of your MRI, please call my office and schedule a follow-up visit for 2 days after that.  -We will review MRI results of the left ankle and drain and inject the right knee with cortisone next visit  -Patient will start Arthrotec twice a day as needed for pain and swelling.  -Call direct clinic number [[phone]] at any time with questions or concerns.    Albert Yeo MD [facility] Orthopedics and Sports Medicine  [facility]          -----    SUBJECTIVE  Marni Austin is a/an 51 year old female who is seen in consultation at the request of  David Gambino PA-C for evaluation of right knee and left pain. The patient is seen by themselves. Had 2nd covid vaccine on 4/15/21    Knee   Onset: 1 years(s) ago. Patient describes injury as was in airplane and foot was stuck and fell. And then last summer fell off some stairs.   Location of Pain: right medial aspect of the knee and feels swollen.   Rating of Pain at worst: 8/10  Rating of Pain Currently:  3/10  Worsened by: sitting to standing, walking, stairs  Better with: ice  Treatments tried: ice, ibuprofen, previous imaging (xray 21) and massage, and ace wrap  Associated symptoms: swelling, tingling, warmth, locking or catching and feeling of instability  Orthopedic history: NO  Relevant surgical history: NO  Social history: social history: works at in an office    Left foot  Onset: 2 month(s) ago. Reports insidious onset without acute precipitating event.  Location of Pain: left heel pain  Rating of Pain at worst: 9/10  Rating of Pain Currently: 5/10  Worsened by: touching, walking, sitting  Better with: nothing  Treatments tried: ice and previous imaging (xray 21)  Associated symptoms: swelling and warmth  Orthopedic history: NO  Relevant surgical history: NO    Past Medical History:   Diagnosis Date     CAD (coronary artery disease)      COPD (chronic obstructive pulmonary disease) (H)      Depression      Hypertension      Social History     Socioeconomic History     Marital status:      Spouse name: Julian     Number of children: 3     Years of education: Not on file     Highest education level: Not on file   Occupational History     Occupation:    Social Needs     Financial resource strain: Not on file     Food insecurity     Worry: Not on file     Inability: Not on file     Transportation needs     Medical: Not on file     Non-medical: Not on file   Tobacco Use     Smoking status: Current Some Day Smoker     Packs/day: 0.10     Years: 26.00     Pack years: 2.60     Start date: 1979     Last attempt to quit: 2018     Years since quittin.8     Smokeless tobacco: Never Used     Tobacco comment: quit 2018, started again 2020 - she is down to 1 pack per month   Substance and Sexual Activity     Alcohol use: No     Alcohol/week: 0.0 standard drinks     Drug use: No     Sexual activity: Yes     Partners: Male   Lifestyle     Physical activity     Days per week:  "Not on file     Minutes per session: Not on file     Stress: Not on file   Relationships     Social connections     Talks on phone: Not on file     Gets together: Not on file     Attends Church service: Not on file     Active member of club or organization: Not on file     Attends meetings of clubs or organizations: Not on file     Relationship status: Not on file     Intimate partner violence     Fear of current or ex partner: Not on file     Emotionally abused: Not on file     Physically abused: Not on file     Forced sexual activity: Not on file   Other Topics Concern      Service No     Blood Transfusions No     Caffeine Concern Yes     Comment: \"uses a lot of it\"     Occupational Exposure No     Hobby Hazards No     Sleep Concern Yes     Comment: trouble sleeping     Stress Concern Yes     Comment: work related     Weight Concern Yes     Special Diet No     Back Care No     Exercise No     Bike Helmet No     Seat Belt Yes     Self-Exams No     Parent/sibling w/ CABG, MI or angioplasty before 65F 55M? Not Asked   Social History Narrative     Not on file         Patient's past medical, surgical, social, and family histories were reviewed today and no changes are noted.    REVIEW OF SYSTEMS:  10 point ROS is negative other than symptoms noted above in HPI, Past Medical History or as stated below  Constitutional: NEGATIVE for fever, chills, change in weight  Skin: NEGATIVE for worrisome rashes, moles or lesions  GI/: NEGATIVE for bowel or bladder changes  Neuro: NEGATIVE for weakness, dizziness or paresthesias    OBJECTIVE:  /80   Ht 1.549 m (5' 1\")   Wt 97.1 kg (214 lb)   LMP 08/30/2013   BMI 40.43 kg/m     General: healthy, alert and in no distress  HEENT: no scleral icterus or conjunctival erythema  Skin: no suspicious lesions or rash. No jaundice.  CV: no pedal edema  Resp: normal respiratory effort without conversational dyspnea   Psych: normal mood and affect  Gait: normal steady gait " with appropriate coordination and balance  Neuro: Normal light sensory exam of lower extremity  MSK:  RIGHT KNEE  Inspection:    normal alignment  Palpation:    Tender about the lateral patellar facet, medial patellar facet and medial joint line. Remainder of bony and ligamentous landmarks are nontender.    Mild effusion is present    Patellofemoral crepitus is Present  Range of Motion:     00 extension to 1100 flexion  Strength:    Quadriceps grossly intact    Extensor mechanism intact  Special Tests:    Positive: none    Negative: MCL/valgus stress (0 & 30 deg), LCL/varus stress (0 & 30 deg), Lachman's, anterior drawer, posterior drawer    LEFT FOOT  Inspection:    no swelling over the dorsal calcaneus  Palpation:    Tender about the medial cuneiform, navicular and calcaneus  Range of Motion:     Active toe flexion and extension  - afrom    Active ankle range of motion - afrom    Passive ankle range of motion - FROM  Strength:    Intrinsic foot musculature strength - grossly intact    Ankle strength - grossly intact  Special Tests:    Positive: calcaneal squeeze        Independent visualization of the below image:  No results found for this or any previous visit (from the past 24 hour(s)).  CALCANEUS LEFT TWO OR MORE VIEWS  4/22/2021 8:40 AM      HISTORY: Pain of left heel.     COMPARISON: None.                                                                      IMPRESSION: Bones are normally aligned. Small inferior calcaneal heel  spur. No acute fracture.     MAISHA MCCALL MD    KNEE RIGHT THREE VIEWS  4/22/2021 8:38 AM      HISTORY: Right knee pain, unspecified chronicity.     COMPARISON: None.                                                                      IMPRESSION: Minimal to mild joint space narrowing in the medial knee  compartment with minimal osteophyte formation. Mild-to-moderate knee  effusion. Patellofemoral articulation is intact. No acute fracture.     BRIAN DECESARE, MD Albert Yeo MD  [facility]  [facility]            Again, thank you for allowing me to participate in the care of your patient.        Sincerely,        Albert Yeo, MD

## 2021-05-07 ENCOUNTER — E-VISIT (OUTPATIENT)
Dept: FAMILY MEDICINE | Facility: CLINIC | Age: 51
End: 2021-05-07
Payer: COMMERCIAL

## 2021-05-07 DIAGNOSIS — R73.9 ELEVATED RANDOM BLOOD GLUCOSE LEVEL: ICD-10-CM

## 2021-05-07 DIAGNOSIS — N89.8 VAGINAL DISCHARGE: ICD-10-CM

## 2021-05-07 DIAGNOSIS — R81 GLUCOSURIA: ICD-10-CM

## 2021-05-07 DIAGNOSIS — R30.0 DYSURIA: ICD-10-CM

## 2021-05-07 DIAGNOSIS — R30.0 DYSURIA: Primary | ICD-10-CM

## 2021-05-07 LAB
ALBUMIN UR-MCNC: ABNORMAL MG/DL
APPEARANCE UR: CLEAR
BACTERIA #/AREA URNS HPF: ABNORMAL /HPF
BILIRUB UR QL STRIP: NEGATIVE
COLOR UR AUTO: YELLOW
GLUCOSE UR STRIP-MCNC: 100 MG/DL
HBA1C MFR BLD: 10.5 % (ref 0–5.6)
HGB UR QL STRIP: ABNORMAL
KETONES UR STRIP-MCNC: NEGATIVE MG/DL
LEUKOCYTE ESTERASE UR QL STRIP: NEGATIVE
MUCOUS THREADS #/AREA URNS LPF: PRESENT /LPF
NITRATE UR QL: NEGATIVE
NON-SQ EPI CELLS #/AREA URNS LPF: ABNORMAL /LPF
PH UR STRIP: 5 PH (ref 5–7)
RBC #/AREA URNS AUTO: ABNORMAL /HPF
SOURCE: ABNORMAL
SP GR UR STRIP: >1.03 (ref 1–1.03)
SPECIMEN SOURCE: NORMAL
UROBILINOGEN UR STRIP-ACNC: 0.2 EU/DL (ref 0.2–1)
WBC #/AREA URNS AUTO: ABNORMAL /HPF
WET PREP SPEC: NORMAL

## 2021-05-07 PROCEDURE — 99421 OL DIG E/M SVC 5-10 MIN: CPT | Performed by: PHYSICIAN ASSISTANT

## 2021-05-07 PROCEDURE — 81001 URINALYSIS AUTO W/SCOPE: CPT | Performed by: PHYSICIAN ASSISTANT

## 2021-05-07 PROCEDURE — 87210 SMEAR WET MOUNT SALINE/INK: CPT | Performed by: PHYSICIAN ASSISTANT

## 2021-05-07 PROCEDURE — 83036 HEMOGLOBIN GLYCOSYLATED A1C: CPT | Performed by: PHYSICIAN ASSISTANT

## 2021-05-07 PROCEDURE — 36415 COLL VENOUS BLD VENIPUNCTURE: CPT | Performed by: PHYSICIAN ASSISTANT

## 2021-05-07 NOTE — TELEPHONE ENCOUNTER
Provider E-Visit time total (minutes): 7  UA with small blood; no infection; 100+ glucose  Neg wet prep  Scheduled for NEW diabetes diagnosis

## 2021-05-10 DIAGNOSIS — I10 HYPERTENSION GOAL BP (BLOOD PRESSURE) < 140/90: ICD-10-CM

## 2021-05-10 DIAGNOSIS — I25.10 CORONARY ARTERY DISEASE INVOLVING NATIVE HEART WITHOUT ANGINA PECTORIS, UNSPECIFIED VESSEL OR LESION TYPE: ICD-10-CM

## 2021-05-11 ENCOUNTER — TELEPHONE (OUTPATIENT)
Dept: FAMILY MEDICINE | Facility: CLINIC | Age: 51
End: 2021-05-11

## 2021-05-11 NOTE — TELEPHONE ENCOUNTER
Patient Quality Outreach      Summary:    Patient has the following on her problem list/HM: None    Patient is due/failing the following:   Breast Cancer Screening - Mammogram    Type of outreach:    Sent [employer] Mobile message.    Questions for provider review:    None                                                                                                                                     Estrellita Ely CMA       Chart routed to Care Team.

## 2021-05-11 NOTE — LETTER
May 25, 2021      Marni Austin  [address] [facility] [address]        Dear Ms. Marni MANLEY Travmanasfahad,          We care about your health and have reviewed your health plan including your medical conditions, medications, and lab results.  Based on this review, it is recommended that you follow up regarding the following health topic(s):  -Breast Cancer Screening    We recommend you take the following action(s):  -schedule a MAMMOGRAM which is due. Please disregard this reminder if you have had this exam elsewhere within the last 1-2 years please let us know so we can update your records.     Please call us at the [facility] - [phone] (or use mSnap) to address the above recommendations.     Thank you for trusting [facility] and we appreciate the opportunity to serve you.  We look forward to supporting your healthcare needs in the future.    Healthy Regards,    Your Health Care Team  [facility]            Sincerely,     David Gambino PA-C

## 2021-05-12 RX ORDER — CHLORTHALIDONE 25 MG/1
TABLET ORAL
Qty: 45 TABLET | Refills: 1 | Status: SHIPPED | OUTPATIENT
Start: 2021-05-12 | End: 2021-10-26

## 2021-05-12 NOTE — TELEPHONE ENCOUNTER
Prescription approved per [facility] Refill Protocol.  Chlorthalidone  Deneen KNUTSON RN

## 2021-05-14 ENCOUNTER — HOSPITAL ENCOUNTER (OUTPATIENT)
Dept: MRI IMAGING | Facility: CLINIC | Age: 51
Discharge: HOME | End: 2021-05-14
Attending: FAMILY MEDICINE | Admitting: FAMILY MEDICINE
Payer: COMMERCIAL

## 2021-05-14 ENCOUNTER — OFFICE VISIT (OUTPATIENT)
Dept: FAMILY MEDICINE | Facility: CLINIC | Age: 51
End: 2021-05-14
Payer: COMMERCIAL

## 2021-05-14 VITALS
HEART RATE: 84 BPM | TEMPERATURE: 98.2 F | WEIGHT: 213 LBS | SYSTOLIC BLOOD PRESSURE: 116 MMHG | BODY MASS INDEX: 40.22 KG/M2 | HEIGHT: 61 IN | RESPIRATION RATE: 20 BRPM | DIASTOLIC BLOOD PRESSURE: 82 MMHG | OXYGEN SATURATION: 96 %

## 2021-05-14 DIAGNOSIS — E11.59 TYPE 2 DIABETES MELLITUS WITH OTHER CIRCULATORY COMPLICATION, WITHOUT LONG-TERM CURRENT USE OF INSULIN (H): Primary | ICD-10-CM

## 2021-05-14 DIAGNOSIS — R21 RASH AND NONSPECIFIC SKIN ERUPTION: ICD-10-CM

## 2021-05-14 DIAGNOSIS — F17.200 TOBACCO USE DISORDER: ICD-10-CM

## 2021-05-14 DIAGNOSIS — M25.561 RIGHT KNEE PAIN, UNSPECIFIED CHRONICITY: ICD-10-CM

## 2021-05-14 PROCEDURE — 73721 MRI JNT OF LWR EXTRE W/O DYE: CPT | Mod: LT

## 2021-05-14 PROCEDURE — 99214 OFFICE O/P EST MOD 30 MIN: CPT | Performed by: PHYSICIAN ASSISTANT

## 2021-05-14 PROCEDURE — 73721 MRI JNT OF LWR EXTRE W/O DYE: CPT | Mod: 26 | Performed by: RADIOLOGY

## 2021-05-14 RX ORDER — METFORMIN HCL 500 MG
500 TABLET, EXTENDED RELEASE 24 HR ORAL 2 TIMES DAILY WITH MEALS
Qty: 180 TABLET | Refills: 0 | Status: SHIPPED | OUTPATIENT
Start: 2021-05-14 | End: 2021-09-23

## 2021-05-14 RX ORDER — LANCETS
EACH MISCELLANEOUS
Qty: 100 EACH | Refills: 6 | Status: SHIPPED | OUTPATIENT
Start: 2021-05-14

## 2021-05-14 RX ORDER — VARENICLINE TARTRATE 1 MG/1
1 TABLET, FILM COATED ORAL 2 TIMES DAILY
Qty: 180 TABLET | Refills: 0 | Status: SHIPPED | OUTPATIENT
Start: 2021-05-14 | End: 2021-09-23

## 2021-05-14 RX ORDER — GLUCOSAMINE HCL/CHONDROITIN SU 500-400 MG
CAPSULE ORAL
Qty: 100 EACH | Refills: 3 | Status: SHIPPED | OUTPATIENT
Start: 2021-05-14

## 2021-05-14 RX ORDER — FLUCONAZOLE 150 MG/1
150 TABLET ORAL
Qty: 2 TABLET | Refills: 0 | Status: SHIPPED | OUTPATIENT
Start: 2021-05-14 | End: 2021-09-23

## 2021-05-14 ASSESSMENT — MIFFLIN-ST. JEOR: SCORE: 1508.54

## 2021-05-14 ASSESSMENT — PAIN SCALES - GENERAL: PAINLEVEL: NO PAIN (0)

## 2021-05-14 NOTE — PROGRESS NOTES
Assessment & Plan     Type 2 diabetes mellitus with other circulatory complication, without long-term current use of insulin (H)  New diagnosis. Patient less surprised. There is room for improvemet of diet and hopefully exercise if breathing can be improved. Want to move slow so as avoid overwhelming Von[place] though truly she does need that much additional medication initially. Plan to gently max out metformin and add other Rx as needed. With her cardiac hx consider adding sglt2 down the road. Work at smoking cessation. Start checking sugars and follow up with diabetes educator. Follow-up with me 3 months  - metFORMIN (GLUCOPHAGE-XR) 500 MG 24 hr tablet; Take 1 tablet (500 mg) by mouth 2 times daily (with meals)  - blood glucose monitoring (NO BRAND SPECIFIED) meter device kit; Use to test blood sugar 1 times daily or as directed. Preferred blood glucose meter OR supplies to accompany: Blood Glucose Monitor Brands: per insurance.  - blood glucose (NO BRAND SPECIFIED) test strip; Use to test blood sugar 1 times daily or as directed. To accompany: Blood Glucose Monitor Brands: per insurance.  - blood glucose calibration (NO BRAND SPECIFIED) solution; To accompany: Blood Glucose Monitor Brands: per insurance.  - thin (NO BRAND SPECIFIED) lancets; Use with lanceting device. To accompany: Blood Glucose Monitor Brands: per insurance.  - alcohol swab prep pads; Use to swab area of injection/erik as directed.  - AMBULATORY ADULT DIABETES EDUCATOR REFERRAL; Future      Rash and nonspecific skin eruption  Continue over the counter and add below  - fluconazole (DIFLUCAN) 150 MG tablet; Take 1 tablet (150 mg) by mouth every 72 hours    Tobacco use disorder  restart  - varenicline (CHANTIX HANDY) 0.5 MG X 11 & 1 MG X 42 tablet; Take 0.5 mg tab daily for 3 days, THEN 0.5 mg tab twice daily for 4 days, THEN 1 mg twice daily.  - varenicline (CHANTIX) 1 MG tablet; Take 1 tablet (1 mg) by mouth 2 times daily      Return in about 3  months (around 8/14/2021).    KASIA Villagomez [facility] VERNON Grider is a 51 year old who presents for the following health issues     HPI     Diabetes Follow-up      How often are you checking your blood sugar? Not at all    What concerns do you have today about your diabetes? Other: questions about new diagnosis      Do you have any of these symptoms? (Select all that apply)  Burning in feet    Recent diagnosis of diabetes - a1c >10  Since then she has significantly limited her sugary beverages  Carbs: plenty of pastas, breads and rice  Now in the morning: eating breadless mcdonalds sandwich  Lunch: sometimes leftovers  Dinner: sometimes home cooked; sometimes fast food; with shake or pop  Has had recent increase in urinary symptoms  Not as active with her breathing; motivated to start      Also mentions irritating rash over the external vagina  Itchy  Also itching and rash under the belly flap  Has tried OTC            BP Readings from Last 2 Encounters:   05/14/21 116/82   05/05/21 128/80     Hemoglobin A1C (%)   Date Value   05/07/2021 10.5 (H)   02/02/2016 6.2 (H)     LDL Cholesterol Calculated (mg/dL)   Date Value   04/22/2021 49   05/31/2019     Cannot estimate LDL when triglyceride exceeds 400 mg/dL     LDL Cholesterol Direct (mg/dL)   Date Value   05/31/2019 67       How many servings of fruits and vegetables do you eat daily?  0-1    On average, how many sweetened beverages do you drink each day (Examples: soda, juice, sweet tea, etc.  Do NOT count diet or artificially sweetened beverages)?   1    How many days per week do you exercise enough to make your heart beat faster? None    How many minutes a day do you exercise enough to make your heart beat faster? None  How many days per week do you miss taking your medication? 1    What makes it hard for you to take your medications?  remembering to take      Review of Systems   Constitutional, HEENT,  "cardiovascular, pulmonary, gi and gu systems are negative, except as otherwise noted.      Objective    /82 (BP Location: Right arm, Patient Position: Chair, Cuff Size: Adult Large)   Pulse 84   Temp 98.2  F (36.8  C) (Oral)   Resp 20   Ht 1.549 m (5' 1\")   Wt 96.6 kg (213 lb)   LMP 08/30/2013   SpO2 96%   BMI 40.25 kg/m    Body mass index is 40.25 kg/m .  Physical Exam   GENERAL: healthy, alert and no distress  RESP: lungs clear to auscultation - no rales, rhonchi or wheezes  CV: regular rates and rhythm  SKIN: ertyhema and satellite papular lesions at the belly panus  Diabetic foot exam: not perfomred today    Lab Results   Component Value Date    A1C 10.5 05/07/2021    A1C 6.2 02/02/2016    A1C 6.6 02/28/2015    A1C 5.4 07/03/2008    A1C 5.3 01/20/2007           "

## 2021-05-15 ENCOUNTER — HEALTH MAINTENANCE LETTER (OUTPATIENT)
Age: 51
End: 2021-05-15

## 2021-05-16 DIAGNOSIS — J45.40 MODERATE PERSISTENT ASTHMA WITHOUT COMPLICATION: ICD-10-CM

## 2021-05-16 NOTE — LETTER
May 18, 2021      Marni Austin  [address] [place] [id number]        Dear Ms. Ludwigjosh MANLEY Geovanna,    Please fill out the enclosed form and return back to the clinic. This will keep your medications up to date.    If you have any further questions regarding this form, please let us know.     Sincerely,     David Gambino PA-C

## 2021-05-17 ENCOUNTER — TELEPHONE (OUTPATIENT)
Dept: FAMILY MEDICINE | Facility: CLINIC | Age: 51
End: 2021-05-17

## 2021-05-17 DIAGNOSIS — I10 HYPERTENSION GOAL BP (BLOOD PRESSURE) < 140/90: ICD-10-CM

## 2021-05-17 RX ORDER — ALBUTEROL SULFATE 90 UG/1
AEROSOL, METERED RESPIRATORY (INHALATION)
Qty: 54 G | Refills: 0 | Status: SHIPPED | OUTPATIENT
Start: 2021-05-17 | End: 2024-08-15

## 2021-05-17 NOTE — TELEPHONE ENCOUNTER
Diabetes Education Scheduling Outreach #1:    Call to patient to schedule. Left message with phone number to call to schedule.    Plan for 2nd outreach attempt within 1 week.    Dora Carias  [facility]  Diabetes and Nutrition Scheduling

## 2021-05-17 NOTE — TELEPHONE ENCOUNTER
albuterol (VENTOLIN HFA) 108 (90 Base) MCG/ACT inhaler   Last Written Prescription Date:  3/24/20  Last Fill Quantity: 54 g,   # refills: 1  Last Office Visit: 5/14/21  Future Office visit:    Next 5 appointments (look out 90 days)    May 19, 2021  3:45 PM  Screening Mammogram with RVMA1  [facility] ([facility] ) [address] [place] [address] [id number] [phone]   May 24, 2021  3:00 PM  Return Visit with Albert Yeo, MD  [facility] [place] ([facility] [place] ) [address] [address] [address]  [phone]           Routing refill request to provider for review/approval because:  Asthma control assessment score within normal limits in last 6 months    Francisca Butts RN on 5/17/2021 at 4:03 PM

## 2021-05-17 NOTE — TELEPHONE ENCOUNTER
Reviewed chart in PCP's absence. Will fill. Please send ACT to complete.    Mary Grace Diamond PA-C

## 2021-05-17 NOTE — TELEPHONE ENCOUNTER
Call to [facility] to clarify spriva- not on med list or history for pt. Disregard.     Deneen KNUTSON RN

## 2021-05-19 ENCOUNTER — TELEPHONE (OUTPATIENT)
Dept: ORTHOPEDICS | Facility: CLINIC | Age: 51
End: 2021-05-19

## 2021-05-19 RX ORDER — LOSARTAN POTASSIUM 25 MG/1
25 TABLET ORAL DAILY
Qty: 90 TABLET | Refills: 0 | Status: SHIPPED | OUTPATIENT
Start: 2021-05-19 | End: 2021-07-26

## 2021-05-19 NOTE — TELEPHONE ENCOUNTER
Prescription approved per [facility] Refill Protocol.  Losartan    Deneen KNUTSON RN

## 2021-05-19 NOTE — TELEPHONE ENCOUNTER
Received faxed refill request from [facility] for Diclofen/Misopr 75-0.2mg tabs.     Phone call to patient and she states she did  the prescription for the above at [facility] for #60.   She states it was $70 and would be much less for a 3 month supply with mail order.   Patient informed that the medication is not meant to take long term and we usually have them fill at the local pharmacy due to this.  Asked that she discuss further at her follow up appointment next week to review MRI. Suggested they could discuss a less expensive medication if needed for future. She verbalized understanding.     ASHISH Snyder RN

## 2021-05-24 ENCOUNTER — OFFICE VISIT (OUTPATIENT)
Dept: ORTHOPEDICS | Facility: CLINIC | Age: 51
End: 2021-05-24
Payer: COMMERCIAL

## 2021-05-24 VITALS — HEIGHT: 61 IN | BODY MASS INDEX: 40.22 KG/M2 | WEIGHT: 213 LBS

## 2021-05-24 DIAGNOSIS — M72.2 PLANTAR FASCIAL FIBROMATOSIS: ICD-10-CM

## 2021-05-24 DIAGNOSIS — M17.11 PRIMARY OSTEOARTHRITIS OF RIGHT KNEE: ICD-10-CM

## 2021-05-24 DIAGNOSIS — M76.72 PERONEAL TENDINITIS OF LEFT LOWER EXTREMITY: ICD-10-CM

## 2021-05-24 DIAGNOSIS — J45.40 MODERATE PERSISTENT ASTHMA WITHOUT COMPLICATION: Primary | ICD-10-CM

## 2021-05-24 DIAGNOSIS — M84.372D STRESS FRACTURE OF LEFT ANKLE WITH ROUTINE HEALING, SUBSEQUENT ENCOUNTER: ICD-10-CM

## 2021-05-24 DIAGNOSIS — M25.561 RIGHT KNEE PAIN, UNSPECIFIED CHRONICITY: Primary | ICD-10-CM

## 2021-05-24 PROCEDURE — 20611 DRAIN/INJ JOINT/BURSA W/US: CPT | Mod: RT | Performed by: FAMILY MEDICINE

## 2021-05-24 PROCEDURE — 99214 OFFICE O/P EST MOD 30 MIN: CPT | Mod: 25 | Performed by: FAMILY MEDICINE

## 2021-05-24 RX ORDER — METHYLPREDNISOLONE ACETATE 40 MG/ML
40 INJECTION, SUSPENSION INTRA-ARTICULAR; INTRALESIONAL; INTRAMUSCULAR; SOFT TISSUE
Status: SHIPPED | OUTPATIENT
Start: 2021-05-24

## 2021-05-24 RX ORDER — ALBUTEROL SULFATE 90 UG/1
2 AEROSOL, METERED RESPIRATORY (INHALATION) EVERY 6 HOURS
Qty: 18 G | Refills: 2 | Status: SHIPPED | OUTPATIENT
Start: 2021-05-24 | End: 2021-11-11

## 2021-05-24 RX ADMIN — METHYLPREDNISOLONE ACETATE 40 MG: 40 INJECTION, SUSPENSION INTRA-ARTICULAR; INTRALESIONAL; INTRAMUSCULAR; SOFT TISSUE at 16:04

## 2021-05-24 ASSESSMENT — MIFFLIN-ST. JEOR: SCORE: 1508.54

## 2021-05-24 NOTE — LETTER
5/24/2021         RE: Marni Austin  [address]  [facility] [id number]        Dear Colleague,    Thank you for referring your patient, Marni Austin, to the [facility] [place]. Please see a copy of my visit note below.    ASSESSMENT & PLAN  Patient Instructions     1. Right knee pain, unspecified chronicity    2. Primary osteoarthritis of right knee    3. Stress fracture of left ankle with routine healing, subsequent encounter    4. Peroneal tendinitis of left lower extremity    5. Plantar fascial fibromatosis      -Patient is following up for right knee pain and swelling due to arthritis left ankle pain due to stress fracture, tendinitis and plantar fasciitis  -Patient tolerated right knee aspiration and cortisone injection today without complications.  Patient was given postprocedure instructions  -MRI of the left ankle was reviewed today which showed navicular stress fracture, peroneal tendinitis and plantar fascia partial tear and tendinitis.  Patient was offered to wear a cam walker boots that she is refusing at this time.  Patient will minimize weightbearing activity to control her pain at this time.  -Patient will follow up in 4 weeks for reevaluation and progression of activity.  To consider starting home exercise program versus formal physical therapy  -Call direct clinic number [[phone]] at any time with questions or concerns.    Albert Yeo MD [facility]  [facility]          -----    SUBJECTIVE:  Marni Austin is a 51 year old female who is seen in follow-up for right knee pain and swelling due to aggravation of arthritis.  Patient has left heel pain possibly due to a calcaneal stress fracture.They were last seen 5/19/2021. Had 2nd covid vaccine on 4/15/21.    Since their last visit reports 50% improvement. States right knee feels like it keeps giving out. States pain in the ankle is  "getting better as well, states doesn't hurt with palpation any more.  They indicate that their current pain level is 3/10. They have tried ice, ibuprofen, other medications: diclofenac-misoporstol and previous imaging (MRI 5/14/21).      The patient is seen by themselves.    Patient's past medical, surgical, social, and family histories were reviewed today and exceptions are as follows: was recently diagnoised with diabetes.    REVIEW OF SYSTEMS:  Constitutional: NEGATIVE for fever, chills, change in weight  Skin: NEGATIVE for worrisome rashes, moles or lesions  GI/: NEGATIVE for bowel or bladder changes  Neuro: NEGATIVE for weakness, dizziness or paresthesias    OBJECTIVE:  Ht 1.549 m (5' 1\")   Wt 96.6 kg (213 lb)   LMP 08/30/2013   BMI 40.25 kg/m     General: healthy, alert and in no distress  HEENT: no scleral icterus or conjunctival erythema  Skin: no suspicious lesions or rash. No jaundice.  CV: regular rhythm by palpation, no pedal edema  Resp: normal respiratory effort without conversational dyspnea   Psych: normal mood and affect  Gait: normal steady gait with appropriate coordination and balance  Neuro: normal light touch sensory exam of the extremities.    MSK:  RIGHT KNEE  Inspection:    normal alignment  Palpation:    Tender about the lateral patellar facet, medial patellar facet and medial joint line. Remainder of bony and ligamentous landmarks are nontender.    Mild effusion is present    Patellofemoral crepitus is Present  Range of Motion:     00 extension to 1100 flexion  Strength:    Quadriceps grossly intact    Extensor mechanism intact  Special Tests:    Positive: none    Negative: MCL/valgus stress (0 & 30 deg), LCL/varus stress (0 & 30 deg), Lachman's, anterior drawer, posterior drawer     LEFT FOOT  Inspection:    no swelling over the dorsal calcaneus  Palpation:    Tender about the medial cuneiform, navicular and calcaneus  Range of Motion:     Active toe flexion and extension  - afrom    " Active ankle range of motion - afrom    Passive ankle range of motion - FROM  Strength:    Intrinsic foot musculature strength - grossly intact    Ankle strength - grossly intact  Special Tests:    Positive: calcaneal squeeze        Independent visualization of the below image:  MR Ankle Left w/o Contrast [[id number]] Resulted: 05/14/21 1325   Order Status: Completed Updated: 05/14/21 1326   Narrative:     MR right ankle without  contrast 5/14/2021 9:26 AM     History: Ankle pain, stress fracture suspected, neg xray; Patient is   over 40 pack year smoker, concern for stress fx of calcaneus; Right   knee pain, unspecified chronicity     Techniques: Multiplanar multisequence imaging of the right ankle was   obtained without  administration of intra-articular or intravenous   contrast.     Comparison: Radiograph 4/22/2021     Findings:     External marker located posterior to the heel.     MEDIAL COMPARTMENT     Tendons: Medial flexor tendons are intact. Mild tenosynovial fluid   surrounding the tibialis posterior tendon distally, nonspecific.     Ligaments: Intact deltoid ligamentous complex with mild thickening and   increased signal of the anterior portions of the superficial and deep   layer, likely from remote injury. The spring ligamentous complex is   intact.     LATERAL COMPARTMENT     Tendons: Tendinosis of the peroneal longus and brevis tendons from the   retromalleolar groove to the peroneal tubercle. No tear or tendon   retraction.     Ligaments: Mild signal attenuation of the anterior talofibular and   calcaneofibular ligaments, likely from remote injury. No superficial   edema. Posterior talofibular ligament is intact. Syndesmotic   ligamentous complex is intact     POSTERIOR COMPARTMENT     Achilles tendon: Intact. Edema superficial to the mid to distal   Achilles tendon, query peritendinitis.     Plantar fascia: Plantar calcaneal enthesophyte with associated   fusiform thickening of the central cord  plantar aponeurosis with   superimposed moderate to high-grade intrasubstance tear at the   calcaneal attachment with reactive bone marrow edema and subtle   erosion/herniation pit (series 3 image 11-12). Overlying superficial   soft tissue edema is noted.     ANTERIOR COMPARTMENT     Tendons: Anterior extensor tendons are intact.     JOINTS     No substantial ankle effusion.     GENERAL FINDINGS     Bones: No fracture or abnormal marrow signal. No talar dome   osteochondral lesion.     Focal edema-like marrow signal intensity of the lateral aspect of the   navicular. Opposing subchondral edema and cystic change at the second   TMT joint, likely degenerative.     Focal erosion along the first metatarsal head medially as seen on    images series 2 image 24).     Hindfoot valgus with calcaneal angle measuring 28 degrees (series 8   image 21).     Muscles: Normal.     Tarsal tunnel: Normal.     Lisfranc intraosseous ligament is grossly intact     Sinus tarsi: Some effacement of fat in the sinus Tarsi. There are also   multiple lobular cystic structures originating from the sinus Tarsi   extending along the tarsal canal including 2.6 cm structure which   extends superiorly along the extensor retinaculum anterior to the   distal fibula. Additional smaller focus measuring up to 1.4 cm   adjacent the talonavicular joint dorsally. These are presumably sinus   Tarsi ganglion cysts. There are also tiny intraosseous cystic foci of   the talus and calcaneus centered at the sinus Tarsi, compatible with   intraosseous ganglion cyst formation.     ANCILLARY FINDINGS     1.1 x 0.5 cm lobulated cystic structure is noted with the   naviculocuneiform joints dorsally (series 8 image 8), likely   synovial/ganglion cyst.    Impression:     Impression:   1. Correlating to the marker, there is severe plantar fasciitis with   high-grade intrasubstance tear of the central cord plantar aponeurosis   at the calcaneal attachment with  reactive calcaneal edema and regional   soft tissue edema.   2. Focal erosions at the calcaneal plantar aponeurosis attachment   along with the first metatarsal head erosion (as seen on the    images). Given multifocal erosions, this raises question for gout.   Correlate clinically. If high suspicion for gout, could also obtained   dual-energy CT to assess for uric acid deposition.   3. Bone marrow edema of the navicular is nonspecific, potentially   representing bone contusion or stress reaction in the right clinical   setting. Correlate clinically.   4. Peroneal longus and brevis tendinosis.   5. MRI findings that can be seen in the early spectrum of the sinus   Tarsi syndrome/lateral-impingement syndrome including edema in the   sinus Tarsi and multilobulated cystic structures arising from the   sinus Tarsi. There is severe hindfoot valgus though additional osseous   changes seen in advanced sinus Tarsi syndrome/lateral-impingement   syndrome are absent.     I have personally reviewed the examination and initial interpretation   and I agree with the findings.     JUTTA ELLERMANN, MD     Large Joint Injection/Arthocentesis    Date/Time: 5/24/2021 4:04 PM  Performed by: Yeo, Albert, MD  Authorized by: Yeo, Albert, MD     Indications:  Pain and osteoarthritis  Needle Size:  25 G  Guidance: ultrasound    Approach:  Superolateral  Location:  Knee      Medications:  40 mg methylPREDNISolone 40 MG/ML  Aspirate amount (mL):  15  Aspirate:  Yellow  Outcome:  Tolerated well, no immediate complications  Procedure discussed: discussed risks, benefits, and alternatives    Consent Given by:  Patient  Timeout: timeout called immediately prior to procedure    Prep: patient was prepped and draped in usual sterile fashion            Patient's conditions were thoroughly discussed during today's visit with greater than 50% of the visit spent counseling the patient with total time spent face-to-face with the patient being 32  minutes.    Albert Yeo MD, [facility]            Again, thank you for allowing me to participate in the care of your patient.        Sincerely,        Albert Yeo, MD

## 2021-05-24 NOTE — PATIENT INSTRUCTIONS
1. Right knee pain, unspecified chronicity    2. Primary osteoarthritis of right knee    3. Stress fracture of left ankle with routine healing, subsequent encounter    4. Peroneal tendinitis of left lower extremity    5. Plantar fascial fibromatosis      -Patient is following up for right knee pain and swelling due to arthritis left ankle pain due to stress fracture, tendinitis and plantar fasciitis  -Patient tolerated right knee aspiration and cortisone injection today without complications.  Patient was given postprocedure instructions  -MRI of the left ankle was reviewed today which showed navicular stress fracture, peroneal tendinitis and plantar fascia partial tear and tendinitis.  Patient was offered to wear a cam walker boots that she is refusing at this time.  Patient will minimize weightbearing activity to control her pain at this time.  -Patient will follow up in 4 weeks for reevaluation and progression of activity.  To consider starting home exercise program versus formal physical therapy  -Call direct clinic number [[phone]] at any time with questions or concerns.    Albert Yeo MD [facility]  [facility] [place]

## 2021-05-24 NOTE — PROGRESS NOTES
ASSESSMENT & PLAN  Patient Instructions     1. Right knee pain, unspecified chronicity    2. Primary osteoarthritis of right knee    3. Stress fracture of left ankle with routine healing, subsequent encounter    4. Peroneal tendinitis of left lower extremity    5. Plantar fascial fibromatosis      -Patient is following up for right knee pain and swelling due to arthritis left ankle pain due to stress fracture, tendinitis and plantar fasciitis  -Patient tolerated right knee aspiration and cortisone injection today without complications.  Patient was given postprocedure instructions  -MRI of the left ankle was reviewed today which showed navicular stress fracture, peroneal tendinitis and plantar fascia partial tear and tendinitis.  Patient was offered to wear a cam walker boots that she is refusing at this time.  Patient will minimize weightbearing activity to control her pain at this time.  -Patient will follow up in 4 weeks for reevaluation and progression of activity.  To consider starting home exercise program versus formal physical therapy  -Call direct clinic number [[phone]] at any time with questions or concerns.    Albert Yeo MD [facility]  [facility] [place]          -----    SUBJECTIVE:  Marni Austin is a 51 year old female who is seen in follow-up for right knee pain and swelling due to aggravation of arthritis.  Patient has left heel pain possibly due to a calcaneal stress fracture.They were last seen 5/19/2021. Had 2nd covid vaccine on 4/15/21.    Since their last visit reports 50% improvement. States right knee feels like it keeps giving out. States pain in the ankle is getting better as well, states doesn't hurt with palpation any more.  They indicate that their current pain level is 3/10. They have tried ice, ibuprofen, other medications: diclofenac-misoporstol and previous imaging (MRI 5/14/21).      The patient is seen by  "themselves.    Patient's past medical, surgical, social, and family histories were reviewed today and exceptions are as follows: was recently diagnoised with diabetes.    REVIEW OF SYSTEMS:  Constitutional: NEGATIVE for fever, chills, change in weight  Skin: NEGATIVE for worrisome rashes, moles or lesions  GI/: NEGATIVE for bowel or bladder changes  Neuro: NEGATIVE for weakness, dizziness or paresthesias    OBJECTIVE:  Ht 1.549 m (5' 1\")   Wt 96.6 kg (213 lb)   LMP 08/30/2013   BMI 40.25 kg/m     General: healthy, alert and in no distress  HEENT: no scleral icterus or conjunctival erythema  Skin: no suspicious lesions or rash. No jaundice.  CV: regular rhythm by palpation, no pedal edema  Resp: normal respiratory effort without conversational dyspnea   Psych: normal mood and affect  Gait: normal steady gait with appropriate coordination and balance  Neuro: normal light touch sensory exam of the extremities.    MSK:  RIGHT KNEE  Inspection:    normal alignment  Palpation:    Tender about the lateral patellar facet, medial patellar facet and medial joint line. Remainder of bony and ligamentous landmarks are nontender.    Mild effusion is present    Patellofemoral crepitus is Present  Range of Motion:     00 extension to 1100 flexion  Strength:    Quadriceps grossly intact    Extensor mechanism intact  Special Tests:    Positive: none    Negative: MCL/valgus stress (0 & 30 deg), LCL/varus stress (0 & 30 deg), Lachman's, anterior drawer, posterior drawer     LEFT FOOT  Inspection:    no swelling over the dorsal calcaneus  Palpation:    Tender about the medial cuneiform, navicular and calcaneus  Range of Motion:     Active toe flexion and extension  - afrom    Active ankle range of motion - afrom    Passive ankle range of motion - FROM  Strength:    Intrinsic foot musculature strength - grossly intact    Ankle strength - grossly intact  Special Tests:    Positive: calcaneal squeeze        Independent visualization " of the below image:  MR Ankle Left w/o Contrast [[id number]] Resulted: 05/14/21 1325   Order Status: Completed Updated: 05/14/21 1326   Narrative:     MR right ankle without  contrast 5/14/2021 9:26 AM     History: Ankle pain, stress fracture suspected, neg xray; Patient is   over 40 pack year smoker, concern for stress fx of calcaneus; Right   knee pain, unspecified chronicity     Techniques: Multiplanar multisequence imaging of the right ankle was   obtained without  administration of intra-articular or intravenous   contrast.     Comparison: Radiograph 4/22/2021     Findings:     External marker located posterior to the heel.     MEDIAL COMPARTMENT     Tendons: Medial flexor tendons are intact. Mild tenosynovial fluid   surrounding the tibialis posterior tendon distally, nonspecific.     Ligaments: Intact deltoid ligamentous complex with mild thickening and   increased signal of the anterior portions of the superficial and deep   layer, likely from remote injury. The spring ligamentous complex is   intact.     LATERAL COMPARTMENT     Tendons: Tendinosis of the peroneal longus and brevis tendons from the   retromalleolar groove to the peroneal tubercle. No tear or tendon   retraction.     Ligaments: Mild signal attenuation of the anterior talofibular and   calcaneofibular ligaments, likely from remote injury. No superficial   edema. Posterior talofibular ligament is intact. Syndesmotic   ligamentous complex is intact     POSTERIOR COMPARTMENT     Achilles tendon: Intact. Edema superficial to the mid to distal   Achilles tendon, query peritendinitis.     Plantar fascia: Plantar calcaneal enthesophyte with associated   fusiform thickening of the central cord plantar aponeurosis with   superimposed moderate to high-grade intrasubstance tear at the   calcaneal attachment with reactive bone marrow edema and subtle   erosion/herniation pit (series 3 image 11-12). Overlying superficial   soft tissue edema is noted.      ANTERIOR COMPARTMENT     Tendons: Anterior extensor tendons are intact.     JOINTS     No substantial ankle effusion.     GENERAL FINDINGS     Bones: No fracture or abnormal marrow signal. No talar dome   osteochondral lesion.     Focal edema-like marrow signal intensity of the lateral aspect of the   navicular. Opposing subchondral edema and cystic change at the second   TMT joint, likely degenerative.     Focal erosion along the first metatarsal head medially as seen on    images series 2 image 24).     Hindfoot valgus with calcaneal angle measuring 28 degrees (series 8   image 21).     Muscles: Normal.     Tarsal tunnel: Normal.     Lisfranc intraosseous ligament is grossly intact     Sinus tarsi: Some effacement of fat in the sinus Tarsi. There are also   multiple lobular cystic structures originating from the sinus Tarsi   extending along the tarsal canal including 2.6 cm structure which   extends superiorly along the extensor retinaculum anterior to the   distal fibula. Additional smaller focus measuring up to 1.4 cm   adjacent the talonavicular joint dorsally. These are presumably sinus   Tarsi ganglion cysts. There are also tiny intraosseous cystic foci of   the talus and calcaneus centered at the sinus Tarsi, compatible with   intraosseous ganglion cyst formation.     ANCILLARY FINDINGS     1.1 x 0.5 cm lobulated cystic structure is noted with the   naviculocuneiform joints dorsally (series 8 image 8), likely   synovial/ganglion cyst.    Impression:     Impression:   1. Correlating to the marker, there is severe plantar fasciitis with   high-grade intrasubstance tear of the central cord plantar aponeurosis   at the calcaneal attachment with reactive calcaneal edema and regional   soft tissue edema.   2. Focal erosions at the calcaneal plantar aponeurosis attachment   along with the first metatarsal head erosion (as seen on the    images). Given multifocal erosions, this raises question for  gout.   Correlate clinically. If high suspicion for gout, could also obtained   dual-energy CT to assess for uric acid deposition.   3. Bone marrow edema of the navicular is nonspecific, potentially   representing bone contusion or stress reaction in the right clinical   setting. Correlate clinically.   4. Peroneal longus and brevis tendinosis.   5. MRI findings that can be seen in the early spectrum of the sinus   Tarsi syndrome/lateral-impingement syndrome including edema in the   sinus Tarsi and multilobulated cystic structures arising from the   sinus Tarsi. There is severe hindfoot valgus though additional osseous   changes seen in advanced sinus Tarsi syndrome/lateral-impingement   syndrome are absent.     I have personally reviewed the examination and initial interpretation   and I agree with the findings.     JUTTA ELLERMANN, MD     Large Joint Injection/Arthocentesis    Date/Time: 5/24/2021 4:04 PM  Performed by: Yeo, Albert, MD  Authorized by: Yeo, Albert, MD     Indications:  Pain and osteoarthritis  Needle Size:  25 G  Guidance: ultrasound    Approach:  Superolateral  Location:  Knee      Medications:  40 mg methylPREDNISolone 40 MG/ML  Aspirate amount (mL):  15  Aspirate:  Yellow  Outcome:  Tolerated well, no immediate complications  Procedure discussed: discussed risks, benefits, and alternatives    Consent Given by:  Patient  Timeout: timeout called immediately prior to procedure    Prep: patient was prepped and draped in usual sterile fashion            Patient's conditions were thoroughly discussed during today's visit with greater than 50% of the visit spent counseling the patient with total time spent face-to-face with the patient being 32 minutes.    Albert Yeo MD, [facility]

## 2021-05-25 NOTE — TELEPHONE ENCOUNTER
Patient Quality Outreach 2nd Attempt      Summary:    Type of outreach:    Sent letter.    Next Steps:  Reach out within 90 days via Letter.    Max number of attempts reached: Yes. Will try again in 90 days if patient still on fail list.    Questions for provider review:    None                                                                                                                    Donna RUTLEDGE    [facility]       Chart routed to Care Team.

## 2021-05-28 PROCEDURE — 77067 SCR MAMMO BI INCL CAD: CPT | Mod: TC | Performed by: RADIOLOGY

## 2021-06-17 ENCOUNTER — ALLIED HEALTH/NURSE VISIT (OUTPATIENT)
Dept: EDUCATION SERVICES | Facility: CLINIC | Age: 51
End: 2021-06-17
Attending: PHYSICIAN ASSISTANT
Payer: COMMERCIAL

## 2021-06-17 DIAGNOSIS — E11.59 TYPE 2 DIABETES MELLITUS WITH OTHER CIRCULATORY COMPLICATION, WITHOUT LONG-TERM CURRENT USE OF INSULIN (H): ICD-10-CM

## 2021-06-17 PROCEDURE — G0108 DIAB MANAGE TRN  PER INDIV: HCPCS | Performed by: DIETITIAN, REGISTERED

## 2021-06-17 NOTE — PROGRESS NOTES
"Diabetes Self-Management Education & Support    Presents for: Initial Assessment for new diagnosis    SUBJECTIVE/OBJECTIVE:  Presents for: Initial Assessment for new diagnosis  Accompanied by: Self  Diabetes education in the past 24mo: (P) No  Focus of Visit: Patient Unsure  Diabetes type: (P) Type 2  Date of diagnosis: May 2021  Disease course: (P) Improving  How confident are you filling out medical forms by yourself:: Not Assessed  Transportation concerns: No  Cultural Influences/Ethnic Background:  Not  or     Diabetes Symptoms & Complications:  Fatigue: (P) Yes  Polydipsia: (P) Yes  Polyphagia: (P) Yes  Polyuria: (P) Yes  Weight trend: Decreasing  Complications assessed today?: No    Patient Problem List and Family Medical History reviewed for relevant medical history, current medical status, and diabetes risk factors.    Vitals:  [facility] 08/30/2013   Estimated body mass index is 40.25 kg/m  as calculated from the following:    Height as of 5/24/21: 1.549 m (5' 1\").    Weight as of 5/24/21: 96.6 kg (213 lb).   Last 3 BP:   BP Readings from Last 3 Encounters:   05/14/21 116/82   05/05/21 128/80   04/22/21 138/86       History   Smoking Status     Current Some Day Smoker     Packs/day: 0.10     Years: 26.00     Start date: 1/1/1979     Last attempt to quit: 7/16/2018   Smokeless Tobacco     Never Used     Comment: quit July 2018, started again March 2020 - she is down to 1 pack per month       Labs:  Lab Results   Component Value Date    A1C 10.5 05/07/2021     Lab Results   Component Value Date     04/22/2021     Lab Results   Component Value Date    LDL 49 04/22/2021     HDL Cholesterol   Date Value Ref Range Status   04/22/2021 26 (L) >49 mg/dL Final   ]  GFR Estimate   Date Value Ref Range Status   04/22/2021 83 >60 mL/min/[1.73_m2] Final     Comment:     Non  GFR Calc  Starting 12/18/2018, serum creatinine based estimated GFR (eGFR) will be   calculated using the Chronic " "Kidney Disease Epidemiology Collaboration   (CKD-EPI) equation.       GFR Estimate If Black   Date Value Ref Range Status   04/22/2021 >90 >60 mL/min/[1.73_m2] Final     Comment:      GFR Calc  Starting 12/18/2018, serum creatinine based estimated GFR (eGFR) will be   calculated using the Chronic Kidney Disease Epidemiology Collaboration   (CKD-EPI) equation.       Lab Results   Component Value Date    CR 0.81 04/22/2021     No results found for: MICROALBUMIN    Healthy Eating:  Healthy Eating Assessed Today: Yes  Cultural/Zoroastrian diet restrictions?: (P) No  Meal planning/habits: (P) Avoiding sweets, Low salt, Frequent snacking  How many times a week on average do you eat food made away from home (restaurant/take-out)?: (P) 4  Meals include: (P) Breakfast, Dinner, Morning Snack, Afternoon Snack, Evening Snack  Breakfast: Dickerson's sausage, egg muffin (takes off bread)- \"needs to take something with her pills\"  Lunch: maybe leftovers  Dinner: [employer] or pasta soup or BLT or pork chop/ steak, green beans or corn OR subway sandwich or spaghettios  Snacks: skinny pop popcorn, chex mix or gardetto's or icecream freezie  Other: craves salt, popsicles - trying to avoid soda  Beverages: (P) Water, Soda, Coffee drinks  Has patient met with a dietitian in the past?: No    Being Active:  Being Active Assessed Today: Yes  Exercise:: Currently not exercising  Barrier to exercise: (P) Physical limitation    Monitoring:  Monitoring Assessed Today: Yes  Blood Glucose Meter: (P) One Touch  Times checking blood sugar at home (number): (P) 2  Times checking blood sugar at home (per): (P) Day  Blood glucose trend: (P) Decreasing            Taking Medications:  Diabetes Medication(s)     Biguanides       metFORMIN (GLUCOPHAGE-XR) 500 MG 24 hr tablet    Take 1 tablet (500 mg) by mouth 2 times daily (with meals)          Taking Medication Assessed Today: Yes  Current Treatments: (P) Oral Medication (taken by " mouth)  Problems taking diabetes medications regularly?: Yes  Diabetes medication side effects?: Yes    Problem Solving:  Problem Solving Assessed Today: No  Is the patient at risk for hypoglycemia?: No    Reducing Risks:  Reducing Risks Assessed Today: Yes  Diabetes Risks: Age over 45 years, Family History, Sedentary Lifestyle  CAD Risks: (P) Obesity, Sedentary lifestyle, Stress, Tobacco exposure  Has dilated eye exam at least once a year?: (P) No  Sees dentist every 6 months?: (P) No  Feet checked by healthcare provider in the last year?: (P) Yes    Healthy Coping:  Healthy Coping Assessed Today: Yes  Emotional response to diabetes: Uncertain  Informal Support system:: (P) None  Stage of change: PREPARATION (Decided to change - considering how)  Patient Activation Measure Survey Score:  No flowsheet data found.    Diabetes knowledge and skills assessment:   Patient is knowledgeable in diabetes management concepts related to: Monitoring    Patient needs further education on the following diabetes management concepts: Healthy Eating, Being Active, Monitoring, Taking Medication, Problem Solving, Reducing Risks and Healthy Coping    Based on learning assessment above, most appropriate setting for further diabetes education would be: Group class or Individual setting.      INTERVENTIONS:    Education provided today on:  AADE Self-Care Behaviors:  Diabetes Pathophysiology  Healthy Eating: carbohydrate counting, weight reduction, heart healthy diet, portion control, plate planning method and label reading  Being Active: relationship to blood glucose  Monitoring: individual blood glucose targets  Taking Medication: side effects of prescribed medications and when to take medications  Problem Solving: high blood glucose - causes, signs/symptoms, treatment and prevention and when to call health care provider  Reducing Risks: A1C - goals, relating to blood glucose levels, how often to check  Healthy Coping: recognize feelings  "about diagnosis and benefits of making appropriate lifestyle changes    Opportunities for ongoing education and support in diabetes-self management were discussed.    Pt verbalized understanding of concepts discussed and recommendations provided today.       Education Materials Provided:  [facility] Understanding Diabetes Booklet      ASSESSMENT:  Glucose values above target, but improving. Pt however, having significant side effects from Metformin (loose stools), had \"an accident\" at work, sometimes skips Metformin for a couple of days. We discussed alternative medications, or potentially starting with 1 tab Metfromin with evening meal (take every day), make sure taking with food in stomach, if doing well after 1 week then increase back up to 1 tab bid. Pt prefers this option vs trying something else, but will let us know if still having significant side effects. Pt also reports \"bladder issues and fatigue\" likely due to high blood sugar. Pt is motivated, but also overwhelmed, also having personal issues (separation, etc) as well.  Provided information on healthy choices when eating away from home, pt states \"I don't eat much, not really any sweets\", however does include salty snacks- popcorn, pretzels, Gardetto's- discussed carb awareness and how all carbs affect glucose level. Pt receptive to information discussed.   Pt previously quit smoking and was frustrated with significant weight gain, pt unfortunately started smoking again and wt is now trending down. Offered continued support and follow up.     Patient's most recent   Lab Results   Component Value Date    A1C 10.5 05/07/2021    is not meeting goal of <7.0    PLAN  See Patient Instructions for co-developed, patient-stated behavior change goals.  AVS printed and provided to patient today. See Follow-Up section for recommended follow-up.    Maddy Martinez RD, [facility]  Diabetes     Time Spent: 50 minutes  Encounter Type: " Individual    Any diabetes medication dose changes were made via the CDE Protocol and Collaborative Practice Agreement with the patient's primary care provider. A copy of this encounter was shared with the provider.

## 2021-06-17 NOTE — LETTER
"    6/17/2021         RE: Marni Austin  [id number] [facility] [address]        Dear Colleague,    Thank you for referring your patient, Marni Austin, to the [facility]. Please see a copy of my visit note below.    Diabetes Self-Management Education & Support    Presents for: Initial Assessment for new diagnosis    SUBJECTIVE/OBJECTIVE:  Presents for: Initial Assessment for new diagnosis  Accompanied by: Self  Diabetes education in the past 24mo: (P) No  Focus of Visit: Patient Unsure  Diabetes type: (P) Type 2  Date of diagnosis: May 2021  Disease course: (P) Improving  How confident are you filling out medical forms by yourself:: Not Assessed  Transportation concerns: No  Cultural Influences/Ethnic Background:  Not  or     Diabetes Symptoms & Complications:  Fatigue: (P) Yes  Polydipsia: (P) Yes  Polyphagia: (P) Yes  Polyuria: (P) Yes  Weight trend: Decreasing  Complications assessed today?: No    Patient Problem List and Family Medical History reviewed for relevant medical history, current medical status, and diabetes risk factors.    Vitals:  LMP 08/30/2013   Estimated body mass index is 40.25 kg/m  as calculated from the following:    Height as of 5/24/21: 1.549 m (5' 1\").    Weight as of 5/24/21: 96.6 kg (213 lb).   Last 3 BP:   BP Readings from Last 3 Encounters:   05/14/21 116/82   05/05/21 128/80   04/22/21 138/86       History   Smoking Status     Current Some Day Smoker     Packs/day: 0.10     Years: 26.00     Start date: 1/1/1979     Last attempt to quit: 7/16/2018   Smokeless Tobacco     Never Used     Comment: quit July 2018, started again March 2020 - she is down to 1 pack per month       Labs:  Lab Results   Component Value Date    A1C 10.5 05/07/2021     Lab Results   Component Value Date     04/22/2021     Lab Results   Component Value Date    LDL 49 04/22/2021     HDL Cholesterol   Date Value Ref Range Status   04/22/2021 26 (L) >49 " "mg/dL Final   ]  GFR Estimate   Date Value Ref Range Status   04/22/2021 83 >60 mL/min/[1.73_m2] Final     Comment:     Non  GFR Calc  Starting 12/18/2018, serum creatinine based estimated GFR (eGFR) will be   calculated using the Chronic Kidney Disease Epidemiology Collaboration   (CKD-EPI) equation.       GFR Estimate If Black   Date Value Ref Range Status   04/22/2021 >90 >60 mL/min/[1.73_m2] Final     Comment:      GFR Calc  Starting 12/18/2018, serum creatinine based estimated GFR (eGFR) will be   calculated using the Chronic Kidney Disease Epidemiology Collaboration   (CKD-EPI) equation.       Lab Results   Component Value Date    CR 0.81 04/22/2021     No results found for: MICROALBUMIN    Healthy Eating:  Healthy Eating Assessed Today: Yes  Cultural/Baptist diet restrictions?: (P) No  Meal planning/habits: (P) Avoiding sweets, Low salt, Frequent snacking  How many times a week on average do you eat food made away from home (restaurant/take-out)?: (P) 4  Meals include: (P) Breakfast, Dinner, Morning Snack, Afternoon Snack, Evening Snack  Breakfast: Dickerson's sausage, egg muffin (takes off bread)- \"needs to take something with her pills\"  Lunch: maybe leftovers  Dinner: [employer] or pasta soup or BLT or pork chop/ steak, green beans or corn OR subway sandwich or spaghettios  Snacks: skinny pop popcorn, chex mix or gardetto's or icecream freezie  Other: craves salt, popsicles - trying to avoid soda  Beverages: (P) Water, Soda, Coffee drinks  Has patient met with a dietitian in the past?: No    Being Active:  Being Active Assessed Today: Yes  Exercise:: Currently not exercising  Barrier to exercise: (P) Physical limitation    Monitoring:  Monitoring Assessed Today: Yes  Blood Glucose Meter: (P) One Touch  Times checking blood sugar at home (number): (P) 2  Times checking blood sugar at home (per): (P) Day  Blood glucose trend: (P) Decreasing            Taking Medications:  Diabetes " Medication(s)     Biguanides       metFORMIN (GLUCOPHAGE-XR) 500 MG 24 hr tablet    Take 1 tablet (500 mg) by mouth 2 times daily (with meals)          Taking Medication Assessed Today: Yes  Current Treatments: (P) Oral Medication (taken by mouth)  Problems taking diabetes medications regularly?: Yes  Diabetes medication side effects?: Yes    Problem Solving:  Problem Solving Assessed Today: No  Is the patient at risk for hypoglycemia?: No    Reducing Risks:  Reducing Risks Assessed Today: Yes  Diabetes Risks: Age over 45 years, Family History, Sedentary Lifestyle  CAD Risks: (P) Obesity, Sedentary lifestyle, Stress, Tobacco exposure  Has dilated eye exam at least once a year?: (P) No  Sees dentist every 6 months?: (P) No  Feet checked by healthcare provider in the last year?: (P) Yes    Healthy Coping:  Healthy Coping Assessed Today: Yes  Emotional response to diabetes: Uncertain  Informal Support system:: (P) None  Stage of change: PREPARATION (Decided to change - considering how)  Patient Activation Measure Survey Score:  No flowsheet data found.    Diabetes knowledge and skills assessment:   Patient is knowledgeable in diabetes management concepts related to: Monitoring    Patient needs further education on the following diabetes management concepts: Healthy Eating, Being Active, Monitoring, Taking Medication, Problem Solving, Reducing Risks and Healthy Coping    Based on learning assessment above, most appropriate setting for further diabetes education would be: Group class or Individual setting.      INTERVENTIONS:    Education provided today on:  AADE Self-Care Behaviors:  Diabetes Pathophysiology  Healthy Eating: carbohydrate counting, weight reduction, heart healthy diet, portion control, plate planning method and label reading  Being Active: relationship to blood glucose  Monitoring: individual blood glucose targets  Taking Medication: side effects of prescribed medications and when to take  "medications  Problem Solving: high blood glucose - causes, signs/symptoms, treatment and prevention and when to call health care provider  Reducing Risks: A1C - goals, relating to blood glucose levels, how often to check  Healthy Coping: recognize feelings about diagnosis and benefits of making appropriate lifestyle changes    Opportunities for ongoing education and support in diabetes-self management were discussed.    Pt verbalized understanding of concepts discussed and recommendations provided today.       Education Materials Provided:  [facility] Understanding Diabetes Booklet      ASSESSMENT:  Glucose values above target, but improving. Pt however, having significant side effects from Metformin (loose stools), had \"an accident\" at work, sometimes skips Metformin for a couple of days. We discussed alternative medications, or potentially starting with 1 tab Metfromin with evening meal (take every day), make sure taking with food in stomach, if doing well after 1 week then increase back up to 1 tab bid. Pt prefers this option vs trying something else, but will let us know if still having significant side effects. Pt also reports \"bladder issues and fatigue\" likely due to high blood sugar. Pt is motivated, but also overwhelmed, also having personal issues (separation, etc) as well.  Provided information on healthy choices when eating away from home, pt states \"I don't eat much, not really any sweets\", however does include salty snacks- popcorn, pretzels, Gardetto's- discussed carb awareness and how all carbs affect glucose level. Pt receptive to information discussed.   Pt previously quit smoking and was frustrated with significant weight gain, pt unfortunately started smoking again and wt is now trending down. Offered continued support and follow up.     Patient's most recent   Lab Results   Component Value Date    A1C 10.5 05/07/2021    is not meeting goal of <7.0    PLAN  See Patient Instructions for " co-developed, patient-stated behavior change goals.  AVS printed and provided to patient today. See Follow-Up section for recommended follow-up.    Maddy Martinez RD, [facility]  Diabetes     Time Spent: 50 minutes  Encounter Type: Individual    Any diabetes medication dose changes were made via the CDE Protocol and Collaborative Practice Agreement with the patient's primary care provider. A copy of this encounter was shared with the provider.

## 2021-06-18 NOTE — PATIENT INSTRUCTIONS
Reduce the Metformin to 1 pill per day (with dinner) for 1 week. If side effects are manageable, go back up to 2 tabs daily. Let us know how you are doing. We can try other medications, if necessary.     Meal Plan Recommendation: use portion control, use plate planning method, choose heart healthy foods and limit high carb snacks, aim for no more than 3 carbohydrate choices (45 gms) per meal, and 1 choices (15 gms) per snack.    Check blood sugars: daily  (A1c target: less than 7%, Blood sugar target before meals:  mg/dl, 2 hrs after the start of a meal: less than 180 mg/dl)    Exercise / activity plan:  Increase as able    Diabetes Support Resources:  Websites: American Diabetes Association: www.diabetes.org    Follow up:  MyChart update in 2 weeks.  Please send message with any questions, concerns or if follow-up is needed.    Maddy Martinez RD, LD, [facility]  Diabetes     [facility] Diabetes Education and Nutrition Services for the [facility]:    For Your Diabetes or Nutrition Education Appointments   Call: [phone]   For Diabetes or Nutrition Related Questions   Call: [phone]

## 2021-06-21 ENCOUNTER — OFFICE VISIT (OUTPATIENT)
Dept: ORTHOPEDICS | Facility: CLINIC | Age: 51
End: 2021-06-21
Payer: COMMERCIAL

## 2021-06-21 VITALS
SYSTOLIC BLOOD PRESSURE: 120 MMHG | DIASTOLIC BLOOD PRESSURE: 72 MMHG | HEIGHT: 61 IN | WEIGHT: 213 LBS | BODY MASS INDEX: 40.22 KG/M2

## 2021-06-21 DIAGNOSIS — M84.372D STRESS FRACTURE OF LEFT ANKLE WITH ROUTINE HEALING, SUBSEQUENT ENCOUNTER: ICD-10-CM

## 2021-06-21 DIAGNOSIS — M72.2 PLANTAR FASCIAL FIBROMATOSIS: ICD-10-CM

## 2021-06-21 DIAGNOSIS — M76.72 PERONEAL TENDINITIS OF LEFT LOWER EXTREMITY: ICD-10-CM

## 2021-06-21 DIAGNOSIS — M17.11 PRIMARY OSTEOARTHRITIS OF RIGHT KNEE: Primary | ICD-10-CM

## 2021-06-21 PROCEDURE — 99214 OFFICE O/P EST MOD 30 MIN: CPT | Performed by: FAMILY MEDICINE

## 2021-06-21 ASSESSMENT — MIFFLIN-ST. JEOR: SCORE: 1508.54

## 2021-06-21 NOTE — PROGRESS NOTES
ASSESSMENT & PLAN  Patient Instructions     1. Primary osteoarthritis of right knee    2. Stress fracture of left ankle with routine healing, subsequent encounter    3. Peroneal tendinitis of left lower extremity    4. Plantar fascial fibromatosis      -Patient is following up for right knee pain due to arthritis and likely meniscus/cartilage tears.  Patient also has left ankle pain due to stress fracture, tendinitis and plantar fasciitis  -Patient reports right knee pain is persistent and is causing her to limp.  Patient is unable to bend, walk, go up and down stairs without recurrence of her pain.  Patient had a cortisone injection last visit which gave her just a few days of relief  -Patient was given treatment options of starting formal physical therapy versus further imaging.  Patient wishes to get an MRI before starting therapy  -Patient will get an MRI of the right knee for further evaluation of intra-articular injury such as meniscus and/or cartilage tears versus possible stress fracture, insufficiency fracture.    -Your MRI has been ordered. You may call [phone] to schedule over the phone. Once you know the date of your MRI, please call my office and schedule a follow-up phone visit for 2 days after that.  -Call direct clinic number [[phone]] at any time with questions or concerns.    Albert Yeo MD [facility] Orthopedics and Sports Medicine  [facility]          -----    SUBJECTIVE:  Marni Austin is a 51 year old female who is seen in follow-up for right knee pain and swelling due to arthritis left ankle pain due to stress fracture, tendinitis and plantar fasciitis.They were last seen 5/24/2021 and had a right knee cortisone injection.     Since their last visit reports 0% - (About the same as last time). States as long as she is off her heel she is good. But when up and walking can get a sharp pain in the heel.  They indicate that their current pain level is 0/10 but  "states when pain comes it can be anywhere between a 2 and 7/10. They have tried rest/activity avoidance, elevation, ibuprofen and corticosteroid injection (most recent date: 05/24/21) that provided  3-4 day(s) of relief. States right now the knee is doing ok, but will have pain with twisting motions     The patient is seen by themselves.    Patient's past medical, surgical, social, and family histories were reviewed today and no changes are noted.    REVIEW OF SYSTEMS:  Constitutional: NEGATIVE for fever, chills, change in weight  Skin: NEGATIVE for worrisome rashes, moles or lesions  GI/: NEGATIVE for bowel or bladder changes  Neuro: NEGATIVE for weakness, dizziness or paresthesias    OBJECTIVE:  /72   Ht 1.549 m (5' 1\")   Wt 96.6 kg (213 lb)   LMP 08/30/2013   BMI 40.25 kg/m     General: healthy, alert and in no distress  HEENT: no scleral icterus or conjunctival erythema  Skin: no suspicious lesions or rash. No jaundice.  CV: regular rhythm by palpation, no pedal edema  Resp: normal respiratory effort without conversational dyspnea   Psych: normal mood and affect  Gait: normal steady gait with appropriate coordination and balance  Neuro: normal light touch sensory exam of the extremities.    MSK:  RIGHT KNEE  Inspection:    normal alignment  Palpation:    Tender about the lateral patellar facet, medial patellar facet and medial joint line. Remainder of bony and ligamentous landmarks are nontender.    Mild effusion is present    Patellofemoral crepitus is Present  Range of Motion:     00 extension to 1100 flexion  Strength:    Quadriceps grossly intact    Extensor mechanism intact  Special Tests:    Positive: none    Negative: MCL/valgus stress (0 & 30 deg), LCL/varus stress (0 & 30 deg), Lachman's, anterior drawer, posterior drawer     LEFT FOOT  Inspection:    no swelling over the dorsal calcaneus  Palpation:    Tender about the medial cuneiform, navicular and calcaneus  Range of Motion:     Active " toe flexion and extension  - afrom    Active ankle range of motion - afrom    Passive ankle range of motion - FROM  Strength:    Intrinsic foot musculature strength - grossly intact    Ankle strength - grossly intact  Special Tests:    Positive: calcaneal squeeze      Albert Yeo MD, [facility]  [facility]

## 2021-06-21 NOTE — PATIENT INSTRUCTIONS
1. Primary osteoarthritis of right knee    2. Stress fracture of left ankle with routine healing, subsequent encounter    3. Peroneal tendinitis of left lower extremity    4. Plantar fascial fibromatosis      -Patient is following up for right knee pain due to arthritis and likely meniscus/cartilage tears.  Patient also has left ankle pain due to stress fracture, tendinitis and plantar fasciitis  -Patient reports right knee pain is persistent and is causing her to limp.  Patient is unable to bend, walk, go up and down stairs without recurrence of her pain.  Patient had a cortisone injection last visit which gave her just a few days of relief  -Patient was given treatment options of starting formal physical therapy versus further imaging.  Patient wishes to get an MRI before starting therapy  -Patient will get an MRI of the right knee for further evaluation of intra-articular injury such as meniscus and/or cartilage tears versus possible stress fracture, insufficiency fracture.    -Your MRI has been ordered. You may call [phone] to schedule over the phone. Once you know the date of your MRI, please call my office and schedule a follow-up phone visit for 2 days after that.  -Call direct clinic number [[phone]] at any time with questions or concerns.    Albert Yeo MD [facility] Orthopedics and Sports Medicine  [facility] [place]

## 2021-06-21 NOTE — LETTER
6/21/2021         RE: Marni Austin  [address]  [place] [address]        Dear Colleague,    Thank you for referring your patient, Marni Austin, to the [facility] [place]. Please see a copy of my visit note below.    ASSESSMENT & PLAN  Patient Instructions     1. Primary osteoarthritis of right knee    2. Stress fracture of left ankle with routine healing, subsequent encounter    3. Peroneal tendinitis of left lower extremity    4. Plantar fascial fibromatosis      -Patient is following up for right knee pain due to arthritis and likely meniscus/cartilage tears.  Patient also has left ankle pain due to stress fracture, tendinitis and plantar fasciitis  -Patient reports right knee pain is persistent and is causing her to limp.  Patient is unable to bend, walk, go up and down stairs without recurrence of her pain.  Patient had a cortisone injection last visit which gave her just a few days of relief  -Patient was given treatment options of starting formal physical therapy versus further imaging.  Patient wishes to get an MRI before starting therapy  -Patient will get an MRI of the right knee for further evaluation of intra-articular injury such as meniscus and/or cartilage tears versus possible stress fracture, insufficiency fracture.    -Your MRI has been ordered. You may call [phone] to schedule over the phone. Once you know the date of your MRI, please call my office and schedule a follow-up phone visit for 2 days after that.  -Call direct clinic number [[phone]] at any time with questions or concerns.    Albert Yeo MD [facility]  [facility]          -----    SUBJECTIVE:  Marni Austin is a 51 year old female who is seen in follow-up for right knee pain and swelling due to arthritis left ankle pain due to stress fracture, tendinitis and plantar fasciitis.They were last seen 5/24/2021 and  "had a right knee cortisone injection.     Since their last visit reports 0% - (About the same as last time). States as long as she is off her heel she is good. But when up and walking can get a sharp pain in the heel.  They indicate that their current pain level is 0/10 but states when pain comes it can be anywhere between a 2 and 7/10. They have tried rest/activity avoidance, elevation, ibuprofen and corticosteroid injection (most recent date: 05/24/21) that provided  3-4 day(s) of relief. States right now the knee is doing ok, but will have pain with twisting motions     The patient is seen by themselves.    Patient's past medical, surgical, social, and family histories were reviewed today and no changes are noted.    REVIEW OF SYSTEMS:  Constitutional: NEGATIVE for fever, chills, change in weight  Skin: NEGATIVE for worrisome rashes, moles or lesions  GI/: NEGATIVE for bowel or bladder changes  Neuro: NEGATIVE for weakness, dizziness or paresthesias    OBJECTIVE:  /72   Ht 1.549 m (5' 1\")   Wt 96.6 kg (213 lb)   LMP 08/30/2013   BMI 40.25 kg/m     General: healthy, alert and in no distress  HEENT: no scleral icterus or conjunctival erythema  Skin: no suspicious lesions or rash. No jaundice.  CV: regular rhythm by palpation, no pedal edema  Resp: normal respiratory effort without conversational dyspnea   Psych: normal mood and affect  Gait: normal steady gait with appropriate coordination and balance  Neuro: normal light touch sensory exam of the extremities.    MSK:  RIGHT KNEE  Inspection:    normal alignment  Palpation:    Tender about the lateral patellar facet, medial patellar facet and medial joint line. Remainder of bony and ligamentous landmarks are nontender.    Mild effusion is present    Patellofemoral crepitus is Present  Range of Motion:     00 extension to 1100 flexion  Strength:    Quadriceps grossly intact    Extensor mechanism intact  Special Tests:    Positive: none    Negative: " MCL/valgus stress (0 & 30 deg), LCL/varus stress (0 & 30 deg), Lachman's, anterior drawer, posterior drawer     LEFT FOOT  Inspection:    no swelling over the dorsal calcaneus  Palpation:    Tender about the medial cuneiform, navicular and calcaneus  Range of Motion:     Active toe flexion and extension  - afrom    Active ankle range of motion - afrom    Passive ankle range of motion - FROM  Strength:    Intrinsic foot musculature strength - grossly intact    Ankle strength - grossly intact  Special Tests:    Positive: calcaneal squeeze      Albert Yeo MD, [facility]            Again, thank you for allowing me to participate in the care of your patient.        Sincerely,        Albert Yeo, MD

## 2021-07-04 ENCOUNTER — HOSPITAL ENCOUNTER (OUTPATIENT)
Dept: MRI IMAGING | Facility: CLINIC | Age: 51
Discharge: HOME | End: 2021-07-04
Attending: FAMILY MEDICINE | Admitting: FAMILY MEDICINE
Payer: COMMERCIAL

## 2021-07-04 DIAGNOSIS — M17.11 PRIMARY OSTEOARTHRITIS OF RIGHT KNEE: ICD-10-CM

## 2021-07-04 PROCEDURE — 73721 MRI JNT OF LWR EXTRE W/O DYE: CPT | Mod: 26 | Performed by: RADIOLOGY

## 2021-07-04 PROCEDURE — 73721 MRI JNT OF LWR EXTRE W/O DYE: CPT | Mod: RT

## 2021-07-21 ENCOUNTER — TELEPHONE (OUTPATIENT)
Dept: ORTHOPEDICS | Facility: CLINIC | Age: 51
End: 2021-07-21

## 2021-07-21 DIAGNOSIS — M84.372D STRESS FRACTURE OF LEFT ANKLE WITH ROUTINE HEALING, SUBSEQUENT ENCOUNTER: Primary | ICD-10-CM

## 2021-07-21 NOTE — TELEPHONE ENCOUNTER
Reason for call:  patient would like to get a boot for Left heel pain.  She said it was an option before but she turned it down.  Pain is pretty bad and would like to try the boot.    Phone number to reach patient:  Cell number on file:    Telephone Information:   Mobile [phone]       Best Time:  unknown    Can we leave a detailed message on this number?  unknown

## 2021-07-22 DIAGNOSIS — E11.59 TYPE 2 DIABETES MELLITUS WITH OTHER CIRCULATORY COMPLICATION, WITHOUT LONG-TERM CURRENT USE OF INSULIN (H): ICD-10-CM

## 2021-07-22 RX ORDER — METFORMIN HCL 500 MG
TABLET, EXTENDED RELEASE 24 HR ORAL
Qty: 180 TABLET | Refills: 3
Start: 2021-07-22

## 2021-07-22 NOTE — TELEPHONE ENCOUNTER
Called and talked with patient. Let her know the boot was ordered and that she can pick it up at our medical supply store in the speciality care building.     Patient was appreciative of the return phone call and said she would stop by later to  the boot.     All questions were answered and patient vocalized understanding of the plan.     Karlie Velasco MS, ATC

## 2021-07-25 DIAGNOSIS — I10 HYPERTENSION GOAL BP (BLOOD PRESSURE) < 140/90: ICD-10-CM

## 2021-07-26 RX ORDER — LOSARTAN POTASSIUM 25 MG/1
TABLET ORAL
Qty: 90 TABLET | Refills: 0 | Status: SHIPPED | OUTPATIENT
Start: 2021-07-26 | End: 2021-10-14

## 2021-07-26 NOTE — TELEPHONE ENCOUNTER
Medication is being filled for 1 time refill only due to:  due for an appt - has appt scheduled - this is for losartan

## 2021-07-26 NOTE — TELEPHONE ENCOUNTER
LMTCB- calling to assist in scheduling.      Return in about 3 months (around 8/14/2021) for diabetes f/u       Deneen KNUTSON RN

## 2021-08-04 ENCOUNTER — VIRTUAL VISIT (OUTPATIENT)
Dept: ORTHOPEDICS | Facility: CLINIC | Age: 51
End: 2021-08-04
Payer: COMMERCIAL

## 2021-08-04 DIAGNOSIS — M17.11 PRIMARY OSTEOARTHRITIS OF RIGHT KNEE: Primary | ICD-10-CM

## 2021-08-04 PROCEDURE — 99212 OFFICE O/P EST SF 10 MIN: CPT | Mod: TEL | Performed by: FAMILY MEDICINE

## 2021-08-04 NOTE — PATIENT INSTRUCTIONS
1. Primary osteoarthritis of right knee      -Patient has right knee pain and swelling due to arthritis  -MRI review of the right knee shows Goel femoral and medial compartments moderate and full-thickness cartilage loss with fraying of the free edge of the medial meniscus.  No signs of avascular necrosis or insufficiency fracture.  -Patient reports partial pain relief from the cortisone injection performed 2 months ago.  -Discussed potential viscosupplementation treatment.  Patient is open to trying this treatment.  Prior authorization was ordered today for Synvisc 1.  My office will contact the patient once authorization has been obtained.  -Patient will follow up in 4 weeks to administer the medication.  -Call direct clinic number [[phone]] at any time with questions or concerns.    Albert Yeo MD [facility]  [facility] [place]

## 2021-08-04 NOTE — LETTER
8/4/2021         RE: Marni Austin  [address]  [facility] [address]        Dear Colleague,    Thank you for referring your patient, Marni Austin, to the [facility] [place]. Please see a copy of my visit note below.    Marni is a 51 year old who is being evaluated via a billable telephone visit.      What phone number would you like to be contacted at? [phone]  How would you like to obtain your AVS? MyChart  Phone call duration: 16 minutes      ASSESSMENT & PLAN  Patient Instructions     1. Primary osteoarthritis of right knee      -Patient has right knee pain and swelling due to arthritis  -MRI review of the right knee shows Goel femoral and medial compartments moderate and full-thickness cartilage loss with fraying of the free edge of the medial meniscus.  No signs of avascular necrosis or insufficiency fracture.  -Patient reports partial pain relief from the cortisone injection performed 2 months ago.  -Discussed potential viscosupplementation treatment.  Patient is open to trying this treatment.  Prior authorization was ordered today for Synvisc 1.  My office will contact the patient once authorization has been obtained.  -Patient will follow up in 4 weeks to administer the medication.  -Call direct clinic number [[phone]] at any time with questions or concerns.    Albert Yeo MD [facility] Medicine  [facility]          -----    SUBJECTIVE:  Marni Austin is a 51 year old female who is seen in follow-up for right knee pain due to arthritis and likely meniscus/cartilage tears.  Patient also has left ankle pain due to stress fracture, tendinitis and plantar fasciitis.They were last seen 7/21/2021.     Since their last visit reports 20% - (About the same as last time). They indicate that their current pain level is 4/10. They have tried corticosteroid injection (most recent date: 5/24/21) that provided   2 month(s) of relief with partial relief.  Patient is still in daily pain with weightbearing activity but not as severe as prior to the injection    The patient is seen by themselves.    Patient's past medical, surgical, social, and family histories were reviewed today and no changes are noted.    REVIEW OF SYSTEMS:  Constitutional: NEGATIVE for fever, chills, change in weight  Skin: NEGATIVE for worrisome rashes, moles or lesions  GI/: NEGATIVE for bowel or bladder changes  Neuro: NEGATIVE for weakness, dizziness or paresthesias      Independent visualization of the below image:  MR right knee without contrast 7/5/2021 7:56 AM     Techniques: Multiplanar multisequence imaging of the right knee was  obtained without administration of intra-articular or intravenous  contrast using routing protocol.     History: Knee pain, chronic, osteoarthritis suspected; eval for medial  meniscus/cartilage tears.  Short term relief from cortisone injection;  Primary osteoarthritis of right knee     Comparison: 4/22/2021     Findings:     MENISCI:  Medial meniscus: Free edge tear of the posterior horn of medial  meniscus.  Lateral meniscus: Free edge tear of the body of lateral meniscus.     LIGAMENTS  Cruciate ligaments: Intact.  Medial supporting structures: Bowing of the tibial collateral ligament  with mild proximal thickening. Intermediate signal on meniscofemoral  and meniscotibial ligaments. These may be due to altered biomechanics.  Lateral supporting structures: Intermediate intrasubstance signal of  the proximal fibular collateral ligament, may be related to chronic  altered biomechanics. Popliteus tendinosis.     EXTENSOR MECHANISM  Patellar tendon tendinosis. Quadriceps tendon is intact.     FLUID  Small to moderate joint effusion. No substantial Baker's cyst.     OSSEOUS and ARTICULAR STRUCTURES  Bones: No fracture, contusion, or osseous lesion is seen.  Osteophytosis.     Patellofemoral compartment: Moderate to  high-grade chondral fissure at  the patellar median ridge. High-grade chondral loss at the central  trochlea with subtle subchondral edema-like marrow signal intensity.     Medial compartment: High-grade and full thickness chondral loss in the  medial compartment with areas of subchondral edema-like marrow signal  intensity, greater on the medial tibial plateau.     Lateral compartment: No high-grade hyaline cartilage disease.     ANCILLARY FINDINGS  Focally prominent subcutaneous edema anterior to the patella without  loculation of fluid to suggest bursitis.                                                                      Impression:  1. 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  2. Subtle free edge tearing of the medial meniscus posterior horn, and  lateral meniscal body.  3. Popliteus tendinosis.  4. Small to moderate joint effusion.  5. Nonspecific prepatellar soft tissue edema.     LIANNE BEKCER     Patient's conditions were thoroughly discussed during today's visit with total time spent face-to-face with the patient and documentation being 20 minutes.    Albert Yeo MD, [facility]            Again, thank you for allowing me to participate in the care of your patient.        Sincerely,        Albert Yeo, MD

## 2021-08-04 NOTE — PROGRESS NOTES
Marni is a 51 year old who is being evaluated via a billable telephone visit.      What phone number would you like to be contacted at? [phone]  How would you like to obtain your AVS? Floridalmaedwarddaja  Phone call duration: 16 minutes      ASSESSMENT & PLAN  Patient Instructions     1. Primary osteoarthritis of right knee      -Patient has right knee pain and swelling due to arthritis  -MRI review of the right knee shows Goel femoral and medial compartments moderate and full-thickness cartilage loss with fraying of the free edge of the medial meniscus.  No signs of avascular necrosis or insufficiency fracture.  -Patient reports partial pain relief from the cortisone injection performed 2 months ago.  -Discussed potential viscosupplementation treatment.  Patient is open to trying this treatment.  Prior authorization was ordered today for Synvisc 1.  My office will contact the patient once authorization has been obtained.  -Patient will follow up in 4 weeks to administer the medication.  -Call direct clinic number [[phone]] at any time with questions or concerns.    Albert Yeo MD [facility]  [facility]          -----    SUBJECTIVE:  Marni Austin is a 51 year old female who is seen in follow-up for right knee pain due to arthritis and likely meniscus/cartilage tears.  Patient also has left ankle pain due to stress fracture, tendinitis and plantar fasciitis.They were last seen 7/21/2021.     Since their last visit reports 20% - (About the same as last time). They indicate that their current pain level is 4/10. They have tried corticosteroid injection (most recent date: 5/24/21) that provided  2 month(s) of relief with partial relief.  Patient is still in daily pain with weightbearing activity but not as severe as prior to the injection    The patient is seen by themselves.    Patient's past medical, surgical, social, and family histories were reviewed today  and no changes are noted.    REVIEW OF SYSTEMS:  Constitutional: NEGATIVE for fever, chills, change in weight  Skin: NEGATIVE for worrisome rashes, moles or lesions  GI/: NEGATIVE for bowel or bladder changes  Neuro: NEGATIVE for weakness, dizziness or paresthesias      Independent visualization of the below image:  MR right knee without contrast 7/5/2021 7:56 AM     Techniques: Multiplanar multisequence imaging of the right knee was  obtained without administration of intra-articular or intravenous  contrast using routing protocol.     History: Knee pain, chronic, osteoarthritis suspected; eval for medial  meniscus/cartilage tears.  Short term relief from cortisone injection;  Primary osteoarthritis of right knee     Comparison: 4/22/2021     Findings:     MENISCI:  Medial meniscus: Free edge tear of the posterior horn of medial  meniscus.  Lateral meniscus: Free edge tear of the body of lateral meniscus.     LIGAMENTS  Cruciate ligaments: Intact.  Medial supporting structures: Bowing of the tibial collateral ligament  with mild proximal thickening. Intermediate signal on meniscofemoral  and meniscotibial ligaments. These may be due to altered biomechanics.  Lateral supporting structures: Intermediate intrasubstance signal of  the proximal fibular collateral ligament, may be related to chronic  altered biomechanics. Popliteus tendinosis.     EXTENSOR MECHANISM  Patellar tendon tendinosis. Quadriceps tendon is intact.     FLUID  Small to moderate joint effusion. No substantial Baker's cyst.     OSSEOUS and ARTICULAR STRUCTURES  Bones: No fracture, contusion, or osseous lesion is seen.  Osteophytosis.     Patellofemoral compartment: Moderate to high-grade chondral fissure at  the patellar median ridge. High-grade chondral loss at the central  trochlea with subtle subchondral edema-like marrow signal intensity.     Medial compartment: High-grade and full thickness chondral loss in the  medial compartment with areas  of subchondral edema-like marrow signal  intensity, greater on the medial tibial plateau.     Lateral compartment: No high-grade hyaline cartilage disease.     ANCILLARY FINDINGS  Focally prominent subcutaneous edema anterior to the patella without  loculation of fluid to suggest bursitis.                                                                      Impression:  1. 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  2. Subtle free edge tearing of the medial meniscus posterior horn, and  lateral meniscal body.  3. Popliteus tendinosis.  4. Small to moderate joint effusion.  5. Nonspecific prepatellar soft tissue edema.     LIANNE BECKER     Patient's conditions were thoroughly discussed during today's visit with total time spent face-to-face with the patient and documentation being 20 minutes.    Albert Yeo MD, [facility]

## 2021-08-16 ENCOUNTER — DOCUMENTATION ONLY (OUTPATIENT)
Dept: LAB | Facility: CLINIC | Age: 51
End: 2021-08-16

## 2021-08-25 ENCOUNTER — HOSPITAL ENCOUNTER (EMERGENCY)
Facility: CLINIC | Age: 51
Discharge: HOME | End: 2021-08-26
Attending: PHYSICIAN ASSISTANT | Admitting: PHYSICIAN ASSISTANT
Payer: COMMERCIAL

## 2021-08-25 DIAGNOSIS — R42 DIZZINESS: ICD-10-CM

## 2021-08-25 DIAGNOSIS — J44.1 COPD EXACERBATION (H): ICD-10-CM

## 2021-08-25 LAB
ANION GAP SERPL CALCULATED.3IONS-SCNC: 4 MMOL/L (ref 3–14)
BASOPHILS # BLD AUTO: 0.1 10E3/UL (ref 0–0.2)
BASOPHILS NFR BLD AUTO: 1 %
BUN SERPL-MCNC: 15 MG/DL (ref 7–30)
CALCIUM SERPL-MCNC: 9.5 MG/DL (ref 8.5–10.1)
CHLORIDE BLD-SCNC: 105 MMOL/L (ref 94–109)
CO2 SERPL-SCNC: 28 MMOL/L (ref 20–32)
CREAT SERPL-MCNC: 0.78 MG/DL (ref 0.52–1.04)
EOSINOPHIL # BLD AUTO: 0.1 10E3/UL (ref 0–0.7)
EOSINOPHIL NFR BLD AUTO: 1 %
ERYTHROCYTE [DISTWIDTH] IN BLOOD BY AUTOMATED COUNT: 12.5 % (ref 10–15)
GFR SERPL CREATININE-BSD FRML MDRD: 87 ML/MIN/1.73M2
GLUCOSE BLD-MCNC: 149 MG/DL (ref 70–99)
HCT VFR BLD AUTO: 45.7 % (ref 35–47)
HGB BLD-MCNC: 15.4 G/DL (ref 11.7–15.7)
HOLD SPECIMEN: NORMAL
IMM GRANULOCYTES # BLD: 0.1 10E3/UL
IMM GRANULOCYTES NFR BLD: 1 %
LYMPHOCYTES # BLD AUTO: 2.2 10E3/UL (ref 0.8–5.3)
LYMPHOCYTES NFR BLD AUTO: 18 %
MCH RBC QN AUTO: 29.3 PG (ref 26.5–33)
MCHC RBC AUTO-ENTMCNC: 33.7 G/DL (ref 31.5–36.5)
MCV RBC AUTO: 87 FL (ref 78–100)
MONOCYTES # BLD AUTO: 0.7 10E3/UL (ref 0–1.3)
MONOCYTES NFR BLD AUTO: 6 %
NEUTROPHILS # BLD AUTO: 9 10E3/UL (ref 1.6–8.3)
NEUTROPHILS NFR BLD AUTO: 73 %
NRBC # BLD AUTO: 0 10E3/UL
NRBC BLD AUTO-RTO: 0 /100
PLATELET # BLD AUTO: 260 10E3/UL (ref 150–450)
POTASSIUM BLD-SCNC: 4 MMOL/L (ref 3.4–5.3)
RBC # BLD AUTO: 5.25 10E6/UL (ref 3.8–5.2)
SODIUM SERPL-SCNC: 137 MMOL/L (ref 133–144)
TROPONIN I SERPL-MCNC: <0.015 UG/L (ref 0–0.04)
WBC # BLD AUTO: 12.1 10E3/UL (ref 4–11)

## 2021-08-25 PROCEDURE — 84484 ASSAY OF TROPONIN QUANT: CPT | Performed by: PHYSICIAN ASSISTANT

## 2021-08-25 PROCEDURE — 82374 ASSAY BLOOD CARBON DIOXIDE: CPT | Performed by: EMERGENCY MEDICINE

## 2021-08-25 PROCEDURE — 96374 THER/PROPH/DIAG INJ IV PUSH: CPT

## 2021-08-25 PROCEDURE — 93005 ELECTROCARDIOGRAM TRACING: CPT

## 2021-08-25 PROCEDURE — 250N000009 HC RX 250: Performed by: PHYSICIAN ASSISTANT

## 2021-08-25 PROCEDURE — 85025 COMPLETE CBC W/AUTO DIFF WBC: CPT | Performed by: PHYSICIAN ASSISTANT

## 2021-08-25 PROCEDURE — 94640 AIRWAY INHALATION TREATMENT: CPT

## 2021-08-25 PROCEDURE — 84484 ASSAY OF TROPONIN QUANT: CPT | Mod: 91 | Performed by: PHYSICIAN ASSISTANT

## 2021-08-25 PROCEDURE — 84484 ASSAY OF TROPONIN QUANT: CPT | Performed by: EMERGENCY MEDICINE

## 2021-08-25 PROCEDURE — 99284 EMERGENCY DEPT VISIT MOD MDM: CPT | Mod: 25

## 2021-08-25 PROCEDURE — 258N000003 HC RX IP 258 OP 636: Performed by: PHYSICIAN ASSISTANT

## 2021-08-25 PROCEDURE — 80048 BASIC METABOLIC PNL TOTAL CA: CPT | Performed by: PHYSICIAN ASSISTANT

## 2021-08-25 PROCEDURE — 96361 HYDRATE IV INFUSION ADD-ON: CPT

## 2021-08-25 PROCEDURE — 85025 COMPLETE CBC W/AUTO DIFF WBC: CPT | Performed by: EMERGENCY MEDICINE

## 2021-08-25 PROCEDURE — 250N000011 HC RX IP 250 OP 636: Performed by: PHYSICIAN ASSISTANT

## 2021-08-25 PROCEDURE — 36415 COLL VENOUS BLD VENIPUNCTURE: CPT | Performed by: EMERGENCY MEDICINE

## 2021-08-25 PROCEDURE — 36415 COLL VENOUS BLD VENIPUNCTURE: CPT | Performed by: PHYSICIAN ASSISTANT

## 2021-08-25 RX ORDER — METHYLPREDNISOLONE SODIUM SUCCINATE 125 MG/2ML
125 INJECTION, POWDER, LYOPHILIZED, FOR SOLUTION INTRAMUSCULAR; INTRAVENOUS ONCE
Status: COMPLETED | OUTPATIENT
Start: 2021-08-25 | End: 2021-08-25

## 2021-08-25 RX ORDER — IPRATROPIUM BROMIDE AND ALBUTEROL SULFATE 2.5; .5 MG/3ML; MG/3ML
3 SOLUTION RESPIRATORY (INHALATION)
Status: DISPENSED | OUTPATIENT
Start: 2021-08-25 | End: 2021-08-25

## 2021-08-25 RX ADMIN — SODIUM CHLORIDE 1000 ML: 9 INJECTION, SOLUTION INTRAVENOUS at 23:43

## 2021-08-25 RX ADMIN — METHYLPREDNISOLONE SODIUM SUCCINATE 125 MG: 125 INJECTION, POWDER, FOR SOLUTION INTRAMUSCULAR; INTRAVENOUS at 21:40

## 2021-08-25 RX ADMIN — IPRATROPIUM BROMIDE AND ALBUTEROL SULFATE 3 ML: .5; 3 SOLUTION RESPIRATORY (INHALATION) at 21:42

## 2021-08-25 ASSESSMENT — ENCOUNTER SYMPTOMS
WHEEZING: 1
SHORTNESS OF BREATH: 1
NAUSEA: 1
DIZZINESS: 1
LIGHT-HEADEDNESS: 1
VOMITING: 1

## 2021-08-25 NOTE — LETTER
August 26, 2021      To Whom It May Concern:      Marni Austin was seen in our Emergency Department today, 08/26/21.  I expect her condition to improve over the next 2 days.  She may return to work/school when improved.    Sincerely,        Latonia Talavera RN

## 2021-08-26 VITALS
DIASTOLIC BLOOD PRESSURE: 78 MMHG | RESPIRATION RATE: 24 BRPM | OXYGEN SATURATION: 95 % | HEART RATE: 87 BPM | SYSTOLIC BLOOD PRESSURE: 134 MMHG | TEMPERATURE: 97.5 F

## 2021-08-26 LAB
ATRIAL RATE - MUSE: 72 BPM
DIASTOLIC BLOOD PRESSURE - MUSE: NORMAL MMHG
INTERPRETATION ECG - MUSE: NORMAL
P AXIS - MUSE: 31 DEGREES
PR INTERVAL - MUSE: 140 MS
QRS DURATION - MUSE: 100 MS
QT - MUSE: 372 MS
QTC - MUSE: 407 MS
R AXIS - MUSE: 74 DEGREES
SYSTOLIC BLOOD PRESSURE - MUSE: NORMAL MMHG
T AXIS - MUSE: 82 DEGREES
TROPONIN I SERPL-MCNC: <0.015 UG/L (ref 0–0.04)
VENTRICULAR RATE- MUSE: 72 BPM

## 2021-08-26 RX ORDER — ONDANSETRON 4 MG/1
4 TABLET, ORALLY DISINTEGRATING ORAL EVERY 8 HOURS PRN
Qty: 12 TABLET | Refills: 0 | Status: SHIPPED | OUTPATIENT
Start: 2021-08-26 | End: 2021-08-29

## 2021-08-26 RX ORDER — PREDNISONE 20 MG/1
TABLET ORAL
Qty: 10 TABLET | Refills: 0 | Status: SHIPPED | OUTPATIENT
Start: 2021-08-26 | End: 2021-09-23

## 2021-08-26 RX ORDER — MECLIZINE HCL 12.5 MG 12.5 MG/1
25 TABLET ORAL 3 TIMES DAILY PRN
Qty: 30 TABLET | Refills: 0 | Status: SHIPPED | OUTPATIENT
Start: 2021-08-26 | End: 2022-03-08

## 2021-08-26 NOTE — ED NOTES
AVS discussed with pt, all questions answered. LDA removed. Last vitals sign taken. VSS. Pt educated on rx prescriptions, and encouraged to follow up with primary.

## 2021-08-26 NOTE — DISCHARGE INSTRUCTIONS
*You may resume diet and activities as tolerated.  *Take medications as prescribed.  Meclizine for dizziness.  Zofran for nausea.    *Follow up with primary care provider in 2 days for recheck.   *Return if you become worse in any way including increased dizziness, shortness of breath, fevers, or other concerning symptoms develop.     Discharge Instructions  Vertigo  You have been diagnosed with vertigo.  This is a dizzy feeling often described as spinning or that the room is moving around you. You will often have nausea (sick to your stomach), vomiting (throwing up), and balance problems with it.  Vertigo is usually caused by a problem in the inner ear which helps control your balance.  Many things can cause vertigo, including calcium collections in the inner ear, a virus infection of the inner ear, concussion, migraine, and some medicines.  Luckily, these causes are not life threatening and will eventually go away.  However, sometimes there is a serious problem that does not show up right away.  Generally, every Emergency Department visit should have a follow-up clinic visit with either a primary or a specialty clinic/provider. Please follow-up as instructed by your emergency provider today.  Return to the Emergency Department if you have:  New or severe headache.  Double vision (seeing two of things).  Trouble speaking or hearing.  Weakness or trouble moving/using one side of your body.  Passing out.  Numbness or tingling.  Chest pain.  Vomiting that will not stop.    Treatment:  There are several commonly prescribed medications:  Antihistamines such as meclizine (Antivert ), dimenhydrinate (Dramamine ), or diphenhydramine (Benadryl ).  Prescription anti-nausea medicines, such as promethazine (Phenergan ), metoclopramide (Reglan ), or ondansetron (Zofran ).  Prescription sedative medicines, such as diazepam (Valium ), lorazepam (Ativan ), or clonazepam (Klonopin ).  Most of these medicines make you sleepy, and  you should not take them before you work or drive. You should only take prescription medicines to treat severe vertigo symptoms, and you should stop the medicine when your symptoms improve.    Follow Up:  If you have vertigo longer than three days, it is important that you follow up either with your primary provider or an Ear, Nose, and Throat (ENT) specialist.  You may need further testing to evaluate your vertigo and you may also need  vestibular  therapy which is a special form of physical therapy to make the vertigo go away.    If you were given a prescription for medicine here today, be sure to read all of the information (including the package insert) that comes with your prescription.  This will include important information about the medicine, its side effects, and any warnings that you need to know about.  The pharmacist who fills the prescription can provide more information and answer questions you may have about the medicine.  If you have questions or concerns that the pharmacist cannot address, please call or return to the Emergency Department.     Remember that you can always come back to the Emergency Department if you are not able to see your regular provider in the amount of time listed above, if you get any new symptoms, or if there is anything that worries you.

## 2021-09-04 ENCOUNTER — HEALTH MAINTENANCE LETTER (OUTPATIENT)
Age: 51
End: 2021-09-04

## 2021-09-06 DIAGNOSIS — J45.40 MODERATE PERSISTENT ASTHMA, UNSPECIFIED WHETHER COMPLICATED: ICD-10-CM

## 2021-09-09 RX ORDER — FLUTICASONE PROPIONATE 220 UG/1
AEROSOL, METERED RESPIRATORY (INHALATION)
Qty: 36 G | Refills: 0 | Status: SHIPPED | OUTPATIENT
Start: 2021-09-09 | End: 2021-12-02

## 2021-09-09 NOTE — TELEPHONE ENCOUNTER
Medication is being filled for 1 time refill only due to:  Patient needs to be seen because needs asthma visit and ACT.     Next 5 appointments (look out 90 days)    Sep 23, 2021  6:40 AM  Tess Saini with David Gambino PA-C  [facility] ([facility] ) [address] [facility] [address]  [phone]        Nellie Heredia RN

## 2021-09-21 DIAGNOSIS — F43.22 ADJUSTMENT DISORDER WITH ANXIETY: ICD-10-CM

## 2021-09-22 DIAGNOSIS — E11.59 TYPE 2 DIABETES MELLITUS WITH OTHER CIRCULATORY COMPLICATION, WITHOUT LONG-TERM CURRENT USE OF INSULIN (H): Primary | ICD-10-CM

## 2021-09-22 NOTE — TELEPHONE ENCOUNTER
Patient has an appointment with Manny Gambino tomorrow 9/23/2021 at 6:40am.  Pharmacy requesting a year supply.  Will postpone until tomorrow.    Isabel Ramirez RN

## 2021-09-22 NOTE — PROGRESS NOTES
"    Assessment & Plan     Type 2 diabetes mellitus with other circulatory complication, without long-term current use of insulin (H)  Improved control however not tolerating metformin at all. She cut back to one tablet early on and still reporting diarrhea after any meal. SHe does not want to continue. Would prefer to avoid insulin and sulfonylureas but these are cheap and that will come into play. First, with CHF, will try using SGLT2 with close follow up. Plan to max, then possibly GLP1. Follow up no laer than 3 months  - empagliflozin (JARDIANCE) 10 MG TABS tablet; Take 1 tablet (10 mg) by mouth daily Increase to 2 tablets daily (20mg) after one month  - Albumin Random Urine Quantitative with Creat Ratio  - Hemoglobin A1c    Congestive heart failure, unspecified HF chronicity, unspecified heart failure type (H)  Overall she has shown no evidence for decompensation. She is not sure the addition of arb made any symptomatic improvement but her BP is well controlled and she tolerated. We will hopefully start below if affordable/covered. Reviewed r/b/se   - empagliflozin (JARDIANCE) 10 MG TABS tablet; Take 1 tablet (10 mg) by mouth daily Increase to 2 tablets daily (20mg) after one month    Acrochordon  She would like to discuss treating many of these. There are irritating along shirt lines  - Adult Dermatology Referral; Future             Tobacco Cessation:   reports that she has been smoking. She started smoking about 42 years ago. She has a 2.60 pack-year smoking history. She has never used smokeless tobacco.      BMI:   Estimated body mass index is 38.58 kg/m  as calculated from the following:    Height as of this encounter: 1.549 m (5' 1\").    Weight as of this encounter: 92.6 kg (204 lb 3.2 oz).           Return in about 3 months (around 12/23/2021) for Follow up for diabetes visit in 3 months; but we'll correspond regularly over mychart!.    David Gambino PA-C  [facility] " SARAHMOMAME Grider is a 51 year old who presents for the following health issues     HPI     Diabetes Follow-up    How often are you checking your blood sugar? One time daily  What time of day are you checking your blood sugars (select all that apply)?  Morning  Have you had any blood sugars above 200?  Yes    Have you had any blood sugars below 70?  No    What symptoms do you notice when your blood sugar is low?  None    What concerns do you have today about your diabetes? None     Do you have any of these symptoms? (Select all that apply)  Excessive thirst    Have you had a diabetic eye exam in the last 12 months? No    Marni Austin is a 51 year old female who presents today for DM2 check up  She mentions that she can only tolerate 1 metformin in the evening  She was having significant GI symptoms, diarrhea  Actually had a few accidents at work   Seemed like every time she would eat she would have looser stool  She has been checking blood sugars daily each morning  She has been focused on healthy eating - avoiding carbs               Hyperlipidemia Follow-Up      Are you regularly taking any medication or supplement to lower your cholesterol?   Yes- Atorvastatin    Are you having muscle aches or other side effects that you think could be caused by your cholesterol lowering medication?  No    Hypertension Follow-up      Do you check your blood pressure regularly outside of the clinic? Yes     Are you following a low salt diet? No    Are your blood pressures ever more than 140 on the top number (systolic) OR more   than 90 on the bottom number (diastolic), for example 140/90? No    BP Readings from Last 2 Encounters:   09/23/21 138/84   08/26/21 134/78     Hemoglobin A1C (%)   Date Value   05/07/2021 10.5 (H)   02/02/2016 6.2 (H)     LDL Cholesterol Calculated (mg/dL)   Date Value   04/22/2021 49   05/31/2019     Cannot estimate LDL when triglyceride exceeds 400 mg/dL     LDL Cholesterol Direct  "(mg/dL)   Date Value   05/31/2019 67       How many servings of fruits and vegetables do you eat daily?  0-1    On average, how many sweetened beverages do you drink each day (Examples: soda, juice, sweet tea, etc.  Do NOT count diet or artificially sweetened beverages)?   2    How many days per week do you exercise enough to make your heart beat faster? None    How many minutes a day do you exercise enough to make your heart beat faster? None  How many days per week do you miss taking your medication? 1    What makes it hard for you to take your medications?  remembering to take    BP is well controlled  Tolerated the addition of ARB without side effects    Review of Systems   Constitutional, HEENT, cardiovascular, pulmonary, gi and gu systems are negative, except as otherwise noted.      Objective    /84 (BP Location: Right arm, Patient Position: Chair, Cuff Size: Adult Large)   Pulse 68   Temp 97.6  F (36.4  C) (Tympanic)   Resp 18   Ht 1.549 m (5' 1\")   Wt 92.6 kg (204 lb 3.2 oz)   LMP 08/30/2013   SpO2 96%   BMI 38.58 kg/m    Body mass index is 38.58 kg/m .  Physical Exam   GENERAL: healthy, alert and no distress  EYES: Eyes grossly normal to inspection, PERRL and conjunctivae and sclerae normal  RESP: lungs clear to auscultation - no rales, rhonchi or wheezes  CV: regular rate and rhythm, normal S1 S2, no S3 or S4, no murmur, click or rub, no peripheral edema and peripheral pulses strong  MS: No peripheral edema   SKIN: diffuse acrochordons in the neck line  PSYCH: mentation appears normal, affect normal/bright    Lab Results   Component Value Date    A1C 8.2 09/23/2021    A1C 10.5 05/07/2021    A1C 6.2 02/02/2016    A1C 6.6 02/28/2015    A1C 5.4 07/03/2008    A1C 5.3 01/20/2007           "

## 2021-09-23 ENCOUNTER — TELEPHONE (OUTPATIENT)
Dept: FAMILY MEDICINE | Facility: CLINIC | Age: 51
End: 2021-09-23

## 2021-09-23 ENCOUNTER — OFFICE VISIT (OUTPATIENT)
Dept: FAMILY MEDICINE | Facility: CLINIC | Age: 51
End: 2021-09-23
Payer: COMMERCIAL

## 2021-09-23 VITALS
HEART RATE: 68 BPM | SYSTOLIC BLOOD PRESSURE: 138 MMHG | HEIGHT: 61 IN | RESPIRATION RATE: 18 BRPM | WEIGHT: 204.2 LBS | TEMPERATURE: 97.6 F | BODY MASS INDEX: 38.55 KG/M2 | DIASTOLIC BLOOD PRESSURE: 84 MMHG | OXYGEN SATURATION: 96 %

## 2021-09-23 DIAGNOSIS — I50.9 CONGESTIVE HEART FAILURE, UNSPECIFIED HF CHRONICITY, UNSPECIFIED HEART FAILURE TYPE (H): ICD-10-CM

## 2021-09-23 DIAGNOSIS — E11.59 TYPE 2 DIABETES MELLITUS WITH OTHER CIRCULATORY COMPLICATION, WITHOUT LONG-TERM CURRENT USE OF INSULIN (H): Primary | ICD-10-CM

## 2021-09-23 DIAGNOSIS — L91.8 ACROCHORDON: ICD-10-CM

## 2021-09-23 LAB
CREAT UR-MCNC: 247 MG/DL
HBA1C MFR BLD: 8.2 % (ref 0–5.6)
MICROALBUMIN UR-MCNC: 32 MG/L
MICROALBUMIN/CREAT UR: 12.96 MG/G CR (ref 0–25)

## 2021-09-23 PROCEDURE — 90471 IMMUNIZATION ADMIN: CPT | Performed by: PHYSICIAN ASSISTANT

## 2021-09-23 PROCEDURE — 36415 COLL VENOUS BLD VENIPUNCTURE: CPT | Performed by: PHYSICIAN ASSISTANT

## 2021-09-23 PROCEDURE — 90682 RIV4 VACC RECOMBINANT DNA IM: CPT | Performed by: PHYSICIAN ASSISTANT

## 2021-09-23 PROCEDURE — 82043 UR ALBUMIN QUANTITATIVE: CPT | Performed by: PHYSICIAN ASSISTANT

## 2021-09-23 PROCEDURE — 99214 OFFICE O/P EST MOD 30 MIN: CPT | Mod: 25 | Performed by: PHYSICIAN ASSISTANT

## 2021-09-23 PROCEDURE — 83036 HEMOGLOBIN GLYCOSYLATED A1C: CPT | Performed by: PHYSICIAN ASSISTANT

## 2021-09-23 ASSESSMENT — PAIN SCALES - GENERAL: PAINLEVEL: NO PAIN (0)

## 2021-09-23 ASSESSMENT — MIFFLIN-ST. JEOR: SCORE: 1468.63

## 2021-09-23 NOTE — PATIENT INSTRUCTIONS
1. Get your eye exam and send us the results!    2. I am sending in jardiance - a NEW medication. We can increase the dose down the road but first we have to see the cost   A. This is a SGLT2 inhibitor - lets have them see the cheapest option (this is excellent for folks with heart failure)  B. Another option are the GLP1 groups - these are the injectables     Let's have you discuss with insurance if any of these are covered/affordable

## 2021-09-23 NOTE — TELEPHONE ENCOUNTER
Prior Authorization Retail Medication Request    Medication/Dose: empagliflozin (JARDIANCE) 10 MG TABS tablet  ICD code (if different than what is on RX):  Previously Tried and Failed:  Rationale:    Insurance Name: UNKNOWN  Insurance ID: UNKNOWN    Pharmacy Information (if different than what is on RX)  Name[facility]  Phone: [phone]     Please include previous medications tried and failed.  Please ask insurance for medications on formulary.

## 2021-09-23 NOTE — TELEPHONE ENCOUNTER
Central Prior Authorization Team   Phone: [phone]    PA Initiation    Medication: empagliflozin (JARDIANCE) 10 MG TABS tablet  Insurance Company: [employer] ([facility]) - Phone [phone] Fax [phone]  Pharmacy Filling the Rx: [facility] - [place], [place] - [address] [place], [place] [address]E [facility], [address]  Filling Pharmacy Phone: [phone]  Filling Pharmacy Fax:    Start Date: 9/23/2021

## 2021-09-27 NOTE — TELEPHONE ENCOUNTER
Prior Authorization Approval    Authorization Effective Date: 9/23/2021  Authorization Expiration Date: 9/23/2022  Medication: empagliflozin (JARDIANCE) 10 MG TABS tablet  Approved Dose/Quantity:   Reference #:     Insurance Company: [employer] ([facility]) - Phone [phone] Fax [phone]  Expected CoPay:       CoPay Card Available:      Foundation Assistance Needed:    Which Pharmacy is filling the prescription (Not needed for infusion/clinic administered): [email or web address] - [place] [address] [facility], [address]  Pharmacy Notified: Yes  Patient Notified: Yes

## 2021-09-28 DIAGNOSIS — E87.6 HYPOKALEMIA: ICD-10-CM

## 2021-09-30 RX ORDER — POTASSIUM CHLORIDE 750 MG/1
TABLET, EXTENDED RELEASE ORAL
Qty: 90 TABLET | Refills: 0 | Status: SHIPPED | OUTPATIENT
Start: 2021-09-30 | End: 2021-12-23

## 2021-10-07 RX ORDER — CITALOPRAM HYDROBROMIDE 40 MG/1
TABLET ORAL
Qty: 90 TABLET | Refills: 3 | Status: SHIPPED | OUTPATIENT
Start: 2021-10-07 | End: 2023-01-26

## 2021-10-07 NOTE — TELEPHONE ENCOUNTER
Routing refill request to provider for review/approval because:  PHQ-9 score:    PHQ 4/22/2021   PHQ-9 Total Score 5   Q9: Thoughts of better off dead/self-harm past 2 weeks Not at all         This has been in RF pool for 2 weeks       Rosalee Leon, RN

## 2021-10-12 DIAGNOSIS — I10 HYPERTENSION GOAL BP (BLOOD PRESSURE) < 140/90: ICD-10-CM

## 2021-10-14 RX ORDER — LOSARTAN POTASSIUM 25 MG/1
TABLET ORAL
Qty: 90 TABLET | Refills: 0 | Status: SHIPPED | OUTPATIENT
Start: 2021-10-14 | End: 2022-01-07

## 2021-10-14 NOTE — TELEPHONE ENCOUNTER
Prescription approved per [facility] Refill Protocol.    Nellie Heredia RN

## 2021-10-21 ENCOUNTER — MYC MEDICAL ADVICE (OUTPATIENT)
Dept: FAMILY MEDICINE | Facility: CLINIC | Age: 51
End: 2021-10-21

## 2021-10-21 ENCOUNTER — TELEPHONE (OUTPATIENT)
Dept: FAMILY MEDICINE | Facility: CLINIC | Age: 51
End: 2021-10-21

## 2021-10-21 DIAGNOSIS — E11.59 TYPE 2 DIABETES MELLITUS WITH OTHER CIRCULATORY COMPLICATION, WITHOUT LONG-TERM CURRENT USE OF INSULIN (H): Primary | ICD-10-CM

## 2021-10-21 NOTE — LETTER
[facility]  [address] [address]  [phone]       November 4, 2021    Marni Austin  [address]  [place] [facility] [place] [address]    Dear Marni,    We care about your health and have reviewed your health plan and are making recommendations based on this review, to optimize your health.    You are in particular need of attention regarding:  -Diabetes    We are recommending that you:  -Diabetic Eye Exam is due.  If this was done within the last 12 months then please contact the clinic with the date and location so we can update your records.    In addition, here is a list of due or overdue Health Maintenance reminders.    Health Maintenance Due   Topic Date Due     Breathing Capacity Test  Never done     Eye Exam  Never done     Hepatitis B Vaccine (1 of 3 - Risk 3-dose series) Never done     Preventive Care Visit  02/27/2018     Zoster (Shingles) Vaccine (1 of 2) Never done     COVID-19 Vaccine (3 - Booster for Moderna series) 10/15/2021     Colorectal Cancer Screening  11/03/2021     HPV Screening  02/27/2022     PAP Smear  02/27/2022       To address the above recommendations, we encourage you to contact us at [phone], via [employer] or by contacting Central Scheduling toll free at [phone] 24 hours a day. They will assist you with finding the most convenient time and location.    Thank you for trusting [facility] and we appreciate the opportunity to serve you.  We look forward to supporting your healthcare needs in the future.    Healthy Regards,    Your [facility] Team

## 2021-10-21 NOTE — TELEPHONE ENCOUNTER
Patient Quality Outreach    Patient is due/failing the following:   Diabetes -  Eye Exam    NEXT STEPS:   schedule eye exam or send results     Type of outreach:    Sent [employer] message.      Questions for provider review:    None     Estrellita Ely  Chart routed to Care Team.

## 2021-10-23 DIAGNOSIS — I25.10 CORONARY ARTERY DISEASE INVOLVING NATIVE HEART WITHOUT ANGINA PECTORIS, UNSPECIFIED VESSEL OR LESION TYPE: ICD-10-CM

## 2021-10-23 DIAGNOSIS — I10 HYPERTENSION GOAL BP (BLOOD PRESSURE) < 140/90: ICD-10-CM

## 2021-10-26 RX ORDER — CHLORTHALIDONE 25 MG/1
TABLET ORAL
Qty: 45 TABLET | Refills: 0 | Status: SHIPPED | OUTPATIENT
Start: 2021-10-26 | End: 2022-01-19

## 2021-10-26 NOTE — TELEPHONE ENCOUNTER
Prescription approved per [facility] Refill Protocol for chlorthalidone

## 2021-11-01 ENCOUNTER — MYC MEDICAL ADVICE (OUTPATIENT)
Dept: FAMILY MEDICINE | Facility: CLINIC | Age: 51
End: 2021-11-01

## 2021-11-01 DIAGNOSIS — L91.8 ACROCHORDON: Primary | ICD-10-CM

## 2021-11-04 NOTE — TELEPHONE ENCOUNTER
Patient Quality Outreach    Patient is due for the following:   Diabetes -  Eye Exam    NEXT STEPS:   schedule eye exam or send results     Type of outreach:    Sent letter.    Next Steps:  Reach out within 90 days via Phone.    Max number of attempts reached: Yes. Will try again in 90 days if patient still on fail list.    Questions for provider review:    None     Estrellita Ely  Chart routed to closed.

## 2021-11-08 DIAGNOSIS — I25.10 CORONARY ARTERY DISEASE INVOLVING NATIVE HEART WITHOUT ANGINA PECTORIS, UNSPECIFIED VESSEL OR LESION TYPE: ICD-10-CM

## 2021-11-08 DIAGNOSIS — I10 HYPERTENSION GOAL BP (BLOOD PRESSURE) < 140/90: ICD-10-CM

## 2021-11-10 DIAGNOSIS — J45.40 MODERATE PERSISTENT ASTHMA WITHOUT COMPLICATION: ICD-10-CM

## 2021-11-10 RX ORDER — METOPROLOL SUCCINATE 25 MG/1
TABLET, EXTENDED RELEASE ORAL
Qty: 90 TABLET | Refills: 0 | Status: SHIPPED | OUTPATIENT
Start: 2021-11-10 | End: 2022-02-04

## 2021-11-10 NOTE — TELEPHONE ENCOUNTER
Prescription approved per [facility] [place].  Isabel Ramirez RN

## 2021-11-11 RX ORDER — ALBUTEROL SULFATE 90 UG/1
AEROSOL, METERED RESPIRATORY (INHALATION)
Qty: 20.1 G | Refills: 3 | Status: SHIPPED | OUTPATIENT
Start: 2021-11-11 | End: 2022-12-09

## 2021-11-11 NOTE — TELEPHONE ENCOUNTER
Routing refill request to provider for review/approval because:  Labs out of range:  ACT    Saurav RAMIREZ RN

## 2021-11-30 DIAGNOSIS — J45.40 MODERATE PERSISTENT ASTHMA, UNSPECIFIED WHETHER COMPLICATED: ICD-10-CM

## 2021-12-02 ENCOUNTER — MYC MEDICAL ADVICE (OUTPATIENT)
Dept: FAMILY MEDICINE | Facility: CLINIC | Age: 51
End: 2021-12-02
Payer: COMMERCIAL

## 2021-12-02 RX ORDER — FLUTICASONE PROPIONATE 220 UG/1
AEROSOL, METERED RESPIRATORY (INHALATION)
Qty: 36 G | Refills: 3 | Status: SHIPPED | OUTPATIENT
Start: 2021-12-02 | End: 2023-01-26

## 2021-12-02 NOTE — TELEPHONE ENCOUNTER
Routing refill request to provider for review/approval because:  Unable to do 3 month mail order PSO. ACT needs to be updated. Sent [employer] message with link to ACT.    Nellie Heredia RN

## 2021-12-07 ENCOUNTER — E-VISIT (OUTPATIENT)
Dept: FAMILY MEDICINE | Facility: CLINIC | Age: 51
End: 2021-12-07

## 2021-12-07 ENCOUNTER — LAB (OUTPATIENT)
Dept: LAB | Facility: CLINIC | Age: 51
End: 2021-12-07
Payer: COMMERCIAL

## 2021-12-07 DIAGNOSIS — R21 RASH AND NONSPECIFIC SKIN ERUPTION: ICD-10-CM

## 2021-12-07 DIAGNOSIS — N94.9 VAGINAL SYMPTOM: Primary | ICD-10-CM

## 2021-12-07 DIAGNOSIS — N94.9 VAGINAL SYMPTOM: ICD-10-CM

## 2021-12-07 DIAGNOSIS — B37.31 CANDIDAL VULVOVAGINITIS: ICD-10-CM

## 2021-12-07 LAB
ALBUMIN UR-MCNC: NEGATIVE MG/DL
APPEARANCE UR: CLEAR
BACTERIA #/AREA URNS HPF: ABNORMAL /HPF
BILIRUB UR QL STRIP: NEGATIVE
CLUE CELLS: ABNORMAL
COLOR UR AUTO: YELLOW
GLUCOSE UR STRIP-MCNC: >=1000 MG/DL
HGB UR QL STRIP: ABNORMAL
KETONES UR STRIP-MCNC: NEGATIVE MG/DL
LEUKOCYTE ESTERASE UR QL STRIP: NEGATIVE
NITRATE UR QL: NEGATIVE
PH UR STRIP: 5 [PH] (ref 5–7)
RBC #/AREA URNS AUTO: ABNORMAL /HPF
SP GR UR STRIP: 1.02 (ref 1–1.03)
SQUAMOUS #/AREA URNS AUTO: ABNORMAL /LPF
TRICHOMONAS, WET PREP: ABNORMAL
UROBILINOGEN UR STRIP-ACNC: 0.2 E.U./DL
WBC #/AREA URNS AUTO: ABNORMAL /HPF
WBC'S/HIGH POWER FIELD, WET PREP: ABNORMAL
YEAST, WET PREP: PRESENT

## 2021-12-07 PROCEDURE — 87210 SMEAR WET MOUNT SALINE/INK: CPT

## 2021-12-07 PROCEDURE — 99421 OL DIG E/M SVC 5-10 MIN: CPT | Performed by: PHYSICIAN ASSISTANT

## 2021-12-07 PROCEDURE — 81001 URINALYSIS AUTO W/SCOPE: CPT

## 2021-12-07 RX ORDER — FLUCONAZOLE 150 MG/1
150 TABLET ORAL
Qty: 3 TABLET | Refills: 0 | Status: SHIPPED | OUTPATIENT
Start: 2021-12-07 | End: 2021-12-08

## 2021-12-07 NOTE — PATIENT INSTRUCTIONS
Marni - looks like it was a yeast infection. Medication will help the rash too! Take 1 pill every 3 days for 3 total doses. I sent in to the pharmacy. Let me know if not improving!  Manny

## 2021-12-08 ENCOUNTER — TELEPHONE (OUTPATIENT)
Dept: FAMILY MEDICINE | Facility: CLINIC | Age: 51
End: 2021-12-08
Payer: COMMERCIAL

## 2021-12-08 DIAGNOSIS — B37.31 CANDIDAL VULVOVAGINITIS: ICD-10-CM

## 2021-12-08 RX ORDER — FLUCONAZOLE 150 MG/1
150 TABLET ORAL
Qty: 3 TABLET | Refills: 0 | Status: SHIPPED | OUTPATIENT
Start: 2021-12-08 | End: 2022-03-08

## 2021-12-09 ASSESSMENT — ASTHMA QUESTIONNAIRES: ACT_TOTALSCORE: 20

## 2021-12-22 DIAGNOSIS — E87.6 HYPOKALEMIA: ICD-10-CM

## 2021-12-23 RX ORDER — POTASSIUM CHLORIDE 750 MG/1
TABLET, EXTENDED RELEASE ORAL
Qty: 90 TABLET | Refills: 0 | Status: SHIPPED | OUTPATIENT
Start: 2021-12-23 | End: 2022-04-21

## 2021-12-23 NOTE — TELEPHONE ENCOUNTER
Prescription approved per [facility] [place]ill [place].  Isabel Ramirez RN

## 2021-12-25 ENCOUNTER — HEALTH MAINTENANCE LETTER (OUTPATIENT)
Age: 51
End: 2021-12-25

## 2021-12-26 DIAGNOSIS — Z13.6 CARDIOVASCULAR SCREENING; LDL GOAL LESS THAN 130: ICD-10-CM

## 2021-12-26 DIAGNOSIS — I25.10 CORONARY ARTERY DISEASE INVOLVING NATIVE HEART WITHOUT ANGINA PECTORIS, UNSPECIFIED VESSEL OR LESION TYPE: ICD-10-CM

## 2021-12-27 RX ORDER — ATORVASTATIN CALCIUM 80 MG/1
TABLET, FILM COATED ORAL
Qty: 90 TABLET | Refills: 1 | Status: SHIPPED | OUTPATIENT
Start: 2021-12-27 | End: 2022-12-16

## 2022-01-01 NOTE — TELEPHONE ENCOUNTER
PROGRESS NOTE    SUBJECTIVE:    This is a  female born on 2024 to a 30 yo  to P2 at 45 0/7 weeks via  due to concerns for preeclampsia. She was sent from [facility] for evaluation of chest pain, uri symptoms and with elevated bps for pre-eclampsia work up. Mom  with hx of insulin dependent DM in pregnancy and PIH with proteinuria in the third trimester. GBS +/O+/O+-, other prenatal testing is negative. She had IM consult and testing to r/o covid. She was thought to have a uri on admission. History of use of belsomra in pregnancy for mdd. Mom denies depressive symptoms at this time. Apgars 8, 9. Infant had oxygen for sat of 89%/NICU post delivery and oxygen sats rebounded with treatment. Mainly bottle feeding but she wants give child breast milk too. Vital Signs:  BP 77/53   Pulse 134   Temp 98.5 °F (36.9 °C)   Resp 42   Ht 20.87\" (53 cm) Comment: Filed from Delivery Summary  Wt 8 lb 14 oz (4.026 kg)   HC 33 cm (12.99\") Comment: Filed from Delivery Summary  BMI 14.33 kg/m²     Birth Weight: 8 lb 13.8 oz (4.02 kg)     Wt Readings from Last 3 Encounters:   22 8 lb 14 oz (4.026 kg) (97 %, Z= 1.86)*     * Growth percentiles are based on Yaakov (Girls, 22-50 Weeks) data. Percent Weight Change Since Birth: 0.14%     Feeding Method Used: Bottle    Recent Labs:   Admission on 2022   Component Date Value Ref Range Status    POC Source 2022 Cord-Arterial   Final    PH 37 20228   Final    PCO2022 59.6  mmHg Final    PO2022 21.5  mmHg Final    HCO3 2022  mmol/L Final    B.E. 2022 -2.5  mmol/L Final    O2 Sat 2022  % Final    Cardiopulmonary Bypass 2022 No   Final     ID 2022 [id number]   Final    DEVICE 2022 [id number]   Final    POC Source 2022 Cord-Venous   Final    PH 37 2022 7. 302   Final    PCO2022 47.1  mmHg Final    PO2022 30.6 Please call patient - I sent the Rx to the [facility] in [place] that she's used previously. Also, can you check with the lab/ to see if patient left her glasses? She says they have a gold tone around them, are prescriptions.     mmHg Final    HCO3 2022  mmol/L Final    B.E. 2022 -3.4  mmol/L Final    O2 Sat 2022  % Final    Cardiopulmonary Bypass 2022 No   Final     ID 2022 [id number]   Final    DEVICE 2022 [id number]   Final    ABO/Rh 2022 O POS   Final    RAÚL IgG 2022 NEG   Final    Meter Glucose 2022 76  70 - 110 mg/dL Final    Meter Glucose 2022 75  70 - 110 mg/dL Final    Meter Glucose 2022 57 (A)  70 - 110 mg/dL Final      Immunization History   Administered Date(s) Administered    Hepatitis B Ped/Adol (Engerix-B, Recombivax HB) 2022       OBJECTIVE:                              General Appearance:  Healthy-appearing, vigorous infant, strong cry. Skin: warm, dry, normal color, no rashes  Head:  Sutures mobile, fontanelles normal size  Eyes:  Sclerae white, pupils equal and reactive, red reflex normal bilaterally  Ears:  Well-positioned, well-formed pinnae  Nose:  Clear, normal mucosa  Throat:  Lips, tongue and mucosa are pink, moist and intact; palate intact  Neck:  Supple, symmetrical  Chest:  Lungs clear to auscultation, respirations unlabored   Heart:  Regular rate & rhythm, S1 S2, no murmurs, rubs, or gallops  Abdomen:  Soft, non-tender, no masses; umbilical stump clean and dry  Umbilicus:   3 vessel cord  Pulses:  Strong equal femoral pulses, brisk capillary refill  Hips:  Negative Bone, Ortolani, gluteal creases equal  :  Normal  female genitalia ; Extremities:  Well-perfused, warm and dry  Neuro:  Easily aroused; good symmetric tone and strength; positive root and suck; symmetric normal reflexes    Assessment:    female infant born at a gestational age of 45 0/7.   Gestational Age: large for gestational age  Gestation: 45 week  Maternal GBS: positive and untreated due to   Patient Active Problem List   Diagnosis    Normal labor     Patient Active Problem List   Diagnosis    Normal labor       Plan:  Continue Routine Care.  Passed hearing exam.   IDM/LGA-bg ok on protocol  Encouraged breastfeeding, pt to ask for pump  Anticipate discharge in 2 day(s) with follow up at the [facility]     Attending Physician Statement  I have reviewed the chart, including any radiology or labs, and seen the patient with the resident(s). I personally reviewed and performed key elements of the history and exam.  I agree with the assessment, plan and orders as documented by the resident. Please refer to the resident note for additional information.       Tamia Saleh MD

## 2022-01-05 DIAGNOSIS — I10 HYPERTENSION GOAL BP (BLOOD PRESSURE) < 140/90: ICD-10-CM

## 2022-01-07 RX ORDER — LOSARTAN POTASSIUM 25 MG/1
TABLET ORAL
Qty: 90 TABLET | Refills: 1 | Status: SHIPPED | OUTPATIENT
Start: 2022-01-07 | End: 2022-06-28

## 2022-01-07 NOTE — TELEPHONE ENCOUNTER
Prescription approved per [facility] Refill Protocol.  Isabel Ramirez RN

## 2022-02-03 DIAGNOSIS — I25.10 CORONARY ARTERY DISEASE INVOLVING NATIVE HEART WITHOUT ANGINA PECTORIS, UNSPECIFIED VESSEL OR LESION TYPE: ICD-10-CM

## 2022-02-03 DIAGNOSIS — I10 HYPERTENSION GOAL BP (BLOOD PRESSURE) < 140/90: ICD-10-CM

## 2022-02-04 RX ORDER — METOPROLOL SUCCINATE 25 MG/1
TABLET, EXTENDED RELEASE ORAL
Qty: 90 TABLET | Refills: 0 | Status: SHIPPED | OUTPATIENT
Start: 2022-02-04 | End: 2022-05-02

## 2022-02-04 NOTE — TELEPHONE ENCOUNTER
Prescription approved per [facility] Refill Protocol.  Isabel Ramirez RN

## 2022-02-09 ENCOUNTER — E-VISIT (OUTPATIENT)
Dept: FAMILY MEDICINE | Facility: CLINIC | Age: 52
End: 2022-02-09
Payer: COMMERCIAL

## 2022-02-09 ENCOUNTER — MYC MEDICAL ADVICE (OUTPATIENT)
Dept: FAMILY MEDICINE | Facility: CLINIC | Age: 52
End: 2022-02-09
Payer: COMMERCIAL

## 2022-02-09 DIAGNOSIS — B37.31 CANDIDAL VULVOVAGINITIS: ICD-10-CM

## 2022-02-09 DIAGNOSIS — J42 CHRONIC BRONCHITIS, UNSPECIFIED CHRONIC BRONCHITIS TYPE (H): Primary | ICD-10-CM

## 2022-02-09 DIAGNOSIS — M62.838 MUSCLE SPASM: ICD-10-CM

## 2022-02-09 PROCEDURE — 99207 PR NON-BILLABLE SERV PER CHARTING: CPT | Performed by: PHYSICIAN ASSISTANT

## 2022-02-09 RX ORDER — CYCLOBENZAPRINE HCL 5 MG
5 TABLET ORAL 3 TIMES DAILY PRN
Qty: 30 TABLET | Refills: 3 | Status: SHIPPED | OUTPATIENT
Start: 2022-02-09 | End: 2023-12-19

## 2022-02-09 RX ORDER — FLUCONAZOLE 150 MG/1
150 TABLET ORAL
Qty: 3 TABLET | Refills: 0 | OUTPATIENT
Start: 2022-02-09

## 2022-02-09 NOTE — TELEPHONE ENCOUNTER
Onset 1 week with nasal congestion, sneezing, and cough.     Cough generally  Worse, but more so at night. Some SOB with severe coughing and exertion.    Uses nebs twice daily.  Patient states is effective.    Also OTC cold meds with little effectiveness.    Denies wheezing and fever.     Made appointment to be seen 2/10.    Advised U C for worsening symptoms.    Please close e-visit.    Nellie Heredia RN

## 2022-02-09 NOTE — TELEPHONE ENCOUNTER
Routing refill request to provider for review/approval because:  Drug not on the [facility] refill protocol for flexeril and diflucan

## 2022-02-10 ENCOUNTER — OFFICE VISIT (OUTPATIENT)
Dept: FAMILY MEDICINE | Facility: CLINIC | Age: 52
End: 2022-02-10
Payer: COMMERCIAL

## 2022-02-10 VITALS
OXYGEN SATURATION: 93 % | HEART RATE: 81 BPM | SYSTOLIC BLOOD PRESSURE: 128 MMHG | WEIGHT: 197.4 LBS | BODY MASS INDEX: 37.27 KG/M2 | HEIGHT: 61 IN | TEMPERATURE: 98.2 F | DIASTOLIC BLOOD PRESSURE: 78 MMHG | RESPIRATION RATE: 17 BRPM

## 2022-02-10 DIAGNOSIS — J44.1 CHRONIC OBSTRUCTIVE PULMONARY DISEASE WITH ACUTE EXACERBATION (H): Primary | ICD-10-CM

## 2022-02-10 DIAGNOSIS — J32.9 RHINOSINUSITIS: ICD-10-CM

## 2022-02-10 DIAGNOSIS — N89.8 VAGINAL IRRITATION: ICD-10-CM

## 2022-02-10 DIAGNOSIS — E11.59 TYPE 2 DIABETES MELLITUS WITH OTHER CIRCULATORY COMPLICATION, WITHOUT LONG-TERM CURRENT USE OF INSULIN (H): ICD-10-CM

## 2022-02-10 DIAGNOSIS — N89.8 VAGINAL IRRITATION: Primary | ICD-10-CM

## 2022-02-10 DIAGNOSIS — T88.7XXA MEDICATION SIDE EFFECTS: ICD-10-CM

## 2022-02-10 LAB
ALBUMIN UR-MCNC: NEGATIVE MG/DL
APPEARANCE UR: CLEAR
BACTERIA #/AREA URNS HPF: ABNORMAL /HPF
BILIRUB UR QL STRIP: NEGATIVE
CLUE CELLS: ABNORMAL
COLOR UR AUTO: YELLOW
GLUCOSE UR STRIP-MCNC: >=1000 MG/DL
HGB UR QL STRIP: ABNORMAL
HYALINE CASTS #/AREA URNS LPF: ABNORMAL /LPF
KETONES UR STRIP-MCNC: NEGATIVE MG/DL
LEUKOCYTE ESTERASE UR QL STRIP: NEGATIVE
NITRATE UR QL: NEGATIVE
PH UR STRIP: 5 [PH] (ref 5–7)
RBC #/AREA URNS AUTO: ABNORMAL /HPF
SP GR UR STRIP: 1.02 (ref 1–1.03)
SQUAMOUS #/AREA URNS AUTO: ABNORMAL /LPF
TRICHOMONAS, WET PREP: ABNORMAL
UROBILINOGEN UR STRIP-ACNC: 0.2 E.U./DL
WBC #/AREA URNS AUTO: ABNORMAL /HPF
WBC'S/HIGH POWER FIELD, WET PREP: ABNORMAL
YEAST, WET PREP: ABNORMAL

## 2022-02-10 PROCEDURE — 99214 OFFICE O/P EST MOD 30 MIN: CPT | Performed by: PHYSICIAN ASSISTANT

## 2022-02-10 PROCEDURE — U0003 INFECTIOUS AGENT DETECTION BY NUCLEIC ACID (DNA OR RNA); SEVERE ACUTE RESPIRATORY SYNDROME CORONAVIRUS 2 (SARS-COV-2) (CORONAVIRUS DISEASE [COVID-19]), AMPLIFIED PROBE TECHNIQUE, MAKING USE OF HIGH THROUGHPUT TECHNOLOGIES AS DESCRIBED BY CMS-2020-01-R: HCPCS | Performed by: PHYSICIAN ASSISTANT

## 2022-02-10 PROCEDURE — U0005 INFEC AGEN DETEC AMPLI PROBE: HCPCS | Performed by: PHYSICIAN ASSISTANT

## 2022-02-10 PROCEDURE — 87210 SMEAR WET MOUNT SALINE/INK: CPT | Performed by: PHYSICIAN ASSISTANT

## 2022-02-10 PROCEDURE — 81001 URINALYSIS AUTO W/SCOPE: CPT | Performed by: PHYSICIAN ASSISTANT

## 2022-02-10 RX ORDER — ALBUTEROL SULFATE 0.83 MG/ML
SOLUTION RESPIRATORY (INHALATION)
Qty: 180 ML | Refills: 1 | Status: SHIPPED | OUTPATIENT
Start: 2022-02-10 | End: 2022-02-10

## 2022-02-10 RX ORDER — ALBUTEROL SULFATE 0.83 MG/ML
SOLUTION RESPIRATORY (INHALATION)
Qty: 180 ML | Refills: 1 | Status: SHIPPED | OUTPATIENT
Start: 2022-02-10 | End: 2022-11-10

## 2022-02-10 RX ORDER — FLUCONAZOLE 150 MG/1
150 TABLET ORAL ONCE
Qty: 1 TABLET | Refills: 0 | Status: SHIPPED | OUTPATIENT
Start: 2022-02-10 | End: 2022-02-10

## 2022-02-10 RX ORDER — PREDNISONE 20 MG/1
TABLET ORAL
Qty: 20 TABLET | Refills: 0 | Status: SHIPPED | OUTPATIENT
Start: 2022-02-10 | End: 2022-03-08

## 2022-02-10 RX ORDER — DOXYCYCLINE 100 MG/1
100 TABLET ORAL 2 TIMES DAILY
Qty: 20 TABLET | Refills: 0 | Status: SHIPPED | OUTPATIENT
Start: 2022-02-10 | End: 2022-02-20

## 2022-02-10 ASSESSMENT — PAIN SCALES - GENERAL: PAINLEVEL: NO PAIN (0)

## 2022-02-10 ASSESSMENT — MIFFLIN-ST. JEOR: SCORE: 1437.78

## 2022-02-10 NOTE — PATIENT INSTRUCTIONS
Vulvovaginitis   What is it?  Vulvovaginitis is a condition that affects the vagina or the outer genital area (the  vulva ). A young girl with vulvovaginitis may experience redness, soreness, burning, itching, or vaginal discharge.  Causes  There are many possible causes of vulvovaginitis. The most common are:    Irritation of the genital area, due to harsh soaps, detergents, chemicals (chlorine, bubble bath), poor hygiene practices, tight clothing, and so on.    Infections, for example with bacteria    Skin conditions, such as eczema  It is important to be examined by a health care provider who can find out the cause of the problem.  Prevention and treatment  1. Teach good hygiene    Wash hands before and after toileting.    Wipe from front to back after urinating - consider toilet paper wipes or damp gauze.    Urinate with knees spread apart and stay seated on the toilet until finished urinating to allow all the urine to come out.    Take a bath (not a shower) every day. Soak in a frog-leg position in a tub of plain water for 10-15 minutes daily.    Wash the genital area very gently during a bath, using a mild bar soap such as Dove  and make sure to wash between the folds (labia). Rinse well after a bath with clear water.  2. Avoid irritation    Wear white cotton underwear and avoid wearing underwear at night.    Avoid harsh laundry detergents and bleach, and make sure underwear is rinsed thoroughly. Avoid fabric softeners and dryer sheets.    Do not use bubble bath or add anything else to bath water unless prescribed by your provider.    Use a mild, hypoallergenic bar soap, such as Dove . Avoid deodorant soaps.    Avoid tight jeans or pants, pantyhose, and tights.    Avoid sitting in a wet bathing suit after swimming - rinse off after swimming and change as soon as possible into dry clothing.    Make sure all soap is washed off after bathing, and do not allow a bar of soap to float around in the  bathtub.  Treatment:    After soaking, apply A & D ointment or Aquaphor as a barrier.    If continued redness and/or itching, use 1% Hydrocortisone ointment twice per day up to 3 days maximum      Call the office if:    The symptoms are not getting better after 72 hours of treatment    You notice vaginal bleeding    Your child has pain or burning when urinating and urinating more often    You have other concerns or questions

## 2022-02-10 NOTE — PROGRESS NOTES
"  Assessment & Plan     Chronic obstructive pulmonary disease with acute exacerbation (H)  Rhinosinusitis  Start below. Continue OTC cares. Close follow up if not improving. Update COVID PCR test  - predniSONE (DELTASONE) 20 MG tablet; Take 3 tabs by mouth daily x 3 days, then 2 tabs daily x 3 days, then 1 tab daily x 3 days, then 1/2 tab daily x 3 days.  - doxycycline monohydrate (ADOXA) 100 MG tablet; Take 1 tablet (100 mg) by mouth 2 times daily for 10 days  - albuterol (PROVENTIL) (2.5 MG/3ML) 0.083% neb solution; USE  1 VIAL BY NEBULIZATION EVERY 6 HOURS AS NEEDED FOR SHORTNESS OF BREATH /  DYSPNEA OR WHEEZING  - Symptomatic; Unknown COVID-19 Virus (Coronavirus) by PCR; Future  - Symptomatic; Unknown COVID-19 Virus (Coronavirus) by PCR Nasopharyngeal      Vaginal irritation  Wet prep and UA normal save for glucosuria, likely 2/2 SLGT2i. This may be causing the external irritation as well. Reviewed topical cares; follow up if not improving.   - Wet prep - lab collect  - UA Macro with Reflex to Micro and Culture - lab collect  - Urine Microscopic    Medication side effects  Following the abx.   - fluconazole (DIFLUCAN) 150 MG tablet; Take 1 tablet (150 mg) by mouth once for 1 dose       She'll return next month for pap, labs, DM2 check etc.       Tobacco Cessation:   reports that she has been smoking. She started smoking about 43 years ago. She has a 2.60 pack-year smoking history. She has never used smokeless tobacco.      BMI:   Estimated body mass index is 37.3 kg/m  as calculated from the following:    Height as of this encounter: 1.549 m (5' 1\").    Weight as of this encounter: 89.5 kg (197 lb 6.4 oz).         Return in about 26 days (around 3/8/2022).    David Gambino PA-C  [facility] VERNON Grider is a 51 year old who presents for the following health issues     HPI     Acute Illness  Acute illness concerns: Cough and sinus issues  Sneezing a lot and runny " "nose    Took at home test on Sunday and Monday= negative     Onset/Duration: a week  Symptoms:  Fever: no  Chills/Sweats: no  Headache (location?): no  Sinus Pressure: YES  Conjunctivitis:  no  Ear Pain: Popping issue  Rhinorrhea: YES  Congestion: YES  Sore Throat: no  Cough: YES-productive of clear sputum sometimes  Wheeze: no  Decreased Appetite: no  Nausea: no  Vomiting: no  Diarrhea: no  Dysuria/Freq.: no  Dysuria or Hematuria: no  Fatigue/Achiness: no  Sick/Strep Exposure: no  Therapies tried and outcome: NyquRyan mancusoalexjennifer Austin is a 51 year old female who presents today for ongoing upper respiratory symptoms for the past 1-2 weeks  Symptoms began with a simple cough that steadily developed/worsened  Felt a lot more exhausted from simple tasks  Started sneezing a lot more; runny nose  No fevers, chills  Lungs are painful from coughing so hard  She is not wheezing overall; not overly short of breath compared to prior illnesses  \"feel fine but dont feel fine\"   No other close contacts with similar symptoms    Started getting vaginal symptoms last week; mostly itching and then some discharge  Using itching cream on the outside  Mild irritation towards the end of urination    Sugars in the morning are around 130s-150s  Tolerating the jardiance      Review of Systems   Constitutional, HEENT, cardiovascular, pulmonary, gi and gu systems are negative, except as otherwise noted.      Objective    /78 (BP Location: Right arm, Patient Position: Sitting, Cuff Size: Adult Regular)   Pulse 81   Temp 98.2  F (36.8  C) (Oral)   Resp 17   Ht 1.549 m (5' 1\")   Wt 89.5 kg (197 lb 6.4 oz)   LMP 08/30/2013   SpO2 93%   BMI 37.30 kg/m    Body mass index is 37.3 kg/m .  Physical Exam   GENERAL: healthy, alert and no distress  EYES: Eyes grossly normal to inspection, PERRL and conjunctivae and sclerae normal  HENT: both ears: clear effusion, nasal mucosa edematous , rhinorrhea white, oropharynx clear and " oral mucous membranes moist  NECK: no adenopathy  RESP: expiratory wheezes throughout  CV: regular rates and rhythm  MS: No peripheral edema     Results for orders placed or performed in visit on 02/10/22 (from the past 24 hour(s))   Wet prep - lab collect    Specimen: Vagina; Swab   Result Value Ref Range    Trichomonas Absent Absent    Yeast Absent Absent    Clue Cells Absent Absent    WBCs/high power field 3+ (A) None   UA Macro with Reflex to Micro and Culture - lab collect    Specimen: Urine, Midstream   Result Value Ref Range    Color Urine Yellow Colorless, Straw, Light Yellow, Yellow    Appearance Urine Clear Clear    Glucose Urine >=1000 (A) Negative mg/dL    Bilirubin Urine Negative Negative    Ketones Urine Negative Negative mg/dL    Specific Gravity Urine 1.020 1.003 - 1.035    Blood Urine Trace (A) Negative    pH Urine 5.0 5.0 - 7.0    Protein Albumin Urine Negative Negative mg/dL    Urobilinogen Urine 0.2 0.2, 1.0 E.U./dL    Nitrite Urine Negative Negative    Leukocyte Esterase Urine Negative Negative   Urine Microscopic   Result Value Ref Range    Bacteria Urine Few (A) None Seen /HPF    RBC Urine 0-2 0-2 /HPF /HPF    WBC Urine 0-5 0-5 /HPF /HPF    Squamous Epithelials Urine Few (A) None Seen /LPF    Hyaline Casts Urine 0-2 (A) None Seen /LPF    Narrative    Urine Culture not indicated

## 2022-02-11 LAB — SARS-COV-2 RNA RESP QL NAA+PROBE: NEGATIVE

## 2022-02-19 ENCOUNTER — HEALTH MAINTENANCE LETTER (OUTPATIENT)
Age: 52
End: 2022-02-19

## 2022-02-25 DIAGNOSIS — E11.59 TYPE 2 DIABETES MELLITUS WITH OTHER CIRCULATORY COMPLICATION, WITHOUT LONG-TERM CURRENT USE OF INSULIN (H): ICD-10-CM

## 2022-02-25 NOTE — LETTER
October 2, 2019      Marni Austin  [address] [facility] [address]        To Whom It May Concern:    Marni Austin was seen on 10/1-10/2/19. Please excuse her until 10/3/19 due to illness.        Sincerely,      Pennie Escobar PA-C     Unable to Assess

## 2022-02-28 RX ORDER — EMPAGLIFLOZIN 25 MG/1
TABLET, FILM COATED ORAL
Qty: 90 TABLET | Refills: 0 | Status: SHIPPED | OUTPATIENT
Start: 2022-02-28 | End: 2022-03-08

## 2022-02-28 NOTE — TELEPHONE ENCOUNTER
3 month belle refill approved. Further refills will be addressed in 1 week at scheduled visit. Aure Orneals RN on 2/28/2022 at 11:39 AM

## 2022-03-08 ENCOUNTER — OFFICE VISIT (OUTPATIENT)
Dept: FAMILY MEDICINE | Facility: CLINIC | Age: 52
End: 2022-03-08
Payer: COMMERCIAL

## 2022-03-08 VITALS
DIASTOLIC BLOOD PRESSURE: 70 MMHG | HEIGHT: 61 IN | TEMPERATURE: 98.6 F | RESPIRATION RATE: 20 BRPM | BODY MASS INDEX: 37.19 KG/M2 | OXYGEN SATURATION: 95 % | HEART RATE: 89 BPM | SYSTOLIC BLOOD PRESSURE: 130 MMHG | WEIGHT: 197 LBS

## 2022-03-08 DIAGNOSIS — J42 CHRONIC BRONCHITIS, UNSPECIFIED CHRONIC BRONCHITIS TYPE (H): ICD-10-CM

## 2022-03-08 DIAGNOSIS — J45.40 MODERATE PERSISTENT ASTHMA WITHOUT COMPLICATION: ICD-10-CM

## 2022-03-08 DIAGNOSIS — F17.200 TOBACCO USE DISORDER: ICD-10-CM

## 2022-03-08 DIAGNOSIS — Z12.11 SCREEN FOR COLON CANCER: ICD-10-CM

## 2022-03-08 DIAGNOSIS — E11.59 TYPE 2 DIABETES MELLITUS WITH OTHER CIRCULATORY COMPLICATION, WITHOUT LONG-TERM CURRENT USE OF INSULIN (H): Primary | ICD-10-CM

## 2022-03-08 DIAGNOSIS — Z12.4 CERVICAL CANCER SCREENING: ICD-10-CM

## 2022-03-08 DIAGNOSIS — N89.8 VAGINAL IRRITATION: Primary | ICD-10-CM

## 2022-03-08 DIAGNOSIS — N89.8 VAGINAL IRRITATION: ICD-10-CM

## 2022-03-08 LAB
ALBUMIN UR-MCNC: NEGATIVE MG/DL
APPEARANCE UR: CLEAR
BACTERIA #/AREA URNS HPF: ABNORMAL /HPF
BILIRUB UR QL STRIP: NEGATIVE
CLUE CELLS: ABNORMAL
COLOR UR AUTO: YELLOW
GLUCOSE UR STRIP-MCNC: >=1000 MG/DL
HBA1C MFR BLD: 9.1 % (ref 0–5.6)
HGB UR QL STRIP: ABNORMAL
KETONES UR STRIP-MCNC: ABNORMAL MG/DL
LEUKOCYTE ESTERASE UR QL STRIP: NEGATIVE
NITRATE UR QL: NEGATIVE
PH UR STRIP: 5 [PH] (ref 5–7)
RBC #/AREA URNS AUTO: ABNORMAL /HPF
SP GR UR STRIP: 1.01 (ref 1–1.03)
SQUAMOUS #/AREA URNS AUTO: ABNORMAL /LPF
TRICHOMONAS, WET PREP: ABNORMAL
UROBILINOGEN UR STRIP-ACNC: 0.2 E.U./DL
WBC #/AREA URNS AUTO: ABNORMAL /HPF
WBC'S/HIGH POWER FIELD, WET PREP: ABNORMAL
YEAST, WET PREP: ABNORMAL

## 2022-03-08 PROCEDURE — G0145 SCR C/V CYTO,THINLAYER,RESCR: HCPCS | Performed by: PHYSICIAN ASSISTANT

## 2022-03-08 PROCEDURE — 36415 COLL VENOUS BLD VENIPUNCTURE: CPT | Performed by: PHYSICIAN ASSISTANT

## 2022-03-08 PROCEDURE — 83036 HEMOGLOBIN GLYCOSYLATED A1C: CPT | Performed by: PHYSICIAN ASSISTANT

## 2022-03-08 PROCEDURE — 87624 HPV HI-RISK TYP POOLED RSLT: CPT | Performed by: PHYSICIAN ASSISTANT

## 2022-03-08 PROCEDURE — 99214 OFFICE O/P EST MOD 30 MIN: CPT | Performed by: PHYSICIAN ASSISTANT

## 2022-03-08 PROCEDURE — 80048 BASIC METABOLIC PNL TOTAL CA: CPT | Performed by: PHYSICIAN ASSISTANT

## 2022-03-08 PROCEDURE — 87210 SMEAR WET MOUNT SALINE/INK: CPT | Performed by: PHYSICIAN ASSISTANT

## 2022-03-08 PROCEDURE — 81001 URINALYSIS AUTO W/SCOPE: CPT | Performed by: PHYSICIAN ASSISTANT

## 2022-03-08 ASSESSMENT — ENCOUNTER SYMPTOMS
COUGH: 1
SORE THROAT: 0

## 2022-03-08 ASSESSMENT — PATIENT HEALTH QUESTIONNAIRE - PHQ9
10. IF YOU CHECKED OFF ANY PROBLEMS, HOW DIFFICULT HAVE THESE PROBLEMS MADE IT FOR YOU TO DO YOUR WORK, TAKE CARE OF THINGS AT HOME, OR GET ALONG WITH OTHER PEOPLE: NOT DIFFICULT AT ALL
SUM OF ALL RESPONSES TO PHQ QUESTIONS 1-9: 5
SUM OF ALL RESPONSES TO PHQ QUESTIONS 1-9: 5

## 2022-03-08 ASSESSMENT — ANXIETY QUESTIONNAIRES
2. NOT BEING ABLE TO STOP OR CONTROL WORRYING: SEVERAL DAYS
4. TROUBLE RELAXING: NOT AT ALL
7. FEELING AFRAID AS IF SOMETHING AWFUL MIGHT HAPPEN: NOT AT ALL
6. BECOMING EASILY ANNOYED OR IRRITABLE: NOT AT ALL
GAD7 TOTAL SCORE: 3
7. FEELING AFRAID AS IF SOMETHING AWFUL MIGHT HAPPEN: NOT AT ALL
GAD7 TOTAL SCORE: 3
GAD7 TOTAL SCORE: 3
5. BEING SO RESTLESS THAT IT IS HARD TO SIT STILL: NOT AT ALL
1. FEELING NERVOUS, ANXIOUS, OR ON EDGE: SEVERAL DAYS
3. WORRYING TOO MUCH ABOUT DIFFERENT THINGS: SEVERAL DAYS

## 2022-03-08 NOTE — PROGRESS NOTES
Assessment & Plan     Type 2 diabetes mellitus with other circulatory complication, without long-term current use of insulin (H)  Uncontrolled on jardiance 25mg alone and likely causing chronic vaginal irritation. SHe did not tolerated metformin. WIth hx of CHF will move to GLP1; trulicity with proven CV benefit but preferring once weekly injectable. Continue to work at diet/exercise. She'll check with insurance on coverage. If none affordable, we'll consider insulin. STOP jardiance  - Hemoglobin A1c  - BASIC METABOLIC PANEL; Future  - semaglutide (OZEMPIC) 2 MG/1.5ML SOPN pen; Inject 0.25 mg Subcutaneous every 7 days After 4 weeks, increase to 0.5mg every 7 days  - BASIC METABOLIC PANEL    Vaginal irritation  I suspect likely from jardiance. We're stopping. Reviewed external treatments  - UA Macro with Reflex to Micro and Culture - lab collect  - Wet prep - lab collect  - Urine Microscopic    Cervical cancer screening  Updated today with chaperone  - Pap Screen with HPV - recommended age 30 - 65 years    Moderate persistent asthma without complication  Chronic bronchitis, unspecified chronic bronchitis type (H)  Tobacco use disorder  Uncontrolled since we had to transition off of dual thearpy 2/2 cost. She is on ICS alone because that is all that is affordable. Tammie given her a lengthy list of inhalers to check on and we'll prescribe the most affordable. Trelegy would be great, but ideally getting some LABA/LAMA in the least would help. Restart chantix. Reviewed importance of smoking cessation.  - varenicline (CHANTIX HANDY) 0.5 MG X 11 & 1 MG X 42 tablet; Take 0.5 mg tab daily for 3 days, THEN 0.5 mg tab twice daily for 4 days, THEN 1 mg twice daily.    Screen for colon cancer  due  - Fecal colorectal cancer screen FIT - Future (S+30); Future  - Fecal colorectal cancer screen FIT - Future (S+30)             Tobacco Cessation:   reports that she has been smoking. She started smoking about 43 years ago. She has a  "2.60 pack-year smoking history. She has never used smokeless tobacco.  Tobacco Cessation Action Plan: Pharmacotherapies : Chantix    BMI:   Estimated body mass index is 37.22 kg/m  as calculated from the following:    Height as of this encounter: 1.549 m (5' 1\").    Weight as of this encounter: 89.4 kg (197 lb).     Return in about 3 months (around 6/8/2022).    David Gambino PA-C  [facility] VERNON Grider is a 51 year old who presents for the following health issues         Cough  This is a recurrent problem. The current episode started more than 1 week ago. The problem occurs constantly. The problem has not changed since onset.Pertinent negatives include no sore throat.   History of Present Illness     Asthma:  She presents for follow up of asthma.  She has some cough, some wheezing, and some shortness of breath. She is using a relief medication a few times a month. She typically misses taking her controller medication 1 time(s) per week.Patient is aware of the following triggers: exercise or sports and smoke. The patient has not had a visit to the Emergency Room, Urgent Care or Hospital due to asthma since the last clinic visit.     COPD:  She presents for follow up of COPD.  Overall, COPD symptoms are stable since last visit. She has same as usual fatigue or shortness of breath with exertion and same as usual shortness of breath at rest.  She constantly coughs and does not have change in sputum. No recent fever. She can walk the length of 1-2 rooms without stopping to rest. She can walk 2 flights of stairs without resting.The patient has had no ED, urgent care, or hospital admissions because of COPD since the last visit.     Mental Health Follow-up:  Patient presents to follow-up on Depression.Patient's depression since last visit has been:  Medium  The patient is not having other symptoms associated with depression.      Any significant life events: relationship " concerns, housing concerns and grief or loss  Patient is not feeling anxious or having panic attacks.  Patient has no concerns about alcohol or drug use.       Today's PHQ-9         PHQ-9 Total Score: 5  PHQ-9 Q9 Thoughts of better off dead/self-harm past 2 weeks :   (P) Not at all    How difficult have these problems made it for you to do your work, take care of things at home, or get along with other people: Not difficult at all    Today's JAMEEL-7 Score: 3    Diabetes:   She presents for follow up of diabetes.  She is checking home blood glucose one time daily. She checks blood glucose before meals.  Blood glucose is sometimes over 200 and never under 70. When her blood glucose is low, the patient is asymptomatic for confusion, blurred vision, lethargy and reports not feeling dizzy, shaky, or weak.  She has no concerns regarding her diabetes at this time.  She is having excessive thirst. The patient has had a diabetic eye exam in the last 12 months.         Heart Failure:  She presents for follow up of heart failure. She is experiencing shortness of breath with activity only, which is same as usual. She is experiencing lower extremity edema which is same as usual. She denies orthopenea and coughs at night. Patient is checking weight daily. She has recently had a weight increase. She has side effects from medications including other. She has had no other medical visits for heart failure since the last visit.    She eats 0-1 servings of fruits and vegetables daily.She consumes 7 sweetened beverage(s) daily.She exercises with enough effort to increase her heart rate 9 or less minutes per day.  She exercises with enough effort to increase her heart rate 3 or less days per week. She is missing 1 dose(s) of medications per week.     Marni Austin is a 51 year old female who presents today for multiple follow ups and PAP    Seen for bronchitis/COPD/ASTHMA flare in February and improved following that  Still struggling  "with breathing  Since insurance changed and could only afford ICS had had more wheezing/tightness/flares  Smoking around 1/2 to 1 ppd again    DIABETES  Taking jardiance 25mg  Did not tolerate metformin  With hx of CHF, SGLT2i was first choice  She has been checking sugars and they've been mnore elevated (was also on prednisone)  Trying to watch intake  No exercise  Has had consistent vaginal irritation since starting  Pain with wiping and significant external itching    BP has been controlled  Non swelling in the extremities       Constitutional, HEENT, cardiovascular, pulmonary, gi and gu systems are negative, except as otherwise noted.      Objective    /70   Pulse 89   Temp 98.6  F (37  C) (Oral)   Resp 20   Ht 1.549 m (5' 1\")   Wt 89.4 kg (197 lb)   LMP 08/30/2013   SpO2 95%   BMI 37.22 kg/m    Body mass index is 37.22 kg/m .  Physical Exam   GENERAL: healthy, alert and no distress  RESP: lungs clear to auscultation - no rales, rhonchi or wheezes  CV: regular rates and rhythm   (female): external vaginal tissue is inflamed and erythematous; vaginal mucosa pink, moist, well rugated and normal cervix, adnexae, and uterus without masses.  MS: No peripheral edema   SKIN: new tattoos to bilateral forearms  PSYCH: mentation appears normal, affect normal/bright    Results for orders placed or performed in visit on 03/08/22 (from the past 24 hour(s))   Wet prep - lab collect    Specimen: Vagina; Swab   Result Value Ref Range    Trichomonas Absent Absent    Yeast Absent Absent    Clue Cells Absent Absent    WBCs/high power field 3+ (A) None   Hemoglobin A1c   Result Value Ref Range    Hemoglobin A1C 9.1 (H) 0.0 - 5.6 %    Narrative    Reviewed, OK with previous     UA Macro with Reflex to Micro and Culture - lab collect    Specimen: Urine, Midstream   Result Value Ref Range    Color Urine Yellow Colorless, Straw, Light Yellow, Yellow    Appearance Urine Clear Clear    Glucose Urine >=1000 (A) Negative " mg/dL    Bilirubin Urine Negative Negative    Ketones Urine Trace (A) Negative mg/dL    Specific Gravity Urine 1.010 1.003 - 1.035    Blood Urine Trace (A) Negative    pH Urine 5.0 5.0 - 7.0    Protein Albumin Urine Negative Negative mg/dL    Urobilinogen Urine 0.2 0.2, 1.0 E.U./dL    Nitrite Urine Negative Negative    Leukocyte Esterase Urine Negative Negative   Urine Microscopic   Result Value Ref Range    Bacteria Urine Few (A) None Seen /HPF    RBC Urine 0-2 0-2 /HPF /HPF    WBC Urine 0-5 0-5 /HPF /HPF    Squamous Epithelials Urine Few (A) None Seen /LPF    Narrative    Urine Culture not indicated

## 2022-03-08 NOTE — PATIENT INSTRUCTIONS
1. Lets check in on the cost of Ozempic -- or any alternative (category GLP1)   --if this is not affordable we will consider insulin      2. Also please check in on combination inhalers (category ICS/LABA or LABA/LAMA)     ALTERNATIVELY: ask about coverage for nebulizer medications: Formoterol or Budesonide    3. The third category would be single/solo LAMA -- incruse, spiriva or alteratives

## 2022-03-09 LAB
ANION GAP SERPL CALCULATED.3IONS-SCNC: 10 MMOL/L (ref 3–14)
BUN SERPL-MCNC: 21 MG/DL (ref 7–30)
CALCIUM SERPL-MCNC: 10.2 MG/DL (ref 8.5–10.1)
CHLORIDE BLD-SCNC: 101 MMOL/L (ref 94–109)
CO2 SERPL-SCNC: 25 MMOL/L (ref 20–32)
CREAT SERPL-MCNC: 0.84 MG/DL (ref 0.52–1.04)
GFR SERPL CREATININE-BSD FRML MDRD: 83 ML/MIN/1.73M2
GLUCOSE BLD-MCNC: 232 MG/DL (ref 70–99)
POTASSIUM BLD-SCNC: 3.7 MMOL/L (ref 3.4–5.3)
SODIUM SERPL-SCNC: 136 MMOL/L (ref 133–144)

## 2022-03-09 ASSESSMENT — PATIENT HEALTH QUESTIONNAIRE - PHQ9: SUM OF ALL RESPONSES TO PHQ QUESTIONS 1-9: 5

## 2022-03-09 ASSESSMENT — ANXIETY QUESTIONNAIRES: GAD7 TOTAL SCORE: 3

## 2022-03-10 ENCOUNTER — TELEPHONE (OUTPATIENT)
Dept: FAMILY MEDICINE | Facility: CLINIC | Age: 52
End: 2022-03-10
Payer: COMMERCIAL

## 2022-03-10 NOTE — TELEPHONE ENCOUNTER
Prior Authorization Retail Medication Request    Medication/Dose: Varenicline 0.5 mg tablets   Sig: Take 0.5 mg tab daily for 3 days, THEN 0.5 mg tab twice daily for 4 days, THEN 1 mg twice daily.  ICD code (if different than what is on RX):  Pt has Asthma, chronic bronchitis, COPD which are factors that her smoking is contributing to.     Previously Tried and Failed:      Rationale:  Due to patients chronic respiratory status and issues with treating due to cost of inhalers. It is believed if we can help her to stop smoking some of her other conditions would be better controlled.     Insurance Name:  [employer] Ph: [phone]   Insurance ID:  [id number]       Pharmacy Information (if different than what is on RX)  Name:    Phone:

## 2022-03-11 LAB
BKR LAB AP GYN ADEQUACY: NORMAL
BKR LAB AP GYN INTERPRETATION: NORMAL
BKR LAB AP HPV REFLEX: NORMAL
BKR LAB AP PREVIOUS ABNORMAL: NORMAL
PATH REPORT.COMMENTS IMP SPEC: NORMAL
PATH REPORT.COMMENTS IMP SPEC: NORMAL
PATH REPORT.RELEVANT HX SPEC: NORMAL

## 2022-03-15 LAB
HUMAN PAPILLOMA VIRUS 16 DNA: NEGATIVE
HUMAN PAPILLOMA VIRUS 18 DNA: NEGATIVE
HUMAN PAPILLOMA VIRUS FINAL DIAGNOSIS: NORMAL
HUMAN PAPILLOMA VIRUS OTHER HR: NEGATIVE

## 2022-03-16 NOTE — TELEPHONE ENCOUNTER
Central Prior Authorization Team   Phone: [phone]      PA Initiation    Medication: Varenicline 0.5 mg tablets  -Initiated  Insurance Company: [employer] ([facility]) - Phone [phone] Fax [phone]  Pharmacy Filling the Rx: [id number] - [place], [place] - [address] AT [facility] OF [address]  Filling Pharmacy Phone: [phone]  Filling Pharmacy Fax:    Start Date: 3/16/2022

## 2022-03-17 NOTE — TELEPHONE ENCOUNTER
Prior Authorization Approval    Authorization Effective Date: 3/16/2022  Authorization Expiration Date: 3/16/2023  Medication: Varenicline 0.5 mg tablets  -APPROVED  Approved Dose/Quantity:   Reference #:     Insurance Company: [employer] ([facility]) - Phone [phone] Fax [phone]  Expected CoPay:       CoPay Card Available:      Foundation Assistance Needed:    Which Pharmacy is filling the prescription (Not needed for infusion/clinic administered): [id number] - [place], [place] - [address] AT [facility] OF [address]  Pharmacy Notified: Yes  Patient Notified: No    **Instructed pharmacy to notify patient when script is ready to /ship.**

## 2022-03-21 DIAGNOSIS — E11.59 TYPE 2 DIABETES MELLITUS WITH OTHER CIRCULATORY COMPLICATION, WITHOUT LONG-TERM CURRENT USE OF INSULIN (H): ICD-10-CM

## 2022-03-22 ENCOUNTER — OFFICE VISIT (OUTPATIENT)
Dept: DERMATOLOGY | Facility: CLINIC | Age: 52
End: 2022-03-22
Attending: PHYSICIAN ASSISTANT
Payer: COMMERCIAL

## 2022-03-22 ENCOUNTER — TELEPHONE (OUTPATIENT)
Dept: DERMATOLOGY | Facility: CLINIC | Age: 52
End: 2022-03-22

## 2022-03-22 VITALS — DIASTOLIC BLOOD PRESSURE: 88 MMHG | OXYGEN SATURATION: 94 % | HEART RATE: 89 BPM | SYSTOLIC BLOOD PRESSURE: 134 MMHG

## 2022-03-22 DIAGNOSIS — L91.8 INFLAMED ACROCHORDON: Primary | ICD-10-CM

## 2022-03-22 DIAGNOSIS — D48.5 NEOPLASM OF UNCERTAIN BEHAVIOR OF SKIN: ICD-10-CM

## 2022-03-22 PROCEDURE — 11102 TANGNTL BX SKIN SINGLE LES: CPT | Performed by: PHYSICIAN ASSISTANT

## 2022-03-22 PROCEDURE — 88305 TISSUE EXAM BY PATHOLOGIST: CPT | Performed by: DERMATOLOGY

## 2022-03-22 PROCEDURE — 99207 PR DROP WITH A PROCEDURE: CPT | Performed by: PHYSICIAN ASSISTANT

## 2022-03-22 PROCEDURE — 11200 RMVL SKIN TAGS UP TO&INC 15: CPT | Mod: 59 | Performed by: PHYSICIAN ASSISTANT

## 2022-03-22 PROCEDURE — 11201 RMVL SKIN TAGS EA ADDL 10: CPT | Mod: 59 | Performed by: PHYSICIAN ASSISTANT

## 2022-03-22 NOTE — LETTER
3/22/2022         RE: Marni Austin  [address] [facility] [address]        Dear Colleague,    Thank you for referring your patient, Marni Austin, to the [facility]. Please see a copy of my visit note below.    HPI:   Chief complaints: Marni Austin is a pleasant 51 year old female who presents for evaluation of irritated skin tags on the neck and buttocks. She also has a mole she would like removed on the forehead      PHYSICAL EXAM:    /88   Pulse 89   LMP 08/30/2013   SpO2 94%   Breastfeeding No   Skin exam performed as follows: Type 2 skin. Mood appropriate  Alert and Oriented X 3. Well developed, well nourished in no distress.  General appearance: Normal  Head including face: Normal  Eyes: conjunctiva and lids: Normal  Mouth: Lips, teeth, gums: Normal  Neck: Normal  Cardiovascular: Exam of peripheral vascular system by observation for swelling, varicosities, edema: Normal  Right upper extremity: Normal  Left upper extremity: Normal  Right lower extremity: Normal  Left lower extremity: Normal  Skin: Scalp and body hair: See below    6 mm tan fleshy papule on the left glabella  Inflamed pedunculated papules on the left neck x 28, right neck x 25, left buttocks x 1      ASSESSMENT/PLAN:     1. Dermal nevus r/o atypicality on the left glabella. Discussed scarring and that she will have a flat round scar. She wishes to proceed with removal. Shave biopsy performed.  Area cleaned and anesthetized with 1% lidocaine with epinephrine.  Dermablade used to remove the lesion and sent to pathology. Bleeding was cauterized. Pt tolerated procedure well with no complications.   2. Inflamed acrochordon on x 28, right neck x 25, left buttocks x 1. Irritated per patient. Snip excision performed to all areas. Bleeding stopped. Pt tolerated well with no complications.           Follow-up: PRN  CC:   Scribed By: Mary Grace Childress, MS, PA-C          Again, thank you for  allowing me to participate in the care of your patient.        Sincerely,        Mary Grace Gibbs PA-C

## 2022-03-22 NOTE — PATIENT INSTRUCTIONS
Wound Care Instructions     FOR SUPERFICIAL WOUNDS     [facility]  [phone]                 AFTER 24 HOURS YOU SHOULD REMOVE THE BANDAGE AND BEGIN DAILY DRESSING CHANGES AS FOLLOWS:     1) Remove Dressing.     2) Clean and dry the area with tap water using a Q-tip or sterile gauze pad.     3) Apply Vaseline, Aquaphor, Polysporin ointment or Bacitracin ointment over entire wound.  Do NOT use Neosporin ointment.     4) Cover the wound with a band-aid, or a sterile non-stick gauze pad and micropore paper tape    REPEAT THESE INSTRUCTIONS AT LEAST ONCE A DAY UNTIL THE WOUND HAS COMPLETELY HEALED.    It is an old wives tale that a wound heals better when it is exposed to air and allowed to dry out. The wound will heal faster with a better cosmetic result if it is kept moist with ointment and covered with a bandage.    **Do not let the wound dry out.**    Supplies Needed:      *Cotton tipped applicators (Q-tips)    *Vaseline, Aquaphor, Polysporin or Bacitracin Ointment (NOT NEOSPORIN)    *Band-aids or non-stick gauze pads and micropore paper tape.      PATIENT INFORMATION:    During the healing process you will notice a number of changes. All wounds develop a small halo of redness surrounding the wound.  This means healing is occurring. Severe itching with extensive redness usually indicates sensitivity to the ointment or bandage tape used to dress the wound.  You should call our office if this develops.      Swelling  and/or discoloration around your surgical site is common, particularly when performed around the eye.    All wounds normally drain.  The larger the wound the more drainage there will be.  After 7-10 days, you will notice the wound beginning to shrink and new skin will begin to grow.  The wound is healed when you can see skin has formed over the entire area.  A healed wound has a healthy, shiny look to the surface and is red to dark pink in color to normalize.  Wounds may  take approximately 4-6 weeks to heal.  Larger wounds may take 6-8 weeks.  After the wound is healed you may discontinue dressing changes.    You may experience a sensation of tightness as your wound heals. This is normal and will gradually subside.    Your healed wound may be sensitive to temperature changes. This sensitivity improves with time, but if you re having a lot of discomfort, try to avoid temperature extremes.    Patients frequently experience itching after their wound appears to have healed because of the continue healing under the skin.  Plain Vaseline will help relieve the itching.      POSSIBLE COMPLICATIONS    BLEEDIN. Leave the bandage in place.  2. Use tightly rolled up gauze or a cloth to apply direct pressure over the bandage for 30  minutes.  3. Reapply pressure for an additional 30 minutes if necessary  4. Use additional gauze and tape to maintain pressure once the bleeding has stopped.

## 2022-03-22 NOTE — TELEPHONE ENCOUNTER
Patient called clinic.    Patient had o/v today w/ MM, per chart note:  Inflamed acrochordon on x 28, right neck x 25, left buttocks x 1. Irritated per patient. Snip excision performed to all areas. Bleeding stopped. Pt tolerated well with no complications.      Per patient a/a on left buttocks will not stop bleeding.    RN advised patient to do the following:  -place pressure on a/a for 15 minutes  -apply vaseline  -apply pressure bandage over vaseling using multiple pieces of guaze and paper tape    Advised patient to call back if bleeding does not stop prior to clinic closing @5pm.    Patient voiced understanding.    CAITY Cavanaugh-BSN-PHN  [facility]  [phone]  
unknown

## 2022-03-22 NOTE — PROGRESS NOTES
HPI:   Chief complaints: Marni Austin is a pleasant 51 year old female who presents for evaluation of irritated skin tags on the neck and buttocks. She also has a mole she would like removed on the forehead      PHYSICAL EXAM:    /88   Pulse 89   LMP 08/30/2013   SpO2 94%   Breastfeeding No   Skin exam performed as follows: Type 2 skin. Mood appropriate  Alert and Oriented X 3. Well developed, well nourished in no distress.  General appearance: Normal  Head including face: Normal  Eyes: conjunctiva and lids: Normal  Mouth: Lips, teeth, gums: Normal  Neck: Normal  Cardiovascular: Exam of peripheral vascular system by observation for swelling, varicosities, edema: Normal  Right upper extremity: Normal  Left upper extremity: Normal  Right lower extremity: Normal  Left lower extremity: Normal  Skin: Scalp and body hair: See below    6 mm tan fleshy papule on the left glabella  Inflamed pedunculated papules on the left neck x 28, right neck x 25, left buttocks x 1      ASSESSMENT/PLAN:     1. Dermal nevus r/o atypicality on the left glabella. Discussed scarring and that she will have a flat round scar. She wishes to proceed with removal. Shave biopsy performed.  Area cleaned and anesthetized with 1% lidocaine with epinephrine.  Dermablade used to remove the lesion and sent to pathology. Bleeding was cauterized. Pt tolerated procedure well with no complications.   2. Inflamed acrochordon on x 28, right neck x 25, left buttocks x 1. Irritated per patient. Snip excision performed to all areas. Bleeding stopped. Pt tolerated well with no complications.           Follow-up: PRN  CC:   Scribed By: Mary Grace Childress, MS, KASIA

## 2022-03-24 LAB
PATH REPORT.COMMENTS IMP SPEC: NORMAL
PATH REPORT.COMMENTS IMP SPEC: NORMAL
PATH REPORT.FINAL DX SPEC: NORMAL
PATH REPORT.GROSS SPEC: NORMAL
PATH REPORT.MICROSCOPIC SPEC OTHER STN: NORMAL
PATH REPORT.RELEVANT HX SPEC: NORMAL

## 2022-03-30 DIAGNOSIS — E11.59 TYPE 2 DIABETES MELLITUS WITH OTHER CIRCULATORY COMPLICATION, WITHOUT LONG-TERM CURRENT USE OF INSULIN (H): ICD-10-CM

## 2022-03-30 NOTE — TELEPHONE ENCOUNTER
Pharmacy requesting prescription for increased dose.    Patient currently taking 0.25 mg dose. Patient states has enough to cover until going to increased dose 0.5mg.    Prescription t'd up for increased dose to be sent to  3 month mail order.    Nellie Heredia RN

## 2022-04-01 PROCEDURE — 82274 ASSAY TEST FOR BLOOD FECAL: CPT | Performed by: PHYSICIAN ASSISTANT

## 2022-04-04 LAB — HEMOCCULT STL QL IA: NEGATIVE

## 2022-04-20 DIAGNOSIS — E87.6 HYPOKALEMIA: ICD-10-CM

## 2022-04-21 RX ORDER — POTASSIUM CHLORIDE 750 MG/1
TABLET, EXTENDED RELEASE ORAL
Qty: 90 TABLET | Refills: 3 | Status: SHIPPED | OUTPATIENT
Start: 2022-04-21 | End: 2023-06-20

## 2022-04-21 NOTE — TELEPHONE ENCOUNTER
Prescription approved per [facility] Refill Protocol.      Dora Ely RN    Potassium   Date Value Ref Range Status   03/08/2022 3.7 3.4 - 5.3 mmol/L Final   04/22/2021 3.8 3.4 - 5.3 mmol/L Final

## 2022-04-29 DIAGNOSIS — I10 HYPERTENSION GOAL BP (BLOOD PRESSURE) < 140/90: ICD-10-CM

## 2022-04-29 DIAGNOSIS — I25.10 CORONARY ARTERY DISEASE INVOLVING NATIVE HEART WITHOUT ANGINA PECTORIS, UNSPECIFIED VESSEL OR LESION TYPE: ICD-10-CM

## 2022-05-02 RX ORDER — METOPROLOL SUCCINATE 25 MG/1
TABLET, EXTENDED RELEASE ORAL
Qty: 90 TABLET | Refills: 3 | Status: SHIPPED | OUTPATIENT
Start: 2022-05-02 | End: 2023-11-08

## 2022-05-02 NOTE — TELEPHONE ENCOUNTER
Prescription approved per [facility] Refill Protocol.  Maddy Moore RN, BSN  [facility]

## 2022-05-19 DIAGNOSIS — E11.59 TYPE 2 DIABETES MELLITUS WITH OTHER CIRCULATORY COMPLICATION, WITHOUT LONG-TERM CURRENT USE OF INSULIN (H): ICD-10-CM

## 2022-05-23 RX ORDER — SEMAGLUTIDE 1.34 MG/ML
INJECTION, SOLUTION SUBCUTANEOUS
Qty: 4.5 ML | Refills: 1 | Status: SHIPPED | OUTPATIENT
Start: 2022-05-23 | End: 2022-10-11

## 2022-06-11 ENCOUNTER — HEALTH MAINTENANCE LETTER (OUTPATIENT)
Age: 52
End: 2022-06-11

## 2022-06-12 ENCOUNTER — E-VISIT (OUTPATIENT)
Dept: FAMILY MEDICINE | Facility: CLINIC | Age: 52
End: 2022-06-12
Payer: COMMERCIAL

## 2022-06-12 DIAGNOSIS — I50.9 CONGESTIVE HEART FAILURE, UNSPECIFIED HF CHRONICITY, UNSPECIFIED HEART FAILURE TYPE (H): Primary | ICD-10-CM

## 2022-06-12 DIAGNOSIS — J45.40 MODERATE PERSISTENT ASTHMA WITHOUT COMPLICATION: ICD-10-CM

## 2022-06-12 DIAGNOSIS — J42 CHRONIC BRONCHITIS, UNSPECIFIED CHRONIC BRONCHITIS TYPE (H): ICD-10-CM

## 2022-06-12 DIAGNOSIS — M17.11 PRIMARY OSTEOARTHRITIS OF RIGHT KNEE: ICD-10-CM

## 2022-06-12 PROCEDURE — 99421 OL DIG E/M SVC 5-10 MIN: CPT | Performed by: PHYSICIAN ASSISTANT

## 2022-06-13 ENCOUNTER — TELEPHONE (OUTPATIENT)
Dept: FAMILY MEDICINE | Facility: CLINIC | Age: 52
End: 2022-06-13
Payer: COMMERCIAL

## 2022-06-24 DIAGNOSIS — I10 HYPERTENSION GOAL BP (BLOOD PRESSURE) < 140/90: ICD-10-CM

## 2022-06-28 ENCOUNTER — TELEPHONE (OUTPATIENT)
Dept: ORTHOPEDICS | Facility: CLINIC | Age: 52
End: 2022-06-28
Payer: COMMERCIAL

## 2022-06-28 DIAGNOSIS — M17.11 PRIMARY OSTEOARTHRITIS OF RIGHT KNEE: Primary | ICD-10-CM

## 2022-06-28 RX ORDER — LOSARTAN POTASSIUM 25 MG/1
TABLET ORAL
Qty: 90 TABLET | Refills: 2 | Status: SHIPPED | OUTPATIENT
Start: 2022-06-28 | End: 2023-03-09

## 2022-06-28 NOTE — PROGRESS NOTES
"ASSESSMENT & PLAN  Patient Instructions     1. Primary osteoarthritis of right knee      -Patient is following up for right knee pain and swelling due to arthritis  -Patient tolerated cortisone injection today without complications.  Patient given postprocedure instruction  -Patient only reports 2 months of relief from her last cortisone injection.  We had a discussion to try viscosupplementation treatment since patient is only getting 2 months of relief from cortisone injections.  -Prior authorization for Synvisc 1 was ordered today.  -Patient will follow up in 4 to 6 weeks to administer the Synvisc 1 injection.  -Call direct clinic number [[phone]] at any time with questions or concerns.    Albert Yeo MD [facility] and Sports Medicine            -----    SUBJECTIVE:  Marni Austin is a 52 year old female who is seen in follow-up for right knee pain.They were last seen 8/4/2021.  Patient states about 1 month ago patient was doing a lot more walking and activity, she was having extreme pain then. Today she is not having a lot of pain today, but is doing a [place]ud [place] in 4 weeks and wants to make sure she is not in pain for this.     Since their last visit reports worsening pain about 4 weeks ago, but pain is doing much better as of today. They indicate that their current pain level is 2/10. They have tried corticosteroid injection (most recent date: 5/24/21) that provided  2 month(s) of relief, hinged knee brace and ibuprofen as needed    The patient is seen by themselves.    Patient's past medical, surgical, social, and family histories were reviewed today and no changes are noted.    REVIEW OF SYSTEMS:  Constitutional: NEGATIVE for fever, chills, change in weight  Skin: NEGATIVE for worrisome rashes, moles or lesions  GI/: NEGATIVE for bowel or bladder changes  Neuro: NEGATIVE for weakness, dizziness or paresthesias    OBJECTIVE:  /84   Ht 1.549 m (5' 1\")  "  Wt 90 kg (198 lb 6.4 oz)   LMP 08/30/2013   BMI 37.49 kg/m     General: healthy, alert and in no distress  HEENT: no scleral icterus or conjunctival erythema  Skin: no suspicious lesions or rash. No jaundice.  CV: regular rhythm by palpation, no pedal edema  Resp: normal respiratory effort without conversational dyspnea   Psych: normal mood and affect  Gait: normal steady gait with appropriate coordination and balance  Neuro: normal light touch sensory exam of the extremities.    MSK:  RIGHT KNEE  Inspection:    normal alignment  Palpation:    Tender about the lateral patellar facet, medial patellar facet and medial joint line. Remainder of bony and ligamentous landmarks are nontender.    Mild effusion is present    Patellofemoral crepitus is Present  Range of Motion:     00 extension to 1100 flexion  Strength:    Quadriceps grossly intact    Extensor mechanism intact  Special Tests:    Positive: none    Negative: MCL/valgus stress (0 & 30 deg), LCL/varus stress (0 & 30 deg), Lachman's, anterior drawer, posterior drawer    Independent visualization of the below image:  Large Joint Injection/Arthocentesis: R knee joint    Date/Time: 6/29/2022 3:55 PM  Performed by: Yeo, Albert, MD  Authorized by: Yeo, Albert, MD     Indications:  Pain and osteoarthritis  Needle Size:  25 G  Guidance: ultrasound    Location:  Knee      Medications:  40 mg methylPREDNISolone 40 MG/ML  Aspirate amount (mL):  26  Aspirate:  Serous and yellow  Outcome:  Tolerated well, no immediate complications  Procedure discussed: discussed risks, benefits, and alternatives    Consent Given by:  Patient  Prep: patient was prepped and draped in usual sterile fashion          Patient's conditions were thoroughly discussed during today's visit with total time spent face-to-face with the patient and documentation being 25 minutes.    Albert Yeo MD, [facility] Orthopedic [facility]

## 2022-06-28 NOTE — TELEPHONE ENCOUNTER
Patient scheduled for appointment on 6/29/2022 for discussion of viscosupplementation injection vs steroid injection of right knee.      Steroid  injection last completed 5/24/2021.       Prior authorization referral for Durolane injection pended.     Please advise.    Jeanna Scott, ATC

## 2022-06-28 NOTE — TELEPHONE ENCOUNTER
Prescription approved per [facility] Refill Protocol.  Isabel Ramirez RN

## 2022-06-29 ENCOUNTER — OFFICE VISIT (OUTPATIENT)
Dept: ORTHOPEDICS | Facility: CLINIC | Age: 52
End: 2022-06-29
Payer: COMMERCIAL

## 2022-06-29 VITALS
DIASTOLIC BLOOD PRESSURE: 84 MMHG | BODY MASS INDEX: 37.46 KG/M2 | HEIGHT: 61 IN | WEIGHT: 198.4 LBS | SYSTOLIC BLOOD PRESSURE: 123 MMHG

## 2022-06-29 DIAGNOSIS — M17.11 PRIMARY OSTEOARTHRITIS OF RIGHT KNEE: Primary | ICD-10-CM

## 2022-06-29 PROCEDURE — 99213 OFFICE O/P EST LOW 20 MIN: CPT | Mod: 25 | Performed by: FAMILY MEDICINE

## 2022-06-29 PROCEDURE — 20611 DRAIN/INJ JOINT/BURSA W/US: CPT | Mod: RT | Performed by: FAMILY MEDICINE

## 2022-06-29 RX ORDER — METHYLPREDNISOLONE ACETATE 40 MG/ML
40 INJECTION, SUSPENSION INTRA-ARTICULAR; INTRALESIONAL; INTRAMUSCULAR; SOFT TISSUE
Status: SHIPPED | OUTPATIENT
Start: 2022-06-29

## 2022-06-29 RX ADMIN — METHYLPREDNISOLONE ACETATE 40 MG: 40 INJECTION, SUSPENSION INTRA-ARTICULAR; INTRALESIONAL; INTRAMUSCULAR; SOFT TISSUE at 15:55

## 2022-06-29 NOTE — LETTER
6/29/2022         RE: Marni Austin  [address] [facility] [address]        Dear Colleague,    Thank you for referring your patient, Marni Austin, to the [facility] [place]. Please see a copy of my visit note below.    ASSESSMENT & PLAN  Patient Instructions     1. Primary osteoarthritis of right knee      -Patient is following up for right knee pain and swelling due to arthritis  -Patient tolerated cortisone injection today without complications.  Patient given postprocedure instruction  -Patient only reports 2 months of relief from her last cortisone injection.  We had a discussion to try viscosupplementation treatment since patient is only getting 2 months of relief from cortisone injections.  -Prior authorization for Synvisc 1 was ordered today.  -Patient will follow up in 4 to 6 weeks to administer the Synvisc 1 injection.  -Call direct clinic number [[phone]] at any time with questions or concerns.    Albert Yeo MD [facility] Orthopedics and Sports Medicine  [facility] [place]          -----    SUBJECTIVE:  Marni Austin is a 52 year old female who is seen in follow-up for right knee pain.They were last seen 8/4/2021.  Patient states about 1 month ago patient was doing a lot more walking and activity, she was having extreme pain then. Today she is not having a lot of pain today, but is doing a Mud Run in 4 weeks and wants to make sure she is not in pain for this.     Since their last visit reports worsening pain about 4 weeks ago, but pain is doing much better as of today. They indicate that their current pain level is 2/10. They have tried corticosteroid injection (most recent date: 5/24/21) that provided  2 month(s) of relief, hinged knee brace and ibuprofen as needed    The patient is seen by themselves.    Patient's past medical, surgical, social, and family histories were reviewed today and no changes are noted.    REVIEW OF  "SYSTEMS:  Constitutional: NEGATIVE for fever, chills, change in weight  Skin: NEGATIVE for worrisome rashes, moles or lesions  GI/: NEGATIVE for bowel or bladder changes  Neuro: NEGATIVE for weakness, dizziness or paresthesias    OBJECTIVE:  /84   Ht 1.549 m (5' 1\")   Wt 90 kg (198 lb 6.4 oz)   LMP 08/30/2013   BMI 37.49 kg/m     General: healthy, alert and in no distress  HEENT: no scleral icterus or conjunctival erythema  Skin: no suspicious lesions or rash. No jaundice.  CV: regular rhythm by palpation, no pedal edema  Resp: normal respiratory effort without conversational dyspnea   Psych: normal mood and affect  Gait: normal steady gait with appropriate coordination and balance  Neuro: normal light touch sensory exam of the extremities.    MSK:  RIGHT KNEE  Inspection:    normal alignment  Palpation:    Tender about the lateral patellar facet, medial patellar facet and medial joint line. Remainder of bony and ligamentous landmarks are nontender.    Mild effusion is present    Patellofemoral crepitus is Present  Range of Motion:     00 extension to 1100 flexion  Strength:    Quadriceps grossly intact    Extensor mechanism intact  Special Tests:    Positive: none    Negative: MCL/valgus stress (0 & 30 deg), LCL/varus stress (0 & 30 deg), Lachman's, anterior drawer, posterior drawer    Independent visualization of the below image:  Large Joint Injection/Arthocentesis: R knee joint    Date/Time: 6/29/2022 3:55 PM  Performed by: Yeo, Albert, MD  Authorized by: Yeo, Albert, MD     Indications:  Pain and osteoarthritis  Needle Size:  25 G  Guidance: ultrasound    Location:  Knee      Medications:  40 mg methylPREDNISolone 40 MG/ML  Aspirate amount (mL):  26  Aspirate:  Serous and yellow  Outcome:  Tolerated well, no immediate complications  Procedure discussed: discussed risks, benefits, and alternatives    Consent Given by:  Patient  Prep: patient was prepped and draped in usual sterile fashion  "         Patient's conditions were thoroughly discussed during today's visit with total time spent face-to-face with the patient and documentation being 25 minutes.    Albert Yeo MD, [facility]            Again, thank you for allowing me to participate in the care of your patient.        Sincerely,        Albert Yeo, MD

## 2022-07-11 ASSESSMENT — PATIENT HEALTH QUESTIONNAIRE - PHQ9
10. IF YOU CHECKED OFF ANY PROBLEMS, HOW DIFFICULT HAVE THESE PROBLEMS MADE IT FOR YOU TO DO YOUR WORK, TAKE CARE OF THINGS AT HOME, OR GET ALONG WITH OTHER PEOPLE: SOMEWHAT DIFFICULT
SUM OF ALL RESPONSES TO PHQ QUESTIONS 1-9: 2
SUM OF ALL RESPONSES TO PHQ QUESTIONS 1-9: 2

## 2022-07-13 ASSESSMENT — ENCOUNTER SYMPTOMS
SORE THROAT: 0
HEMATOCHEZIA: 0
HEADACHES: 0
HEMATURIA: 0
CHILLS: 0
BREAST MASS: 0
PARESTHESIAS: 0
DYSURIA: 0
EYE PAIN: 0
MYALGIAS: 0
NERVOUS/ANXIOUS: 0
SHORTNESS OF BREATH: 0
JOINT SWELLING: 0
ABDOMINAL PAIN: 0
HEARTBURN: 0
CONSTIPATION: 0
DIZZINESS: 0
COUGH: 0
DIARRHEA: 0
ARTHRALGIAS: 0
WEAKNESS: 0
FEVER: 0
NAUSEA: 0
PALPITATIONS: 0
FREQUENCY: 0

## 2022-07-14 ENCOUNTER — OFFICE VISIT (OUTPATIENT)
Dept: FAMILY MEDICINE | Facility: CLINIC | Age: 52
End: 2022-07-14
Payer: COMMERCIAL

## 2022-07-14 VITALS
RESPIRATION RATE: 18 BRPM | SYSTOLIC BLOOD PRESSURE: 124 MMHG | HEART RATE: 74 BPM | BODY MASS INDEX: 37.38 KG/M2 | WEIGHT: 198 LBS | OXYGEN SATURATION: 96 % | TEMPERATURE: 97.6 F | HEIGHT: 61 IN | DIASTOLIC BLOOD PRESSURE: 78 MMHG

## 2022-07-14 DIAGNOSIS — Z13.220 SCREENING FOR HYPERLIPIDEMIA: ICD-10-CM

## 2022-07-14 DIAGNOSIS — Z23 HIGH PRIORITY FOR 2019-NCOV VACCINE: ICD-10-CM

## 2022-07-14 DIAGNOSIS — I25.119 CORONARY ARTERY DISEASE INVOLVING NATIVE HEART WITH ANGINA PECTORIS, UNSPECIFIED VESSEL OR LESION TYPE (H): ICD-10-CM

## 2022-07-14 DIAGNOSIS — E11.59 TYPE 2 DIABETES MELLITUS WITH OTHER CIRCULATORY COMPLICATION, WITHOUT LONG-TERM CURRENT USE OF INSULIN (H): ICD-10-CM

## 2022-07-14 DIAGNOSIS — Z12.31 VISIT FOR SCREENING MAMMOGRAM: ICD-10-CM

## 2022-07-14 DIAGNOSIS — E66.01 MORBID OBESITY (H): ICD-10-CM

## 2022-07-14 DIAGNOSIS — Z00.00 ROUTINE GENERAL MEDICAL EXAMINATION AT A HEALTH CARE FACILITY: Primary | ICD-10-CM

## 2022-07-14 DIAGNOSIS — I50.9 CONGESTIVE HEART FAILURE, UNSPECIFIED HF CHRONICITY, UNSPECIFIED HEART FAILURE TYPE (H): ICD-10-CM

## 2022-07-14 DIAGNOSIS — E11.59 TYPE 2 DIABETES MELLITUS WITH OTHER CIRCULATORY COMPLICATION, WITHOUT LONG-TERM CURRENT USE OF INSULIN (H): Primary | ICD-10-CM

## 2022-07-14 LAB — HBA1C MFR BLD: 7.3 % (ref 0–5.6)

## 2022-07-14 PROCEDURE — 99396 PREV VISIT EST AGE 40-64: CPT | Mod: 25 | Performed by: PHYSICIAN ASSISTANT

## 2022-07-14 PROCEDURE — 36415 COLL VENOUS BLD VENIPUNCTURE: CPT | Performed by: PHYSICIAN ASSISTANT

## 2022-07-14 PROCEDURE — 80061 LIPID PANEL: CPT | Performed by: PHYSICIAN ASSISTANT

## 2022-07-14 PROCEDURE — 90471 IMMUNIZATION ADMIN: CPT | Performed by: PHYSICIAN ASSISTANT

## 2022-07-14 PROCEDURE — 91306 COVID-19,PF,MODERNA (18+ YRS BOOSTER .25ML): CPT | Performed by: PHYSICIAN ASSISTANT

## 2022-07-14 PROCEDURE — 99214 OFFICE O/P EST MOD 30 MIN: CPT | Mod: 25 | Performed by: PHYSICIAN ASSISTANT

## 2022-07-14 PROCEDURE — 0064A COVID-19,PF,MODERNA (18+ YRS BOOSTER .25ML): CPT | Performed by: PHYSICIAN ASSISTANT

## 2022-07-14 PROCEDURE — 90677 PCV20 VACCINE IM: CPT | Performed by: PHYSICIAN ASSISTANT

## 2022-07-14 PROCEDURE — 83036 HEMOGLOBIN GLYCOSYLATED A1C: CPT | Performed by: PHYSICIAN ASSISTANT

## 2022-07-14 PROCEDURE — 83721 ASSAY OF BLOOD LIPOPROTEIN: CPT | Mod: 59 | Performed by: PHYSICIAN ASSISTANT

## 2022-07-14 PROCEDURE — 84460 ALANINE AMINO (ALT) (SGPT): CPT | Performed by: PHYSICIAN ASSISTANT

## 2022-07-14 PROCEDURE — 82043 UR ALBUMIN QUANTITATIVE: CPT | Performed by: PHYSICIAN ASSISTANT

## 2022-07-14 ASSESSMENT — ENCOUNTER SYMPTOMS
EYE PAIN: 0
FREQUENCY: 0
HEADACHES: 0
NAUSEA: 0
COUGH: 0
BREAST MASS: 0
WEAKNESS: 0
NERVOUS/ANXIOUS: 0
MYALGIAS: 0
DIZZINESS: 0
CHILLS: 0
PALPITATIONS: 0
ARTHRALGIAS: 0
SHORTNESS OF BREATH: 0
JOINT SWELLING: 0
CONSTIPATION: 0
HEMATURIA: 0
HEARTBURN: 0
HEMATOCHEZIA: 0
ABDOMINAL PAIN: 0
FEVER: 0
PARESTHESIAS: 0
DYSURIA: 0
DIARRHEA: 0
SORE THROAT: 0

## 2022-07-14 ASSESSMENT — PAIN SCALES - GENERAL: PAINLEVEL: NO PAIN (0)

## 2022-07-14 NOTE — PATIENT INSTRUCTIONS
Get the eye exam done and send us results!  To schedule your mammogram at any of the [place] locations, call [phone].  3.    Let me know the NAMES of your inhalers your using; AND start using flovent 2 puffs twice daily   4.    Check with insurance and see if there are any AFFORDABLE LABA inhalers (long acting beta agonists)   --if we cant get you one of those before your race -- use the albuterol 2 puffs around 15 minutes before the race        Preventive Health Recommendations  Female Ages 50 - 64    Yearly exam: See your health care provider every year in order to  Review health changes.   Discuss preventive care.    Review your medicines if your doctor has prescribed any.    Get a Pap test every three years (unless you have an abnormal result and your provider advises testing more often).  If you get Pap tests with HPV test, you only need to test every 5 years, unless you have an abnormal result.   You do not need a Pap test if your uterus was removed (hysterectomy) and you have not had cancer.  You should be tested each year for STDs (sexually transmitted diseases) if you're at risk.   Have a mammogram every 1 to 2 years.  Have a colonoscopy at age 50, or have a yearly FIT test (stool test). These exams screen for colon cancer.    Have a cholesterol test every 5 years, or more often if advised.  Have a diabetes test (fasting glucose) every three years. If you are at risk for diabetes, you should have this test more often.   If you are at risk for osteoporosis (brittle bone disease), think about having a bone density scan (DEXA).    Shots: Get a flu shot each year. Get a tetanus shot every 10 years.    Nutrition:   Eat at least 5 servings of fruits and vegetables each day.  Eat whole-grain bread, whole-wheat pasta and brown rice instead of white grains and rice.  Get adequate Calcium and Vitamin D.     Lifestyle  Exercise at least 150 minutes a week (30 minutes a day, 5 days a week). This will help you  control your weight and prevent disease.  Limit alcohol to one drink per day.  No smoking.   Wear sunscreen to prevent skin cancer.   See your dentist every six months for an exam and cleaning.  See your eye doctor every 1 to 2 years.

## 2022-07-14 NOTE — PROGRESS NOTES
SUBJECTIVE:   CC: Marni Austin is an 52 year old woman who presents for preventive health visit.     Patient has been advised of split billing requirements and indicates understanding: Yes     Healthy Habits:     Getting at least 3 servings of Calcium per day:  Yes    Bi-annual eye exam:  Yes    Dental care twice a year:  Yes    Sleep apnea or symptoms of sleep apnea:  None    Diet:  Regular (no restrictions)    Frequency of exercise:  None    Taking medications regularly:  Yes    PHQ-2 Total Score: 2    Marni Austin is a 52 year old female who presents today for annual check up  Her spouse passed since we last saw her and she is really taking that hard  Has continued smoking as a result  She did tolerate ozempic addition overall and notes improvements in sugar numbers and weight      Today's PHQ-2 Score:   PHQ-2 ( 1999 Pf[employer]) 7/13/2022   Q1: Little interest or pleasure in doing things 1   Q2: Feeling down, depressed or hopeless 1   PHQ-2 Score 2   PHQ-2 Total Score (12-17 Years)- Positive if 3 or more points; Administer PHQ-A if positive -   Q1: Little interest or pleasure in doing things Several days   Q2: Feeling down, depressed or hopeless Several days   PHQ-2 Score 2     Abuse: Current or Past (Physical, Sexual or Emotional) - No  Do you feel safe in your environment? Yes    Social History     Tobacco Use     Smoking status: Current Some Day Smoker     Packs/day: 0.50     Years: 26.00     Pack years: 13.00     Types: Cigarettes     Start date: 1/1/1979     Last attempt to quit: 8/13/2018     Years since quitting: 3.9     Smokeless tobacco: Never Used     Tobacco comment: working on it. somedays i dont some i do   Substance Use Topics     Alcohol use: No     If you drink alcohol do you typically have >3 drinks per day or >7 drinks per week? No    No flowsheet data found.    Reviewed orders with patient.  Reviewed health maintenance and updated orders accordingly - Yes  Lab work is in process  Labs  "reviewed in [facility]    Breast Cancer Screening:    Breast CA Risk Assessment (FHS-7) 3/23/2021   Do you have a family history of breast, colon, or ovarian cancer? No / Unknown       H  PAP / HPV Latest Ref Rng & Units 3/8/2022 2/27/2017 7/3/2008   PAP   Negative for Intraepithelial Lesion or Malignancy (NILM) - -   PAP (Historical) - - NIL NIL   HPV16 Negative Negative Negative -   HPV18 Negative Negative Negative -   HRHPV Negative Negative Negative -     Reviewed and updated as needed this visit by clinical staff   Tobacco  Allergies  Meds   Med Hx  Surg Hx  Fam Hx  Soc Hx          Reviewed and updated as needed this visit by Provider                     Review of Systems   Constitutional: Negative for chills and fever.   HENT: Negative for congestion, ear pain, hearing loss and sore throat.    Eyes: Negative for pain and visual disturbance.   Respiratory: Negative for cough and shortness of breath.    Cardiovascular: Negative for chest pain, palpitations and peripheral edema.   Gastrointestinal: Negative for abdominal pain, constipation, diarrhea, heartburn, hematochezia and nausea.   Breasts:  Negative for tenderness, breast mass and discharge.   Genitourinary: Negative for dysuria, frequency, genital sores, hematuria, pelvic pain, urgency, vaginal bleeding and vaginal discharge.   Musculoskeletal: Negative for arthralgias, joint swelling and myalgias.   Skin: Negative for rash.   Neurological: Negative for dizziness, weakness, headaches and paresthesias.   Psychiatric/Behavioral: Negative for mood changes. The patient is not nervous/anxious.         OBJECTIVE:   /78 (BP Location: Right arm, Patient Position: Sitting, Cuff Size: Adult Large)   Pulse 74   Temp 97.6  F (36.4  C) (Tympanic)   Resp 18   Ht 1.549 m (5' 1\")   Wt 89.8 kg (198 lb)   LMP 08/30/2013   SpO2 96%   PF 96 L/min   BMI 37.41 kg/m    Physical Exam  GENERAL: healthy, alert and no distress  EYES: Eyes grossly normal to " inspection, PERRL and conjunctivae and sclerae normal  HENT: ear canals and TM's normal, nose and mouth without ulcers or lesions  RESP: wheeze in the low bases; limited expiratory effort  CV: regular rates and rhythm  MS: No peripheral edema   SKIN: no suspicious lesions or rashes  PSYCH: mentation appears normal, affect normal/bright    Diagnostic Test Results:  Labs reviewed in Epic    ASSESSMENT/PLAN:   1. Routine general medical examination at a health care facility  Reviewed personal and family history. Reviewed age appropriate screenings. Recommended any needed vaccinations.    2. Screening for hyperlipidemia  Updating today; continue to push aggressive risk factor reduction    3. Visit for screening mammogram  Due  - *MA Screening Digital Bilateral; Future    4. Congestive heart failure, unspecified HF chronicity, unspecified heart failure type (H)  Controlled. No evidence for overload. She is on BB and ARB; along with diuretic. Consider switch to spirinolactone?    5. Morbid obesity (H)  Continue to focus on lifestyle improvement to help with dm2, htn, chf etc. Ozempic has helped    6. Coronary artery disease involving native heart with angina pectoris, unspecified vessel or lesion type (H)  Modify risk factors; back to smoking again after spouse passed.     7. Type 2 diabetes mellitus with other circulatory complication, without long-term current use of insulin (H)  Significant improvement on ozempic. Increasing dose today  - Pneumococcal 20 Valent Conjugate (Prevnar 20)  - Albumin Random Urine Quantitative with Creat Ratio  - Lipid panel reflex to direct LDL Fasting  - Hemoglobin A1c  - ALT  - Semaglutide, 1 MG/DOSE, 4 MG/3ML SOPN; Inject 1 mg Subcutaneous every 7 days  Dispense: 9 mL; Refill: 1    8. High priority for 2019-nCoV vaccine  Updating   - COVID-19,PF,MODERNA (18+ Yrs BOOSTER .25mL)        COUNSELING:  Reviewed preventive health counseling, as reflected in patient instructions    Estimated body  "mass index is 37.41 kg/m  as calculated from the following:    Height as of this encounter: 1.549 m (5' 1\").    Weight as of this encounter: 89.8 kg (198 lb).        She reports that she has been smoking cigarettes. She started smoking about 43 years ago. She has a 13.00 pack-year smoking history. She has never used smokeless tobacco.  Tobacco Cessation Action Plan:   She'll consider the chantix once again      Counseling Resources:  ATP IV Guidelines  Pooled Cohorts Equation Calculator  Breast Cancer Risk Calculator  BRCA-Related Cancer Risk Assessment: FHS-7 Tool  FRAX Risk Assessment  ICSI Preventive Guidelines  Dietary Guidelines for Americans, 2010  [employer]'s MyPlate  ASA Prophylaxis  Lung CA Screening    David Gambino PA-C  [facility] [place]  Answers for HPI/ROS submitted by the patient on 7/11/2022  If you checked off any problems, how difficult have these problems made it for you to do your work, take care of things at home, or get along with other people?: Somewhat difficult  PHQ9 TOTAL SCORE: 2      "

## 2022-07-15 LAB
ALT SERPL W P-5'-P-CCNC: 30 U/L (ref 0–50)
CHOLEST SERPL-MCNC: 179 MG/DL
FASTING STATUS PATIENT QL REPORTED: NO
HDLC SERPL-MCNC: 25 MG/DL
LDLC SERPL CALC-MCNC: 82 MG/DL
LDLC SERPL CALC-MCNC: ABNORMAL MG/DL
NONHDLC SERPL-MCNC: 154 MG/DL
TRIGL SERPL-MCNC: 943 MG/DL

## 2022-07-16 LAB
CREAT UR-MCNC: 126 MG/DL
MICROALBUMIN UR-MCNC: 15 MG/L
MICROALBUMIN/CREAT UR: 11.9 MG/G CR (ref 0–25)

## 2022-07-22 NOTE — PROGRESS NOTES
ASSESSMENT & PLAN  Patient Instructions     1. Primary osteoarthritis of right knee      -Patient is following up for right knee pain due to arthritis  -Patient tolerated aspiration and Durolane injection today without complications.  Patient given postprocedure instructions  -Patient will follow up when pain and swelling return  -Call direct clinic number [[phone]] at any time with questions or concerns.    Albert Yeo MD [facility] Orthopedics and Sports Medicine  [facility]          -----    SUBJECTIVE:  Marni Austin is a 52 year old female who is seen for right knee viscosupplementation injection       Patient rates pain as 0/10 pre-procedure, states pain waxes and wanes. Patient rates pain as 0/10 post-procedure.    Large Joint Injection/Arthocentesis: R knee joint    Date/Time: 7/26/2022 3:50 PM  Performed by: Yeo, Albert, MD  Authorized by: Yeo, Albert, MD     Indications:  Pain and osteoarthritis  Needle Size:  22 G  Guidance: ultrasound    Approach:  Lateral  Location:  Knee      Medications:  60 mg sodium hyaluronate 60 MG/3ML  Aspirate amount (mL):  11  Aspirate:  Yellow  Outcome:  Tolerated well, no immediate complications  Procedure discussed: discussed risks, benefits, and alternatives    Consent Given by:  Patient  Prep: patient was prepped and draped in usual sterile fashion          Albert Yeo MD, [facility]

## 2022-07-26 ENCOUNTER — OFFICE VISIT (OUTPATIENT)
Dept: ORTHOPEDICS | Facility: CLINIC | Age: 52
End: 2022-07-26
Payer: COMMERCIAL

## 2022-07-26 VITALS
DIASTOLIC BLOOD PRESSURE: 78 MMHG | BODY MASS INDEX: 37.38 KG/M2 | SYSTOLIC BLOOD PRESSURE: 120 MMHG | WEIGHT: 198 LBS | HEIGHT: 61 IN

## 2022-07-26 DIAGNOSIS — M17.11 PRIMARY OSTEOARTHRITIS OF RIGHT KNEE: Primary | ICD-10-CM

## 2022-07-26 PROCEDURE — 20611 DRAIN/INJ JOINT/BURSA W/US: CPT | Mod: RT | Performed by: FAMILY MEDICINE

## 2022-07-26 NOTE — PATIENT INSTRUCTIONS
1. Primary osteoarthritis of right knee      -Patient is following up for right knee pain due to arthritis  -Patient tolerated aspiration and Durolane injection today without complications.  Patient given postprocedure instructions  -Patient will follow up when pain and swelling return  -Call direct clinic number [[phone]] at any time with questions or concerns.    Albert Yeo MD CAQSM  [place] [facility]  [facility] [place]

## 2022-07-26 NOTE — LETTER
7/26/2022         RE: Marni Austin  [address] [facility] [address]        Dear Colleague,    Thank you for referring your patient, Marni Austin, to the [facility]ICINE [facility]INIC [place]. Please see a copy of my visit note below.    ASSESSMENT & PLAN  Patient Instructions     1. Primary osteoarthritis of right knee      -Patient is following up for right knee pain due to arthritis  -Patient tolerated aspiration and Durolane injection today without complications.  Patient given postprocedure instructions  -Patient will follow up when pain and swelling return  -Call direct clinic number [[phone]] at any time with questions or concerns.    Albert Yeo MD [facility]  [facility]          -----    SUBJECTIVE:  Marni Austin is a 52 year old female who is seen for right knee viscosupplementation injection       Patient rates pain as 0/10 pre-procedure, states pain waxes and wanes. Patient rates pain as 0/10 post-procedure.    Large Joint Injection/Arthocentesis: R knee joint    Date/Time: 7/26/2022 3:50 PM  Performed by: Yeo, Albert, MD  Authorized by: Yeo, Albert, MD     Indications:  Pain and osteoarthritis  Needle Size:  22 G  Guidance: ultrasound    Approach:  Lateral  Location:  Knee      Medications:  60 mg sodium hyaluronate 60 MG/3ML  Aspirate amount (mL):  11  Aspirate:  Yellow  Outcome:  Tolerated well, no immediate complications  Procedure discussed: discussed risks, benefits, and alternatives    Consent Given by:  Patient  Prep: patient was prepped and draped in usual sterile fashion          Albert Yeo MD, [facility]        Again, thank you for allowing me to participate in the care of your patient.        Sincerely,        Albert Yeo, MD

## 2022-08-06 ENCOUNTER — HEALTH MAINTENANCE LETTER (OUTPATIENT)
Age: 52
End: 2022-08-06

## 2022-09-20 DIAGNOSIS — E11.59 TYPE 2 DIABETES MELLITUS WITH OTHER CIRCULATORY COMPLICATION, WITHOUT LONG-TERM CURRENT USE OF INSULIN (H): ICD-10-CM

## 2022-09-21 RX ORDER — BLOOD SUGAR DIAGNOSTIC
STRIP MISCELLANEOUS
Qty: 100 STRIP | Refills: 0 | Status: SHIPPED | OUTPATIENT
Start: 2022-09-21 | End: 2022-11-29

## 2022-09-21 NOTE — TELEPHONE ENCOUNTER
Patient needs to be seen for diabetes before more refills are authorized     Abiodun Russell RN on 9/21/2022 at 1:25 PM

## 2022-09-23 DIAGNOSIS — I25.10 CORONARY ARTERY DISEASE INVOLVING NATIVE HEART WITHOUT ANGINA PECTORIS, UNSPECIFIED VESSEL OR LESION TYPE: ICD-10-CM

## 2022-09-23 DIAGNOSIS — I10 HYPERTENSION GOAL BP (BLOOD PRESSURE) < 140/90: ICD-10-CM

## 2022-09-26 RX ORDER — CHLORTHALIDONE 25 MG/1
TABLET ORAL
Qty: 45 TABLET | Refills: 0 | Status: SHIPPED | OUTPATIENT
Start: 2022-09-26 | End: 2022-12-15

## 2022-09-26 NOTE — TELEPHONE ENCOUNTER
Prescription approved per [facility] Refill Protocol.    Kyleigh ENGLAND RN, BSN, PHN  [facility]

## 2022-09-30 ENCOUNTER — TELEPHONE (OUTPATIENT)
Dept: FAMILY MEDICINE | Facility: CLINIC | Age: 52
End: 2022-09-30

## 2022-09-30 NOTE — TELEPHONE ENCOUNTER
Patient Quality Outreach    Patient is due for the following:   Diabetes -  Eye Exam and Foot Exam      Topic Date Due     Hepatitis B Vaccine (1 of 3 - Risk 3-dose series) Never done     Zoster (Shingles) Vaccine (1 of 2) Never done     Flu Vaccine (1) 09/01/2022     COVID-19 Vaccine (4 - Booster for Moderna series) 09/08/2022       Next Steps:   Schedule a nurse only visit for Vaccinations office visit for Diabetes    Type of outreach:    Sent [employer] message.      Questions for provider review:    None     Gema Garzon MA

## 2022-10-11 ENCOUNTER — E-VISIT (OUTPATIENT)
Dept: FAMILY MEDICINE | Facility: CLINIC | Age: 52
End: 2022-10-11
Payer: COMMERCIAL

## 2022-10-11 DIAGNOSIS — J32.9 RHINOSINUSITIS: ICD-10-CM

## 2022-10-11 DIAGNOSIS — J44.1 CHRONIC OBSTRUCTIVE PULMONARY DISEASE WITH ACUTE EXACERBATION (H): Primary | ICD-10-CM

## 2022-10-11 PROCEDURE — 99421 OL DIG E/M SVC 5-10 MIN: CPT | Performed by: PHYSICIAN ASSISTANT

## 2022-10-11 RX ORDER — PREDNISONE 20 MG/1
40 TABLET ORAL DAILY
Qty: 10 TABLET | Refills: 0 | Status: SHIPPED | OUTPATIENT
Start: 2022-10-11 | End: 2023-01-26

## 2022-10-11 RX ORDER — DOXYCYCLINE 100 MG/1
100 TABLET ORAL 2 TIMES DAILY
Qty: 20 TABLET | Refills: 0 | Status: SHIPPED | OUTPATIENT
Start: 2022-10-11 | End: 2022-10-21

## 2022-10-22 ENCOUNTER — HEALTH MAINTENANCE LETTER (OUTPATIENT)
Age: 52
End: 2022-10-22

## 2022-10-25 DIAGNOSIS — E11.59 TYPE 2 DIABETES MELLITUS WITH OTHER CIRCULATORY COMPLICATION, WITHOUT LONG-TERM CURRENT USE OF INSULIN (H): ICD-10-CM

## 2022-10-26 RX ORDER — BLOOD SUGAR DIAGNOSTIC
STRIP MISCELLANEOUS
Qty: 100 STRIP | Refills: 3
Start: 2022-10-26

## 2022-11-09 DIAGNOSIS — J44.1 CHRONIC OBSTRUCTIVE PULMONARY DISEASE WITH ACUTE EXACERBATION (H): ICD-10-CM

## 2022-11-10 RX ORDER — ALBUTEROL SULFATE 0.83 MG/ML
SOLUTION RESPIRATORY (INHALATION)
Qty: 180 ML | Refills: 1 | Status: SHIPPED | OUTPATIENT
Start: 2022-11-10

## 2022-11-10 NOTE — TELEPHONE ENCOUNTER
ACT not needed since patient has COPD.     Prescription approved per [facility] Refill Protocol.    Aure Ornelas RN on 11/10/2022 at 11:39 AM

## 2022-11-20 DIAGNOSIS — E11.59 TYPE 2 DIABETES MELLITUS WITH OTHER CIRCULATORY COMPLICATION, WITHOUT LONG-TERM CURRENT USE OF INSULIN (H): ICD-10-CM

## 2022-11-22 RX ORDER — SEMAGLUTIDE 1.34 MG/ML
INJECTION, SOLUTION SUBCUTANEOUS
Qty: 9 ML | Refills: 1 | Status: SHIPPED | OUTPATIENT
Start: 2022-11-22 | End: 2023-06-30

## 2022-11-27 DIAGNOSIS — E11.59 TYPE 2 DIABETES MELLITUS WITH OTHER CIRCULATORY COMPLICATION, WITHOUT LONG-TERM CURRENT USE OF INSULIN (H): ICD-10-CM

## 2022-11-29 RX ORDER — BLOOD SUGAR DIAGNOSTIC
STRIP MISCELLANEOUS
Qty: 100 STRIP | Refills: 3 | Status: SHIPPED | OUTPATIENT
Start: 2022-11-29 | End: 2023-10-24

## 2022-11-29 NOTE — TELEPHONE ENCOUNTER
Prescription approved per [facility] Refill Protocol.    Orin HANNA RN   Patient Advocate Liaison (PAL)  [facility]

## 2022-12-06 DIAGNOSIS — J45.40 MODERATE PERSISTENT ASTHMA WITHOUT COMPLICATION: ICD-10-CM

## 2022-12-09 RX ORDER — ALBUTEROL SULFATE 90 UG/1
AEROSOL, METERED RESPIRATORY (INHALATION)
Qty: 20.1 G | Refills: 3 | Status: SHIPPED | OUTPATIENT
Start: 2022-12-09

## 2022-12-15 DIAGNOSIS — Z13.6 CARDIOVASCULAR SCREENING; LDL GOAL LESS THAN 130: ICD-10-CM

## 2022-12-15 DIAGNOSIS — I25.10 CORONARY ARTERY DISEASE INVOLVING NATIVE HEART WITHOUT ANGINA PECTORIS, UNSPECIFIED VESSEL OR LESION TYPE: ICD-10-CM

## 2022-12-16 RX ORDER — ATORVASTATIN CALCIUM 80 MG/1
TABLET, FILM COATED ORAL
Qty: 90 TABLET | Refills: 1 | Status: SHIPPED | OUTPATIENT
Start: 2022-12-16 | End: 2023-06-28

## 2023-01-23 ASSESSMENT — PATIENT HEALTH QUESTIONNAIRE - PHQ9
10. IF YOU CHECKED OFF ANY PROBLEMS, HOW DIFFICULT HAVE THESE PROBLEMS MADE IT FOR YOU TO DO YOUR WORK, TAKE CARE OF THINGS AT HOME, OR GET ALONG WITH OTHER PEOPLE: SOMEWHAT DIFFICULT
SUM OF ALL RESPONSES TO PHQ QUESTIONS 1-9: 9
SUM OF ALL RESPONSES TO PHQ QUESTIONS 1-9: 9

## 2023-01-26 ENCOUNTER — OFFICE VISIT (OUTPATIENT)
Dept: FAMILY MEDICINE | Facility: CLINIC | Age: 53
End: 2023-01-26
Payer: COMMERCIAL

## 2023-01-26 VITALS
TEMPERATURE: 97.9 F | DIASTOLIC BLOOD PRESSURE: 68 MMHG | BODY MASS INDEX: 38.32 KG/M2 | RESPIRATION RATE: 13 BRPM | HEIGHT: 60 IN | HEART RATE: 80 BPM | SYSTOLIC BLOOD PRESSURE: 120 MMHG | OXYGEN SATURATION: 96 % | WEIGHT: 195.2 LBS

## 2023-01-26 DIAGNOSIS — M79.602 BILATERAL ARM PAIN: Primary | ICD-10-CM

## 2023-01-26 DIAGNOSIS — Z12.31 ENCOUNTER FOR SCREENING MAMMOGRAM FOR BREAST CANCER: ICD-10-CM

## 2023-01-26 DIAGNOSIS — E11.9 TYPE 2 DIABETES MELLITUS WITHOUT COMPLICATION, WITHOUT LONG-TERM CURRENT USE OF INSULIN (H): ICD-10-CM

## 2023-01-26 DIAGNOSIS — I25.10 CORONARY ARTERY DISEASE INVOLVING NATIVE HEART WITHOUT ANGINA PECTORIS, UNSPECIFIED VESSEL OR LESION TYPE: ICD-10-CM

## 2023-01-26 DIAGNOSIS — F43.21 GRIEVING: ICD-10-CM

## 2023-01-26 DIAGNOSIS — I10 HYPERTENSION GOAL BP (BLOOD PRESSURE) < 140/90: ICD-10-CM

## 2023-01-26 DIAGNOSIS — F43.22 ADJUSTMENT DISORDER WITH ANXIETY: ICD-10-CM

## 2023-01-26 DIAGNOSIS — J45.40 MODERATE PERSISTENT ASTHMA, UNSPECIFIED WHETHER COMPLICATED: ICD-10-CM

## 2023-01-26 DIAGNOSIS — Z23 HIGH PRIORITY FOR 2019-NCOV VACCINE: ICD-10-CM

## 2023-01-26 DIAGNOSIS — M79.601 BILATERAL ARM PAIN: Primary | ICD-10-CM

## 2023-01-26 DIAGNOSIS — J41.0 SIMPLE CHRONIC BRONCHITIS (H): ICD-10-CM

## 2023-01-26 DIAGNOSIS — I50.9 CHRONIC CONGESTIVE HEART FAILURE, UNSPECIFIED HEART FAILURE TYPE (H): ICD-10-CM

## 2023-01-26 LAB
ERYTHROCYTE [DISTWIDTH] IN BLOOD BY AUTOMATED COUNT: 12.9 % (ref 10–15)
HBA1C MFR BLD: 6.6 % (ref 0–5.6)
HCT VFR BLD AUTO: 48.6 % (ref 35–47)
HGB BLD-MCNC: 16.4 G/DL (ref 11.7–15.7)
MCH RBC QN AUTO: 29.3 PG (ref 26.5–33)
MCHC RBC AUTO-ENTMCNC: 33.7 G/DL (ref 31.5–36.5)
MCV RBC AUTO: 87 FL (ref 78–100)
PLATELET # BLD AUTO: 270 10E3/UL (ref 150–450)
RBC # BLD AUTO: 5.6 10E6/UL (ref 3.8–5.2)
WBC # BLD AUTO: 12.1 10E3/UL (ref 4–11)

## 2023-01-26 PROCEDURE — 0134A COVID-19 VACCINE BIVALENT BOOSTER 18+ (MODERNA): CPT | Performed by: PHYSICIAN ASSISTANT

## 2023-01-26 PROCEDURE — 83036 HEMOGLOBIN GLYCOSYLATED A1C: CPT | Performed by: PHYSICIAN ASSISTANT

## 2023-01-26 PROCEDURE — 96127 BRIEF EMOTIONAL/BEHAV ASSMT: CPT | Performed by: PHYSICIAN ASSISTANT

## 2023-01-26 PROCEDURE — 85027 COMPLETE CBC AUTOMATED: CPT | Performed by: PHYSICIAN ASSISTANT

## 2023-01-26 PROCEDURE — 80048 BASIC METABOLIC PNL TOTAL CA: CPT | Performed by: PHYSICIAN ASSISTANT

## 2023-01-26 PROCEDURE — 99214 OFFICE O/P EST MOD 30 MIN: CPT | Mod: 25 | Performed by: PHYSICIAN ASSISTANT

## 2023-01-26 PROCEDURE — 36415 COLL VENOUS BLD VENIPUNCTURE: CPT | Performed by: PHYSICIAN ASSISTANT

## 2023-01-26 PROCEDURE — 90682 RIV4 VACC RECOMBINANT DNA IM: CPT | Performed by: PHYSICIAN ASSISTANT

## 2023-01-26 PROCEDURE — 91313 COVID-19 VACCINE BIVALENT BOOSTER 18+ (MODERNA): CPT | Performed by: PHYSICIAN ASSISTANT

## 2023-01-26 PROCEDURE — 90471 IMMUNIZATION ADMIN: CPT | Performed by: PHYSICIAN ASSISTANT

## 2023-01-26 RX ORDER — CITALOPRAM HYDROBROMIDE 40 MG/1
40 TABLET ORAL DAILY
Qty: 90 TABLET | Refills: 3 | Status: SHIPPED | OUTPATIENT
Start: 2023-01-26 | End: 2023-08-24

## 2023-01-26 RX ORDER — FLUTICASONE PROPIONATE 220 UG/1
AEROSOL, METERED RESPIRATORY (INHALATION)
Qty: 36 G | Refills: 3 | Status: SHIPPED | OUTPATIENT
Start: 2023-01-26 | End: 2023-11-22

## 2023-01-26 RX ORDER — NAPROXEN 500 MG/1
500 TABLET ORAL 2 TIMES DAILY WITH MEALS
Qty: 30 TABLET | Refills: 0 | Status: SHIPPED | OUTPATIENT
Start: 2023-01-26 | End: 2023-11-22

## 2023-01-26 RX ORDER — CHLORTHALIDONE 25 MG/1
12.5 TABLET ORAL DAILY
Qty: 45 TABLET | Refills: 0 | Status: SHIPPED | OUTPATIENT
Start: 2023-01-26 | End: 2023-03-09

## 2023-01-26 ASSESSMENT — PATIENT HEALTH QUESTIONNAIRE - PHQ9
10. IF YOU CHECKED OFF ANY PROBLEMS, HOW DIFFICULT HAVE THESE PROBLEMS MADE IT FOR YOU TO DO YOUR WORK, TAKE CARE OF THINGS AT HOME, OR GET ALONG WITH OTHER PEOPLE: SOMEWHAT DIFFICULT
SUM OF ALL RESPONSES TO PHQ QUESTIONS 1-9: 9

## 2023-01-26 ASSESSMENT — PAIN SCALES - GENERAL: PAINLEVEL: MILD PAIN (3)

## 2023-01-26 NOTE — PROGRESS NOTES
"  Assessment & Plan     Bilateral arm pain  Without hernan injury or n/t I suspect this is ongoing after some heavier work loads a few months back. Trial nsaids. Consider further work up if symptoms persist  - naproxen (NAPROSYN) 500 MG tablet; Take 1 tablet (500 mg) by mouth 2 times daily (with meals)    Grieving  Adjustment disorder with anxiety  Prfers to stay at same dose. Has looked into therapy but too expensive  - citalopram (CELEXA) 40 MG tablet; Take 1 tablet (40 mg) by mouth daily    Type 2 diabetes mellitus without complication, without long-term current use of insulin (H)  Updating labs. Checking regularly and going well on fasting blood sugars. Tolerating ozempic  - HEMOGLOBIN A1C; Future  - HEMOGLOBIN A1C    Moderate persistent asthma, unspecified whether complicated  Simple chronic bronchitis (H)  Continue present management.   - fluticasone (FLOVENT HFA) 220 MCG/ACT inhaler; USE 2 INHALATIONS BY MOUTH  TWICE DAILY Strength: 220 MCG/ACT        Hypertension goal BP (blood pressure) < 140/90  At goal. Continue present management.   - BASIC METABOLIC PANEL; Future  - chlorthalidone (HYGROTON) 25 MG tablet; Take 0.5 tablets (12.5 mg) by mouth daily  - BASIC METABOLIC PANEL    Coronary artery disease involving native heart without angina pectoris, unspecified vessel or lesion type  Denies any new symptoms. Continue risk factor modification  - chlorthalidone (HYGROTON) 25 MG tablet; Take 0.5 tablets (12.5 mg) by mouth daily    Chronic congestive heart failure, unspecified heart failure type (H)  No evidence for overload  - CBC with Platelets; Future  - CBC with Platelets    Encounter for screening mammogram for breast cancer  Due in May  - MA Screen Bilateral w/Alexandro; Future    High priority for 2019-nCoV vaccine  - COVID-19,PF,MODERNA BIVALENT 18+Yrs       BMI:   Estimated body mass index is 37.81 kg/m  as calculated from the following:    Height as of this encounter: 1.53 m (5' 0.25\").    Weight as of this " encounter: 88.5 kg (195 lb 3.2 oz).           Return in about 6 months (around 7/26/2023).    KASIA Villagomez [facility] VERNON Grider is a 52 year old, presenting for the following health issues:  Musculoskeletal Problem and Imm/Inj (COVID-19 VACCINE)      Musculoskeletal Problem    History of Present Illness       Reason for visit:  Joint pain in right elbow    She eats 0-1 servings of fruits and vegetables daily.She consumes 1 sweetened beverage(s) daily.She exercises with enough effort to increase her heart rate 9 or less minutes per day.  She exercises with enough effort to increase her heart rate 3 or less days per week. She is missing 1 dose(s) of medications per week.  She is not taking prescribed medications regularly due to remembering to take.    Today's PHQ-9         PHQ-9 Total Score: 9    PHQ-9 Q9 Thoughts of better off dead/self-harm past 2 weeks :   Not at all    How difficult have these problems made it for you to do your work, take care of things at home, or get along with other people: Somewhat difficult     Patient presents for med check and arm pain  She mentions ongoing for the past 2-3 months  Mentions mostly right sided a bit on the left  Difficult lifting anything because of lack of strength/pain  Ok at work at her desk  No rashes  No n/t  Had been doing a lot of moving/packing prior to symptoms but nothing over past 2 months and not improving   No hernan neck pain   is ok    She continues to grieve heavily for her spouse, father and father in law after an unfortunate cluster of deaths  Housing situation in flux along with some family strife  Talking with kids regularly but no family close  Feels like she is a burden  Definitely feeling far worsened depression, but no SI    Breathing has been ok  Denies any chest pains, shortness of breath etc  Increased smoking with increased stress      Review of Systems   Constitutional, HEENT,  "cardiovascular, pulmonary, gi and gu systems are negative, except as otherwise noted.      Objective    /68 (BP Location: Right arm, Patient Position: Sitting, Cuff Size: Adult Regular)   Pulse 80   Temp 97.9  F (36.6  C) (Oral)   Resp 13   Ht 1.53 m (5' 0.25\")   Wt 88.5 kg (195 lb 3.2 oz)   LMP 08/30/2013   SpO2 96%   BMI 37.81 kg/m    Body mass index is 37.81 kg/m .  Physical Exam   GENERAL: healthy, alert and no distress  EYES: Eyes grossly normal to inspection, PERRL and conjunctivae and sclerae normal  HENT: ear canals and TM's normal, nose and mouth without ulcers or lesions  RESP: decreased breath sounds throughout though clear  CV: regular rates and rhythm  MS: bilateral arm ttp in the upper arms just proximal to the elbow. In both arms there is increased pain with resisted movements. Abduction causes some pains into the shoulders as well. Resisted supination and pronation causes pain into the forearms  SKIN: no suspicious lesions or rashes  PSYCH: mentation appears normal, affect normal and tearful    Diagnostic: labs today                "

## 2023-01-27 LAB
ANION GAP SERPL CALCULATED.3IONS-SCNC: 15 MMOL/L (ref 7–15)
BUN SERPL-MCNC: 14.3 MG/DL (ref 6–20)
CALCIUM SERPL-MCNC: 9.5 MG/DL (ref 8.6–10)
CHLORIDE SERPL-SCNC: 100 MMOL/L (ref 98–107)
CREAT SERPL-MCNC: 0.94 MG/DL (ref 0.51–0.95)
DEPRECATED HCO3 PLAS-SCNC: 24 MMOL/L (ref 22–29)
GFR SERPL CREATININE-BSD FRML MDRD: 72 ML/MIN/1.73M2
GLUCOSE SERPL-MCNC: 110 MG/DL (ref 70–99)
POTASSIUM SERPL-SCNC: 3.8 MMOL/L (ref 3.4–5.3)
SODIUM SERPL-SCNC: 139 MMOL/L (ref 136–145)

## 2023-02-02 NOTE — NURSING NOTE
Chief Complaint   Patient presents with     Physical       Initial /76  Pulse 93  Temp 98.1  F (36.7  C)  Ht 5' (1.524 m)  Wt 195 lb (88.5 kg)  LMP 08/30/2013  SpO2 96%  BMI 38.08 kg/m2 Estimated body mass index is 38.08 kg/(m^2) as calculated from the following:    Height as of this encounter: 5' (1.524 m).    Weight as of this encounter: 195 lb (88.5 kg).  Medication Reconciliation: complete   Jerson Dumas CMA        
84

## 2023-03-08 DIAGNOSIS — I25.10 CORONARY ARTERY DISEASE INVOLVING NATIVE HEART WITHOUT ANGINA PECTORIS, UNSPECIFIED VESSEL OR LESION TYPE: ICD-10-CM

## 2023-03-08 DIAGNOSIS — I10 HYPERTENSION GOAL BP (BLOOD PRESSURE) < 140/90: ICD-10-CM

## 2023-03-09 RX ORDER — CHLORTHALIDONE 25 MG/1
TABLET ORAL
Qty: 45 TABLET | Refills: 2 | Status: SHIPPED | OUTPATIENT
Start: 2023-03-09

## 2023-03-09 RX ORDER — LOSARTAN POTASSIUM 25 MG/1
TABLET ORAL
Qty: 90 TABLET | Refills: 2 | Status: SHIPPED | OUTPATIENT
Start: 2023-03-09 | End: 2024-08-02

## 2023-03-09 NOTE — TELEPHONE ENCOUNTER
Prescription approved per [facility] Refill Protocol.    Daniela Welch, Registered Nurse  [facility]

## 2023-03-13 NOTE — NURSING NOTE
"Chief Complaint   Patient presents with     Consult     Muscle spasms at night,        Initial /82  Pulse 79  Resp 18  Ht 1.549 m (5' 1\")  Wt 94.3 kg (208 lb)  LMP 08/30/2013  SpO2 96%  BMI 39.3 kg/m2 Estimated body mass index is 39.3 kg/(m^2) as calculated from the following:    Height as of this encounter: 1.549 m (5' 1\").    Weight as of this encounter: 94.3 kg (208 lb).    Medication Reconciliation: complete    Neck circumference: 17 inches / 43 centimeters.  Ess 10    DME: OBDULIO Cunningham LPN/VIRAJ  "
n/a

## 2023-03-24 ENCOUNTER — E-VISIT (OUTPATIENT)
Dept: FAMILY MEDICINE | Facility: CLINIC | Age: 53
End: 2023-03-24
Payer: COMMERCIAL

## 2023-03-24 DIAGNOSIS — J41.0 SIMPLE CHRONIC BRONCHITIS (H): Primary | ICD-10-CM

## 2023-03-24 PROCEDURE — 99422 OL DIG E/M SVC 11-20 MIN: CPT | Performed by: PHYSICIAN ASSISTANT

## 2023-03-24 RX ORDER — PREDNISONE 20 MG/1
TABLET ORAL
Qty: 20 TABLET | Refills: 0 | Status: SHIPPED | OUTPATIENT
Start: 2023-03-24 | End: 2023-11-17

## 2023-04-08 ENCOUNTER — TRANSFERRED RECORDS (OUTPATIENT)
Dept: MULTI SPECIALTY CLINIC | Facility: CLINIC | Age: 53
End: 2023-04-08

## 2023-04-08 LAB — RETINOPATHY: NORMAL

## 2023-04-13 ENCOUNTER — ANCILLARY PROCEDURE (OUTPATIENT)
Dept: MAMMOGRAPHY | Facility: CLINIC | Age: 53
End: 2023-04-13
Attending: PHYSICIAN ASSISTANT
Payer: COMMERCIAL

## 2023-04-13 DIAGNOSIS — Z12.31 ENCOUNTER FOR SCREENING MAMMOGRAM FOR BREAST CANCER: ICD-10-CM

## 2023-04-13 PROCEDURE — 77063 BREAST TOMOSYNTHESIS BI: CPT | Mod: TC | Performed by: RADIOLOGY

## 2023-04-13 PROCEDURE — 77067 SCR MAMMO BI INCL CAD: CPT | Mod: TC | Performed by: RADIOLOGY

## 2023-04-22 ENCOUNTER — OFFICE VISIT (OUTPATIENT)
Dept: ORTHOPEDICS | Facility: CLINIC | Age: 53
End: 2023-04-22
Payer: COMMERCIAL

## 2023-04-22 VITALS
DIASTOLIC BLOOD PRESSURE: 85 MMHG | HEIGHT: 61 IN | BODY MASS INDEX: 36.06 KG/M2 | WEIGHT: 191 LBS | SYSTOLIC BLOOD PRESSURE: 130 MMHG

## 2023-04-22 DIAGNOSIS — M17.11 PRIMARY OSTEOARTHRITIS OF RIGHT KNEE: Primary | ICD-10-CM

## 2023-04-22 PROCEDURE — 20611 DRAIN/INJ JOINT/BURSA W/US: CPT | Mod: RT | Performed by: FAMILY MEDICINE

## 2023-04-22 RX ORDER — LIDOCAINE HYDROCHLORIDE 10 MG/ML
5 INJECTION, SOLUTION INFILTRATION; PERINEURAL
Status: SHIPPED | OUTPATIENT
Start: 2023-04-22

## 2023-04-22 RX ORDER — METHYLPREDNISOLONE ACETATE 40 MG/ML
40 INJECTION, SUSPENSION INTRA-ARTICULAR; INTRALESIONAL; INTRAMUSCULAR; SOFT TISSUE
Status: SHIPPED | OUTPATIENT
Start: 2023-04-22

## 2023-04-22 RX ADMIN — LIDOCAINE HYDROCHLORIDE 5 ML: 10 INJECTION, SOLUTION INFILTRATION; PERINEURAL at 08:40

## 2023-04-22 RX ADMIN — METHYLPREDNISOLONE ACETATE 40 MG: 40 INJECTION, SUSPENSION INTRA-ARTICULAR; INTRALESIONAL; INTRAMUSCULAR; SOFT TISSUE at 08:40

## 2023-04-22 NOTE — PATIENT INSTRUCTIONS
1. Primary osteoarthritis of right knee      -Patient is following up for aggravation of chronic right knee pain due to arthritis  -Patient tolerated aspiration and cortisone injection today without complications.  Patient was given postprocedure instruction  -Prior authorization for Durolane was ordered today  -Patient will follow-up at her convenience to administer Durolane medication  -Call direct clinic number [[phone]] at any time with questions or concerns.    Albert Yeo MD [facility]  [facility] [place]

## 2023-04-22 NOTE — LETTER
4/22/2023         RE: Marni Austin  [address]  [facility] [address]        Dear Colleague,    Thank you for referring your patient, Marni Austin, to the [facility] SPORTS MEDICINE CLINIC [place]. Please see a copy of my visit note below.    ASSESSMENT & PLAN  Patient Instructions     1. Primary osteoarthritis of right knee      -Patient is following up for aggravation of chronic right knee pain due to arthritis  -Patient tolerated aspiration and cortisone injection today without complications.  Patient was given postprocedure instruction  -Prior authorization for Durolane was ordered today  -Patient will follow-up at her convenience to administer Durolane medication  -Call direct clinic number [[phone]] at any time with questions or concerns.    Albert Yeo MD [facility] Orthopedics and Sports Medicine  [facility] [place]          -----    SUBJECTIVE:  Marni Austin is a 53 year old female who is seen in follow-up for osteoarthritis of right knee.They were last seen 7/26/22.     Since their last visit reports 80-85% improvement. They indicate that their current pain level is 2/10. They have tried bracing, icing, naproxen, corticosteroid injection (most recent date: 6/29/22) that provided  and viscosupplementation injection (most recent date: 7/26/22) that provided relief for 8 months.  The pain returned 1.5 months ago when she was moving and had to climb numerous stairs. 1 week ago her knee gave out and she fell on right side. She had increased pain and swelling after this. She continues to have pain with walking long distances and knee flexion, and episodes of instability.    The patient is seen by themselves.    Patient's past medical, surgical, social, and family histories were reviewed today and exceptions are as follows: father and  passed away.    REVIEW OF SYSTEMS:  Constitutional: NEGATIVE for fever, chills, change in weight  Skin: NEGATIVE for  "worrisome rashes, moles or lesions  GI/: NEGATIVE for bowel or bladder changes  Neuro: NEGATIVE for weakness, dizziness or paresthesias    OBJECTIVE:  /85   Ht 1.537 m (5' 0.5\")   Wt 86.6 kg (191 lb)   LMP 08/30/2013   BMI 36.69 kg/m     General: healthy, alert and in no distress  HEENT: no scleral icterus or conjunctival erythema  Skin: no suspicious lesions or rash. No jaundice.  CV: regular rhythm by palpation, no pedal edema  Resp: normal respiratory effort without conversational dyspnea   Psych: normal mood and affect  Gait: normal steady gait with appropriate coordination and balance  Neuro: normal light touch sensory exam of the extremities.    MSK:  RIGHT KNEE  Inspection:    normal alignment  Palpation:    Tender about the lateral patellar facet, medial patellar facet and medial joint line. Remainder of bony and ligamentous landmarks are nontender.    Mild effusion is present    Patellofemoral crepitus is Present  Range of Motion:     00 extension to 1100 flexion  Strength:    Quadriceps grossly intact    Extensor mechanism intact  Special Tests:    Positive: none    Negative: MCL/valgus stress (0 & 30 deg), LCL/varus stress (0 & 30 deg), Lachman's, anterior drawer, posterior drawer    Independent visualization of the below image:      Large Joint Injection/Arthocentesis: R knee joint    Date/Time: 4/22/2023 8:40 AM    Performed by: Yeo, Albert, MD  Authorized by: Yeo, Albert, MD    Indications:  Pain and osteoarthritis  Needle Size:  25 G  Guidance: ultrasound    Approach:  Anterolateral  Location:  Knee      Medications:  5 mL lidocaine 1 %; 40 mg methylPREDNISolone 40 MG/ML  Medications comment:  The 5mL lidocaine was used as local anesthetic.      Naropin (Ropivacaine) 0.5%  NDC: 05358-804-09  Lot: [id number]  Exp: 01/31/2026  Aspirate amount (mL):  11  Aspirate:  Clear and yellow  Outcome:  Tolerated well, no immediate complications  Procedure discussed: discussed risks, benefits, and " alternatives    Consent Given by:  Patient  Timeout: timeout called immediately prior to procedure    Prep: patient was prepped and draped in usual sterile fashion     Ultrasound was used to ensure safe and accurate needle placement and injection. Ultrasound images of the procedure were permanently stored.            Albert Yeo MD, [facility]          Again, thank you for allowing me to participate in the care of your patient.        Sincerely,        Albert Yeo, MD

## 2023-04-22 NOTE — PROGRESS NOTES
"ASSESSMENT & PLAN  Patient Instructions     1. Primary osteoarthritis of right knee      -Patient is following up for aggravation of chronic right knee pain due to arthritis  -Patient tolerated aspiration and cortisone injection today without complications.  Patient was given postprocedure instruction  -Prior authorization for Durolane was ordered today  -Patient will follow-up at her convenience to administer Durolane medication  -Call direct clinic number [[phone]] at any time with questions or concerns.    Albert Yeo MD [facility] Orthopedics and Sports Medicine  [facility]          -----    SUBJECTIVE:  Marni Austin is a 53 year old female who is seen in follow-up for osteoarthritis of right knee.They were last seen 7/26/22.     Since their last visit reports 80-85% improvement. They indicate that their current pain level is 2/10. They have tried bracing, icing, naproxen, corticosteroid injection (most recent date: 6/29/22) that provided  and viscosupplementation injection (most recent date: 7/26/22) that provided relief for 8 months.  The pain returned 1.5 months ago when she was moving and had to climb numerous stairs. 1 week ago her knee gave out and she fell on right side. She had increased pain and swelling after this. She continues to have pain with walking long distances and knee flexion, and episodes of instability.    The patient is seen by themselves.    Patient's past medical, surgical, social, and family histories were reviewed today and exceptions are as follows: father and  passed away.    REVIEW OF SYSTEMS:  Constitutional: NEGATIVE for fever, chills, change in weight  Skin: NEGATIVE for worrisome rashes, moles or lesions  GI/: NEGATIVE for bowel or bladder changes  Neuro: NEGATIVE for weakness, dizziness or paresthesias    OBJECTIVE:  /85   Ht 1.537 m (5' 0.5\")   Wt 86.6 kg (191 lb)   LMP 08/30/2013   BMI 36.69 kg/m     General: healthy, alert " and in no distress  HEENT: no scleral icterus or conjunctival erythema  Skin: no suspicious lesions or rash. No jaundice.  CV: regular rhythm by palpation, no pedal edema  Resp: normal respiratory effort without conversational dyspnea   Psych: normal mood and affect  Gait: normal steady gait with appropriate coordination and balance  Neuro: normal light touch sensory exam of the extremities.    MSK:  RIGHT KNEE  Inspection:    normal alignment  Palpation:    Tender about the lateral patellar facet, medial patellar facet and medial joint line. Remainder of bony and ligamentous landmarks are nontender.    Mild effusion is present    Patellofemoral crepitus is Present  Range of Motion:     00 extension to 1100 flexion  Strength:    Quadriceps grossly intact    Extensor mechanism intact  Special Tests:    Positive: none    Negative: MCL/valgus stress (0 & 30 deg), LCL/varus stress (0 & 30 deg), Lachman's, anterior drawer, posterior drawer    Independent visualization of the below image:      Large Joint Injection/Arthocentesis: R knee joint    Date/Time: 4/22/2023 8:40 AM    Performed by: Yeo, Albert, MD  Authorized by: Yeo, Albert, MD    Indications:  Pain and osteoarthritis  Needle Size:  25 G  Guidance: ultrasound    Approach:  Anterolateral  Location:  Knee      Medications:  5 mL lidocaine 1 %; 40 mg methylPREDNISolone 40 MG/ML  Medications comment:  The 5mL lidocaine was used as local anesthetic.      Naropin (Ropivacaine) 0.5%  NDC: 27751-939-48  Lot: [id number]  Exp: 01/31/2026  Aspirate amount (mL):  11  Aspirate:  Clear and yellow  Outcome:  Tolerated well, no immediate complications  Procedure discussed: discussed risks, benefits, and alternatives    Consent Given by:  Patient  Timeout: timeout called immediately prior to procedure    Prep: patient was prepped and draped in usual sterile fashion     Ultrasound was used to ensure safe and accurate needle placement and injection. Ultrasound images of the procedure  were permanently stored.            Albert Yeo MD, [facility]

## 2023-04-28 ENCOUNTER — TRANSFERRED RECORDS (OUTPATIENT)
Dept: MULTI SPECIALTY CLINIC | Facility: CLINIC | Age: 53
End: 2023-04-28

## 2023-05-08 ENCOUNTER — TELEPHONE (OUTPATIENT)
Dept: FAMILY MEDICINE | Facility: CLINIC | Age: 53
End: 2023-05-08
Payer: COMMERCIAL

## 2023-05-08 NOTE — TELEPHONE ENCOUNTER
Prior Authorization Retail Medication Request    Medication/Dose: OZEMPIC, 1 MG/DOSE, 4 MG/3ML SOPN   ICD code (if different than what is on RX):    Previously Tried and Failed:    Rationale:      Insurance Name:  [facility]   Insurance ID:  [id number]      Pharmacy Information (if different than what is on RX)  Name:     [facility] ([facility]) - [place], [place] - [address] [facility]    Phone:  [phone]

## 2023-05-12 NOTE — TELEPHONE ENCOUNTER
PA Initiation    Medication: OZEMPIC, 1 MG/DOSE, 4 MG/3ML SOPN   Insurance Company: [employer] ([facility]) - Phone [phone] Fax [phone]  Pharmacy Filling the Rx:    Filling Pharmacy Phone:    Filling Pharmacy Fax:    Start Date: 5/12/2023

## 2023-05-12 NOTE — TELEPHONE ENCOUNTER
Prior Authorization Approval    Authorization Effective Date:  5/11/2023  Authorization Expiration Date:  5/12/2024  Medication: OZEMPIC, 1 MG/DOSE, 4 MG/3ML SOPN-pa approved  Approved Dose/Quantity: 9/84  Reference #: [id number]   Insurance Company: [employer] ([facility]) - Phone [phone] Fax [phone]  Expected CoPay:       CoPay Card Available:      Foundation Assistance Needed:    Which Pharmacy is filling the prescription (Not needed for infusion/clinic administered): [facility] ([facility] ) - [place], [place] - [address]  Pharmacy Notified: Yes  Patient Notified: Yes- pharmacy will process and send out to pt

## 2023-05-18 ENCOUNTER — NURSE TRIAGE (OUTPATIENT)
Dept: FAMILY MEDICINE | Facility: CLINIC | Age: 53
End: 2023-05-18
Payer: COMMERCIAL

## 2023-05-18 NOTE — TELEPHONE ENCOUNTER
Pt calls,    S-(situation): wants to make appointment for diarrhea    B-(background): see triage note, no red flag issues, ongoing x 2 weeks, worse in am, smelly    A-(assessment): loose stools    R-(recommendations): PCP HAS 9:00 ACUTE SPOT, PT WILLING TO COME AT THIS TIME, OK TO USE? (copied RM triage, may schedule if you can or wait for PCP to advise)    CALL PT TO INFORM, [facility]  Telephone Information:   Mobile [phone]         Reason for Disposition    MILD diarrhea (e.g., 1-3 or more stools than normal in past 24 hours) diarrhea without known cause and present > 7 days    Additional Information    Negative: Shock suspected (e.g., cold/pale/clammy skin, too weak to stand, low BP, rapid pulse)    Negative: Difficult to awaken or acting confused (e.g., disoriented, slurred speech)    Negative: Sounds like a life-threatening emergency to the triager    Negative: Vomiting also present and worse than the diarrhea    Negative: Blood in stool and without diarrhea    Negative: SEVERE abdominal pain (e.g., excruciating) and present > 1 hour    Negative: SEVERE abdominal pain and age > 60 years    Negative: Bloody, black, or tarry bowel movements (Exception: chronic-unchanged black-grey bowel movements and is taking iron pills or Pepto-Bismol)    Negative: SEVERE diarrhea (e.g., 7 or more times / day more than normal) and age > 60 years    Negative: Constant abdominal pain lasting > 2 hours    Negative: Drinking very little and has signs of dehydration (e.g., no urine > 12 hours, very dry mouth, very lightheaded)    Negative: Patient sounds very sick or weak to the triager    Negative: SEVERE diarrhea (e.g., 7 or more times / day more than normal) and present > 24 hours (1 day)    Negative: MODERATE diarrhea (e.g., 4-6 times / day more than normal) and present > 48 hours (2 days)    Negative: MODERATE diarrhea (e.g., 4-6 times / day more than normal) and age > 70 years    Negative: Abdominal pain (Exception: Pain  "clears completely with each passage of diarrhea stool.)    Negative: Fever > 101 F (38.3 C)    Negative: Blood in the stool    Negative: Mucus or pus in stool has been present > 2 days and diarrhea is more than mild    Negative: Weak immune system (e.g., HIV positive, cancer chemo, splenectomy, organ transplant, chronic steroids)    Negative: Travel to a foreign country in past month    Negative: Recent antibiotic therapy (i.e., within last 2 months) and diarrhea present > 3 days since antibiotic was stopped    Negative: Recent hospitalization and diarrhea present > 3 days    Negative: Tube feedings (e.g., nasogastric, g-tube, j-tube)    Answer Assessment - Initial Assessment Questions  1. DIARRHEA SEVERITY: \"How bad is the diarrhea?\" \"How many more stools have you had in the past 24 hours than normal?\"     - NO DIARRHEA (SCALE 0)    - MILD (SCALE 1-3): Few loose or mushy BMs; increase of 1-3 stools over normal daily number of stools; mild increase in ostomy output.    -  MODERATE (SCALE 4-7): Increase of 4-6 stools daily over normal; moderate increase in ostomy output.  * SEVERE (SCALE 8-10; OR 'WORST POSSIBLE'): Increase of 7 or more stools daily over normal; moderate increase in ostomy output; incontinence.      MILD   2. ONSET: \"When did the diarrhea begin?\"       PAST COUPLE OF WEEKS  3. BM CONSISTENCY: \"How loose or watery is the diarrhea?\"       \"pretty loose\"   4. VOMITING: \"Are you also vomiting?\" If Yes, ask: \"How many times in the past 24 hours?\"       No   5. ABDOMINAL PAIN: \"Are you having any abdominal pain?\" If Yes, ask: \"What does it feel like?\" (e.g., crampy, dull, intermittent, constant)       No  6. ABDOMINAL PAIN SEVERITY: If present, ask: \"How bad is the pain?\"  (e.g., Scale 1-10; mild, moderate, or severe)    - MILD (1-3): doesn't interfere with normal activities, abdomen soft and not tender to touch     - MODERATE (4-7): interferes with normal activities or awakens from sleep, abdomen tender " "to touch     - SEVERE (8-10): excruciating pain, doubled over, unable to do any normal activities        No   7. ORAL INTAKE: If vomiting, \"Have you been able to drink liquids?\" \"How much liquids have you had in the past 24 hours?\"      Yes, 1 16 oz bottle of water, 20 ox cold coffee, drinking pop  8. HYDRATION: \"Any signs of dehydration?\" (e.g., dry mouth [not just dry lips], too weak to stand, dizziness, new weight loss) \"When did you last urinate?\"      No   9. EXPOSURE: \"Have you traveled to a foreign country recently?\" \"Have you been exposed to anyone with diarrhea?\" \"Could you have eaten any food that was spoiled?\"      No, no, \"I don't think so\"  10. ANTIBIOTIC USE: \"Are you taking antibiotics now or have you taken antibiotics in the past 2 months?\"        NO   11. OTHER SYMPTOMS: \"Do you have any other symptoms?\" (e.g., fever, blood in stool)        No  12. PREGNANCY: \"Is there any chance you are pregnant?\" \"When was your last menstrual period?\"        n/a    Protocols used: DIARRHEA-A-OH    "

## 2023-05-18 NOTE — TELEPHONE ENCOUNTER
Next 5 appointments (look out 90 days)    May 19, 2023  9:00 AM  (Arrive by 8:40 AM)  Provider Visit with David Gambino PA-C  [facility] ([facility] ) [address] [facility] [address] [phone]        Called pt and relayed pt is scheduled above.     Patient was given an opportunity to ask questions, verbalized understanding of plan, and is agreeable.    Lanre Bee RN on 5/18/2023 at 3:13 PM

## 2023-05-19 ENCOUNTER — OFFICE VISIT (OUTPATIENT)
Dept: FAMILY MEDICINE | Facility: CLINIC | Age: 53
End: 2023-05-19
Payer: COMMERCIAL

## 2023-05-19 VITALS
HEIGHT: 61 IN | WEIGHT: 190.1 LBS | OXYGEN SATURATION: 92 % | SYSTOLIC BLOOD PRESSURE: 107 MMHG | RESPIRATION RATE: 18 BRPM | HEART RATE: 84 BPM | TEMPERATURE: 97.6 F | BODY MASS INDEX: 35.89 KG/M2 | DIASTOLIC BLOOD PRESSURE: 79 MMHG

## 2023-05-19 DIAGNOSIS — E11.9 TYPE 2 DIABETES MELLITUS WITHOUT COMPLICATION, WITHOUT LONG-TERM CURRENT USE OF INSULIN (H): ICD-10-CM

## 2023-05-19 DIAGNOSIS — R19.7 DIARRHEA, UNSPECIFIED TYPE: Primary | ICD-10-CM

## 2023-05-19 DIAGNOSIS — R53.83 OTHER FATIGUE: ICD-10-CM

## 2023-05-19 DIAGNOSIS — J45.40 MODERATE PERSISTENT ASTHMA WITHOUT COMPLICATION: ICD-10-CM

## 2023-05-19 LAB
BASOPHILS # BLD AUTO: 0.1 10E3/UL (ref 0–0.2)
BASOPHILS NFR BLD AUTO: 0 %
EOSINOPHIL # BLD AUTO: 0.2 10E3/UL (ref 0–0.7)
EOSINOPHIL NFR BLD AUTO: 2 %
ERYTHROCYTE [DISTWIDTH] IN BLOOD BY AUTOMATED COUNT: 12.5 % (ref 10–15)
FERRITIN SERPL-MCNC: 290 NG/ML (ref 11–328)
HBA1C MFR BLD: 6.5 % (ref 0–5.6)
HCT VFR BLD AUTO: 45.5 % (ref 35–47)
HGB BLD-MCNC: 16.1 G/DL (ref 11.7–15.7)
IMM GRANULOCYTES # BLD: 0.1 10E3/UL
IMM GRANULOCYTES NFR BLD: 1 %
IRON BINDING CAPACITY (ROCHE): 280 UG/DL (ref 240–430)
IRON SATN MFR SERPL: 21 % (ref 15–46)
IRON SERPL-MCNC: 59 UG/DL (ref 37–145)
LYMPHOCYTES # BLD AUTO: 2.5 10E3/UL (ref 0.8–5.3)
LYMPHOCYTES NFR BLD AUTO: 21 %
MCH RBC QN AUTO: 29.7 PG (ref 26.5–33)
MCHC RBC AUTO-ENTMCNC: 35.4 G/DL (ref 31.5–36.5)
MCV RBC AUTO: 84 FL (ref 78–100)
MONOCYTES # BLD AUTO: 0.8 10E3/UL (ref 0–1.3)
MONOCYTES NFR BLD AUTO: 6 %
NEUTROPHILS # BLD AUTO: 8.2 10E3/UL (ref 1.6–8.3)
NEUTROPHILS NFR BLD AUTO: 70 %
PLATELET # BLD AUTO: 250 10E3/UL (ref 150–450)
RBC # BLD AUTO: 5.42 10E6/UL (ref 3.8–5.2)
TSH SERPL DL<=0.005 MIU/L-ACNC: 0.74 UIU/ML (ref 0.3–4.2)
WBC # BLD AUTO: 11.8 10E3/UL (ref 4–11)

## 2023-05-19 PROCEDURE — 83540 ASSAY OF IRON: CPT | Performed by: PHYSICIAN ASSISTANT

## 2023-05-19 PROCEDURE — 84443 ASSAY THYROID STIM HORMONE: CPT | Performed by: PHYSICIAN ASSISTANT

## 2023-05-19 PROCEDURE — 83550 IRON BINDING TEST: CPT | Performed by: PHYSICIAN ASSISTANT

## 2023-05-19 PROCEDURE — 85025 COMPLETE CBC W/AUTO DIFF WBC: CPT | Performed by: PHYSICIAN ASSISTANT

## 2023-05-19 PROCEDURE — 83036 HEMOGLOBIN GLYCOSYLATED A1C: CPT | Performed by: PHYSICIAN ASSISTANT

## 2023-05-19 PROCEDURE — 99214 OFFICE O/P EST MOD 30 MIN: CPT | Performed by: PHYSICIAN ASSISTANT

## 2023-05-19 PROCEDURE — 82728 ASSAY OF FERRITIN: CPT | Performed by: PHYSICIAN ASSISTANT

## 2023-05-19 PROCEDURE — 36415 COLL VENOUS BLD VENIPUNCTURE: CPT | Performed by: PHYSICIAN ASSISTANT

## 2023-05-19 ASSESSMENT — ENCOUNTER SYMPTOMS: DIARRHEA: 1

## 2023-05-19 ASSESSMENT — ANXIETY QUESTIONNAIRES
7. FEELING AFRAID AS IF SOMETHING AWFUL MIGHT HAPPEN: NOT AT ALL
8. IF YOU CHECKED OFF ANY PROBLEMS, HOW DIFFICULT HAVE THESE MADE IT FOR YOU TO DO YOUR WORK, TAKE CARE OF THINGS AT HOME, OR GET ALONG WITH OTHER PEOPLE?: NOT DIFFICULT AT ALL
7. FEELING AFRAID AS IF SOMETHING AWFUL MIGHT HAPPEN: NOT AT ALL
GAD7 TOTAL SCORE: 0
3. WORRYING TOO MUCH ABOUT DIFFERENT THINGS: NOT AT ALL
4. TROUBLE RELAXING: NOT AT ALL
IF YOU CHECKED OFF ANY PROBLEMS ON THIS QUESTIONNAIRE, HOW DIFFICULT HAVE THESE PROBLEMS MADE IT FOR YOU TO DO YOUR WORK, TAKE CARE OF THINGS AT HOME, OR GET ALONG WITH OTHER PEOPLE: NOT DIFFICULT AT ALL
5. BEING SO RESTLESS THAT IT IS HARD TO SIT STILL: NOT AT ALL
GAD7 TOTAL SCORE: 0
1. FEELING NERVOUS, ANXIOUS, OR ON EDGE: NOT AT ALL
6. BECOMING EASILY ANNOYED OR IRRITABLE: NOT AT ALL
2. NOT BEING ABLE TO STOP OR CONTROL WORRYING: NOT AT ALL

## 2023-05-19 ASSESSMENT — PAIN SCALES - GENERAL: PAINLEVEL: MODERATE PAIN (4)

## 2023-05-19 NOTE — PROGRESS NOTES
"  Assessment & Plan     Diarrhea, unspecified type  Unclear. With the sulpher like burps, concerned for GI infection. Screening below. Follow-up per results; may consider empiric trial abx.   - Enteric Bacteria and Virus Panel by [facility] Stool; Future  - Helicobacter pylori Antigen Stool; Future  - Enteric Bacteria and Virus Panel by [facility] Stool  - Helicobacter pylori Antigen Stool    Other fatigue  Multiple ddx. Hx of untreated JORGE. New diarrhea. Stress. Start with lab screening  - CBC with Platelets & Differential  - Ferritin  - Iron & Iron Binding Capacity    Moderate persistent asthma without complication  Going well    Type 2 diabetes mellitus without complication, without long-term current use of insulin (H)  Updating labs; monitor sugars during presumed infection  - TSH WITH FREE T4 REFLEX; Future  - HEMOGLOBIN A1C; Future  - TSH WITH FREE T4 REFLEX  - HEMOGLOBIN A1C       BMI:   Estimated body mass index is 35.92 kg/m  as calculated from the following:    Height as of this encounter: 1.549 m (5' 1\").    Weight as of this encounter: 86.2 kg (190 lb 1.6 oz).           David Gambino PA-C  [facility] VERNON Grider is a 53 year old, presenting for the following health issues:  Diarrhea        5/19/2023     8:52 AM   Additional Questions   Roomed by Martha RUTLEDGE CMA   Accompanied by NA         5/19/2023     8:52 AM   Patient Reported Additional Medications   Patient reports taking the following new medications None     Diarrhea    History of Present Illness       Reason for visit:  Daily diarrhea and tured all the time.  Symptom onset:  1-2 weeks ago  Symptoms include:  Diarrhea daily, fatigue  Symptom intensity:  Mild  Symptom progression:  Staying the same  Had these symptoms before:  Yes  Has tried/received treatment for these symptoms:  No  What makes it worse:  Having accidents  What makes it better:  Sleep    She eats 0-1 servings of fruits and vegetables daily.She consumes 2 " sweetened beverage(s) daily.She exercises with enough effort to increase her heart rate 9 or less minutes per day.  She exercises with enough effort to increase her heart rate 3 or less days per week. She is missing 1 dose(s) of medications per week.  She is not taking prescribed medications regularly due to remembering to take.  Today's JAMEEL-7 Score: 0       Diarrhea  Onset/Duration: 1-2 weeks   Description:       Consistency of stool: runny, pasty and explosive       Blood in stool: No       Number of loose stools past 24 hours: 2  Progression of Symptoms: worsening  Accompanying signs and symptoms:       Fever: No       Nausea/Vomiting: No       Abdominal pain: YES       Weight loss: No       Episodes of constipation: YES- 2 times in the last two weeks (Not sure if she took to much Imodium)  History   Ill contacts: No  Recent use of antibiotics: No  Recent travels: No  Recent medication-new or changes(Rx or OTC): No  Precipitating or alleviating factors: None  Therapies tried and outcome: Imodium AD    Marni Austin is a 53 year old female who presents today for new onset bowel changes  Went home one day after work ad   Each day she wakes she has a lot of gas, then at work with have explosive diarrhea  The remainder of the day will generally get better  There has been no change diet or routine  No vomiting  No blood in the stool; it is not bright green  When she burps it tastes like rotten eggs  Not sure about any major stool odors  No change to urination  No fevers     Patient states that she is tired every day- she tosses and turns and does not feel rested when she wakes up. However this is not all that different than previous; the fatigue is the different aspect/new  She states that around 10 AM she feels like she could pass out from fatigue.  She does not think its related specifically to moods; more a low energy  Definitely started before the diarrhea    Review of Systems   Gastrointestinal: Positive for  "diarrhea.      Constitutional, HEENT, cardiovascular, pulmonary, gi and gu systems are negative, except as otherwise noted.      Objective    /79 (BP Location: Right arm, Patient Position: Sitting, Cuff Size: Adult Large)   Pulse 84   Temp 97.6  F (36.4  C) (Oral)   Resp 18   Ht 1.549 m (5' 1\")   Wt 86.2 kg (190 lb 1.6 oz)   LMP 08/30/2013   SpO2 92%   BMI 35.92 kg/m    Body mass index is 35.92 kg/m .  Physical Exam   GENERAL: healthy, alert and no distress  RESP: lungs clear to auscultation - no rales, rhonchi or wheezes  CV: regular rates and rhythm, normal S1 S2, no S3 or S4 and no murmur, click or rub  ABDOMEN: tenderness epigastric, bowel sounds hyperactive and no palpable or pulsatile masses  MS: No peripheral edema   PSYCH: mentation appears normal, affect normal/bright    Diagnostics: updating today                 "

## 2023-06-05 ENCOUNTER — OFFICE VISIT (OUTPATIENT)
Dept: ORTHOPEDICS | Facility: CLINIC | Age: 53
End: 2023-06-05
Payer: COMMERCIAL

## 2023-06-05 DIAGNOSIS — M17.11 PRIMARY OSTEOARTHRITIS OF RIGHT KNEE: Primary | ICD-10-CM

## 2023-06-05 PROCEDURE — 20611 DRAIN/INJ JOINT/BURSA W/US: CPT | Mod: RT | Performed by: FAMILY MEDICINE

## 2023-06-05 RX ORDER — LIDOCAINE HYDROCHLORIDE 10 MG/ML
5 INJECTION, SOLUTION INFILTRATION; PERINEURAL
Status: SHIPPED | OUTPATIENT
Start: 2023-06-05

## 2023-06-05 RX ADMIN — LIDOCAINE HYDROCHLORIDE 5 ML: 10 INJECTION, SOLUTION INFILTRATION; PERINEURAL at 07:59

## 2023-06-05 NOTE — PROGRESS NOTES
ASSESSMENT & PLAN  Patient Instructions     1. Primary osteoarthritis of right knee      -Patient is following up for chronic right knee pain due to arthritis  -Patient tolerated Durolane injection today without complications.  Patient was given postprocedure instructions  -Patient asked about ongoing treatments and potential need for surgical intervention in the future.  Patient was informed that we can continue with these injections as long as today help with her pain.  -Patient was concerned about episodes of buckling and instability.  Patient was informed that these episodes are not due to structural damage but rather weakness of her muscles  -Patient was offered formal physical therapy versus home exercise program.  Patient wishes to proceed with home exercise program.  Handouts were given today  -Patient was also advised to start walking backwards and toe raises while leaning against the wall to build her anterior tibialis muscle strength  -Patient was informed that we should try to delay surgery for as long as possible since she is far too young for knee replacement surgery.  -Patient will follow up when pain returns  -Call direct clinic number [[phone]] at any time with questions or concerns.    Albert Yeo MD [facility] Orthopedics and Sports Medicine  [facility]          -----    SUBJECTIVE:  Marni Austin is a 53 year old female who is seen for US guided Durolane injection for right knee.    Large Joint Injection/Arthocentesis: R knee joint    Date/Time: 6/5/2023 7:59 AM    Performed by: Yeo, Albert, MD  Authorized by: Yeo, Albert, MD    Indications:  Pain and osteoarthritis  Needle Size:  22 G  Guidance: ultrasound    Approach:  Anterolateral  Location:  Knee      Medications:  60 mg sodium hyaluronate 60 MG/3ML; 5 mL lidocaine 1 %  Aspirate amount (mL):  10  Aspirate:  Serous and yellow  Outcome:  Tolerated well, no immediate complications  Procedure discussed: discussed  risks, benefits, and alternatives    Consent Given by:  Patient  Timeout: timeout called immediately prior to procedure    Prep: patient was prepped and draped in usual sterile fashion     Ultrasound was used to ensure safe and accurate needle placement and injection. Ultrasound images of the procedure were permanently stored.        Patient rates pain as 1/10 pre-procedure. Patient rates pain as 1/10 post-procedure.      Albert Yeo MD, [facility]

## 2023-06-05 NOTE — PATIENT INSTRUCTIONS
1. Primary osteoarthritis of right knee      -Patient is following up for chronic right knee pain due to arthritis  -Patient tolerated Durolane injection today without complications.  Patient was given postprocedure instructions  -Patient asked about ongoing treatments and potential need for surgical intervention in the future.  Patient was informed that we can continue with these injections as long as today help with her pain.  -Patient was concerned about episodes of buckling and instability.  Patient was informed that these episodes are not due to structural damage but rather weakness of her muscles  -Patient was offered formal physical therapy versus home exercise program.  Patient wishes to proceed with home exercise program.  Handouts were given today  -Patient was also advised to start walking backwards and toe raises while leaning against the wall to build her anterior tibialis muscle strength  -Patient was informed that we should try to delay surgery for as long as possible since she is far too young for knee replacement surgery.  -Patient will follow up when pain returns  -Call direct clinic number [[phone]] at any time with questions or concerns.    Albert Yeo MD [facility] Orthopedics and Sports Medicine  [facility] [place]

## 2023-06-05 NOTE — LETTER
6/5/2023         RE: Marni Austin  [address]  [place] [address]        Dear Colleague,    Thank you for referring your patient, Marni Austin, to the [facility] [place]. Please see a copy of my visit note below.    ASSESSMENT & PLAN  Patient Instructions     1. Primary osteoarthritis of right knee      -Patient is following up for chronic right knee pain due to arthritis  -Patient tolerated Durolane injection today without complications.  Patient was given postprocedure instructions  -Patient asked about ongoing treatments and potential need for surgical intervention in the future.  Patient was informed that we can continue with these injections as long as today help with her pain.  -Patient was concerned about episodes of buckling and instability.  Patient was informed that these episodes are not due to structural damage but rather weakness of her muscles  -Patient was offered formal physical therapy versus home exercise program.  Patient wishes to proceed with home exercise program.  Handouts were given today  -Patient was also advised to start walking backwards and toe raises while leaning against the wall to build her anterior tibialis muscle strength  -Patient was informed that we should try to delay surgery for as long as possible since she is far too young for knee replacement surgery.  -Patient will follow up when pain returns  -Call direct clinic number [[phone]] at any time with questions or concerns.    Albert Yeo MD [facility] Orthopedics and Sports Medicine  [facility]          -----    SUBJECTIVE:  Marni Austin is a 53 year old female who is seen for US guided Durolane injection for right knee.    Large Joint Injection/Arthocentesis: R knee joint    Date/Time: 6/5/2023 7:59 AM    Performed by: Yeo, Albert, MD  Authorized by: Yeo, Albert, MD    Indications:  Pain and osteoarthritis  Needle Size:  22 G  Guidance:  ultrasound    Approach:  Anterolateral  Location:  Knee      Medications:  60 mg sodium hyaluronate 60 MG/3ML; 5 mL lidocaine 1 %  Aspirate amount (mL):  10  Aspirate:  Serous and yellow  Outcome:  Tolerated well, no immediate complications  Procedure discussed: discussed risks, benefits, and alternatives    Consent Given by:  Patient  Timeout: timeout called immediately prior to procedure    Prep: patient was prepped and draped in usual sterile fashion     Ultrasound was used to ensure safe and accurate needle placement and injection. Ultrasound images of the procedure were permanently stored.        Patient rates pain as 1/10 pre-procedure. Patient rates pain as 1/10 post-procedure.      Albert Yeo MD, [facility]        Again, thank you for allowing me to participate in the care of your patient.        Sincerely,        Albert Yeo, MD

## 2023-06-14 ENCOUNTER — PATIENT OUTREACH (OUTPATIENT)
Dept: CARE COORDINATION | Facility: CLINIC | Age: 53
End: 2023-06-14
Payer: COMMERCIAL

## 2023-06-18 DIAGNOSIS — E87.6 HYPOKALEMIA: ICD-10-CM

## 2023-06-20 RX ORDER — POTASSIUM CHLORIDE 750 MG/1
TABLET, EXTENDED RELEASE ORAL
Qty: 90 TABLET | Refills: 1 | Status: SHIPPED | OUTPATIENT
Start: 2023-06-20 | End: 2024-03-12

## 2023-06-20 NOTE — TELEPHONE ENCOUNTER
Prescription approved per [facility] Refill Protocol.  Saurav RAMIREZ RN 6/20/2023 at 3:06 PM

## 2023-06-27 DIAGNOSIS — I25.10 CORONARY ARTERY DISEASE INVOLVING NATIVE HEART WITHOUT ANGINA PECTORIS, UNSPECIFIED VESSEL OR LESION TYPE: ICD-10-CM

## 2023-06-27 DIAGNOSIS — Z13.6 CARDIOVASCULAR SCREENING; LDL GOAL LESS THAN 130: ICD-10-CM

## 2023-06-28 ENCOUNTER — PATIENT OUTREACH (OUTPATIENT)
Dept: CARE COORDINATION | Facility: CLINIC | Age: 53
End: 2023-06-28
Payer: COMMERCIAL

## 2023-06-28 RX ORDER — ATORVASTATIN CALCIUM 80 MG/1
80 TABLET, FILM COATED ORAL DAILY
Qty: 90 TABLET | Refills: 0 | Status: SHIPPED | OUTPATIENT
Start: 2023-06-28 | End: 2023-12-18

## 2023-06-29 DIAGNOSIS — E11.59 TYPE 2 DIABETES MELLITUS WITH OTHER CIRCULATORY COMPLICATION, WITHOUT LONG-TERM CURRENT USE OF INSULIN (H): ICD-10-CM

## 2023-06-30 RX ORDER — SEMAGLUTIDE 1.34 MG/ML
INJECTION, SOLUTION SUBCUTANEOUS
Qty: 9 ML | Refills: 1 | Status: SHIPPED | OUTPATIENT
Start: 2023-06-30 | End: 2023-12-29

## 2023-08-11 ENCOUNTER — MYC MEDICAL ADVICE (OUTPATIENT)
Dept: FAMILY MEDICINE | Facility: CLINIC | Age: 53
End: 2023-08-11
Payer: COMMERCIAL

## 2023-08-11 ENCOUNTER — TELEPHONE (OUTPATIENT)
Dept: FAMILY MEDICINE | Facility: CLINIC | Age: 53
End: 2023-08-11
Payer: COMMERCIAL

## 2023-08-11 NOTE — TELEPHONE ENCOUNTER
Patient is due for a colonoscopy, ryan'd up referral, please review and advise. Patient had an eye exam at [facility] on 4/8/23, do you want these records?    Alka Schroeder RN on 8/11/2023 at 9:28 AM

## 2023-08-11 NOTE — CONFIDENTIAL NOTE
Patient Quality Outreach    Patient is due for the following:   Diabetes -  Eye Exam  Colon Cancer Screening  Physical Preventive Adult Physical    Next Steps:   Schedule a Adult Preventative    Type of outreach:    Sent [employer] message.      Questions for provider review:    None           Bacilio Serrano MA

## 2023-08-24 ENCOUNTER — MYC MEDICAL ADVICE (OUTPATIENT)
Dept: FAMILY MEDICINE | Facility: CLINIC | Age: 53
End: 2023-08-24
Payer: COMMERCIAL

## 2023-08-24 DIAGNOSIS — F17.200 TOBACCO USE DISORDER: Primary | ICD-10-CM

## 2023-08-24 DIAGNOSIS — F43.22 ADJUSTMENT DISORDER WITH ANXIETY: ICD-10-CM

## 2023-08-24 RX ORDER — CITALOPRAM HYDROBROMIDE 40 MG/1
40 TABLET ORAL DAILY
Qty: 90 TABLET | Refills: 3 | Status: SHIPPED | OUTPATIENT
Start: 2023-08-24 | End: 2024-03-12

## 2023-08-24 NOTE — TELEPHONE ENCOUNTER
Manny,    Please see MC   Do you want to increase Ozempic?    Thank you  Stephane Rivera RN on 8/24/2023 at 2:38 PM

## 2023-08-25 RX ORDER — VARENICLINE TARTRATE 1 MG/1
1 TABLET, FILM COATED ORAL 2 TIMES DAILY
Qty: 180 TABLET | Refills: 0 | Status: SHIPPED | OUTPATIENT
Start: 2023-08-25 | End: 2023-11-17

## 2023-08-27 ENCOUNTER — HEALTH MAINTENANCE LETTER (OUTPATIENT)
Age: 53
End: 2023-08-27

## 2023-09-07 ENCOUNTER — MYC MEDICAL ADVICE (OUTPATIENT)
Dept: FAMILY MEDICINE | Facility: CLINIC | Age: 53
End: 2023-09-07
Payer: COMMERCIAL

## 2023-09-07 ENCOUNTER — TELEPHONE (OUTPATIENT)
Dept: FAMILY MEDICINE | Facility: CLINIC | Age: 53
End: 2023-09-07
Payer: COMMERCIAL

## 2023-09-07 NOTE — TELEPHONE ENCOUNTER
Prior Authorization Retail Medication Request    Medication/Dose: varenicline (CHANTIX HANDY) 0.5 MG X 11 & 1 MG X 42 tablet  ICD code (if different than what is on RX): Tobacco use disorder [F17.200]  - Primary   Previously Tried and Failed:   Rationale:     Insurance Name: [employer]  Insurance ID: [id number]       Pharmacy Information (if different than what is on RX)  Name: [email or web address] - [place], [place] - [address] [place] [address]  Phone:  [phone]     Estrellita SLATER, RN, BSN, PHN

## 2023-09-08 NOTE — TELEPHONE ENCOUNTER
Central Prior Authorization Team   Phone: [phone]    PA Initiation    Medication: varenicline (CHANTIX HANDY) 0.5 MG X 11 & 1 MG X 42 tablet  Insurance Company: [employer] ([facility]) - Phone [phone] Fax [phone]  Pharmacy Filling the Rx: [facility]) - [address]  Filling Pharmacy Phone: [phone]  Filling Pharmacy Fax: [phone]  Start Date: 9/8/2023

## 2023-09-11 NOTE — TELEPHONE ENCOUNTER
Prior Authorization Approval    Authorization Effective Date: 9/7/2023  Authorization Expiration Date: 11/8/2023  Medication: varenicline (CHANTIX HANDY) 0.5 MG X 11 & 1 MG X 42 tablet - APPROVED  Approved Dose/Quantity:    Reference #:     Insurance Company: [facility] ([facility]) - Phone [phone] Fax [phone]  Expected CoPay:       CoPay Card Available:      Foundation Assistance Needed:    Which Pharmacy is filling the prescription (Not needed for infusion/clinic administered): [facility]) - [facility] [address] [place] [address]  Pharmacy Notified: Yes  Patient Notified: Yes  ADVISED PATIENT OF APPROVAL AND TO CONTACT PHARMACY TO SET UP DELIVERY.

## 2023-10-14 NOTE — TELEPHONE ENCOUNTER
Spoke with patient regarding mychart message. Ok to have patient daughter be seen by PCP. [place] ok to be seen by DM but would be best to see peds [facility]WC. Patient informed. Will have daughter set up establishing care appointment when able.    Hollie MANLEY RN, BSN, PHN  [place] [facility] RN    
Him/He

## 2023-10-22 DIAGNOSIS — E11.59 TYPE 2 DIABETES MELLITUS WITH OTHER CIRCULATORY COMPLICATION, WITHOUT LONG-TERM CURRENT USE OF INSULIN (H): ICD-10-CM

## 2023-10-24 RX ORDER — BLOOD SUGAR DIAGNOSTIC
STRIP MISCELLANEOUS
Qty: 100 STRIP | Refills: 0 | Status: SHIPPED | OUTPATIENT
Start: 2023-10-24 | End: 2024-01-04

## 2023-10-30 DIAGNOSIS — F17.200 TOBACCO USE DISORDER: ICD-10-CM

## 2023-10-30 RX ORDER — VARENICLINE TARTRATE 0.5 (11)-1
KIT ORAL
Qty: 53 TABLET | Refills: 0 | OUTPATIENT
Start: 2023-10-30

## 2023-11-06 DIAGNOSIS — I25.10 CORONARY ARTERY DISEASE INVOLVING NATIVE HEART WITHOUT ANGINA PECTORIS, UNSPECIFIED VESSEL OR LESION TYPE: ICD-10-CM

## 2023-11-06 DIAGNOSIS — I10 HYPERTENSION GOAL BP (BLOOD PRESSURE) < 140/90: ICD-10-CM

## 2023-11-08 RX ORDER — METOPROLOL SUCCINATE 25 MG/1
TABLET, EXTENDED RELEASE ORAL
Qty: 90 TABLET | Refills: 3 | Status: SHIPPED | OUTPATIENT
Start: 2023-11-08 | End: 2024-03-12

## 2023-11-16 ENCOUNTER — E-VISIT (OUTPATIENT)
Dept: FAMILY MEDICINE | Facility: CLINIC | Age: 53
End: 2023-11-16
Payer: COMMERCIAL

## 2023-11-16 DIAGNOSIS — F17.200 TOBACCO USE DISORDER: ICD-10-CM

## 2023-11-16 PROCEDURE — 99207 PR NON-BILLABLE SERV PER CHARTING: CPT | Performed by: PHYSICIAN ASSISTANT

## 2023-11-17 RX ORDER — VARENICLINE TARTRATE 1 MG/1
1 TABLET, FILM COATED ORAL 2 TIMES DAILY
Qty: 180 TABLET | Refills: 1 | Status: SHIPPED | OUTPATIENT
Start: 2023-11-17 | End: 2024-08-15

## 2023-11-17 NOTE — TELEPHONE ENCOUNTER
Please call and triage patient - I do think being seen this weekend is best but if she declines can we find her any open slot in a clinic next week?

## 2023-11-21 ENCOUNTER — NURSE TRIAGE (OUTPATIENT)
Dept: FAMILY MEDICINE | Facility: CLINIC | Age: 53
End: 2023-11-21
Payer: COMMERCIAL

## 2023-11-21 NOTE — TELEPHONE ENCOUNTER
11/19/23 pt had sharp pains started in calf as quick sharp pain and then moved to ankle.  Lasted couple hours.  Elevated foot and applied heat.  Pt unsure if was warm to touch, pt was rubbing area trying to get pain to go away.  Leg pain resolved now.    Jaw pain resolved.  Painful and swollen for a couple of weeks.  Went to dentist, no infection per dentist.    Reports worst symptom now is left arm constant pain for past 1-2 months.  Starts from just above elbow up into neck.  Pain ranges from 6-8 out of 10.  Has tried ibuprofen, provides just a little relief.  Left hand numbness, chronic for past couple years    Chronic SOB and wheezing but pt reports worsening.  SOB with walking across room, not at rest.  No audible wheezing over phone.    Pt also reports small rash on right side of abdomen in skin fold, started couple weeks ago.  About size of orange.  Itches, not growing in size.  Pt unsure color or shape, has difficulty seeing area.    Denies chest pain, difficulty breathing, unusual sweating, fever.    Advised pt to call back with any new or worsening symptoms.    Scheduled appt for 11/22/23.  Routed to Manny Gambino, no appts available today.  Are you ok with appt scheduled for 11/22/23 or would you like pt to go to  now?      Reason for Disposition   Patient wants to be seen    Additional Information   Negative: SEVERE pain (e.g., excruciating, unable to do any normal activities)   Negative: Red area or streak > 2 inches (or 5 cm)   Negative: Cast on wrist or arm and now increasing pain   Negative: Weakness (i.e., loss of strength) in hand or fingers  (Exception: Not truly weak; hand feels weak because of pain.)   Negative: Arm pains with exertion (e.g., occurs with walking; goes away on resting)   Negative: Fever and red area (or area very tender to touch)   Negative: Swollen joint and fever   Negative: Entire arm is swollen   Negative: Patient sounds very sick or weak to the triager   Negative: Painful rash  "with multiple small blisters grouped together (i.e., dermatomal distribution or 'band' or 'stripe')   Negative: Looks like a boil, infected sore, deep ulcer, or other infected rash (spreading redness, pus)   Negative: Localized rash is very painful (no fever)   Negative: Shock suspected (e.g., cold/pale/clammy skin, too weak to stand, low BP, rapid pulse)   Negative: Similar pain previously and it was from 'heart attack'   Negative: Similar pain previously from 'angina' and not relieved by nitroglycerin   Negative: Sounds like a life-threatening emergency to the triager    Answer Assessment - Initial Assessment Questions  1. ONSET: \"When did the pain start?\"      About 1-2 months ago  2. LOCATION: \"Where is the pain located?\"      Just above left elbow and up into neck  3. PAIN: \"How bad is the pain?\" (Scale 1-10; or mild, moderate, severe)    - MILD (1-3): Doesn't interfere with normal activities.    - MODERATE (4-7): Interferes with normal activities (e.g., work or school) or awakens from sleep.    - SEVERE (8-10): Excruciating pain, unable to do any normal activities, unable to hold a cup of water.      Ranges from 6-8  4. WORK OR EXERCISE: \"Has there been any recent work or exercise that involved this part of the body?\"      no  5. CAUSE: \"What do you think is causing the arm pain?\"      Neck maybe  6. OTHER SYMPTOMS: \"Do you have any other symptoms?\" (e.g., neck pain, swelling, rash, fever, numbness, weakness)      Neck pain.  Jaw was hurting for weeks and swollen, went to dentist no infection found.  Jaw pain has resolved.  Has chronic SOB but thinks it is worse than normal.  Chronic wheezing, pt thinks has been worse than normal.  7. PREGNANCY: \"Is there any chance you are pregnant?\" \"When was your last menstrual period?\"      no    Protocols used: Arm Pain-A-[place]    "

## 2023-11-21 NOTE — TELEPHONE ENCOUNTER
Called pt and advised of below.  Pt verbalized understanding and agreeable to plan.    Stephanie Rose RN, BSN  [facility]

## 2023-11-22 ENCOUNTER — OFFICE VISIT (OUTPATIENT)
Dept: FAMILY MEDICINE | Facility: CLINIC | Age: 53
End: 2023-11-22
Payer: COMMERCIAL

## 2023-11-22 VITALS
WEIGHT: 190.9 LBS | RESPIRATION RATE: 18 BRPM | OXYGEN SATURATION: 100 % | BODY MASS INDEX: 36.04 KG/M2 | HEART RATE: 66 BPM | HEIGHT: 61 IN | DIASTOLIC BLOOD PRESSURE: 81 MMHG | TEMPERATURE: 98.2 F | SYSTOLIC BLOOD PRESSURE: 147 MMHG

## 2023-11-22 DIAGNOSIS — I25.119 CORONARY ARTERY DISEASE INVOLVING NATIVE HEART WITH ANGINA PECTORIS, UNSPECIFIED VESSEL OR LESION TYPE (H): ICD-10-CM

## 2023-11-22 DIAGNOSIS — E11.59 TYPE 2 DIABETES MELLITUS WITH OTHER CIRCULATORY COMPLICATION, WITHOUT LONG-TERM CURRENT USE OF INSULIN (H): ICD-10-CM

## 2023-11-22 DIAGNOSIS — L20.9 ATOPIC DERMATITIS, UNSPECIFIED TYPE: ICD-10-CM

## 2023-11-22 DIAGNOSIS — E66.01 MORBID OBESITY (H): ICD-10-CM

## 2023-11-22 DIAGNOSIS — Z23 NEED FOR PROPHYLACTIC VACCINATION AND INOCULATION AGAINST INFLUENZA: ICD-10-CM

## 2023-11-22 DIAGNOSIS — M25.512 ACUTE PAIN OF LEFT SHOULDER: Primary | ICD-10-CM

## 2023-11-22 DIAGNOSIS — M67.432 GANGLION CYST OF WRIST, LEFT: ICD-10-CM

## 2023-11-22 DIAGNOSIS — J45.40 MODERATE PERSISTENT ASTHMA, UNSPECIFIED WHETHER COMPLICATED: ICD-10-CM

## 2023-11-22 DIAGNOSIS — R53.83 FATIGUE, UNSPECIFIED TYPE: ICD-10-CM

## 2023-11-22 LAB
ERYTHROCYTE [DISTWIDTH] IN BLOOD BY AUTOMATED COUNT: 12.4 % (ref 10–15)
HBA1C MFR BLD: 6.3 % (ref 0–5.6)
HCT VFR BLD AUTO: 44.9 % (ref 35–47)
HGB BLD-MCNC: 15.5 G/DL (ref 11.7–15.7)
HOLD SPECIMEN: NORMAL
MCH RBC QN AUTO: 29.8 PG (ref 26.5–33)
MCHC RBC AUTO-ENTMCNC: 34.5 G/DL (ref 31.5–36.5)
MCV RBC AUTO: 86 FL (ref 78–100)
PLATELET # BLD AUTO: 255 10E3/UL (ref 150–450)
RBC # BLD AUTO: 5.2 10E6/UL (ref 3.8–5.2)
WBC # BLD AUTO: 11.4 10E3/UL (ref 4–11)

## 2023-11-22 PROCEDURE — 80061 LIPID PANEL: CPT | Performed by: NURSE PRACTITIONER

## 2023-11-22 PROCEDURE — 93000 ELECTROCARDIOGRAM COMPLETE: CPT | Performed by: NURSE PRACTITIONER

## 2023-11-22 PROCEDURE — 80053 COMPREHEN METABOLIC PANEL: CPT | Performed by: NURSE PRACTITIONER

## 2023-11-22 PROCEDURE — 90471 IMMUNIZATION ADMIN: CPT | Performed by: NURSE PRACTITIONER

## 2023-11-22 PROCEDURE — 82306 VITAMIN D 25 HYDROXY: CPT | Performed by: NURSE PRACTITIONER

## 2023-11-22 PROCEDURE — 99214 OFFICE O/P EST MOD 30 MIN: CPT | Mod: 25 | Performed by: NURSE PRACTITIONER

## 2023-11-22 PROCEDURE — 83036 HEMOGLOBIN GLYCOSYLATED A1C: CPT | Performed by: NURSE PRACTITIONER

## 2023-11-22 PROCEDURE — 91320 SARSCV2 VAC 30MCG TRS-SUC IM: CPT | Performed by: NURSE PRACTITIONER

## 2023-11-22 PROCEDURE — 82607 VITAMIN B-12: CPT | Performed by: NURSE PRACTITIONER

## 2023-11-22 PROCEDURE — 82570 ASSAY OF URINE CREATININE: CPT | Performed by: NURSE PRACTITIONER

## 2023-11-22 PROCEDURE — 85027 COMPLETE CBC AUTOMATED: CPT | Performed by: NURSE PRACTITIONER

## 2023-11-22 PROCEDURE — 90686 IIV4 VACC NO PRSV 0.5 ML IM: CPT | Performed by: NURSE PRACTITIONER

## 2023-11-22 PROCEDURE — 90480 ADMN SARSCOV2 VAC 1/ONLY CMP: CPT | Performed by: NURSE PRACTITIONER

## 2023-11-22 PROCEDURE — 82043 UR ALBUMIN QUANTITATIVE: CPT | Performed by: NURSE PRACTITIONER

## 2023-11-22 PROCEDURE — 36415 COLL VENOUS BLD VENIPUNCTURE: CPT | Performed by: NURSE PRACTITIONER

## 2023-11-22 RX ORDER — TRIAMCINOLONE ACETONIDE 1 MG/G
CREAM TOPICAL 2 TIMES DAILY
Qty: 80 G | Refills: 0 | Status: SHIPPED | OUTPATIENT
Start: 2023-11-22 | End: 2023-11-22

## 2023-11-22 RX ORDER — FLUTICASONE PROPIONATE 220 UG/1
AEROSOL, METERED RESPIRATORY (INHALATION)
Qty: 36 G | Refills: 3 | Status: SHIPPED | OUTPATIENT
Start: 2023-11-22 | End: 2024-08-02

## 2023-11-22 RX ORDER — PREDNISONE 20 MG/1
40 TABLET ORAL DAILY
Qty: 10 TABLET | Refills: 0 | Status: SHIPPED | OUTPATIENT
Start: 2023-11-22 | End: 2023-11-27

## 2023-11-22 RX ORDER — TRIAMCINOLONE ACETONIDE 1 MG/G
CREAM TOPICAL 2 TIMES DAILY
Qty: 80 G | Refills: 0 | Status: SHIPPED | OUTPATIENT
Start: 2023-11-22

## 2023-11-22 ASSESSMENT — ENCOUNTER SYMPTOMS: SHORTNESS OF BREATH: 1

## 2023-11-22 ASSESSMENT — PAIN SCALES - GENERAL: PAINLEVEL: NO PAIN (0)

## 2023-11-22 NOTE — PROGRESS NOTES
Assessment & Plan     Acute pain of left shoulder  Acute; denies acute injury/trauma. Has been uncomfortable for >1 month. Has concerns of cardiac involvement. EKG in clinic is unchanged from prior no acute concerns. D/t point tenderness encouraged follow up with ortho. Discussed RICE and sleeping positions. May continue to use lidocaine patches as needed.     - Orthopedic  Referral; Future  - EKG 12-lead complete w/read - Clinics    Moderate persistent asthma, unspecified whether complicated  Chronic unstable, wheezing is heard throughout all lung fields, patient is hemodynamically stable today, denies worsening symptoms from baseline. Has been having a hard time with smoking cessation a lot seems to revolve around dosing and insurance coverage. Patient has not been using her flovent inhaler for several months. Strongly encouraged to restart medication dosing. Provided prednisone dosing for acute symptoms.     - fluticasone (FLOVENT HFA) 220 MCG/ACT inhaler; USE 2 INHALATIONS BY MOUTH  TWICE DAILY Strength: 220 MCG/ACT  - predniSONE (DELTASONE) 20 MG tablet; Take 2 tablets (40 mg) by mouth daily for 5 days    Coronary artery disease involving native heart with angina pectoris, unspecified vessel or lesion type (H24)  Chronic stable; will check labs.     - Comprehensive metabolic panel (BMP + Alb, Alk Phos, ALT, AST, Total. Bili, TP)  - Lipid panel reflex to direct LDL Non-fasting    Type 2 diabetes mellitus with other circulatory complication, without long-term current use of insulin (H)  Chronic stable; will check labs.     - Albumin Random Urine Quantitative with Creat Ratio  - HEMOGLOBIN A1C  - Comprehensive metabolic panel (BMP + Alb, Alk Phos, ALT, AST, Total. Bili, TP)    Fatigue, unspecified type  Acute ongoing; discussed breathing concerns that can cause increased fatigue. Will r/o underlying labs.     - CBC with Platelets and Reflex to Iron Studies  - Vitamin D Deficiency  - Vitamin  "B12    Atopic dermatitis, unspecified type  Acute; discussed common causes of symptoms. Encouraged the use of lotion (CeraVe, Aquaphor, Aveeno, Eucerin, etc) to site twice daily as well as steroid to help with itching.     - triamcinolone (KENALOG) 0.1 % external cream; Apply topically 2 times daily    Ganglion cyst of wrist, left  Acute; does not cause discomfort at this time.     Morbid obesity (H)  Chronic unstable; encouraged exercise as tolerated and a well balanced diet.     Need for prophylactic vaccination and inoculation against influenza  Routine vaccination.        Nicotine/Tobacco Cessation:  She reports that she has been smoking cigarettes. She started smoking about 44 years ago. She has a 13.00 pack-year smoking history. She has never used smokeless tobacco.  Nicotine/Tobacco Cessation Plan:   Pharmacotherapies : varenicline (Chantix)      BMI:   Estimated body mass index is 36.07 kg/m  as calculated from the following:    Height as of this encounter: 1.549 m (5' 1\").    Weight as of this encounter: 86.6 kg (190 lb 14.4 oz).   Weight management plan: Discussed healthy diet and exercise guidelines        DELORES Foy CNP  [facility] VERNON Grider is a 53 year old, presenting for the following health issues:  Shortness of Breath, wheeze, Arm Pain, Rash, and Tingling        11/22/2023     1:06 PM   Additional Questions   Roomed by Mariel ASKEW MA   Accompanied by self         11/22/2023     1:06 PM   Patient Reported Additional Medications   Patient reports taking the following new medications none       History of Present Illness       Reason for visit:  Aches/pain in arm, hand, and ankle  Symptom onset:  1-2 weeks ago  Symptoms include:  Tire all the time/cant sleep with pain in arm/wheezing,etc  Symptom intensity:  Moderate  Symptom progression:  Improving  What makes it worse:  Lightheadedness  What makes it better:  Sleep    She eats 0-1 servings of fruits and " "vegetables daily.She consumes 2 sweetened beverage(s) daily.She exercises with enough effort to increase her heart rate 9 or less minutes per day.  She exercises with enough effort to increase her heart rate 3 or less days per week. She is missing 1 dose(s) of medications per week.  She is not taking prescribed medications regularly due to remembering to take.       Concern - shortness of breath, rash on left side of belly and tingling on left arm on fingers     Left arm pain, aching, sleeps on the left side, no injury or trauma, has been going on for >1 month. States she does note tingling down her left arm at times as well. Does have a new growth on her left wrist as well although denies pain in area.     Patient also states she has been out of her flovent inhaler for a while and has only been using her albuterol inhaler. Denies an increase in shortness of breath an wheezing.     Tried to quit smoking, insurance only gave 30 days on a 90 day supply order, has been having a hard time keeping up with medication dosing d/t insurance coverage.     Review of Systems   Respiratory:  Positive for shortness of breath.    Skin:  Positive for rash.          Objective    BP (!) 147/81 (BP Location: Right arm, Patient Position: Sitting, Cuff Size: Adult Regular)   Pulse 66   Temp 98.2  F (36.8  C) (Oral)   Resp 18   Ht 1.549 m (5' 1\")   Wt 86.6 kg (190 lb 14.4 oz)   LMP 08/30/2013   SpO2 100%   BMI 36.07 kg/m    Body mass index is 36.07 kg/m .  Physical Exam   GENERAL: healthy, alert and no distress  HENT: normal cephalic/atraumatic, both ears: clear effusion, nose and mouth without ulcers or lesions, oropharynx clear, and oral mucous membranes moist  NECK: no adenopathy, no asymmetry, masses, or scars and thyroid normal to palpation  RESP: expiratory wheezes bilateral and throughout and inspiratory wheezes throughout  CV: regular rate and rhythm, normal S1 S2, no S3 or S4, no murmur, click or rub, no peripheral edema " and peripheral pulses strong  MS: normal muscle tone, normal range of motion, no edema, and tenderness to palpation left posterior shoulder  SKIN: dry, erythematous rash noted to left side of belt line on abdomen, ganglion cyst noted to left proximal wrist  PSYCH: mentation appears normal, affect normal/bright

## 2023-11-23 LAB
ALBUMIN SERPL BCG-MCNC: 4.4 G/DL (ref 3.5–5.2)
ALP SERPL-CCNC: 101 U/L (ref 40–150)
ALT SERPL W P-5'-P-CCNC: 20 U/L (ref 0–50)
ANION GAP SERPL CALCULATED.3IONS-SCNC: 12 MMOL/L (ref 7–15)
AST SERPL W P-5'-P-CCNC: 21 U/L (ref 0–45)
BILIRUB SERPL-MCNC: 0.5 MG/DL
BUN SERPL-MCNC: 15.9 MG/DL (ref 6–20)
CALCIUM SERPL-MCNC: 9.2 MG/DL (ref 8.6–10)
CHLORIDE SERPL-SCNC: 101 MMOL/L (ref 98–107)
CHOLEST SERPL-MCNC: 161 MG/DL
CREAT SERPL-MCNC: 0.73 MG/DL (ref 0.51–0.95)
CREAT UR-MCNC: 116 MG/DL
DEPRECATED HCO3 PLAS-SCNC: 26 MMOL/L (ref 22–29)
EGFRCR SERPLBLD CKD-EPI 2021: >90 ML/MIN/1.73M2
GLUCOSE SERPL-MCNC: 93 MG/DL (ref 70–99)
HDLC SERPL-MCNC: 30 MG/DL
LDLC SERPL CALC-MCNC: 73 MG/DL
MICROALBUMIN UR-MCNC: <12 MG/L
MICROALBUMIN/CREAT UR: NORMAL MG/G{CREAT}
NONHDLC SERPL-MCNC: 131 MG/DL
POTASSIUM SERPL-SCNC: 3.8 MMOL/L (ref 3.4–5.3)
PROT SERPL-MCNC: 6.9 G/DL (ref 6.4–8.3)
SODIUM SERPL-SCNC: 139 MMOL/L (ref 135–145)
TRIGL SERPL-MCNC: 292 MG/DL
VIT B12 SERPL-MCNC: 386 PG/ML (ref 232–1245)
VIT D+METAB SERPL-MCNC: 27 NG/ML (ref 20–50)

## 2023-12-01 ENCOUNTER — OFFICE VISIT (OUTPATIENT)
Dept: ORTHOPEDICS | Facility: CLINIC | Age: 53
End: 2023-12-01
Attending: NURSE PRACTITIONER
Payer: COMMERCIAL

## 2023-12-01 ENCOUNTER — ANCILLARY PROCEDURE (OUTPATIENT)
Dept: GENERAL RADIOLOGY | Facility: CLINIC | Age: 53
End: 2023-12-01
Attending: STUDENT IN AN ORGANIZED HEALTH CARE EDUCATION/TRAINING PROGRAM
Payer: COMMERCIAL

## 2023-12-01 VITALS
DIASTOLIC BLOOD PRESSURE: 87 MMHG | BODY MASS INDEX: 35.87 KG/M2 | WEIGHT: 190 LBS | SYSTOLIC BLOOD PRESSURE: 135 MMHG | HEIGHT: 61 IN

## 2023-12-01 DIAGNOSIS — M75.82 INFRASPINATUS TENDONITIS, LEFT: ICD-10-CM

## 2023-12-01 DIAGNOSIS — M25.512 ACUTE PAIN OF LEFT SHOULDER: ICD-10-CM
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
## 2023-12-01 DIAGNOSIS — G56.02 LEFT CARPAL TUNNEL SYNDROME: ICD-10-CM

## 2023-12-01 DIAGNOSIS — S46.912A OVERUSE SYNDROME OF SHOULDER, LEFT, INITIAL ENCOUNTER: Primary | ICD-10-CM

## 2023-12-01 PROCEDURE — 99203 OFFICE O/P NEW LOW 30 MIN: CPT | Mod: 25 | Performed by: STUDENT IN AN ORGANIZED HEALTH CARE EDUCATION/TRAINING PROGRAM

## 2023-12-01 PROCEDURE — 20611 DRAIN/INJ JOINT/BURSA W/US: CPT | Mod: LT | Performed by: STUDENT IN AN ORGANIZED HEALTH CARE EDUCATION/TRAINING PROGRAM

## 2023-12-01 PROCEDURE — 73030 X-RAY EXAM OF SHOULDER: CPT | Mod: TC | Performed by: RADIOLOGY

## 2023-12-01 RX ORDER — TRIAMCINOLONE ACETONIDE 40 MG/ML
40 INJECTION, SUSPENSION INTRA-ARTICULAR; INTRAMUSCULAR
Status: SHIPPED | OUTPATIENT
Start: 2023-12-01

## 2023-12-01 RX ORDER — LIDOCAINE HYDROCHLORIDE 10 MG/ML
1 INJECTION, SOLUTION INFILTRATION; PERINEURAL
Status: SHIPPED | OUTPATIENT
Start: 2023-12-01

## 2023-12-01 RX ADMIN — LIDOCAINE HYDROCHLORIDE 1 ML: 10 INJECTION, SOLUTION INFILTRATION; PERINEURAL at 16:24

## 2023-12-01 RX ADMIN — TRIAMCINOLONE ACETONIDE 40 MG: 40 INJECTION, SUSPENSION INTRA-ARTICULAR; INTRAMUSCULAR at 16:24

## 2023-12-01 NOTE — LETTER
12/1/2023         RE: Marni Austin  [address]  [facility] [address]        Dear Colleague,    Thank you for referring your patient, Marni Austin, to the [facility] [place]. Please see a copy of my visit note below.    ASSESSMENT & PLAN    Marni was seen today for pain.    Diagnoses and all orders for this visit:    Sprain of left rotator cuff capsule, initial encounter  -     Physical Therapy Referral; Future  -     Large Joint Injection/Arthocentesis: L subacromial bursa    Acute pain of left shoulder  -     Orthopedic  Referral  -     XR Shoulder Left G/E 3 Views; Future  -     Physical Therapy Referral; Future    Infraspinatus tendonitis, left  -     Physical Therapy Referral; Future    Left carpal tunnel syndrome      53-year-old female presents with acute left shoulder pain for the past month with no inciting injury.  She also has a history of left carpal tunnel syndrome.  On physical exam, she has significant pain and spasm of her left trapezius muscle and tenderness over the infraspinatus as well as some pain and mild weakness with external rotation.  She does a lot of repetitive motion at work that I think is likely the main contributor to her symptoms today in the setting of some shoulder tendinitis.  Her shoulder x-ray reveals no signs of shoulder arthritis.    Plan:  - Left shoulder subacromial bursa injection performed in clinic today to help with acute pain, discussed that we could trial short course of oral anti-inflammatories but patient declined at this time  - Refer to physical therapy to work on modalities, stretching, strengthening, and posture  - Discussed with patient that she could trial a neutral wrist splint to the left wrist at night and then follow-up with me for a full carpal tunnel evaluation at her next visit  - Can use heat and start stretching areas of tightness in the left neck/shoulder area to help with muscle  "spasms    Return in about 3 months (around 3/1/2024).      Dr. Genesis Cuellar, DO  [facility]  Sports Medicine     -----  Chief Complaint   Patient presents with     Left Shoulder - Pain       SUBJECTIVE  Marni Austin is a/an 53 year old right-handed female who is seen in consultation at the request of  Alexa Smalls C.N.P. for evaluation of left shoulder pain.  Onset was 1 month ago with no injury. Pain is located at the posterior shoulder. Symptoms are worsened by sleeping on it, reaching hand above shoulder.  She has tried Salonpas patches, ibuprofen, and heat. Associated symptoms include radiating pain down to the elbow and some tingling that happened one day, but has since resolved. Reports some chronic tingling in R fingertips, reports h/o carpal tunnel.     The patient is seen alone    Prior injury/Surgical history of affected joint: None  Social History/Occupation: Works in the office    REVIEW OF SYSTEMS:  Pertinent positives/negative: As stated above in HPI    OBJECTIVE:  /87   Ht 1.549 m (5' 1\")   Wt 86.2 kg (190 lb)   LMP 08/30/2013   BMI 35.90 kg/m     General: Alert and in no distress  Skin: no visable rashes  CV: Extremities appear well perfused   Resp: normal respiratory effort, no conversational dyspnea   Psych: normal mood, affect  MSK:  RIGHT/LEFT: LeftShoulder Exam:  Appearance: Symmetric shoulder heights, No scapular winging observed  Palpation: Tender to palpation of trapezius, infraspinatus, pectoralis major  Rotator cuff strength:    Supraspinatus: 5/5   ER: 4/5   IR: 5/5  AROM:    Forward flexion: 180   Abduction: 180   ER at side: 90   IR: To belt line  Special tests: + for None  Positive carpal compression test on the left  Negative Spurling on the left    RADIOLOGY:  Final results and radiologist's interpretation available in the [facility] health record.  Images below were personally reviewed and discussed with the patient in the office today.  My " personal interpretation of the performed imaging: XR of the left shoulder performed today reveals normal joint space alignment and no bony tenderness    Large Joint Injection/Arthocentesis: L subacromial bursa    Date/Time: 12/1/2023 4:24 PM    Performed by: Genesis Cuellar DO  Authorized by: Genesis Cuellar DO    Indications:  Pain  Needle Size:  25 G  Guidance: ultrasound    Approach:  Posterolateral  Location:  Shoulder      Site:  L subacromial bursa  Medications:  40 mg triamcinolone 40 MG/ML; 1 mL lidocaine 1 %  Outcome:  Tolerated well, no immediate complications  Procedure discussed: discussed risks, benefits, and alternatives    Consent Given by:  Patient  Timeout: timeout called immediately prior to procedure    Prep: patient was prepped and draped in usual sterile fashion     Ultrasound was used to ensure safe and accurate needle placement and injection. Ultrasound images of the procedure were permanently stored.          Review of prior external note(s) from - primary care             Again, thank you for allowing me to participate in the care of your patient.        Sincerely,        Genesis Cuellar DO

## 2023-12-01 NOTE — PROGRESS NOTES
ASSESSMENT & PLAN    Marni was seen today for pain.    Diagnoses and all orders for this visit:    Overuse syndrome of shoulder, left, initial encounter  -     Physical Therapy Referral; Future  -     Large Joint Injection/Arthocentesis: L subacromial bursa  -     lidocaine 1 % injection 1 mL  -     triamcinolone (KENALOG-40) injection 40 mg    Acute pain of left shoulder  -     Orthopedic  Referral  -     XR Shoulder Left G/E 3 Views; Future  -     Physical Therapy Referral; Future    Infraspinatus tendonitis, left  -     Physical Therapy Referral; Future    Left carpal tunnel syndrome      53-year-old female presents with acute left shoulder pain for the past month with no inciting injury.  She also has a history of left carpal tunnel syndrome.  On physical exam, she has significant pain and spasm of her left trapezius muscle and tenderness over the infraspinatus as well as some pain and mild weakness with external rotation.  She does a lot of repetitive motion at work that I think is likely the main contributor to her symptoms today in the setting of some shoulder tendinitis.  Her shoulder x-ray reveals no signs of shoulder arthritis.    Plan:  - Left shoulder subacromial bursa injection performed in clinic today to help with acute pain, discussed that we could trial short course of oral anti-inflammatories but patient declined at this time  - Refer to physical therapy to work on modalities, stretching, strengthening, and posture  - Discussed with patient that she could trial a neutral wrist splint to the left wrist at night and then follow-up with me for a full carpal tunnel evaluation at her next visit  - Can use heat and start stretching areas of tightness in the left neck/shoulder area to help with muscle spasms    Return in about 3 months (around 3/1/2024).      Dr. Genesis Cuellar, DO  [facility]  Sports Medicine     -----  Chief Complaint   Patient presents with     "Left Shoulder - Pain       SUBJECTIVE  Marni Austin is a/an 53 year old right-handed female who is seen in consultation at the request of  Alexa Smalls C.N.P. for evaluation of left shoulder pain.  Onset was 1 month ago with no injury. Pain is located at the posterior shoulder. Symptoms are worsened by sleeping on it, reaching hand above shoulder.  She has tried Salonpas patches, ibuprofen, and heat. Associated symptoms include radiating pain down to the elbow and some tingling that happened one day, but has since resolved. Reports some chronic tingling in R fingertips, reports h/o carpal tunnel.     The patient is seen alone    Prior injury/Surgical history of affected joint: None  Social History/Occupation: Works in the office    REVIEW OF SYSTEMS:  Pertinent positives/negative: As stated above in HPI    OBJECTIVE:  /87   Ht 1.549 m (5' 1\")   Wt 86.2 kg (190 lb)   LMP 08/30/2013   BMI 35.90 kg/m     General: Alert and in no distress  Skin: no visable rashes  CV: Extremities appear well perfused   Resp: normal respiratory effort, no conversational dyspnea   Psych: normal mood, affect  MSK:  RIGHT/LEFT: LeftShoulder Exam:  Appearance: Symmetric shoulder heights, No scapular winging observed  Palpation: Tender to palpation of trapezius, infraspinatus, pectoralis major  Rotator cuff strength:    Supraspinatus: 5/5   ER: 4/5   IR: 5/5  AROM:    Forward flexion: 180   Abduction: 180   ER at side: 90   IR: To belt line  Special tests: + for None  Positive carpal compression test on the left  Negative Spurling on the left    RADIOLOGY:  Final results and radiologist's interpretation available in the [facility] health record.  Images below were personally reviewed and discussed with the patient in the office today.  My personal interpretation of the performed imaging: XR of the left shoulder performed today reveals normal joint space alignment and no bony tenderness    Large Joint Injection/Arthocentesis: L " subacromial bursa    Date/Time: 12/1/2023 4:24 PM    Performed by: Genesis Cuellar DO  Authorized by: Genesis Cuellar DO    Indications:  Pain  Needle Size:  25 G  Guidance: ultrasound    Approach:  Posterolateral  Location:  Shoulder      Site:  L subacromial bursa  Medications:  40 mg triamcinolone 40 MG/ML; 1 mL lidocaine 1 %  Outcome:  Tolerated well, no immediate complications  Procedure discussed: discussed risks, benefits, and alternatives    Consent Given by:  Patient  Timeout: timeout called immediately prior to procedure    Prep: patient was prepped and draped in usual sterile fashion     Ultrasound was used to ensure safe and accurate needle placement and injection. Ultrasound images of the procedure were permanently stored.          Review of prior external note(s) from - primary care

## 2023-12-01 NOTE — PATIENT INSTRUCTIONS
Thank you for choosing [facility]!    DR. ESQUIVEL'S CLINIC LOCATIONS:     [place]  TRIAGE LINE: [phone] [address] APPOINTMENTS: [phone]   [place] [address] RADIOLOGY: [phone]   (Mondays & Tuesdays) HAND THERAPY: [phone]    PHYSICAL THERAPY: [phone]   [place] BILLING QUESTIONS: [phone] [address] FAX: [phone]   [place] [address]    (Thursdays & Fridays)     Post-Injection Discharge Instructions    You may shower, however avoid swimming, tub baths or hot tubs for 24 hours following your procedure  You may have a mild to moderate increase in pain for a few days following the injection.  The lidocaine (local numbing medicine) will wear off in several hours. It usually takes 3-5 days for the steroid medication to start working although it may take up to 14 days for full effect.   You may use ice packs for 10-15 minutes, 3 to 4 times a day at the injection site for comfort if needed  You may use extra strength Tylenol for pain control if necessary   If you were fasting, you may resume your normal diet and medications after the procedure  If you have diabetes, your blood sugar may be higher than normal for 10-14 days following a steroid injection. Contact your doctor who manages your diabetes if your blood sugar is significantly higher than usual    If you experience any of the following, call Sports Medicine @ [phone] or [phone]  -Fever over 100 degrees F  -Swelling, bleeding, redness, drainage, warmth at the injection site  -New or significant worsening pain

## 2023-12-19 ENCOUNTER — OFFICE VISIT (OUTPATIENT)
Dept: URGENT CARE | Facility: URGENT CARE | Age: 53
End: 2023-12-19
Payer: COMMERCIAL

## 2023-12-19 ENCOUNTER — NURSE TRIAGE (OUTPATIENT)
Dept: NURSING | Facility: CLINIC | Age: 53
End: 2023-12-19
Payer: COMMERCIAL

## 2023-12-19 VITALS
DIASTOLIC BLOOD PRESSURE: 90 MMHG | OXYGEN SATURATION: 96 % | SYSTOLIC BLOOD PRESSURE: 142 MMHG | WEIGHT: 190 LBS | BODY MASS INDEX: 35.9 KG/M2 | RESPIRATION RATE: 20 BRPM | TEMPERATURE: 98 F | HEART RATE: 85 BPM

## 2023-12-19 DIAGNOSIS — M62.838 MUSCLE SPASM: ICD-10-CM

## 2023-12-19 DIAGNOSIS — R21 RASH: Primary | ICD-10-CM

## 2023-12-19 PROCEDURE — 99213 OFFICE O/P EST LOW 20 MIN: CPT | Performed by: PHYSICIAN ASSISTANT

## 2023-12-19 RX ORDER — CYCLOBENZAPRINE HCL 5 MG
5 TABLET ORAL 3 TIMES DAILY PRN
Qty: 30 TABLET | Refills: 3 | Status: SHIPPED | OUTPATIENT
Start: 2023-12-19

## 2023-12-19 RX ORDER — MUPIROCIN 20 MG/G
OINTMENT TOPICAL 3 TIMES DAILY
Qty: 60 G | Refills: 0 | Status: SHIPPED | OUTPATIENT
Start: 2023-12-19 | End: 2023-12-26

## 2023-12-19 ASSESSMENT — PAIN SCALES - GENERAL: PAINLEVEL: NO PAIN (0)

## 2023-12-19 NOTE — PROGRESS NOTES
Assessment & Plan     Rash  Acute problem. Suspect mild dermatitis. No evidence of cellulitis on exam.  On exam patient is in no acute distress.  I have recommended triamcinolone cream to help with itching.  Patient has at home.  Bactroban is prescribed today to prevent secondary infection.  Follow-up if any worsening symptoms.  Patient agrees.  - mupirocin (BACTROBAN) 2 % external ointment  Dispense: 60 g; Refill: 0    Muscle spasm  Acute on chronic lower extremities muscle spasms.  No recent acute injury.  Recent comprehensive metabolic panel checked 3 weeks ago unremarkable.  Flexeril is prescribed today, recommended using as needed.  Stay hydrated.  Heat pads as needed.  Follow-up if any worsening symptoms.  Patient agrees.  - cyclobenzaprine (FLEXERIL) 5 MG tablet  Dispense: 30 tablet; Refill: 3       Return in about 1 week (around 12/26/2023) for Symptoms failing to improve.    Hannah Sevilla PA-C  [facility] RAMON Grider is a 53 year old female who presents to clinic today for the following health issues:  Chief Complaint   Patient presents with    Urgent Care     Rash spreading all over her body- mostly arms and legs.  Extreme Itching.     Also concerns of CO2 detectors going off the other night.  Her roommate was too drunk to get up and go outside, so she kept going in and out. Wonders if it has anything to do with her rash.     Mike Horses in her lower legs.      HPI    Patient is presenting to urgent care today with complaints of a rash.  Onset of symptoms 3 days ago.  Rash is very itchy and is spreading.  Denies any new cosmetics or detergents or soap.  Treatment tried: Hydrocortisone cream--helped only slightly.  No drainage from affected areas.  Second complaint is muscle spasms in lower legs.  Symptoms worsening in the past few days, has used Flexeril in the past before which has helped.      Review of Systems  Constitutional, HEENT, cardiovascular,  pulmonary, GI, , musculoskeletal, neuro, skin, endocrine and psych systems are negative, except as otherwise noted.      Objective    BP (!) 142/90 (BP Location: Right arm, Patient Position: Sitting, Cuff Size: Adult Regular)   Pulse 85   Temp 98  F (36.7  C) (Oral)   Resp 20   Wt 86.2 kg (190 lb)   LMP 08/30/2013   SpO2 96%   BMI 35.90 kg/m    Physical Exam   GENERAL: healthy, alert and no distress  RESP: lungs clear to auscultation - no rales, rhonchi or wheezes  SKIN: Erythematous papular rash noted on bilateral forearms, lower legs, excoriation marks are noted, no vesicles noted.  LE exam: No gross deformities, swelling or ecchymosis noted.

## 2023-12-19 NOTE — TELEPHONE ENCOUNTER
Triage call  Patient calling to report she has been having some severe carlos horses in the night  she also states she  for the last 3 -4 days has developed a rash it started on one of her tatoos on her arm and spread down by her ankle and is spreading up her leg and  she now also has the rash on her forearm and is is small red bumps and they are very itchy.    Per protocol see in office today. Care advice given.  Verbalizes understanding and agrees with plan.. transferred to scheduling.    Cara Treadwell RN   [facility] Nurse Advisor  7:50 AM 12/19/2023    Reason for Disposition   SEVERE itching    Additional Information   Negative: Sudden onset of rash (within last 2 hours) and difficulty with breathing or swallowing   Negative: Difficult to awaken or acting confused (e.g., disoriented, slurred speech)   Negative: Fever and purple or blood-colored spots or dots   Negative: Too weak or sick to stand   Negative: Life-threatening reaction (anaphylaxis) in the past to similar substance (e.g., food, insect bite/sting, chemical, etc.) and < 2 hours since exposure   Negative: Sounds like a life-threatening emergency to the triager   Negative: Drug rash suspected and started taking new medicine within last 2 weeks  (Exception: Antihistamine, eye drops, ear drops, decongestant or other OTC cough/cold medicines.)   Negative: Hives suspected   Negative: Insect bites suspected   Negative: MPOX SUSPECTED (e.g., direct skin contact such as sex, recent travel to [place] or [place]) and any SYMPTOMS OF MPOX (e.g., rash, fever, muscle aches, or swollen lymph nodes)   Negative: At risk for Mpox (men-who-have-sex-with-men) and possible exposure (e.g., multiple sex partners in past 21 days) and ANY SYMPTOMS OF MPOX (e.g., rash, fever, muscle aches, or swollen lymph nodes)   Negative: Sunburn suspected   Negative: Bright red, sunburn-like rash and current tampon use or nasal packing   Negative: Bright red, sunburn-like  rash and wound infection or recent surgery   Negative: Bright red skin that peels off in sheets   Negative: Stiff neck (can't touch chin to chest)   Negative: Patient sounds very sick or weak to the triager   Negative: Fever   Negative: Face becomes swollen   Negative: Headache   Negative: Purple or blood-colored spots or dots (no fever and sounds well to triager)   Negative: Joint pain or swelling   Negative: Bloody crusts on lips or sores in mouth   Negative: Large or small blisters on skin (i.e., fluid filled bubbles or sacs)   Negative: Pregnant   Negative: Rash began within 4 hours of a new prescription medication    Protocols used: Rash or Redness - Widespread-A-OH

## 2023-12-22 ENCOUNTER — TELEPHONE (OUTPATIENT)
Dept: ORTHOPEDICS | Facility: CLINIC | Age: 53
End: 2023-12-22
Payer: COMMERCIAL

## 2023-12-22 DIAGNOSIS — M17.11 PRIMARY OSTEOARTHRITIS OF RIGHT KNEE: Primary | ICD-10-CM

## 2023-12-22 NOTE — TELEPHONE ENCOUNTER
Patient scheduled for appointment on 1/9/24 @ [facility] for discussion of viscosupplementation injection vs steroid injection of right knee.      Durolane injection last completed 6/5/23.     Patient has failed trial of OTC NSAIDs/Pain Medication (ibuprofen, tylenol, naproxen,...):  Yes    Prior authorization referral for Durolane injection pended.    Virgen Gutierrez MBA, ATC

## 2023-12-26 ENCOUNTER — TELEPHONE (OUTPATIENT)
Dept: FAMILY MEDICINE | Facility: CLINIC | Age: 53
End: 2023-12-26
Payer: COMMERCIAL

## 2023-12-26 NOTE — TELEPHONE ENCOUNTER
Pt called requesting appt with PCP for a rash. Pt states she wants to be seen this week. No appts available with PCP or other providers at . Pt was seen at  for rash on 12/19 and would like in clinic follow up as the rash has not gotten better.   Appt scheduled at [facility] location per pt's request.   Advised pt to call back with any new or worsening symptoms. Pt verbalized understanding.     Melinda Arredondo RN    Appointments in Next Year      Dec 29, 2023  4:30 PM  (Arrive by 4:10 PM)  Provider Visit with Rozina Puentes PA-C  [facility] ([facility] ) [phone]

## 2024-01-01 NOTE — ED PROVIDER NOTES
History   Chief Complaint:  Dizziness    The history is provided by the patient.      Marni Austin is a 51 year old female with history of diabetes, hypertension, CAD, CHF, asthma, and COPD who presents with light-headedness. The patient reports that she began to feel dizzy/lightheaded today at 1030 at work. The dizziness was much worse at 1130, and she then vomited.  She reports feeling increased dizziness with standing and moving her head.  She now feels better while lying down. She went to Urgent Care earlier, where she tested negative for COVID and was referred here. She denies chest pain. She also notes increased shortness of breath and wheezing lately that she attributes to COPD.  She denies headache, vision changes, slurred speech, or any other concerning symptoms.    Review of Systems   Respiratory: Positive for shortness of breath and wheezing.    Cardiovascular: Negative for chest pain.   Gastrointestinal: Positive for nausea and vomiting.   Neurological: Positive for dizziness and light-headedness.   All other systems reviewed and are negative.      Allergies:  Bupropion    Medications:  Aspirin  Lipitor  Zyrtec  Hygroton  Celexa  Flexeril  Arthrotec  Cozaar  Glucophage  Toprol  Nitrostat  Potassium chloride  Chantix    Past Medical History:    CAD  COPD  Depression  Hypertension  CHF  Asthma  Morbid obesity  Tobacco use disorder  Adjustment disorder with anxiety  Hyperlipidemia   Diabetes     Past Surgical History:    Cardiac stent placement  Tonsillectomy  Tubal ligation    Family History:    Hypertension, mother  Heart disease, mother  Alcoholism, mother  Diabetes, father  CAD, father  Obesity, brother    Social History:  PCP: David Gambino  Presents to the ED alone.    Physical Exam     Orthostatics taken at 2255:  Sitting Orthostatic BP: 146/95, Sitting Orthostatic Pulse: 74 bpm   Lying Orthostatic BP: 142/80 (Pt. Dizzy), Lying Orthostatic Pulse: 74 bpm   Standing Orthostatic  negative BP: 163/106 (Pt. not dizzy or lightheaded), Standing Orthostatic Pulse: 86 bpm    Patient Vitals for the past 24 hrs:   BP Temp Temp src Pulse Resp SpO2   08/26/21 0015 134/78 -- -- 87 -- 95 %   08/26/21 0000 (!) 153/77 -- -- 81 -- --   08/25/21 2245 (!) 163/106 -- -- -- -- --   08/25/21 2230 (!) 142/88 -- -- -- -- --   08/25/21 2215 (!) 132/94 -- -- -- -- --   08/25/21 2200 137/87 -- -- 73 -- 92 %   08/25/21 2150 -- -- -- -- -- 98 %   08/25/21 2140 (!) 151/90 -- -- 74 -- 96 %   08/25/21 2120 120/88 -- -- 71 -- 97 %   08/25/21 1828 (!) 188/110 97.5  F (36.4  C) Temporal 79 24 98 %       Physical Exam  General: GCS 15  Head:  Scalp is atraumatic.  Eyes:  EOM intact. The pupils are equal, round, and reactive to light. No scleral icterus.  No nystagmus.  ENT:                                      Ears:  The external ears are normal. TM's non-erythematous. External canals normal.    Nose:  The external nose is normal.  Throat:  The oropharynx is normal. Mucus membranes are moist.                 Neck:  Normal range of motion. There is no rigidity.   CV:  Regular rate and rhythm. No murmur. 2+ radial and DP pulses  Resp:  Diffuse wheezing throughout all lung fields, non-labored, no retractions or accessory muscle use.  GI:  Abdomen is soft, no distension, no tenderness.   MS:  Normal range of motion.   Skin:  Warm and dry.   Neuro:  Cranial nerves 2-12 intact; 5/5 strength throughout the upper and lower extremities; sensation intact to light touch throughout the upper and lower extremities; normal finger to nose; normal gait, No meningismus.   Psych:  Awake. Alert & orientedx3.  Appropriate interactions.     Emergency Department Course     ECG:  ECG taken at 1853, ECG read at 1902  Normal sinus rhythm  Nonspecific T wave abnormality  Abnormal ECG  Rate 72 bpm. IN interval 140 ms. QRS duration 100 ms. QT/QTc 372/407 ms. P-R-T axes 31 74 82.    Laboratory:  CBC: WBC 12.1(H), HGB 15.4,    BMP: Glucose 149(H), o/w WNL  (Creatinine: 0.78)    Troponin (Collected 2011): <0.015  Troponin (Collected 2342): <0.015    Emergency Department Course:  Reviewed:  I reviewed the patient's nursing notes, vitals, past medical records, Care Everywhere.     Assessments:  ED Course as of Aug 26 0128   Wed Aug 25, 2021   2123 I performed an assessment and examination of the patient as noted above.        2304 I rechecked the patient and updated with findings. I ambulated the patient, she reported dizziness when she turned around.         Interventions:  2140 Solu-Medrol 125 mg, IV  2142 Duoneb 3 mL, nebulization    2343 NS 1L IV Bolus     Disposition:  The patient was discharged to home.     Impression & Plan   Medical Decision Making:  Marni Austin is a 51 year old female with a history of COPD, CAD, CHF, who presents for evaluation of dizziness/lightheadedness. Details from patient's history suggest vertigo and differential for vertigo includes labyrinthitis, benign positional vertigo, otitis media, tumor, cerebral vascular accident, intracranial bleed, among others.  She has a normal neurologic examination including cerebellar exam.  No signs or symptoms to suggest need for advanced head imaging. Also considered more of a presyncopal etiology including cardiac arrhythmia, acute coronary syndrome, aortic stenosis, pulmonary embolism, orthostatic hypotension, among others.  EKG reveals normal sinus rhythm with nonspecific T wave abnormalities.  Initial and serial troponin negative.  She has no chest pain and low suspicion for acute coronary syndrome.  She has no pleuritic chest pain and shortness of breath resolved after treating COPD. Doubt PE.  Mild leukocytosis 12.1, she notes vomiting PTA and suspect component of dehydration. Afebrile.      After above interventions, she was able to ambulate without difficulty, though noted some dizziness with turning around which suggests more of a benign positional vertigo.  Will prescribe meclizine for  home.  She did not want this here as she wished to drive.     Clinically, also concern for COPD exacerbation.  Patient was given the above interventions and on recheck wheezing markedly improved.  There is no hypoxia.  Recommended continuing albuterol every 6 hours.  Will prescribe prednisone for home.  There is no fever, cough, or concern for pneumonia at this time.      Given the patient's vague symptoms, I discussed admission to observation, though patient felt improved and appropriate for discharge.  I emphasized the importance of strict return precautions should she develop chest pain, worsening dizziness, worsening shortness of breath, or any other concerning symptoms develop.  Patient agrees with this plan and understands importance of close follow-up with primary care provider.  Work note provided.      Covid-19  Marni Austin was evaluated during a global COVID-19 pandemic, which necessitated consideration that the patient might be at risk for infection with the SARS-CoV-2 virus that causes COVID-19.   Applicable protocols for evaluation were followed during the patient's care.     Diagnosis:    ICD-10-CM    1. Dizziness  R42    2. COPD exacerbation (H)  J44.1        Discharge Medications:  Discharge Medication List as of 8/26/2021 12:20 AM      START taking these medications    Details   meclizine (ANTIVERT) 12.5 MG tablet Take 2 tablets (25 mg) by mouth 3 times daily as needed for dizziness, Disp-30 tablet, R-0, Local Print      predniSONE (DELTASONE) 20 MG tablet Take two tablets (= 40mg) each day for 5 (five) days, Disp-10 tablet, R-0, Local Print           Scribe Disclosure:  I, Cece Soto, am serving as a scribe at 9:21 PM on 8/25/2021 to document services personally performed by Karlie Gutierrez PA-C based on my observations and the provider's statements to me.       Karlie Gutierrez PA-C  08/26/21 0151

## 2024-01-04 DIAGNOSIS — E11.59 TYPE 2 DIABETES MELLITUS WITH OTHER CIRCULATORY COMPLICATION, WITHOUT LONG-TERM CURRENT USE OF INSULIN (H): ICD-10-CM

## 2024-01-04 RX ORDER — BLOOD SUGAR DIAGNOSTIC
STRIP MISCELLANEOUS
Qty: 100 STRIP | Refills: 0 | Status: SHIPPED | OUTPATIENT
Start: 2024-01-04

## 2024-03-12 ENCOUNTER — MYC REFILL (OUTPATIENT)
Dept: FAMILY MEDICINE | Facility: CLINIC | Age: 54
End: 2024-03-12
Payer: COMMERCIAL

## 2024-03-12 DIAGNOSIS — E87.6 HYPOKALEMIA: ICD-10-CM

## 2024-03-12 DIAGNOSIS — I25.10 CORONARY ARTERY DISEASE INVOLVING NATIVE HEART WITHOUT ANGINA PECTORIS, UNSPECIFIED VESSEL OR LESION TYPE: ICD-10-CM

## 2024-03-12 DIAGNOSIS — F43.22 ADJUSTMENT DISORDER WITH ANXIETY: ICD-10-CM

## 2024-03-12 DIAGNOSIS — I10 HYPERTENSION GOAL BP (BLOOD PRESSURE) < 140/90: ICD-10-CM

## 2024-03-12 RX ORDER — POTASSIUM CHLORIDE 750 MG/1
10 TABLET, EXTENDED RELEASE ORAL DAILY
Qty: 90 TABLET | Refills: 1 | Status: CANCELLED | OUTPATIENT
Start: 2024-03-12

## 2024-03-12 RX ORDER — METOPROLOL SUCCINATE 25 MG/1
25 TABLET, EXTENDED RELEASE ORAL DAILY
Qty: 90 TABLET | Refills: 3 | Status: CANCELLED | OUTPATIENT
Start: 2024-03-12

## 2024-03-12 RX ORDER — CITALOPRAM HYDROBROMIDE 40 MG/1
40 TABLET ORAL DAILY
Qty: 90 TABLET | Refills: 3 | Status: CANCELLED | OUTPATIENT
Start: 2024-03-12

## 2024-03-13 DIAGNOSIS — F43.22 ADJUSTMENT DISORDER WITH ANXIETY: ICD-10-CM

## 2024-03-13 RX ORDER — CITALOPRAM HYDROBROMIDE 40 MG/1
40 TABLET ORAL DAILY
Qty: 90 TABLET | Refills: 3 | OUTPATIENT
Start: 2024-03-13

## 2024-03-13 RX ORDER — CITALOPRAM HYDROBROMIDE 40 MG/1
40 TABLET ORAL DAILY
Qty: 90 TABLET | Refills: 1 | Status: SHIPPED | OUTPATIENT
Start: 2024-03-13 | End: 2024-09-05

## 2024-03-13 RX ORDER — METOPROLOL SUCCINATE 25 MG/1
25 TABLET, EXTENDED RELEASE ORAL DAILY
Qty: 90 TABLET | Refills: 1 | Status: SHIPPED | OUTPATIENT
Start: 2024-03-13 | End: 2024-09-05

## 2024-03-13 RX ORDER — POTASSIUM CHLORIDE 750 MG/1
10 TABLET, EXTENDED RELEASE ORAL DAILY
Qty: 90 TABLET | Refills: 1 | Status: SHIPPED | OUTPATIENT
Start: 2024-03-13 | End: 2024-09-05

## 2024-03-22 NOTE — TELEPHONE ENCOUNTER
Reason for call:  Form   Our goal is to have forms completed within 72 hours, however some forms may require a visit or additional information.     Who is the form from? Insurance comp  Where did the form come from? Patient or family brought in     What clinic location was the form placed at? [place]  Where was the form placed?  Box/Folder  What number is listed as a contact on the form? [phone]    Phone call message - patient request for a letter, form or note:     Date needed: as soon as possible  Please call the patient when completed  Has the patient signed a consent form for release of information? YES    Additional comments: none    Type of letter, form or note: medical    Phone number to reach patient:  Cell number on file:    Telephone Information:   Mobile [phone]       Best Time:  any    Can we leave a detailed message on this number?  YES    Travel screening: Not Applicable   Ambulatory

## 2024-03-24 ENCOUNTER — HEALTH MAINTENANCE LETTER (OUTPATIENT)
Age: 54
End: 2024-03-24

## 2024-04-05 DIAGNOSIS — E11.59 TYPE 2 DIABETES MELLITUS WITH OTHER CIRCULATORY COMPLICATION, WITHOUT LONG-TERM CURRENT USE OF INSULIN (H): ICD-10-CM

## 2024-04-05 RX ORDER — SEMAGLUTIDE 1.34 MG/ML
INJECTION, SOLUTION SUBCUTANEOUS
Qty: 3 ML | Refills: 0 | Status: SHIPPED | OUTPATIENT
Start: 2024-04-05 | End: 2024-04-15

## 2024-04-15 ENCOUNTER — MYC MEDICAL ADVICE (OUTPATIENT)
Dept: FAMILY MEDICINE | Facility: CLINIC | Age: 54
End: 2024-04-15

## 2024-04-15 ENCOUNTER — MYC REFILL (OUTPATIENT)
Dept: FAMILY MEDICINE | Facility: CLINIC | Age: 54
End: 2024-04-15

## 2024-04-15 ENCOUNTER — E-VISIT (OUTPATIENT)
Dept: FAMILY MEDICINE | Facility: CLINIC | Age: 54
End: 2024-04-15
Payer: COMMERCIAL

## 2024-04-15 ENCOUNTER — VIRTUAL VISIT (OUTPATIENT)
Dept: FAMILY MEDICINE | Facility: CLINIC | Age: 54
End: 2024-04-15
Payer: COMMERCIAL

## 2024-04-15 DIAGNOSIS — J45.41 MODERATE PERSISTENT ASTHMA WITH ACUTE EXACERBATION: Primary | ICD-10-CM

## 2024-04-15 DIAGNOSIS — E11.59 TYPE 2 DIABETES MELLITUS WITH OTHER CIRCULATORY COMPLICATION, WITHOUT LONG-TERM CURRENT USE OF INSULIN (H): ICD-10-CM

## 2024-04-15 DIAGNOSIS — R05.9 COUGH, UNSPECIFIED TYPE: Primary | ICD-10-CM

## 2024-04-15 DIAGNOSIS — Z12.31 VISIT FOR SCREENING MAMMOGRAM: ICD-10-CM

## 2024-04-15 PROCEDURE — 99214 OFFICE O/P EST MOD 30 MIN: CPT | Mod: 95 | Performed by: PHYSICIAN ASSISTANT

## 2024-04-15 PROCEDURE — 99207 PR NON-BILLABLE SERV PER CHARTING: CPT | Performed by: PHYSICIAN ASSISTANT

## 2024-04-15 RX ORDER — SEMAGLUTIDE 1.34 MG/ML
1 INJECTION, SOLUTION SUBCUTANEOUS
Qty: 9 ML | Refills: 0 | Status: SHIPPED | OUTPATIENT
Start: 2024-04-15 | End: 2024-05-13

## 2024-04-15 RX ORDER — PREDNISONE 20 MG/1
40 TABLET ORAL DAILY
Qty: 10 TABLET | Refills: 0 | Status: SHIPPED | OUTPATIENT
Start: 2024-04-15 | End: 2024-08-15

## 2024-04-15 ASSESSMENT — PATIENT HEALTH QUESTIONNAIRE - PHQ9
10. IF YOU CHECKED OFF ANY PROBLEMS, HOW DIFFICULT HAVE THESE PROBLEMS MADE IT FOR YOU TO DO YOUR WORK, TAKE CARE OF THINGS AT HOME, OR GET ALONG WITH OTHER PEOPLE: NOT DIFFICULT AT ALL
SUM OF ALL RESPONSES TO PHQ QUESTIONS 1-9: 8
SUM OF ALL RESPONSES TO PHQ QUESTIONS 1-9: 8

## 2024-04-15 NOTE — PROGRESS NOTES
Marni is a 54 year old who is being evaluated via a billable video visit.    How would you like to obtain your AVS? Floridalmahart  If the video visit is dropped, the invitation should be resent by: Text to cell phone: [phone]  Will anyone else be joining your video visit? No      Assessment & Plan     Moderate persistent asthma with acute exacerbation  Likely multifactorial with some work dust exposure and viral illness to boot. Continue current inhaler therapy but plan to add below. She is appropriate at this time for virtual eval but if significantly worsening will be seen in clinic. She can message in 2-3 days if sinus symptoms persisting or cough not fully improving (but again not worsening) and I will ssend in a course of abx as well to cover both chest and sinus.  - predniSONE (DELTASONE) 20 MG tablet; Take 2 tablets (40 mg) by mouth daily    Visit for screening mammogram  due  - MA SCREENING DIGITAL BILAT - Future  (s+30); Future          Subjective   Marni is a 54 year old, presenting for the following health issues:  URI      4/15/2024     1:56 PM   Additional Questions   Roomed by Martha RUTLEDGE CMA   Accompanied by JASON         4/15/2024     1:56 PM   Patient Reported Additional Medications   Patient reports taking the following new medications None     URI    Answers submitted by the patient for this visit:  Patient Health Questionnaire (Submitted on 4/15/2024)  If you checked off any problems, how difficult have these problems made it for you to do your work, take care of things at home, or get along with other people?: Not difficult at all  PHQ9 TOTAL SCORE: 8       Acute Illness  Acute illness concerns: URI  Onset/Duration: 4 Days ago   Symptoms:  Fever: No  Chills/Sweats: YES  Headache (location?): YES  Sinus Pressure: No  Conjunctivitis:  No  Ear Pain: no  Rhinorrhea: No  Congestion: YES  Sore Throat: No  Cough: YES-non-productive, productive of clear sputum, barking  Wheeze: YES  Decreased Appetite:  YES  Nausea: No  Vomiting: No  Diarrhea: YES  Dysuria/Freq.: No  Dysuria or Hematuria: No  Fatigue/Achiness: YES  Sick/Strep Exposure: No  Therapies tried and outcome: Tylenol, Advil     Marni Austin is a 54 year old female who presents today for new onset upper respiratory symptoms  She has really been trying to manage her asthma/copd well this past year and hasn't had a single flare for awhile  Had some sinus/upper respiratory symptoms - lots of pressure, draining, pain etc around a month ago and that started to slowly get better after a few weeks  Developed diarrhea on Thursday; no vomiting  Tried to work on Friday but was sent home for ongoing fatigue, congestion, cough  Cough is wet/productive; chest feels tight; wheeze   -cough going on for   New work exposure to lots of dust    Smoking still but not that much; just 5-6 cigarettes daily    Using both albuterol; fluticasone          Review of Systems  Constitutional, HEENT, cardiovascular, pulmonary, gi and gu systems are negative, except as otherwise noted.      Objective           Vitals:  No vitals were obtained today due to virtual visit.    Physical Exam   GENERAL: alert and no distress  EYES: Eyes grossly normal to inspection.  No discharge or erythema, or obvious scleral/conjunctival abnormalities.  RESP: heavy productive cough throughout visit. She did not appear with shortness of breath and was speaking in full sentences  SKIN: Visible skin clear. No significant rash, abnormal pigmentation or lesions.  NEURO: Cranial nerves grossly intact.  Mentation and speech appropriate for age.  PSYCH: Appropriate affect, tone, and pace of words        Video-Visit Details    Type of service:  Video Visit   Originating Location (pt. Location): Home    Distant Location (provider location):  Off-site  Platform used for Video Visit: Alfonso  Signed Electronically by: David Gambino PA-C

## 2024-04-22 ENCOUNTER — OFFICE VISIT (OUTPATIENT)
Dept: URGENT CARE | Facility: URGENT CARE | Age: 54
End: 2024-04-22
Payer: COMMERCIAL

## 2024-04-22 VITALS
TEMPERATURE: 98.5 F | HEART RATE: 78 BPM | SYSTOLIC BLOOD PRESSURE: 109 MMHG | DIASTOLIC BLOOD PRESSURE: 72 MMHG | OXYGEN SATURATION: 96 %

## 2024-04-22 DIAGNOSIS — R05.1 ACUTE COUGH: ICD-10-CM

## 2024-04-22 DIAGNOSIS — J01.00 ACUTE MAXILLARY SINUSITIS, RECURRENCE NOT SPECIFIED: Primary | ICD-10-CM

## 2024-04-22 DIAGNOSIS — J45.41 MODERATE PERSISTENT ASTHMA WITH ACUTE EXACERBATION: ICD-10-CM

## 2024-04-22 PROCEDURE — 99214 OFFICE O/P EST MOD 30 MIN: CPT | Performed by: PHYSICIAN ASSISTANT

## 2024-04-22 RX ORDER — DOXYCYCLINE 100 MG/1
100 TABLET ORAL 2 TIMES DAILY
Qty: 20 TABLET | Refills: 0 | Status: SHIPPED | OUTPATIENT
Start: 2024-04-22 | End: 2024-05-02

## 2024-04-22 RX ORDER — DOXYCYCLINE 100 MG/1
100 CAPSULE ORAL 2 TIMES DAILY
Qty: 20 CAPSULE | Refills: 0 | Status: SHIPPED | OUTPATIENT
Start: 2024-04-22 | End: 2024-05-02

## 2024-04-22 RX ORDER — BENZONATATE 100 MG/1
100 CAPSULE ORAL 3 TIMES DAILY PRN
Qty: 30 CAPSULE | Refills: 0 | Status: SHIPPED | OUTPATIENT
Start: 2024-04-22 | End: 2024-05-02

## 2024-04-22 ASSESSMENT — ENCOUNTER SYMPTOMS
CHILLS: 0
FEVER: 0
SINUS PRESSURE: 1
SHORTNESS OF BREATH: 1
WHEEZING: 1
COUGH: 1

## 2024-04-22 NOTE — PATIENT INSTRUCTIONS
Go to the emergency room if symptoms worsen at any hour  Continue on home Medication for Asthma and COPD  Continue doing neb treatments for wheezing

## 2024-04-22 NOTE — TELEPHONE ENCOUNTER
YESI Bryant -     See ov of 4/15/24.      Called the pt to discuss.  She denies chest pain today.  She says she just has chest pain when coughing.  No fever.  She is still not feeling better.    Advised she should be seen in person today.  Concerned with hx of asthma and per ov note of 4/15/24 and with having chest pain.  No in person appts today in [facility], [place] or [place].  Advised to be seen in Urgent Care today.  Advised how to put in the on my way for Urgent Care.    Advised I will let Manny Gambino know.

## 2024-04-22 NOTE — LETTER
April 22, 2024      Marni Austin  [id number] [address] [id number]        To Whom It May Concern:    Marni Austin  was seen on 04/22/24. Please excuse her from work until 04/24/204 due to illness.        Sincerely,        Aleja Fried PA-C

## 2024-04-22 NOTE — PROGRESS NOTES
Assessment & Plan       1. Acute maxillary sinusitis, recurrence not specified    - doxycycline hyclate (VIBRAMYCIN) 100 MG capsule; Take 1 capsule (100 mg) by mouth 2 times daily for 10 days  Dispense: 20 capsule; Refill: 0    2. Acute cough    - benzonatate (TESSALON) 100 MG capsule; Take 1 capsule (100 mg) by mouth 3 times daily as needed for cough  Dispense: 30 capsule; Refill: 0    3. Moderate persistent asthma with acute exacerbation    -Patient wheezing on lung exam  -Patient recently completed prednisone treatment for 5 days  -Patient advised to continue with neb treatments every 4-6 hours as needed, Flovent inhaler and follow up with PCP for further instructions      Patient Instructions   Go to the emergency room if symptoms worsen at any hour  Continue on home Medication for Asthma and COPD  Continue doing neb treatments for wheezing      Return if symptoms worsen or fail to improve.    At the end of the encounter, I discussed results, diagnosis, medications. Discussed red flags for immediate return to clinic/ER, as well as indications for follow up if no improvement. Patient understood and agreed to plan. Patient was stable for discharge.    Sole Grider is a 54 year old female who presents to clinic today for the following health issues:  Chief Complaint   Patient presents with    Urgent Care     Chest Congestion, cough, wheezing, possible sinus infection, two negative covid test at home. Hx of asthma, copd, congestive heart failure.      HPI    Patient reports cough, congestion, wheezing and sinus pressure x 10 days. Patient had an evisit with her doctor and was started on prednisone for exacerbation of Asthma. She notes symptoms have not improved after completion of the steroid treatment. Patient has a history of Asthma, COPD. She does duo nebulizer treatments  at home. She also uses the albuterol inhaler as needed, Flovent 220 mcg/act, 2 puffs daily. No fever or chills.     Review of Systems    Constitutional:  Negative for chills and fever.   HENT:  Positive for congestion and sinus pressure.    Respiratory:  Positive for cough, shortness of breath and wheezing.    All other systems reviewed and are negative.      Problem List:  2021-09: Type 2 diabetes mellitus with other circulatory complication,   without long-term current use of insulin (H)  2020-01: Acute pain of left knee  2018-08: CHF (congestive heart failure) (H)  2018-08: Moderate persistent asthma  2018-07: Morbid obesity (H)  2017-03: Elevated fasting glucose  2015-03: Simple chronic bronchitis (H)  2015-03: Rectal bleeding  2013-09: Health Care Home  2013-09: Tobacco use disorder  2013-09: Hypertension goal BP (blood pressure) < 140/90  2010-10: CARDIOVASCULAR SCREENING; LDL GOAL LESS THAN 130  2009-12: BP (high blood pressure)  2009-09: Menorrhagia  2009-03: Depressive disorder, not elsewhere classified  2009-03: Hypertension  2007-01: Adjustment disorder with anxiety  Coronary artery disease involving native heart with angina pectoris,   unspecified vessel or lesion type (H24)      Past Medical History:   Diagnosis Date    Arthritis     not sure if arthritis or carpel tunnel    CAD (coronary artery disease)     Congestive heart failure (H)     COPD (chronic obstructive pulmonary disease) (H)     Depression     Depressive disorder     its better    Hypertension     Uncomplicated asthma        Social History     Tobacco Use    Smoking status: Every Day     Current packs/day: 0.50     Average packs/day: 0.5 packs/day for 45.3 years (22.7 ttl pk-yrs)     Types: Cigarettes     Start date: 1/1/1979    Smokeless tobacco: Never    Tobacco comments:     working on it. somedays i dont some i do   Substance Use Topics    Alcohol use: No           Objective    /72 (BP Location: Right arm, Patient Position: Sitting, Cuff Size: Adult Large)   Pulse 78   Temp 98.5  F (36.9  C) (Tympanic)   LMP 08/30/2013   SpO2 96%   Physical  Exam  Constitutional:       Appearance: Normal appearance.   HENT:      Head: Normocephalic.      Right Ear: Tympanic membrane normal.      Left Ear: Tympanic membrane normal.      Nose: Congestion present.      Right Sinus: Maxillary sinus tenderness present. No frontal sinus tenderness.      Left Sinus: Maxillary sinus tenderness present. No frontal sinus tenderness.      Mouth/Throat:      Mouth: Mucous membranes are moist.      Pharynx: Oropharynx is clear. Uvula midline. No posterior oropharyngeal erythema.   Cardiovascular:      Rate and Rhythm: Normal rate and regular rhythm.   Pulmonary:      Effort: Pulmonary effort is normal.      Breath sounds: Examination of the right-upper field reveals wheezing. Examination of the left-upper field reveals wheezing. Examination of the right-middle field reveals wheezing. Examination of the left-middle field reveals wheezing. Wheezing present.   Lymphadenopathy:      Head:      Right side of head: No submental, submandibular or tonsillar adenopathy.      Left side of head: No submental, submandibular or tonsillar adenopathy.      Cervical: No cervical adenopathy.      Right cervical: No superficial cervical adenopathy.     Left cervical: No superficial cervical adenopathy.   Skin:     General: Skin is warm and dry.      Findings: No rash.   Neurological:      Mental Status: She is alert.   Psychiatric:         Mood and Affect: Mood normal.         Behavior: Behavior normal.              Aleja Fried PA-C

## 2024-04-23 ENCOUNTER — OFFICE VISIT (OUTPATIENT)
Dept: ORTHOPEDICS | Facility: CLINIC | Age: 54
End: 2024-04-23
Payer: COMMERCIAL

## 2024-04-23 VITALS — WEIGHT: 191 LBS | BODY MASS INDEX: 36.06 KG/M2 | HEIGHT: 61 IN

## 2024-04-23 DIAGNOSIS — M17.11 PRIMARY OSTEOARTHRITIS OF RIGHT KNEE: Primary | ICD-10-CM

## 2024-04-23 PROCEDURE — 20611 DRAIN/INJ JOINT/BURSA W/US: CPT | Mod: RT | Performed by: FAMILY MEDICINE

## 2024-04-23 RX ORDER — LIDOCAINE HYDROCHLORIDE 10 MG/ML
5 INJECTION, SOLUTION INFILTRATION; PERINEURAL
Status: SHIPPED | OUTPATIENT
Start: 2024-04-23

## 2024-04-23 RX ADMIN — LIDOCAINE HYDROCHLORIDE 5 ML: 10 INJECTION, SOLUTION INFILTRATION; PERINEURAL at 14:36

## 2024-04-23 NOTE — PROGRESS NOTES
"ASSESSMENT & PLAN  Patient Instructions     1. Primary osteoarthritis of right knee      -Patient is following for chronic right knee pain and swelling due to arthritis  -Patient's tolerated right Synvisc 1 injection today without complications.  Patient was given postprocedure instructions  -Patient will continue with home exercise program  -Patient will follow-up when pain returns  -Call direct clinic number [[phone]] at any time with questions or concerns.    Albert Yeo MD [facility] and [facility] Medicine  [facility]        -----    SUBJECTIVE:  Marni Austin is a 54 year old female who is seen in follow-up for right knee pain.They were last seen 06/05/2023 for this issue.     Since their last visit reports 0% - (About the same as last time). They indicate that their current pain level is 2/10. They have tried rest/activity avoidance, ice, viscosupplementation injection (most recent date: 06/05/2023) that provided about 8 months of relief, and compression.      The patient is seen by themselves.    Patient's past medical, surgical, social, and family histories were reviewed today and no changes are noted.    REVIEW OF SYSTEMS:  Constitutional: NEGATIVE for fever, chills, change in weight  Skin: NEGATIVE for worrisome rashes, moles or lesions  GI/: NEGATIVE for bowel or bladder changes  Neuro: NEGATIVE for weakness, dizziness or paresthesias    OBJECTIVE:  Ht 1.549 m (5' 1\")   Wt 86.6 kg (191 lb)   LMP 08/30/2013   BMI 36.09 kg/m     General: healthy, alert and in no distress  HEENT: no scleral icterus or conjunctival erythema  Skin: no suspicious lesions or rash. No jaundice.  CV: regular rhythm by palpation, no pedal edema  Resp: normal respiratory effort without conversational dyspnea   Psych: normal mood and affect  Gait: normal steady gait with appropriate coordination and balance  Neuro: normal light touch sensory exam of the extremities.    MSK:  RIGHT " KNEE  Inspection:    normal alignment  Palpation:    Tender about the lateral patellar facet, medial patellar facet and medial joint line. Remainder of bony and ligamentous landmarks are nontender.    Mild effusion is present    Patellofemoral crepitus is Present  Range of Motion:     00 extension to 1100 flexion  Strength:    Quadriceps grossly intact    Extensor mechanism intact  Special Tests:    Positive: none    Negative: MCL/valgus stress (0 & 30 deg), LCL/varus stress (0 & 30 deg), Lachman's, anterior drawer, posterior drawer    Independent visualization of the below image:    Large Joint Injection/Arthocentesis: R knee joint    Date/Time: 4/23/2024 2:36 PM    Performed by: Yeo, Albert, MD  Authorized by: Yeo, Albert, MD    Indications:  Pain and osteoarthritis  Needle Size:  22 G  Guidance: ultrasound    Approach:  Anterolateral  Location:  Knee      Medications:  48 mg hylan 48 MG/6ML; 5 mL lidocaine 1 %  Aspirate amount (mL):  11  Aspirate:  Yellow and clear  Outcome:  Tolerated well, no immediate complications  Procedure discussed: discussed risks, benefits, and alternatives    Consent Given by:  Patient  Timeout: timeout called immediately prior to procedure    Prep: patient was prepped and draped in usual sterile fashion     Ultrasound was used to ensure safe and accurate needle placement and injection. Ultrasound images of the procedure were permanently stored.          Albert Yeo MD, [facility] [place] [facility]

## 2024-04-23 NOTE — PATIENT INSTRUCTIONS
1. Primary osteoarthritis of right knee      -Patient is following for chronic right knee pain and swelling due to arthritis  -Patient's tolerated right Synvisc 1 injection today without complications.  Patient was given postprocedure instructions  -Patient will continue with home exercise program  -Patient will follow-up when pain returns  -Call direct clinic number [[phone]] at any time with questions or concerns.    Albert Yeo MD [facility] Orthopedics and Sports Medicine  [facility] [place]

## 2024-04-23 NOTE — LETTER
"    4/23/2024         RE: Marni Austin  [address]  [place] [address]        Dear Colleague,    Thank you for referring your patient, Marni Austin, to the [facility] [place]. Please see a copy of my visit note below.    ASSESSMENT & PLAN  Patient Instructions     1. Primary osteoarthritis of right knee      -Patient is following for chronic right knee pain and swelling due to arthritis  -Patient's tolerated right Synvisc 1 injection today without complications.  Patient was given postprocedure instructions  -Patient will continue with home exercise program  -Patient will follow-up when pain returns  -Call direct clinic number [[phone]] at any time with questions or concerns.    Albert Yeo MD [facility] Orthopedics and Sports Medicine  [facility]        -----    SUBJECTIVE:  Marni Austin is a 54 year old female who is seen in follow-up for right knee pain.They were last seen 06/05/2023 for this issue.     Since their last visit reports 0% - (About the same as last time). They indicate that their current pain level is 2/10. They have tried rest/activity avoidance, ice, viscosupplementation injection (most recent date: 06/05/2023) that provided about 8 months of relief, and compression.      The patient is seen by themselves.    Patient's past medical, surgical, social, and family histories were reviewed today and no changes are noted.    REVIEW OF SYSTEMS:  Constitutional: NEGATIVE for fever, chills, change in weight  Skin: NEGATIVE for worrisome rashes, moles or lesions  GI/: NEGATIVE for bowel or bladder changes  Neuro: NEGATIVE for weakness, dizziness or paresthesias    OBJECTIVE:  Ht 1.549 m (5' 1\")   Wt 86.6 kg (191 lb)   LMP 08/30/2013   BMI 36.09 kg/m     General: healthy, alert and in no distress  HEENT: no scleral icterus or conjunctival erythema  Skin: no suspicious lesions or rash. No jaundice.  CV: regular rhythm by " palpation, no pedal edema  Resp: normal respiratory effort without conversational dyspnea   Psych: normal mood and affect  Gait: normal steady gait with appropriate coordination and balance  Neuro: normal light touch sensory exam of the extremities.    MSK:  RIGHT KNEE  Inspection:    normal alignment  Palpation:    Tender about the lateral patellar facet, medial patellar facet and medial joint line. Remainder of bony and ligamentous landmarks are nontender.    Mild effusion is present    Patellofemoral crepitus is Present  Range of Motion:     00 extension to 1100 flexion  Strength:    Quadriceps grossly intact    Extensor mechanism intact  Special Tests:    Positive: none    Negative: MCL/valgus stress (0 & 30 deg), LCL/varus stress (0 & 30 deg), Lachman's, anterior drawer, posterior drawer    Independent visualization of the below image:    Large Joint Injection/Arthocentesis: R knee joint    Date/Time: 4/23/2024 2:36 PM    Performed by: Yeo, Albert, MD  Authorized by: Yeo, Albert, MD    Indications:  Pain and osteoarthritis  Needle Size:  22 G  Guidance: ultrasound    Approach:  Anterolateral  Location:  Knee      Medications:  48 mg hylan 48 MG/6ML; 5 mL lidocaine 1 %  Aspirate amount (mL):  11  Aspirate:  Yellow and clear  Outcome:  Tolerated well, no immediate complications  Procedure discussed: discussed risks, benefits, and alternatives    Consent Given by:  Patient  Timeout: timeout called immediately prior to procedure    Prep: patient was prepped and draped in usual sterile fashion     Ultrasound was used to ensure safe and accurate needle placement and injection. Ultrasound images of the procedure were permanently stored.          Albert Yeo MD, [facility]      Again, thank you for allowing me to participate in the care of your patient.        Sincerely,        Albert Yeo, MD

## 2024-05-11 DIAGNOSIS — E11.59 TYPE 2 DIABETES MELLITUS WITH OTHER CIRCULATORY COMPLICATION, WITHOUT LONG-TERM CURRENT USE OF INSULIN (H): ICD-10-CM

## 2024-05-13 RX ORDER — SEMAGLUTIDE 1.34 MG/ML
INJECTION, SOLUTION SUBCUTANEOUS
Qty: 3 ML | Refills: 0 | Status: SHIPPED | OUTPATIENT
Start: 2024-05-13 | End: 2024-08-01

## 2024-06-15 ENCOUNTER — TRANSFERRED RECORDS (OUTPATIENT)
Dept: MULTI SPECIALTY CLINIC | Facility: CLINIC | Age: 54
End: 2024-06-15

## 2024-06-15 LAB — RETINOPATHY: NEGATIVE

## 2024-07-06 NOTE — TELEPHONE ENCOUNTER
"Requested Prescriptions   Pending Prescriptions Disp Refills     nitroGLYcerin (NITROSTAT) 0.4 MG sublingual tablet [Pharmacy Med Name: NITROGLYCERIN 0.4MG SUBLINGUAL 25S]  Last Written Prescription Date:  2/28/19  Last Fill Quantity: 75,  # refills: 1    Last office visit: 1/18/2019 with prescribing provider:  Graham Hare MD       Future Office Visit:     100 tablet      Sig: DISSOLVE 1 TAB UNDER TONGUE EVERY 5 MINUTES AS NEEDED  FOR CHEST PAIN. DO NOT  EXCEED 3TABS/15MIN. CALL  911 IF CHEST PAIN PERSISTS       Nitrates Failed - 4/23/2019  4:03 AM        Failed - Sublingual nitro order needs review     If refill exceeds 1 bottle per month, please forward request to provider.           Passed - Blood pressure under 140/90 in past 12 months     BP Readings from Last 3 Encounters:   01/18/19 114/70   12/21/18 128/82   12/05/18 130/81                 Passed - Pt is not on erectile dysfunction medications        Passed - Recent (12 mo) or future (30 days) visit within the authorizing provider's specialty     Patient had office visit in the last 12 months or has a visit in the next 30 days with authorizing provider or within the authorizing provider's specialty.  See \"Patient Info\" tab in inbasket, or \"Choose Columns\" in Meds & Orders section of the refill encounter.              Passed - Medication is active on med list        Passed - Patient is age 18 or older          "
Im consfused about this nitro Rx. This seems like excessive use and I have no idea how the Rx # became 75. Is she taking this daily? Is this a new rx plan discussed with cardiology. Should NOT be taking this much. Can we contact her and find out?  
Routing refill request to provider for review/approval because:  > 1 bottle a month per protocol  Celina HERNÁNDEZ RN - Triage  [facility]          
Spoke with patient she accidentally had this on automatic refills. She does not need a refill and stated she has plenty of supply. (years worth)     Updated pharmacy to remove from automatic refill system.     Grisel Paez RN Flex    
independent/supervision

## 2024-08-01 DIAGNOSIS — E11.59 TYPE 2 DIABETES MELLITUS WITH OTHER CIRCULATORY COMPLICATION, WITHOUT LONG-TERM CURRENT USE OF INSULIN (H): ICD-10-CM

## 2024-08-01 RX ORDER — SEMAGLUTIDE 1.34 MG/ML
INJECTION, SOLUTION SUBCUTANEOUS
Qty: 9 ML | Refills: 0 | Status: SHIPPED | OUTPATIENT
Start: 2024-08-01

## 2024-08-11 ENCOUNTER — HEALTH MAINTENANCE LETTER (OUTPATIENT)
Age: 54
End: 2024-08-11

## 2024-08-13 ENCOUNTER — OFFICE VISIT (OUTPATIENT)
Dept: FAMILY MEDICINE | Facility: CLINIC | Age: 54
End: 2024-08-13
Payer: COMMERCIAL

## 2024-08-13 VITALS
HEART RATE: 76 BPM | SYSTOLIC BLOOD PRESSURE: 126 MMHG | WEIGHT: 188.3 LBS | OXYGEN SATURATION: 97 % | BODY MASS INDEX: 35.55 KG/M2 | HEIGHT: 61 IN | TEMPERATURE: 98.3 F | DIASTOLIC BLOOD PRESSURE: 87 MMHG | RESPIRATION RATE: 26 BRPM

## 2024-08-13 DIAGNOSIS — E11.59 TYPE 2 DIABETES MELLITUS WITH OTHER CIRCULATORY COMPLICATION, WITHOUT LONG-TERM CURRENT USE OF INSULIN (H): ICD-10-CM

## 2024-08-13 DIAGNOSIS — Z12.11 SCREEN FOR COLON CANCER: ICD-10-CM

## 2024-08-13 DIAGNOSIS — I25.119 CORONARY ARTERY DISEASE INVOLVING NATIVE HEART WITH ANGINA PECTORIS, UNSPECIFIED VESSEL OR LESION TYPE (H): ICD-10-CM

## 2024-08-13 DIAGNOSIS — J45.40 MODERATE PERSISTENT ASTHMA WITHOUT COMPLICATION: ICD-10-CM

## 2024-08-13 DIAGNOSIS — Z12.31 ENCOUNTER FOR SCREENING MAMMOGRAM FOR BREAST CANCER: ICD-10-CM

## 2024-08-13 DIAGNOSIS — J41.0 SIMPLE CHRONIC BRONCHITIS (H): ICD-10-CM

## 2024-08-13 DIAGNOSIS — E11.59 TYPE 2 DIABETES MELLITUS WITH OTHER CIRCULATORY COMPLICATION, WITHOUT LONG-TERM CURRENT USE OF INSULIN (H): Primary | ICD-10-CM

## 2024-08-13 DIAGNOSIS — I50.9 CHRONIC CONGESTIVE HEART FAILURE, UNSPECIFIED HEART FAILURE TYPE (H): ICD-10-CM

## 2024-08-13 DIAGNOSIS — E66.01 MORBID OBESITY (H): ICD-10-CM

## 2024-08-13 DIAGNOSIS — Z00.00 ROUTINE GENERAL MEDICAL EXAMINATION AT A HEALTH CARE FACILITY: Primary | ICD-10-CM

## 2024-08-13 DIAGNOSIS — F17.200 TOBACCO USE DISORDER: ICD-10-CM

## 2024-08-13 LAB
HBA1C MFR BLD: 6.4 % (ref 0–5.6)
HOLD SPECIMEN: NORMAL

## 2024-08-13 PROCEDURE — 80061 LIPID PANEL: CPT | Performed by: PHYSICIAN ASSISTANT

## 2024-08-13 PROCEDURE — 82043 UR ALBUMIN QUANTITATIVE: CPT | Performed by: PHYSICIAN ASSISTANT

## 2024-08-13 PROCEDURE — 99396 PREV VISIT EST AGE 40-64: CPT | Mod: 25 | Performed by: PHYSICIAN ASSISTANT

## 2024-08-13 PROCEDURE — 83036 HEMOGLOBIN GLYCOSYLATED A1C: CPT | Performed by: PHYSICIAN ASSISTANT

## 2024-08-13 PROCEDURE — 85027 COMPLETE CBC AUTOMATED: CPT | Performed by: PHYSICIAN ASSISTANT

## 2024-08-13 PROCEDURE — 36415 COLL VENOUS BLD VENIPUNCTURE: CPT | Performed by: PHYSICIAN ASSISTANT

## 2024-08-13 PROCEDURE — 82570 ASSAY OF URINE CREATININE: CPT | Performed by: PHYSICIAN ASSISTANT

## 2024-08-13 PROCEDURE — 90471 IMMUNIZATION ADMIN: CPT | Performed by: PHYSICIAN ASSISTANT

## 2024-08-13 PROCEDURE — 90746 HEPB VACCINE 3 DOSE ADULT IM: CPT | Performed by: PHYSICIAN ASSISTANT

## 2024-08-13 PROCEDURE — 80053 COMPREHEN METABOLIC PANEL: CPT | Performed by: PHYSICIAN ASSISTANT

## 2024-08-13 PROCEDURE — 99214 OFFICE O/P EST MOD 30 MIN: CPT | Mod: 25 | Performed by: PHYSICIAN ASSISTANT

## 2024-08-13 ASSESSMENT — ANXIETY QUESTIONNAIRES
8. IF YOU CHECKED OFF ANY PROBLEMS, HOW DIFFICULT HAVE THESE MADE IT FOR YOU TO DO YOUR WORK, TAKE CARE OF THINGS AT HOME, OR GET ALONG WITH OTHER PEOPLE?: NOT DIFFICULT AT ALL
2. NOT BEING ABLE TO STOP OR CONTROL WORRYING: NOT AT ALL
GAD7 TOTAL SCORE: 0
1. FEELING NERVOUS, ANXIOUS, OR ON EDGE: NOT AT ALL
3. WORRYING TOO MUCH ABOUT DIFFERENT THINGS: NOT AT ALL
7. FEELING AFRAID AS IF SOMETHING AWFUL MIGHT HAPPEN: NOT AT ALL
6. BECOMING EASILY ANNOYED OR IRRITABLE: NOT AT ALL
7. FEELING AFRAID AS IF SOMETHING AWFUL MIGHT HAPPEN: NOT AT ALL
4. TROUBLE RELAXING: NOT AT ALL
5. BEING SO RESTLESS THAT IT IS HARD TO SIT STILL: NOT AT ALL
GAD7 TOTAL SCORE: 0
IF YOU CHECKED OFF ANY PROBLEMS ON THIS QUESTIONNAIRE, HOW DIFFICULT HAVE THESE PROBLEMS MADE IT FOR YOU TO DO YOUR WORK, TAKE CARE OF THINGS AT HOME, OR GET ALONG WITH OTHER PEOPLE: NOT DIFFICULT AT ALL

## 2024-08-13 ASSESSMENT — PAIN SCALES - GENERAL: PAINLEVEL: NO PAIN (0)

## 2024-08-13 NOTE — PATIENT INSTRUCTIONS
PLEASE consider the shingles vaccine at the pharmacy!       Patient Education   Preventive Care Advice   This is general advice given by our system to help you stay healthy. However, your care team may have specific advice just for you. Please talk to your care team about your preventive care needs.  Nutrition  Eat 5 or more servings of fruits and vegetables each day.  Try wheat bread, brown rice and whole grain pasta (instead of white bread, rice, and pasta).  Get enough calcium and vitamin D. Check the label on foods and aim for 100% of the RDA (recommended daily allowance).  Lifestyle  Exercise at least 150 minutes each week  (30 minutes a day, 5 days a week).  Do muscle strengthening activities 2 days a week. These help control your weight and prevent disease.  No smoking.  Wear sunscreen to prevent skin cancer.  Have a dental exam and cleaning every 6 months.  Yearly exams  See your health care team every year to talk about:  Any changes in your health.  Any medicines your care team has prescribed.  Preventive care, family planning, and ways to prevent chronic diseases.  Shots (vaccines)   HPV shots (up to age 26), if you've never had them before.  Hepatitis B shots (up to age 59), if you've never had them before.  COVID-19 shot: Get this shot when it's due.  Flu shot: Get a flu shot every year.  Tetanus shot: Get a tetanus shot every 10 years.  Pneumococcal, hepatitis A, and RSV shots: Ask your care team if you need these based on your risk.  Shingles shot (for age 50 and up)  General health tests  Diabetes screening:  Starting at age 35, Get screened for diabetes at least every 3 years.  If you are younger than age 35, ask your care team if you should be screened for diabetes.  Cholesterol test: At age 39, start having a cholesterol test every 5 years, or more often if advised.  Bone density scan (DEXA): At age 50, ask your care team if you should have this scan for osteoporosis (brittle bones).  Hepatitis C:  Get tested at least once in your life.  STIs (sexually transmitted infections)  Before age 24: Ask your care team if you should be screened for STIs.  After age 24: Get screened for STIs if you're at risk. You are at risk for STIs (including HIV) if:  You are sexually active with more than one person.  You don't use condoms every time.  You or a partner was diagnosed with a sexually transmitted infection.  If you are at risk for HIV, ask about PrEP medicine to prevent HIV.  Get tested for HIV at least once in your life, whether you are at risk for HIV or not.  Cancer screening tests  Cervical cancer screening: If you have a cervix, begin getting regular cervical cancer screening tests starting at age 21.  Breast cancer scan (mammogram): If you've ever had breasts, begin having regular mammograms starting at age 40. This is a scan to check for breast cancer.  Colon cancer screening: It is important to start screening for colon cancer at age 45.  Have a colonoscopy test every 10 years (or more often if you're at risk) Or, ask your provider about stool tests like a FIT test every year or Cologuard test every 3 years.  To learn more about your testing options, visit:   .  For help making a decision, visit:   https://bit.ly/wk11422.  Prostate cancer screening test: If you have a prostate, ask your care team if a prostate cancer screening test (PSA) at age 55 is right for you.  Lung cancer screening: If you are a current or former smoker ages 50 to 80, ask your care team if ongoing lung cancer screenings are right for you.  For informational purposes only. Not to replace the advice of your health care provider. Copyright   2023 [employer]. All rights reserved. Clinically reviewed by the [facility] Transitions Program. [employer] 914107 - REV 01/24.

## 2024-08-13 NOTE — PROGRESS NOTES
Preventive Care Visit  [facility] VERNON Gambino PA-C, Family Medicine  Aug 13, 2024      Assessment & Plan     Routine general medical examination at a health care facility  Reviewed personal and family history. Reviewed age appropriate screenings. Recommended any needed vaccinations.      Type 2 diabetes mellitus with other circulatory complication, without long-term current use of insulin (H)  Controlled. Continue present management. We did discuss increasing her dose of ozempic to 2mg and she will call when needing refills if wanting to move hup  - Albumin Random Urine Quantitative with Creat Ratio  - Lipid panel reflex to direct LDL Fasting  - Comprehensive metabolic panel  - Hemoglobin A1c    Moderate persistent asthma without complication  Controlled but recently flovent coverage was changed and now very expensive. Seeing if below is more affordable. She does not do well with breath activated inhalers  - beclomethasone HFA (QVAR REDIHALER) 80 MCG/ACT inhaler; Inhale 1 puff into the lungs 2 times daily  - beclomethasone HFA (QVAR REDIHALER) 40 MCG/ACT inhaler; Inhale 1 puff into the lungs 2 times daily    Chronic congestive heart failure, unspecified heart failure type (H)  Stable. Recommend updated echo. There is no sign of fluid overload. Can still consider spirinolactone if needing to add  - CBC with Platelets; Future  - Echocardiogram Complete; Future  - CBC with Platelets    Screen for colon cancer  due  - Fecal colorectal cancer screen FIT - Future (S+30); Future  - Fecal colorectal cancer screen FIT - Future (S+30)    Tobacco use disorder  Even with chantix she cannot fully quit but does smoke less. For now she does not prefer any further intervention    Coronary artery disease involving native heart with angina pectoris, unspecified vessel or lesion type (H24)  Continue with excellent risk management and current rx. Strongly recommend cutting back on cigarettes    Simple  "chronic bronchitis (H)  As per above    Morbid obesity (H)  Likely we will increase ozempic to help with weight and dm2 control    Encounter for screening mammogram for breast cancer  - MA Screen Bilateral w/Alexandro; Future          BMI  Estimated body mass index is 35.6 kg/m  as calculated from the following:    Height as of this encounter: 1.549 m (5' 0.98\").    Weight as of this encounter: 85.4 kg (188 lb 4.8 oz).         Sole Grider is a 54 year old, presenting for the following:  Albert Austin is a 54 year old female who presents today for annual check up  Overall feeling well but struggling while alone  Working two jobs; 7 days a week  Gets to FaceTime with her grandkids but not seeing them all that much  Moods stable but not great  No thoughts for self harm  Relationship with roommate ok  Smoking a lot more   -breathing is \"fine\"; worse with humidity          8/13/2024     3:27 PM   Additional Questions   Roomed by GARY TOLLIVER   Accompanied by Self         8/13/2024     3:27 PM   Patient Reported Additional Medications   Patient reports taking the following new medications None        Via the Health Maintenance questionnaire, the patient has reported the following services have been completed -[facility], this information has been sent to the abstraction team.  Health Care Directive  Patient does not have a Health Care Directive or Living Will: Discussed advance care planning with patient; however, patient declined at this time.    Answers submitted by the patient for this visit:  JAMEEL-7 (Submitted on 8/13/2024)  JAMEEL 7 TOTAL SCORE: 0  General Questionnaire (Submitted on 8/13/2024)  Chief Complaint: Chronic problems general questions HPI Form  What is the reason for your visit today? : Wellness  How many servings of fruits and vegetables do you eat daily?: 0-1  On average, how many sweetened beverages do you drink each day (Examples: soda, juice, sweet tea, etc.  Do NOT " count diet or artificially sweetened beverages)?: 2  How many minutes a day do you exercise enough to make your heart beat faster?: 9 or less  How many days a week do you exercise enough to make your heart beat faster?: 3 or less  How many days per week do you miss taking your medication?: 1  What makes it hard for you to take your medication every day?: remembering to take         No data to display                        7/13/2022   Nutrition   Three or more servings of calcium each day? Yes   Diet: regular (no restrictions)            7/13/2022   Exercise   Frequency of exercise: None            12/29/2023   Social Factors   Worry food won't last until get money to buy more No   Food not last or not have enough money for food? No   Do you have housing? (Housing is defined as stable permanent housing and does not include staying ouside in a car, in a tent, in an abandoned building, in an overnight shelter, or couch-surfing.) Yes   Are you worried about losing your housing? No   Lack of transportation? No   Unable to get utilities (heat,electricity)? Yes   Want help with housing or utility concern? No            7/13/2022   Dental   Dentist two times every year? Yes                 Today's PHQ-2 Score:       4/15/2024    12:38 PM   PHQ-2 ( 1999 [employer])   Q1: Little interest or pleasure in doing things 2   Q2: Feeling down, depressed or hopeless 2   PHQ-2 Score 4   Q1: Little interest or pleasure in doing things More than half the days   Q2: Feeling down, depressed or hopeless More than half the days   PHQ-2 Score 4         4/15/2024   Substance Use   If I could quit smoking, I would Completely agree   I want to quit somking, worry about health affects Neutral   Willing to make a plan to quit smoking Completely agree   Willing to cut down before quitting Completely agree        Social History     Tobacco Use    Smoking status: Every Day     Current packs/day: 0.50     Average packs/day: 0.5 packs/day for 45.6 years  (22.8 ttl pk-yrs)     Types: Cigarettes     Start date: 1/1/1979    Smokeless tobacco: Never    Tobacco comments:     working on it. somedays i dont some i do   Vaping Use    Vaping status: Never Used   Substance Use Topics    Alcohol use: No    Drug use: No           4/13/2023   LAST FHS-7 RESULTS   1st degree relative breast or ovarian cancer No   Any relative bilateral breast cancer No   Any male have breast cancer No   Any ONE woman have BOTH breast AND ovarian cancer No   Any woman with breast cancer before 50yrs No   2 or more relatives with breast AND/OR ovarian cancer No   2 or more relatives with breast AND/OR bowel cancer No           Mammogram Screening - Mammogram every 1-2 years updated in Health Maintenance based on mutual decision making      History of abnormal Pap smear: No - age 30- 64 PAP with HPV every 5 years recommended        Latest Ref Rng & Units 3/8/2022     3:22 PM 2/27/2017     9:20 AM 2/27/2017     8:49 AM   PAP / HPV   PAP  Negative for Intraepithelial Lesion or Malignancy (NILM)      PAP (Historical)    NIL    HPV 16 DNA Negative Negative  Negative     HPV 18 DNA Negative Negative  Negative     Other HR HPV Negative Negative  Negative       ASCVD Risk   The 10-year ASCVD risk score (Louise CORDERO, et al., 2019) is: 17%    Values used to calculate the score:      Age: 54 years      Sex: Female      Is Non- : No      Diabetic: Yes      Tobacco smoker: Yes      Systolic Blood Pressure: 126 mmHg      Is BP treated: Yes      HDL Cholesterol: 30 mg/dL      Total Cholesterol: 161 mg/dL           Reviewed and updated as needed this visit by Provider                    Lab work is in process  Labs reviewed in EPIC      Review of Systems  Constitutional, HEENT, cardiovascular, pulmonary, gi and gu systems are negative, except as otherwise noted.     Objective    Exam  /87 (BP Location: Right arm, Patient Position: Sitting, Cuff Size: Adult Regular)   Pulse 76   " Temp 98.3  F (36.8  C) (Oral)   Resp 26   Ht 1.549 m (5' 0.98\")   Wt 85.4 kg (188 lb 4.8 oz)   LMP 08/30/2013   SpO2 97%   BMI 35.60 kg/m     Estimated body mass index is 35.6 kg/m  as calculated from the following:    Height as of this encounter: 1.549 m (5' 0.98\").    Weight as of this encounter: 85.4 kg (188 lb 4.8 oz).    Physical Exam  GENERAL: alert and no distress  EYES: Eyes grossly normal to inspection, PERRL and conjunctivae and sclerae normal  HENT: ear canals and TM's normal, nose and mouth without ulcers or lesions  NECK: no adenopathy, no asymmetry, masses, or scars  RESP: lungs clear, decreased sounds  CV: regular rates and rhythm  ABDOMEN: soft, nontender, no hepatosplenomegaly, no masses and bowel sounds normal  MS: No peripheral edema   SKIN: no suspicious lesions or rashes  PSYCH: mentation appears normal and affect normal        Signed Electronically by: David Gambino PA-C    "

## 2024-08-14 ENCOUNTER — TELEPHONE (OUTPATIENT)
Dept: FAMILY MEDICINE | Facility: CLINIC | Age: 54
End: 2024-08-14
Payer: COMMERCIAL

## 2024-08-14 ENCOUNTER — TELEPHONE (OUTPATIENT)
Dept: FAMILY MEDICINE | Facility: CLINIC | Age: 54
End: 2024-08-14

## 2024-08-14 DIAGNOSIS — J45.40 MODERATE PERSISTENT ASTHMA WITHOUT COMPLICATION: Primary | ICD-10-CM

## 2024-08-14 LAB
ALBUMIN SERPL BCG-MCNC: 4.4 G/DL (ref 3.5–5.2)
ALP SERPL-CCNC: 140 U/L (ref 40–150)
ALT SERPL W P-5'-P-CCNC: 18 U/L (ref 0–50)
ANION GAP SERPL CALCULATED.3IONS-SCNC: 11 MMOL/L (ref 7–15)
AST SERPL W P-5'-P-CCNC: 20 U/L (ref 0–45)
BILIRUB SERPL-MCNC: 0.3 MG/DL
BUN SERPL-MCNC: 18.8 MG/DL (ref 6–20)
CALCIUM SERPL-MCNC: 9.5 MG/DL (ref 8.8–10.4)
CHLORIDE SERPL-SCNC: 101 MMOL/L (ref 98–107)
CHOLEST SERPL-MCNC: 150 MG/DL
CREAT SERPL-MCNC: 0.84 MG/DL (ref 0.51–0.95)
CREAT UR-MCNC: 161 MG/DL
EGFRCR SERPLBLD CKD-EPI 2021: 81 ML/MIN/1.73M2
ERYTHROCYTE [DISTWIDTH] IN BLOOD BY AUTOMATED COUNT: 12.5 % (ref 10–15)
FASTING STATUS PATIENT QL REPORTED: NO
FASTING STATUS PATIENT QL REPORTED: NO
GLUCOSE SERPL-MCNC: 145 MG/DL (ref 70–99)
HCO3 SERPL-SCNC: 29 MMOL/L (ref 22–29)
HCT VFR BLD AUTO: 46.9 % (ref 35–47)
HDLC SERPL-MCNC: 30 MG/DL
HGB BLD-MCNC: 16.1 G/DL (ref 11.7–15.7)
LDLC SERPL CALC-MCNC: 42 MG/DL
MCH RBC QN AUTO: 29.3 PG (ref 26.5–33)
MCHC RBC AUTO-ENTMCNC: 34.3 G/DL (ref 31.5–36.5)
MCV RBC AUTO: 85 FL (ref 78–100)
MICROALBUMIN UR-MCNC: 163 MG/L
MICROALBUMIN/CREAT UR: 101.24 MG/G CR (ref 0–25)
NONHDLC SERPL-MCNC: 120 MG/DL
PLATELET # BLD AUTO: 263 10E3/UL (ref 150–450)
POTASSIUM SERPL-SCNC: 4.1 MMOL/L (ref 3.4–5.3)
PROT SERPL-MCNC: 7 G/DL (ref 6.4–8.3)
RBC # BLD AUTO: 5.49 10E6/UL (ref 3.8–5.2)
SODIUM SERPL-SCNC: 141 MMOL/L (ref 135–145)
TRIGL SERPL-MCNC: 389 MG/DL
WBC # BLD AUTO: 10.3 10E3/UL (ref 4–11)

## 2024-08-14 NOTE — TELEPHONE ENCOUNTER
Prior Authorization Retail Medication Request    Medication/Dose: beclomethasone HFA (QVAR REDIHALER) 80 MCG/ACT inhaler   Diagnosis and ICD code (if different than what is on RX):      New/renewal/insurance change PA/secondary ins. PA:  Previously Tried and Failed:    Rationale:      Insurance   Primary: ANY  Insurance ID: [id number]    Phone: [phone]      Pharmacy Information (if different than what is on RX)    Name:     [id number] [address]  Phone:  [phone]  Fax: [phone]

## 2024-08-14 NOTE — TELEPHONE ENCOUNTER
Medication Change    Contacts       Contact Date/Time Type Contact Phone/Fax    08/14/2024 04:43 PM CDT Fax (Incoming) [facility] #[id number] [place], [place] - [address] (Pharmacy) [phone]            What medication are you calling about (include dose and sig)?: beclomethasone HFA (QVAR REDIHALER) 80 MCG/ACT inhaler     Preferred Pharmacy:   [id number] [place], [place] - [address] [address] [address] [address]  Phone: [phone] Fax: [phone]    Controlled Substance Agreement on file:   CSA -- Patient Level:    CSA: None found at the patient level.       Who prescribed the medication?: Immanuel Hernandez    Do you need a refill? No    Patient offered an appointment? No    Do you have any questions or concerns?  Yes: Pharmacy - Drug not covered by patient's plan. The preferred alternative is: Pulmicortinh mcg.       Could we send this information to you in [employer] or would you prefer to receive a phone call?:   No preference   Okay to leave a detailed message?: Yes at Other phone number:  [phone]

## 2024-08-15 ENCOUNTER — MYC MEDICAL ADVICE (OUTPATIENT)
Dept: FAMILY MEDICINE | Facility: CLINIC | Age: 54
End: 2024-08-15
Payer: COMMERCIAL

## 2024-08-15 NOTE — TELEPHONE ENCOUNTER
PA Initiation    Medication: QVAR REDIHALER 80 MCG/ACT IN AERB  Insurance Company: [employer] Non-Specialty PA's - Phone [phone] Fax [phone]  Pharmacy Filling the Rx: [facility] #[id number] [place], [place] - [address]  Filling Pharmacy Phone: [phone]  Filling Pharmacy Fax: [phone]  Start Date: 8/15/2024

## 2024-08-15 NOTE — TELEPHONE ENCOUNTER
PRIOR AUTHORIZATION DENIED    Medication: QVAR REDIHALER 80 MCG/ACT IN AERB    Insurance Company: [employer] Non-Specialty PA's - Phone [phone] Fax [phone]    Denial Date: 8/15/2024    Denial Reason(s): Patient needs to try and fail Pulmicort Flexhaler.     Appeal Information:

## 2024-08-16 NOTE — TELEPHONE ENCOUNTER
Routing to Manny Ambrocio, BSN, RN     [facility]    08/16/2024 at 11:49 AM

## 2024-08-20 ENCOUNTER — ANCILLARY PROCEDURE (OUTPATIENT)
Dept: MAMMOGRAPHY | Facility: CLINIC | Age: 54
End: 2024-08-20
Attending: PHYSICIAN ASSISTANT
Payer: COMMERCIAL

## 2024-08-20 DIAGNOSIS — Z12.31 ENCOUNTER FOR SCREENING MAMMOGRAM FOR BREAST CANCER: ICD-10-CM

## 2024-08-20 PROCEDURE — 77067 SCR MAMMO BI INCL CAD: CPT | Mod: TC | Performed by: RADIOLOGY

## 2024-08-20 PROCEDURE — 77063 BREAST TOMOSYNTHESIS BI: CPT | Mod: TC | Performed by: RADIOLOGY

## 2024-09-01 PROCEDURE — 82274 ASSAY TEST FOR BLOOD FECAL: CPT | Performed by: PHYSICIAN ASSISTANT

## 2024-09-05 DIAGNOSIS — E87.6 HYPOKALEMIA: ICD-10-CM

## 2024-09-05 DIAGNOSIS — I10 HYPERTENSION GOAL BP (BLOOD PRESSURE) < 140/90: ICD-10-CM

## 2024-09-05 DIAGNOSIS — F43.22 ADJUSTMENT DISORDER WITH ANXIETY: ICD-10-CM

## 2024-09-05 DIAGNOSIS — I25.10 CORONARY ARTERY DISEASE INVOLVING NATIVE HEART WITHOUT ANGINA PECTORIS, UNSPECIFIED VESSEL OR LESION TYPE: ICD-10-CM

## 2024-09-05 RX ORDER — METOPROLOL SUCCINATE 25 MG/1
25 TABLET, EXTENDED RELEASE ORAL DAILY
Qty: 90 TABLET | Refills: 2 | Status: SHIPPED | OUTPATIENT
Start: 2024-09-05

## 2024-09-05 RX ORDER — POTASSIUM CHLORIDE 750 MG/1
10 TABLET, EXTENDED RELEASE ORAL DAILY
Qty: 90 TABLET | Refills: 2 | Status: SHIPPED | OUTPATIENT
Start: 2024-09-05

## 2024-09-05 RX ORDER — CITALOPRAM HYDROBROMIDE 40 MG/1
40 TABLET ORAL DAILY
Qty: 90 TABLET | Refills: 1 | Status: SHIPPED | OUTPATIENT
Start: 2024-09-05

## 2024-09-06 ENCOUNTER — APPOINTMENT (OUTPATIENT)
Dept: LAB | Facility: CLINIC | Age: 54
End: 2024-09-06
Payer: COMMERCIAL

## 2024-09-09 LAB — HEMOCCULT STL QL IA: NEGATIVE

## 2024-09-17 ENCOUNTER — NURSE TRIAGE (OUTPATIENT)
Dept: FAMILY MEDICINE | Facility: CLINIC | Age: 54
End: 2024-09-17
Payer: COMMERCIAL

## 2024-09-17 NOTE — TELEPHONE ENCOUNTER
[employer] message received from patient regarding a fall and pain in her tailbone. RN called to triage patient. She fell a week ago and landed on her butt. She denies injury to any other area of her body. She reports her tailbone pain is rated a 4/10, improves with ibuprofen use and heat. She has been taking 800mg of ibuprofen every 4 hours. Counseled patient that is too much ibuprofen to be taking. Reviewed the max dose of ibuprofen in a 24 hour period and options for how to space out dosage. Per protocol- patient can treat symptoms at home. She knows reasons to call back. Patient verbalized understanding and denies questions.    Oliva Cesar RN 9/17/2024 3:53 PM  [facility]    Reason for Disposition   Minor tailbone injury    Additional Information   Negative: Dangerous mechanism of injury (e.g., fall > 10 feet or 3 meters, trampoline)(Exception: Pain began > 1 hour after injury.)   Negative: Sounds like a life-threatening emergency to the triager   Negative: Injury to back above tailbone   Negative: Wound looks infected   Negative: Can't walk or very difficult to walk   Negative: [1] Unable to urinate (or only a few drops) > 4 hours AND [2] bladder feels very full (e.g., palpable bladder or strong urge to urinate)   Negative: [1] Loss of bladder or bowel control (urine or bowel incontinence; wetting self, leaking stool) AND [2] new-onset   Negative: Numbness (loss of sensation) in groin or rectal area   Negative: Blood in stool   Negative: Blood in urine (red, pink, or tea-colored)   Negative: Weakness of a leg or foot (e.g., unable to bear weight, dragging foot)   Negative: Numbness in a leg or foot (i.e., loss of sensation)   Negative: [1] SEVERE pain AND [2] not improved 2 hours after pain medicine/ice packs   Negative: Pain radiates into the thigh or further down the leg now   Negative: [1] MODERATE pain (e.g., interferes with normal activities) AND [2] high-risk adult (e.g., age > 60  "years, osteoporosis, chronic steroid use)   Negative: [1] After 3 days (72 hours) AND [2] pain not improving   Negative: [1] After 4 weeks AND [2] still painful    Answer Assessment - Initial Assessment Questions  1. MECHANISM: \"How did the injury happen?\"        Fall when putting on knee brace and lost balance  2. ONSET: \"When did the injury happen?\" (Minutes or hours ago)       1 week ago  3. LOCATION: \"Where is the injury located?\"       tailbone  4. SEVERITY: \"Can you sit?\" \"Can you walk?\"       Improving, initially hurt to sit and walk. Ibuprofen seems to help the pain  5. PAIN: \"Is there pain?\" If Yes, ask: \"How bad is the pain?\" (e.g., Scale 1-10; mild, moderate, or severe)    - MILD (1-3): doesn't interfere with normal activities     - MODERATE (4-7): interferes with normal activities or awakens from sleep     - SEVERE (8-10): excruciating pain, unable to do any normal activities       4/10 less than 4 with ibuprofen  6. SIZE: For bruises, or swelling, ask: \"How large is it?\" (e.g., inches or centimeters)       Does not see any bruising or swelling  7. OTHER SYMPTOMS: \"Do you have any other symptoms?\" (e.g., numbness, back pain)      None    Protocols used: Tailbone Injury-A-AH    "

## 2024-10-17 ENCOUNTER — HOSPITAL ENCOUNTER (OUTPATIENT)
Dept: CARDIOLOGY | Facility: CLINIC | Age: 54
Discharge: HOME | End: 2024-10-17
Attending: PHYSICIAN ASSISTANT | Admitting: PHYSICIAN ASSISTANT
Payer: COMMERCIAL

## 2024-10-17 DIAGNOSIS — I50.9 CHRONIC CONGESTIVE HEART FAILURE, UNSPECIFIED HEART FAILURE TYPE (H): ICD-10-CM

## 2024-10-17 LAB — LVEF ECHO: NORMAL

## 2024-10-17 PROCEDURE — 999N000208 ECHOCARDIOGRAM COMPLETE

## 2024-10-17 PROCEDURE — 255N000002 HC RX 255 OP 636: Performed by: PHYSICIAN ASSISTANT

## 2024-10-17 PROCEDURE — 93306 TTE W/DOPPLER COMPLETE: CPT | Mod: 26 | Performed by: INTERNAL MEDICINE

## 2024-10-17 RX ADMIN — HUMAN ALBUMIN MICROSPHERES AND PERFLUTREN 3 ML: 10; .22 INJECTION, SOLUTION INTRAVENOUS at 15:02

## 2024-10-23 ENCOUNTER — MYC MEDICAL ADVICE (OUTPATIENT)
Dept: FAMILY MEDICINE | Facility: CLINIC | Age: 54
End: 2024-10-23
Payer: COMMERCIAL

## 2024-10-23 DIAGNOSIS — E11.59 TYPE 2 DIABETES MELLITUS WITH OTHER CIRCULATORY COMPLICATION, WITHOUT LONG-TERM CURRENT USE OF INSULIN (H): ICD-10-CM

## 2024-10-23 DIAGNOSIS — E11.59 TYPE 2 DIABETES MELLITUS WITH OTHER CIRCULATORY COMPLICATION, WITHOUT LONG-TERM CURRENT USE OF INSULIN (H): Primary | ICD-10-CM

## 2024-10-23 RX ORDER — SEMAGLUTIDE 1.34 MG/ML
INJECTION, SOLUTION SUBCUTANEOUS
Qty: 9 ML | Refills: 0 | Status: SHIPPED | OUTPATIENT
Start: 2024-10-23 | End: 2024-11-01

## 2024-10-24 DIAGNOSIS — E11.59 TYPE 2 DIABETES MELLITUS WITH OTHER CIRCULATORY COMPLICATION, WITHOUT LONG-TERM CURRENT USE OF INSULIN (H): ICD-10-CM

## 2024-10-24 RX ORDER — SEMAGLUTIDE 1.34 MG/ML
INJECTION, SOLUTION SUBCUTANEOUS
Qty: 9 ML | Refills: 0 | OUTPATIENT
Start: 2024-10-24

## 2024-10-24 NOTE — TELEPHONE ENCOUNTER
Routing to refill team.    Janna Ambrocio, BSN, RN     [facility]    10/24/2024 at 9:26 AM

## 2024-10-25 DIAGNOSIS — E11.59 TYPE 2 DIABETES MELLITUS WITH OTHER CIRCULATORY COMPLICATION, WITHOUT LONG-TERM CURRENT USE OF INSULIN (H): ICD-10-CM

## 2024-10-25 RX ORDER — SEMAGLUTIDE 1.34 MG/ML
INJECTION, SOLUTION SUBCUTANEOUS
Qty: 9 ML | Refills: 0 | OUTPATIENT
Start: 2024-10-25

## 2024-10-27 DIAGNOSIS — I10 HYPERTENSION GOAL BP (BLOOD PRESSURE) < 140/90: ICD-10-CM

## 2024-10-28 DIAGNOSIS — E11.59 TYPE 2 DIABETES MELLITUS WITH OTHER CIRCULATORY COMPLICATION, WITHOUT LONG-TERM CURRENT USE OF INSULIN (H): ICD-10-CM

## 2024-10-28 RX ORDER — SEMAGLUTIDE 1.34 MG/ML
INJECTION, SOLUTION SUBCUTANEOUS
Qty: 9 ML | Refills: 0 | OUTPATIENT
Start: 2024-10-28

## 2024-10-28 RX ORDER — LOSARTAN POTASSIUM 25 MG/1
25 TABLET ORAL DAILY
Qty: 90 TABLET | Refills: 2 | Status: SHIPPED | OUTPATIENT
Start: 2024-10-28

## 2024-10-29 DIAGNOSIS — Z13.6 CARDIOVASCULAR SCREENING; LDL GOAL LESS THAN 130: ICD-10-CM

## 2024-10-29 DIAGNOSIS — I25.10 CORONARY ARTERY DISEASE INVOLVING NATIVE HEART WITHOUT ANGINA PECTORIS, UNSPECIFIED VESSEL OR LESION TYPE: ICD-10-CM

## 2024-10-29 RX ORDER — ATORVASTATIN CALCIUM 80 MG/1
80 TABLET, FILM COATED ORAL DAILY
Qty: 90 TABLET | Refills: 2 | Status: SHIPPED | OUTPATIENT
Start: 2024-10-29

## 2024-10-31 ENCOUNTER — E-VISIT (OUTPATIENT)
Dept: FAMILY MEDICINE | Facility: CLINIC | Age: 54
End: 2024-10-31
Payer: COMMERCIAL

## 2024-10-31 DIAGNOSIS — R21 RASH AND NONSPECIFIC SKIN ERUPTION: Primary | ICD-10-CM

## 2024-10-31 PROCEDURE — 99421 OL DIG E/M SVC 5-10 MIN: CPT | Performed by: PHYSICIAN ASSISTANT

## 2024-11-01 RX ORDER — CLOTRIMAZOLE 1 %
CREAM (GRAM) TOPICAL 2 TIMES DAILY
Qty: 30 G | Refills: 0 | Status: SHIPPED | OUTPATIENT
Start: 2024-11-01

## 2024-11-07 NOTE — TELEPHONE ENCOUNTER
Occupational Therapy Evaluation    Visit Type: Initial Evaluation  Visit: 1  Referring Provider: Ajay Mendoza NP  Medical Diagnosis (from order): M75.42 - Shoulder impingement syndrome, left   Treatment Diagnosis: left shoulder - increased pain/symptoms, impaired posture, impaired strength, impaired scapulohumeral rhythm and impaired activity tolerance.  Onset  - Date of onset: 10/29/2024  Chart reviewed at time of initial evaluation (relevant co-morbidities, allergies, tests and medications listed):  - Diagnostic tests reviewed: X-Ray  Past Medical History:  No date: Asthma (CMD)      Comment:  Chronic  7/8/2018: Patellar tendinitis of left knee  No date: Seizures  (CMD)      Comment:  states only seizures were going through w/d from Xanax.                He's being weened off seizure meds as of 5/5/22    Past Surgical History:  08/08/2019: Arthroscopic medial collateral ligament (mcl) repair; Left      SUBJECTIVE                                                                                                               Patient reports cutting grass using a standing lawnmower on 10/29/24 when he began having left shoulder pain afterwards. Saw MD on 11/1/24 since the left shoulder pain was so high and not resolving. He does report that his shoulder is now getting better over time and that he has been going back to the gym, but taking it easy. Patient is a  for work, and although his shoulder doesn't seem to bother him there, he is still avoiding heavy lifting.     Pain / Symptoms  - Pain rating (out of 10): Current: 0 ; Worst: 3 (with heavy lifting)  - Location: Left shoulder- anterior deltoid and bicep  - Quality / Description: sharp, shooting     - With use/overhead lifting  - Alleviating Factors: prescription medications, ice, rest, avoiding movement in involved area     - Finished prednisone today, not currently using anything to alleviate pain  - Progression since onset:  PRIOR AUTHORIZATION DENIED    Medication: varenicline (CHANTIX STARTING MONTH HANDY) 0.5 MG X 11 & 1 MG X 42 tablet    Denial Date: 1/22/2018    Denial Rational:  PATIENT MUST TRY/FAIL ONE OF THE FOLLOWING         Appeal Information:           resolved    Function:   Limitations / Exacerbation Factors:   - Patient reports pain and difficulty with function reported below.  - , work - limited or modified, house/yard work, pushing/pulling  - Pressing/pushing movements, overhead movements with heavy weight  Prior Level of Function: pain free ADLs and IADLs. 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 Decreasing pain, being able to do the pushing/pulling movements    Prior treatment  - no therapies  - Discharged from hospital, home health, or skilled nursing facility in last 30 days: no  Home Environment   - Patient lives with: alone two dogs  - Assistance available: as needed  - Denies 2 or more falls or an unexplained fall with injury in the last year.  - Feel safe at home / work / school: yes     OBJECTIVE                                                                                                                    Hand Dominance: right-handed  Gross Motor: right  Fine Motor: right    Posture:  - Seated: forward head  - Shoulders: rounded  LUE:     - Scapula: anteriorly tipped  RUE:     - Scapula: anteriorly tipped  Right shoulder observed to be more elevated      Range of Motion (ROM)   (degrees unless noted; active unless noted; norms in ( ); negative=lacking to 0, positive=beyond 0)  Shoulder:    - Flexion (180):         Left: WFL          Right: WFL    - Abduction (180):         Left: WFL         Right: WFL    - Internal Rotation:        - Behind Back:            Left: T10            Right: T10    - External Rotation:       - Behind the Head:            Left: T1             Right: T1   Elbow/Forearm:    - Flexion (140-150):       Left:  WFL        Right:   WFL     - Extension (0):       Left: WFL       Right: WFL   - Supination (80):       Left: WFL       Right: WFL   - Pronation (80):       Left: WFL       Right: WFL    Strength  (out of 5 unless noted, standard test position unless noted)   Shoulder:     - Flexion:          Left: 4+          Right: 4+     - Abduction:         Left: 4+         Right: 4+    - Internal Rotation:           Left (at 0°): 4         Right (at 0°): 4+    - External Rotation:          Left (at 0°): 4-         Right (at 0°): 4+         Palpation  Left  - Pectoralis Major: hypertonic  - Deltoid: tenderness and trigger point  Right  Left deltoid: anterior.  Shoulder: [place]/Tendon/Bone  - Anterior Shoulder: - Left: tenderness  - Biceps Tendon (proximal): - Left: tenderness    Special Tests  Shoulder: Tendon/Rotator Cuff  - Crossover Impingement:  Left: positive  - Drop Arm:  Left: negative  - Ricardo-Roberto:  Left: negative  - Painful Arc: Left: negative         Outcome/Assessments  Outcome Measures:   Quick Disabilities of the Arm, Shoulder and Hand: QuickDash Total Score (Score will not calculate if more then 2 questions are left blank): 11.36   (scored 0-100; a higher score indicates greater disability) see flowsheet for additional documentation        Treatment     Therapeutic Exercise  - Scapular squeezes x 5 reps - added to HEP  - Scapular rolls x 5 reps - added to HEP  - Wall pec stretch x 4 reps - added to HEP  - Wall bicep stretch with hold x 4 reps - added to HEP    - Blue band shoulder \"W\" with scapular squeezes x 8 reps - added to HEP  - Blue band shoulder rows x 10 reps - added to HEP  - Blue band shoulder extension x 10 reps - cues for body positioning/scapular squeeze - added to HEP    Skilled input: verbal instruction/cues, as detailed above and posture correction    Writer verbally educated and received verbal consent for hand placement, positioning of patient, and techniques to be performed today from patient for therapist  position for techniques as described above and how they are pertinent to the patient's plan of care.  Home Exercise Program  Access Code: [email or web address]  URL: [email or web address]  Date: 11/07/2024  Prepared by: Nyla Curry    Exercises  - Seated Scapular Retraction  - 1 x daily - 7 x weekly - 3 sets - 10 reps  - Standing Backward Shoulder Rolls  - 1 x daily - 7 x weekly - 3 sets - 10 reps  - Single Arm Doorway Pec Stretch at 90 Degrees Abduction  - 1 x daily - 7 x weekly - 3 sets - 10 reps  - Standing Bicep Stretch at Wall  - 1 x daily - 7 x weekly - 3 sets - 10 reps  - Standing Shoulder Row with Anchored Resistance  - 1 x daily - 7 x weekly - 3 sets - 10 reps  - Shoulder External Rotation and Scapular Retraction with Resistance  - 1 x daily - 7 x weekly - 3 sets - 10 reps  - Standing Shoulder Row with Anchored Resistance  - 1 x daily - 7 x weekly - 3 sets - 10 reps      ASSESSMENT                                                                                                          31 year old patient has reported functional limitations listed above impacted by signs and symptoms consistent with treatment diagnosis below.  Treatment Diagnosis:   - Involved: left shoulder.  - Symptoms/impairments: increased pain/symptoms, impaired posture, impaired strength, impaired scapulohumeral rhythm and impaired activity tolerance.    Patient was seen by Occupational Therapy for left shoulder impingement syndrome that occurred after use while mowing the lawn on 10/29/24. He demonstrated good left shoulder ROM, with decreased external and internal rotation strength and a positive crossover impingement sign indicative of impingement syndrome. Patient would benefit from shoulder/scapular stretching and strengthening to improve LUE use and strength for ADL/IADL tasks with decreased discomfort.     Prognosis: Patient will benefit from skilled therapy.  Rehabilitative potential is: good.  Clinical decision  making: Low - Patient has few limitations (1-3), comorbidities and/or complexities, as noted in problem focused assessment noted above, that impact their occupational profile.  Resulting in few treatment options and no task modification consistent with low clinical decision making complexity.    Education:   - Present and ready to learn: patient  - Results of above outlined education: Verbalizes understanding, Needs reinforcement and Demonstrates understanding    PLAN                                                                                                                         The following skilled interventions to be implemented to achieve goals listed below:  Neuromuscular Re-Education (50809)  Therapeutic Activity (16723)  Therapeutic Exercise (75976)  Manual Therapy (28846)  Dry Needling    Frequency / Duration  1 times per week tapering as patient progresses for 6 weeks for an estimated total of 6 visits    Patient involved in and agreed to plan of care and goals.  Patient given attendance policy at time of initial evaluation.    Suggestions for next session as indicated: Progress per plan of care, scapular/thoracic strengthening (prone on green ball, cat/cow), higher level theraband/cable cross strengthening with focus on body positioning (add ER/IR with theraband for home)    Goals  Long Term Goals: to be met by end of plan of care   1. Patient will increase left shoulder external rotation strength to 4/5 or greater for increased use of the LUE with rotational overhead ADL/IADL tasks and activities including overhead manipulation of tools (hammering).   2. Patient will report 80% improvement in the left shoulder symptoms with activities that include heavy resistive pushing and pulling such as those at the gym (bench press) and/or work tasks (moving machinery).  3. Patient will be independent with progressed and modified home exercise program.       Therapy procedure time and total treatment time can  be found documented on the Time Entry flowsheet

## 2024-11-18 ENCOUNTER — TELEPHONE (OUTPATIENT)
Dept: FAMILY MEDICINE | Facility: CLINIC | Age: 54
End: 2024-11-18
Payer: COMMERCIAL

## 2024-11-18 NOTE — CONFIDENTIAL NOTE
Patient Quality Outreach    Patient is due for the following:   IVD  -  IVD Follow-up Visit    Action(s) Taken:   No follow up needed at this time.    Type of outreach:    Sent [email or web address] message.    Questions for provider review:    None           Bacilio Serrano MA

## 2024-12-06 DIAGNOSIS — F43.22 ADJUSTMENT DISORDER WITH ANXIETY: ICD-10-CM

## 2024-12-09 RX ORDER — CITALOPRAM HYDROBROMIDE 40 MG/1
40 TABLET ORAL DAILY
Qty: 90 TABLET | Refills: 1 | OUTPATIENT
Start: 2024-12-09

## 2024-12-16 ENCOUNTER — TELEPHONE (OUTPATIENT)
Dept: ORTHOPEDICS | Facility: CLINIC | Age: 54
End: 2024-12-16
Payer: COMMERCIAL

## 2024-12-16 NOTE — TELEPHONE ENCOUNTER
Phone call to patient to reschedule their gel injection appointment. Patient was scheduled within two weeks so there would not be enough time for prior authorization to get approved.    Patient was disappointed and did not want to get rescheduled in the new year so patient would like to hold off on getting rescheduled for gel injections. I let patient know that we would be more than willing to see them for their left knee. Patient stated they would like to be seen for their left knee during their appointment on 12/17/2024.     Patient was in understanding.     Javy Watson, Visit Facilitator

## 2024-12-16 NOTE — TELEPHONE ENCOUNTER
Patient states she just spoke with a nurse and we are not able to provide the injection tomorrow. She is aware we can do an xray of her left knee. She asks if we are able to drain her right knee since we can't give her the injection.   She can be reached at: [phone].     ASHISH Snyder RN

## 2024-12-16 NOTE — TELEPHONE ENCOUNTER
Phone call to patient. Patient was informed that Dr. Yeo can evaluate their right knee and if deemed appropriate by Dr. Yeo their right knee can be drained. Patient was in understanding and appreciative of the call back.     Javy Watson, Visit Facilitator

## 2024-12-17 ENCOUNTER — NURSE TRIAGE (OUTPATIENT)
Dept: FAMILY MEDICINE | Facility: CLINIC | Age: 54
End: 2024-12-17

## 2024-12-17 ENCOUNTER — OFFICE VISIT (OUTPATIENT)
Dept: FAMILY MEDICINE | Facility: CLINIC | Age: 54
End: 2024-12-17
Payer: COMMERCIAL

## 2024-12-17 ENCOUNTER — OFFICE VISIT (OUTPATIENT)
Dept: ORTHOPEDICS | Facility: CLINIC | Age: 54
End: 2024-12-17
Payer: COMMERCIAL

## 2024-12-17 ENCOUNTER — ANCILLARY PROCEDURE (OUTPATIENT)
Dept: GENERAL RADIOLOGY | Facility: CLINIC | Age: 54
End: 2024-12-17
Attending: FAMILY MEDICINE
Payer: COMMERCIAL

## 2024-12-17 VITALS
BODY MASS INDEX: 36.21 KG/M2 | RESPIRATION RATE: 18 BRPM | SYSTOLIC BLOOD PRESSURE: 138 MMHG | WEIGHT: 191.5 LBS | TEMPERATURE: 98.8 F | OXYGEN SATURATION: 96 % | HEART RATE: 100 BPM | DIASTOLIC BLOOD PRESSURE: 86 MMHG

## 2024-12-17 VITALS
DIASTOLIC BLOOD PRESSURE: 100 MMHG | BODY MASS INDEX: 35.5 KG/M2 | HEIGHT: 61 IN | SYSTOLIC BLOOD PRESSURE: 150 MMHG | WEIGHT: 188 LBS

## 2024-12-17 DIAGNOSIS — Z23 HIGH PRIORITY FOR 2019-NCOV VACCINE: ICD-10-CM

## 2024-12-17 DIAGNOSIS — Z23 NEED FOR PROPHYLACTIC VACCINATION AND INOCULATION AGAINST INFLUENZA: ICD-10-CM

## 2024-12-17 DIAGNOSIS — G89.29 CHRONIC PAIN OF LEFT KNEE: Primary | ICD-10-CM

## 2024-12-17 DIAGNOSIS — M25.562 CHRONIC PAIN OF LEFT KNEE: Primary | ICD-10-CM

## 2024-12-17 DIAGNOSIS — R21 RASH AND NONSPECIFIC SKIN ERUPTION: Primary | ICD-10-CM

## 2024-12-17 DIAGNOSIS — M25.562 CHRONIC PAIN OF LEFT KNEE: ICD-10-CM

## 2024-12-17 DIAGNOSIS — M17.11 PRIMARY OSTEOARTHRITIS OF RIGHT KNEE: ICD-10-CM

## 2024-12-17 DIAGNOSIS — M17.12 PRIMARY OSTEOARTHRITIS OF LEFT KNEE: ICD-10-CM

## 2024-12-17 DIAGNOSIS — Z23 NEED FOR SHINGLES VACCINE: ICD-10-CM

## 2024-12-17 DIAGNOSIS — J45.41 MODERATE PERSISTENT ASTHMA WITH ACUTE EXACERBATION: ICD-10-CM

## 2024-12-17 DIAGNOSIS — G89.29 CHRONIC PAIN OF LEFT KNEE: ICD-10-CM

## 2024-12-17 DIAGNOSIS — Z23 ENCOUNTER FOR ADMINISTRATION OF VACCINE: ICD-10-CM

## 2024-12-17 PROCEDURE — 90471 IMMUNIZATION ADMIN: CPT

## 2024-12-17 PROCEDURE — 99213 OFFICE O/P EST LOW 20 MIN: CPT | Mod: 25

## 2024-12-17 PROCEDURE — 73562 X-RAY EXAM OF KNEE 3: CPT | Mod: LT | Performed by: FAMILY MEDICINE

## 2024-12-17 PROCEDURE — 20611 DRAIN/INJ JOINT/BURSA W/US: CPT | Mod: 50 | Performed by: FAMILY MEDICINE

## 2024-12-17 PROCEDURE — 90480 ADMN SARSCOV2 VAC 1/ONLY CMP: CPT

## 2024-12-17 PROCEDURE — 90673 RIV3 VACCINE NO PRESERV IM: CPT

## 2024-12-17 PROCEDURE — 99214 OFFICE O/P EST MOD 30 MIN: CPT | Mod: 25 | Performed by: FAMILY MEDICINE

## 2024-12-17 PROCEDURE — 90472 IMMUNIZATION ADMIN EACH ADD: CPT

## 2024-12-17 PROCEDURE — 90750 HZV VACC RECOMBINANT IM: CPT

## 2024-12-17 PROCEDURE — 90746 HEPB VACCINE 3 DOSE ADULT IM: CPT

## 2024-12-17 PROCEDURE — 91320 SARSCV2 VAC 30MCG TRS-SUC IM: CPT

## 2024-12-17 RX ORDER — METHYLPREDNISOLONE ACETATE 40 MG/ML
40 INJECTION, SUSPENSION INTRA-ARTICULAR; INTRALESIONAL; INTRAMUSCULAR; SOFT TISSUE
Status: COMPLETED | OUTPATIENT
Start: 2024-12-17 | End: 2024-12-17

## 2024-12-17 RX ORDER — LIDOCAINE HYDROCHLORIDE 10 MG/ML
5 INJECTION, SOLUTION INFILTRATION; PERINEURAL
Status: COMPLETED | OUTPATIENT
Start: 2024-12-17 | End: 2024-12-17

## 2024-12-17 RX ORDER — MUPIROCIN 20 MG/G
OINTMENT TOPICAL 3 TIMES DAILY
COMMUNITY
Start: 2024-06-12

## 2024-12-17 RX ORDER — HYDRALAZINE HYDROCHLORIDE 50 MG/1
50 TABLET, FILM COATED ORAL 3 TIMES DAILY
COMMUNITY

## 2024-12-17 RX ORDER — ROPIVACAINE HYDROCHLORIDE 5 MG/ML
4 INJECTION, SOLUTION EPIDURAL; INFILTRATION; PERINEURAL
Status: COMPLETED | OUTPATIENT
Start: 2024-12-17 | End: 2024-12-17

## 2024-12-17 RX ORDER — PREDNISONE 20 MG/1
TABLET ORAL
Qty: 20 TABLET | Refills: 0 | Status: SHIPPED | OUTPATIENT
Start: 2024-12-17 | End: 2024-12-30

## 2024-12-17 RX ORDER — AMLODIPINE BESYLATE 10 MG/1
10 TABLET ORAL DAILY
COMMUNITY

## 2024-12-17 RX ADMIN — METHYLPREDNISOLONE ACETATE 40 MG: 40 INJECTION, SUSPENSION INTRA-ARTICULAR; INTRALESIONAL; INTRAMUSCULAR; SOFT TISSUE at 08:41

## 2024-12-17 RX ADMIN — ROPIVACAINE HYDROCHLORIDE 4 ML: 5 INJECTION, SOLUTION EPIDURAL; INFILTRATION; PERINEURAL at 08:41

## 2024-12-17 RX ADMIN — LIDOCAINE HYDROCHLORIDE 5 ML: 10 INJECTION, SOLUTION INFILTRATION; PERINEURAL at 08:41

## 2024-12-17 ASSESSMENT — PAIN SCALES - GENERAL: PAINLEVEL_OUTOF10: NO PAIN (0)

## 2024-12-17 NOTE — PROGRESS NOTES
"  Assessment & Plan     Rash and nonspecific skin eruption  Moderate persistent asthma with acute exacerbation  Diffuse rash noted on exam, with patch on lower back worse and more consolidated. Considered treating with oral antibiotics or steroid, but given that patient is also having an acute asthma exacerbation, will treat with oral course of steroids first. She was using daily inhalers as maintenance medicine, but had to stop due to cost. She uses albuterol and nebulizer at home. Recommended she continue with current asthma cares in addition to prednisone taper. If rash does not improve after prednisone course, will consider treating with oral antibiotics.   - predniSONE (DELTASONE) 20 MG tablet  Dispense: 20 tablet; Refill: 0    Patient would like to get all vaccines she is due for today. Discussed that she may feel body aches, chills, and otherwise unwell afterwards, but she would like to proceed anyway.   Encounter for administration of vaccine  - HEPATITIS B, ADULT 20+ (ENGERIX-B/RECOMBIVAX HB)  Need for prophylactic vaccination and inoculation against influenza  - INFLUENZA VACCINE TRIVALENT(FLUBLOK)  High priority for 2019-nCoV vaccine  - COVID-19 12+ (PFIZER)  Need for shingles vaccine  - ZOSTER RECOMBINANT ADJUVANTED (SHINGRIX)    Patient should follow up in 2 weeks if symptoms do not improve or sooner for new or worsening symptoms. All questions answered to patient's satisfaction. Warning signs of when to seek emergency care were discussed.     [place] Jimenez PA-C    Nicotine/Tobacco Cessation  She reports that she has been smoking cigarettes. She started smoking about 45 years ago. She has a 23 pack-year smoking history. She has never used smokeless tobacco.  Nicotine/Tobacco Cessation Plan  Information offered: Patient not interested at this time      BMI  Estimated body mass index is 36.21 kg/m  as calculated from the following:    Height as of an earlier encounter on 12/17/24: 1.549 m (5' 0.98\").    " Weight as of this encounter: 86.9 kg (191 lb 8 oz).   Weight management plan: Patient was referred to their PCP to discuss a diet and exercise plan.    Subjective   Marni is a 54 year old, presenting for the following health issues:  Rash        12/17/2024     2:28 PM   Additional Questions   Roomed by Fabiola CAROLINA   Accompanied by Self     History of Present Illness       Reason for visit:  Rash and itcing everywhere She is missing 1 dose(s) of medications per week.     Marni is a 54 year old female who presents today with rash that started a few months ago- one on her back started back in November.     Hard to get the cream on due to location in middle of her back.    She went to visit her friend over the weekend where they have cats; patient is allergic. Since then she has been itching all over her body. Spots are small and red.     Asthma has been worse. She has been taking albuterol and a nebulizer. She used to have a daily inhaler, but she had to stop due to insurance issues.     Rash  Onset/Duration: couple months  Description  Location: middle of back, legs, arm   Character: round, blotchy, burning, red  Itching: severe  Intensity:  severe  Progression of Symptoms:  worsening  Accompanying signs and symptoms:   Fever: No  Body aches or joint pain: No  Sore throat symptoms: No  Recent cold symptoms: No  History:           Previous episodes of similar rash: None  New exposures:  None  Recent travel: No  Exposure to similar rash: No  Precipitating or alleviating factors: none  Therapies tried and outcome: hydrocortisone, zyrtec, benadryl creams -  not effective    Review of Systems  Constitutional, neuro, ENT, endocrine, pulmonary, cardiac, gastrointestinal, genitourinary, musculoskeletal, integument and psychiatric systems are negative, except as otherwise noted.    Past Medical History:   Diagnosis Date    Arthritis     not sure if arthritis or carpel tunnel    CAD (coronary artery disease)     Congestive heart  failure (H)     COPD (chronic obstructive pulmonary disease) (H)     Depression     Depressive disorder     its better    Hypertension     Uncomplicated asthma      Past Surgical History:   Procedure Laterality Date    CARDIAC SURGERY  2015    Cardiac stents x 1    GYN SURGERY  4/18/93    tubolligation (sp)    TONSILLECTOMY Bilateral 1985    Tubal ligation surgery  1993     Allergies   Allergen Reactions    Zyban [Bupropion Hcl] Anaphylaxis    Bupropion Rash         Objective    /86 (BP Location: Right arm, Patient Position: Sitting, Cuff Size: Adult Regular)   Pulse 100   Temp 98.8  F (37.1  C) (Tympanic)   Resp 18   Wt 86.9 kg (191 lb 8 oz)   LMP 08/30/2013   SpO2 96%   BMI 36.21 kg/m    Body mass index is 36.21 kg/m .  Physical Exam   GENERAL: alert and no distress  EYES: Eyes grossly normal to inspection, PERRL and conjunctivae and sclerae normal  HENT: ear canals and TM's normal, nose and mouth without ulcers or lesions  NECK: no adenopathy, no asymmetry, masses, or scars  RESP: no rales , no rhonchi, and expiratory wheezes throughout  CV: regular rate and rhythm, normal S1 S2, no S3 or S4, no murmur, click or rub, no peripheral edema  MS: no gross musculoskeletal defects noted, no edema  SKIN: pruritic, red rash noted on back, bilateral upper and lower extremities, neck and trunk. Most consolidated region on back. See image below.  PSYCH: mentation appears normal, affect normal/bright          Signed Electronically by: [place] Jimenez PA-C

## 2024-12-17 NOTE — PROGRESS NOTES
ASSESSMENT & PLAN  Patient Instructions     1. Chronic pain of left knee    2. Primary osteoarthritis of left knee    3. Primary osteoarthritis of right knee      -Patient has acute left knee pain due to arthritis.  Patient is also following up for chronic right knee pain due to arthritis  -X-rays taken off today of the left knee shows severe medial compartment joint space narrowing, with tricompartment osteophytes.  X-rays have progressed slightly since her last one 4 years ago  -Patient reports pain in the left knee anytime she is kneeling  -Patient works a second job at [employer] and has significant pain while working and after her shifts since she is required to be on her feet  -Patient is wearing hinged knee braces while she is working  -Patient tolerated bilateral knee intra-articular cortisone injections today without complications.  Patient was given postprocedure instructions  -Patient was advised to start home exercise program to strengthen her legs.  Handouts were given today  -Patient was advised that she will likely need bilateral knee replacement surgeries due to her severe arthritis  -Patient will need to perform regular home exercise program to strengthen her muscles, slow sustainable weight loss to slow down the progression of the degeneration and quit smoking to delaying surgery for as long as possible  -Call direct clinic number [[phone]] at any time with questions or concerns.    Albert Yeo MD [facility] Orthopedics and Sports Medicine  [facility] [place]        -----    SUBJECTIVE  Marni Austin is a/an 54 year old female who is seen as a self referral for evaluation of left knee pain. The patient is seen by themselves.    Onset: 1 years(s) ago. Reports insidious onset without acute precipitating event.  Location of Pain: left anterolateral knee  Rating of Pain at worst: 10/10  Rating of Pain Currently: 2/10  Worsened by: Kneeling  Better with: Rest  Treatments tried:  "rest/activity avoidance and Ibuprofen  Associated symptoms: no distal numbness or tingling; denies swelling or warmth  Orthopedic history: YES - h/o right knee OA - see below  Relevant surgical history: NO  Social history: social history: works in an office    Patient also reports right knee pain. They were last seen on 04/23/2024 and received a Synvisc One injection. They report they got relief for about 6 months. They report no new injuries or symptoms.     Past Medical History:   Diagnosis Date    Arthritis     not sure if arthritis or carpel tunnel    CAD (coronary artery disease)     Congestive heart failure (H)     COPD (chronic obstructive pulmonary disease) (H)     Depression     Depressive disorder     its better    Hypertension     Uncomplicated asthma      Social History     Socioeconomic History    Marital status:      Spouse name: Julian    Number of children: 3   Occupational History    Occupation:    Tobacco Use    Smoking status: Every Day     Current packs/day: 0.50     Average packs/day: 0.5 packs/day for 46.0 years (23.0 ttl pk-yrs)     Types: Cigarettes     Start date: 1/1/1979    Smokeless tobacco: Never    Tobacco comments:     working on it. somedays i dont some i do   Vaping Use    Vaping status: Never Used   Substance and Sexual Activity    Alcohol use: No    Drug use: No    Sexual activity: Not Currently     Partners: Male     Birth control/protection: Female Surgical   Other Topics Concern     Service No    Blood Transfusions No    Caffeine Concern Yes     Comment: \"uses a lot of it\"    Occupational Exposure No    Hobby Hazards No    Sleep Concern Yes     Comment: trouble sleeping    Stress Concern Yes     Comment: work related    Weight Concern Yes    Special Diet No    Back Care No    Exercise No    Bike Helmet No    Seat Belt Yes    Self-Exams No    Parent/sibling w/ CABG, MI or angioplasty before 65F 55M? Yes     Comment: dad     Social Drivers of Health " "    Financial Resource Strain: High Risk (12/29/2023)    Financial Resource Strain     Within the past 12 months, have you or your family members you live with been unable to get utilities (heat, electricity) when it was really needed?: Yes   Food Insecurity: Low Risk  (12/29/2023)    Food Insecurity     Within the past 12 months, did you worry that your food would run out before you got money to buy more?: No     Within the past 12 months, did the food you bought just not last and you didn t have money to get more?: No   Transportation Needs: Low Risk  (12/29/2023)    Transportation Needs     Within the past 12 months, has lack of transportation kept you from medical appointments, getting your medicines, non-medical meetings or appointments, work, or from getting things that you need?: No   Interpersonal Safety: Low Risk  (8/13/2024)    Interpersonal Safety     Do you feel physically and emotionally safe where you currently live?: Yes     Within the past 12 months, have you been hit, slapped, kicked or otherwise physically hurt by someone?: No     Within the past 12 months, have you been humiliated or emotionally abused in other ways by your partner or ex-partner?: No   Housing Stability: Low Risk  (12/29/2023)    Housing Stability     Do you have housing? : Yes     Are you worried about losing your housing?: No         Patient's past medical, surgical, social, and family histories were reviewed today and no changes are noted.    REVIEW OF SYSTEMS:  10 point ROS is negative other than symptoms noted above in HPI, Past Medical History or as stated below  Constitutional: NEGATIVE for fever, chills, change in weight  Skin: NEGATIVE for worrisome rashes, moles or lesions  GI/: NEGATIVE for bowel or bladder changes  Neuro: NEGATIVE for weakness, dizziness or paresthesias    OBJECTIVE:  BP (!) 150/100   Ht 1.549 m (5' 0.98\")   Wt 85.3 kg (188 lb)   LMP 08/30/2013   BMI 35.55 kg/m     General: healthy, alert and in " no distress  HEENT: no scleral icterus or conjunctival erythema  Skin: no suspicious lesions or rash. No jaundice.  CV: no pedal edema  Resp: normal respiratory effort without conversational dyspnea   Psych: normal mood and affect  Gait: normal steady gait with appropriate coordination and balance  Neuro: Normal light sensory exam of lower extremity  MSK:  BILATERAL KNEE  Inspection:    normal alignment  Palpation:    Tender about the medial joint line. Remainder of bony and ligamentous landmarks are nontender.    Trace effusion is present    Patellofemoral crepitus is Present  Range of Motion:     00 extension to 1200 flexion  Strength:    Quadriceps grossly intact    Extensor mechanism intact  Special Tests:    Positive: none    Negative: MCL/valgus stress (0 & 30 deg), LCL/varus stress (0 & 30 deg), Lachman's, anterior drawer, posterior drawer, Melania's    Independent visualization of the below image:  Recent Results (from the past 24 hours)   XR Knee Standing AP [place] Bilat Lat Left    Narrative    Severe medial compartment joint space narrowing, with tricompartment   osteophytes.  No acute fracture or dislocation.       Large Joint Injection/Arthocentesis: bilateral knee    Date/Time: 12/17/2024 8:41 AM    Performed by: Yeo, Albert, MD  Authorized by: Yeo, Albert, MD    Indications:  Pain and osteoarthritis  Needle Size:  22 G  Guidance: ultrasound    Approach:  Superolateral  Location:  Knee  Laterality:  Bilateral      Medications (Right):  40 mg methylPREDNISolone 40 MG/ML; 4 mL ROPivacaine 5 MG/ML; 5 mL lidocaine 1 %  Aspirate amount (mL):  5  Aspirate:  Yellow and clear  Medications (Left):  40 mg methylPREDNISolone 40 MG/ML; 4 mL ROPivacaine 5 MG/ML  Outcome:  Tolerated well, no immediate complications  Procedure discussed: discussed risks, benefits, and alternatives    Consent Given by:  Patient  Timeout: timeout called immediately prior to procedure    Prep: patient was prepped and draped in usual  sterile fashion     Ultrasound was used to ensure safe and accurate needle placement and injection. Ultrasound images of the procedure were permanently stored.          Albert Yeo MD [facility] Sports and Orthopedic [facility]

## 2024-12-17 NOTE — LETTER
12/17/2024      Marni Austin  [address]  [place] [address]      Dear Colleague,    Thank you for referring your patient, Marni Austin, to the [facility] [place]. Please see a copy of my visit note below.    ASSESSMENT & PLAN  Patient Instructions     1. Chronic pain of left knee    2. Primary osteoarthritis of left knee    3. Primary osteoarthritis of right knee      -Patient has acute left knee pain due to arthritis.  Patient is also following up for chronic right knee pain due to arthritis  -X-rays taken off today of the left knee shows severe medial compartment joint space narrowing, with tricompartment osteophytes.  X-rays have progressed slightly since her last one 4 years ago  -Patient reports pain in the left knee anytime she is kneeling  -Patient works a second job at [employer] and has significant pain while working and after her shifts since she is required to be on her feet  -Patient is wearing hinged knee braces while she is working  -Patient tolerated bilateral knee intra-articular cortisone injections today without complications.  Patient was given postprocedure instructions  -Patient was advised to start home exercise program to strengthen her legs.  Handouts were given today  -Patient was advised that she will likely need bilateral knee replacement surgeries due to her severe arthritis  -Patient will need to perform regular home exercise program to strengthen her muscles, slow sustainable weight loss to slow down the progression of the degeneration and quit smoking to delaying surgery for as long as possible  -Call direct clinic number [[phone]] at any time with questions or concerns.    Albert Yeo MD [facility] Orthopedics and Sports Medicine  [facility] [place]        -----    SUBJECTIVE  Marni Austin is a/an 54 year old female who is seen as a self referral for evaluation of left knee pain. The patient is seen by  "themselves.    Onset: 1 years(s) ago. Reports insidious onset without acute precipitating event.  Location of Pain: left anterolateral knee  Rating of Pain at worst: 10/10  Rating of Pain Currently: 2/10  Worsened by: Kneeling  Better with: Rest  Treatments tried: rest/activity avoidance and Ibuprofen  Associated symptoms: no distal numbness or tingling; denies swelling or warmth  Orthopedic history: YES - h/o right knee OA - see below  Relevant surgical history: NO  Social history: social history: works in an office    Patient also reports right knee pain. They were last seen on 04/23/2024 and received a Synvisc One injection. They report they got relief for about 6 months. They report no new injuries or symptoms.     Past Medical History:   Diagnosis Date     Arthritis     not sure if arthritis or carpel tunnel     CAD (coronary artery disease)      Congestive heart failure (H)      COPD (chronic obstructive pulmonary disease) (H)      Depression      Depressive disorder     its better     Hypertension      Uncomplicated asthma      Social History     Socioeconomic History     Marital status:      Spouse name: Julian     Number of children: 3   Occupational History     Occupation:    Tobacco Use     Smoking status: Every Day     Current packs/day: 0.50     Average packs/day: 0.5 packs/day for 46.0 years (23.0 ttl pk-yrs)     Types: Cigarettes     Start date: 1/1/1979     Smokeless tobacco: Never     Tobacco comments:     working on it. somedays i dont some i do   Vaping Use     Vaping status: Never Used   Substance and Sexual Activity     Alcohol use: No     Drug use: No     Sexual activity: Not Currently     Partners: Male     Birth control/protection: Female Surgical   Other Topics Concern      Service No     Blood Transfusions No     Caffeine Concern Yes     Comment: \"uses a lot of it\"     Occupational Exposure No     Hobby Hazards No     Sleep Concern Yes     Comment: trouble sleeping "     Stress Concern Yes     Comment: work related     Weight Concern Yes     Special Diet No     Back Care No     Exercise No     Bike Helmet No     Seat Belt Yes     Self-Exams No     Parent/sibling w/ CABG, MI or angioplasty before 65F 55M? Yes     Comment: dad     Social Drivers of Health     Financial Resource Strain: High Risk (12/29/2023)    Financial Resource Strain      Within the past 12 months, have you or your family members you live with been unable to get utilities (heat, electricity) when it was really needed?: Yes   Food Insecurity: Low Risk  (12/29/2023)    Food Insecurity      Within the past 12 months, did you worry that your food would run out before you got money to buy more?: No      Within the past 12 months, did the food you bought just not last and you didn t have money to get more?: No   Transportation Needs: Low Risk  (12/29/2023)    Transportation Needs      Within the past 12 months, has lack of transportation kept you from medical appointments, getting your medicines, non-medical meetings or appointments, work, or from getting things that you need?: No   Interpersonal Safety: Low Risk  (8/13/2024)    Interpersonal Safety      Do you feel physically and emotionally safe where you currently live?: Yes      Within the past 12 months, have you been hit, slapped, kicked or otherwise physically hurt by someone?: No      Within the past 12 months, have you been humiliated or emotionally abused in other ways by your partner or ex-partner?: No   Housing Stability: Low Risk  (12/29/2023)    Housing Stability      Do you have housing? : Yes      Are you worried about losing your housing?: No         Patient's past medical, surgical, social, and family histories were reviewed today and no changes are noted.    REVIEW OF SYSTEMS:  10 point ROS is negative other than symptoms noted above in HPI, Past Medical History or as stated below  Constitutional: NEGATIVE for fever, chills, change in weight  Skin:  "NEGATIVE for worrisome rashes, moles or lesions  GI/: NEGATIVE for bowel or bladder changes  Neuro: NEGATIVE for weakness, dizziness or paresthesias    OBJECTIVE:  BP (!) 150/100   Ht 1.549 m (5' 0.98\")   Wt 85.3 kg (188 lb)   LMP 08/30/2013   BMI 35.55 kg/m     General: healthy, alert and in no distress  HEENT: no scleral icterus or conjunctival erythema  Skin: no suspicious lesions or rash. No jaundice.  CV: no pedal edema  Resp: normal respiratory effort without conversational dyspnea   Psych: normal mood and affect  Gait: normal steady gait with appropriate coordination and balance  Neuro: Normal light sensory exam of lower extremity  MSK:  BILATERAL KNEE  Inspection:    normal alignment  Palpation:    Tender about the medial joint line. Remainder of bony and ligamentous landmarks are nontender.    Trace effusion is present    Patellofemoral crepitus is Present  Range of Motion:     00 extension to 1200 flexion  Strength:    Quadriceps grossly intact    Extensor mechanism intact  Special Tests:    Positive: none    Negative: MCL/valgus stress (0 & 30 deg), LCL/varus stress (0 & 30 deg), Lachman's, anterior drawer, posterior drawer, Melania's    Independent visualization of the below image:  Recent Results (from the past 24 hours)   XR Knee Standing AP [place] Bilat Lat Left    Narrative    Severe medial compartment joint space narrowing, with tricompartment   osteophytes.  No acute fracture or dislocation.       Large Joint Injection/Arthocentesis: bilateral knee    Date/Time: 12/17/2024 8:41 AM    Performed by: Yeo, Albert, MD  Authorized by: Yeo, Albert, MD    Indications:  Pain and osteoarthritis  Needle Size:  22 G  Guidance: ultrasound    Approach:  Superolateral  Location:  Knee  Laterality:  Bilateral      Medications (Right):  40 mg methylPREDNISolone 40 MG/ML; 4 mL ROPivacaine 5 MG/ML; 5 mL lidocaine 1 %  Aspirate amount (mL):  5  Aspirate:  Yellow and clear  Medications (Left):  40 mg " methylPREDNISolone 40 MG/ML; 4 mL ROPivacaine 5 MG/ML  Outcome:  Tolerated well, no immediate complications  Procedure discussed: discussed risks, benefits, and alternatives    Consent Given by:  Patient  Timeout: timeout called immediately prior to procedure    Prep: patient was prepped and draped in usual sterile fashion     Ultrasound was used to ensure safe and accurate needle placement and injection. Ultrasound images of the procedure were permanently stored.          Albert Yeo MD [facility] Sports and Orthopedic [facility]      Again, thank you for allowing me to participate in the care of your patient.        Sincerely,        Albert Yeo, MD

## 2024-12-17 NOTE — TELEPHONE ENCOUNTER
Nurse Triage SBAR    Situation: Widespread rash since beginning of November, but worsened this weekend.    Background:   Had e-visit 11/1/24 with David Gambino PA-C for this same rash. Was prescribed clotrimazole to combine with triamcinolone cream. That did not help. Also tried hydrocortisone cream, which helped briefly with the itching but didn't relieve the rash. Benadryl, zyrtec, and cetrizine did not help.   This weekend she believes it may have worsened due to being around her friend's cats.    Assessment:   Rash located on back, shoulders, neck, armpits, thighs, groin, stomach. Itchy. Not painful. Back might have blisters, but the rest looks like little red dots. It does kourtney when she pushes on it  Wheezing with exertion. Hx asthma and COPD, so this is not new for her.  Yesterday had headache.  Denies cough, fever, neck pain.    Recommendation: Go To Office Now. Scheduled at [facility] per patient request as there were no openings at  today.    Reason for Disposition   Large or small blisters on skin (i.e., fluid filled bubbles or sacs)    Additional Information   Negative: Sudden onset of rash (within last 2 hours) and difficulty with breathing or swallowing   Negative: Difficult to awaken or acting confused (e.g., disoriented, slurred speech)   Negative: Fever and purple or blood-colored spots or dots   Negative: Too weak or sick to stand   Negative: Life-threatening reaction (anaphylaxis) in the past to similar substance (e.g., food, insect bite/sting, chemical, etc.) and < 2 hours since exposure   Negative: Sounds like a life-threatening emergency to the triager   Negative: Drug rash suspected and started taking new medicine within last 2 weeks  (Exception: Antihistamine, eye drops, ear drops, decongestant or other OTC cough/cold medicines.)   Negative: Hives suspected   Negative: Insect bites suspected   Negative: MPOX SUSPECTED (e.g., direct skin contact such as sex, recent travel to [place]) and any SYMPTOMS OF MPOX (e.g., rash, fever, muscle aches, or swollen lymph nodes)   Negative: AT RISK FOR MPOX (men-who-have-sex-with-men) and POSSIBLE EXPOSURE (e.g., multiple sex partners in past 21 days) and ANY SYMPTOMS OF MPOX (e.g., rash, fever, muscle aches, or swollen lymph nodes)   Negative: Sunburn suspected   Negative: Bright red, sunburn-like rash and current tampon use or nasal packing   Negative: Bright red, sunburn-like rash and wound infection or recent surgery   Negative: Bright red skin that peels off in sheets   Negative: Stiff neck (can't touch chin to chest)   Negative: Patient sounds very sick or weak to the triager   Negative: Fever   Negative: Face becomes swollen   Negative: Headache   Negative: Purple or blood-colored spots or dots (no fever and sounds well to triager)   Negative: Joint pain or swelling   Negative: Bloody crusts on lips or sores in mouth    Protocols used: Rash or Redness - Widespread-A-OH

## 2024-12-17 NOTE — LETTER
December 17, 2024      Marni MANLEY Geovanna  [address] [place] [address]        To Whom It May Concern:    Marni Austin  was seen on 12/17/24.  Please excuse her from work until 1/6/2025 due to injury.        Sincerely,        Albert Yeo, MD

## 2024-12-17 NOTE — PATIENT INSTRUCTIONS
1. Chronic pain of left knee    2. Primary osteoarthritis of left knee    3. Primary osteoarthritis of right knee      -Patient has acute left knee pain due to arthritis.  Patient is also following up for chronic right knee pain due to arthritis  -X-rays taken off today of the left knee shows severe medial compartment joint space narrowing, with tricompartment osteophytes.  X-rays have progressed slightly since her last one 4 years ago  -Patient reports pain in the left knee anytime she is kneeling  -Patient works a second job at [employer] and has significant pain while working and after her shifts since she is required to be on her feet  -Patient is wearing hinged knee braces while she is working  -Patient tolerated bilateral knee intra-articular cortisone injections today without complications.  Patient was given postprocedure instructions  -Patient was advised to start home exercise program to strengthen her legs.  Handouts were given today  -Patient was advised that she will likely need bilateral knee replacement surgeries due to her severe arthritis  -Patient will need to perform regular home exercise program to strengthen her muscles, slow sustainable weight loss to slow down the progression of the degeneration and quit smoking to delaying surgery for as long as possible  -Call direct clinic number [[phone]] at any time with questions or concerns.    Albert Yeo MD [facility] Orthopedics and Sports Medicine  [facility] [place]

## 2024-12-22 ENCOUNTER — HEALTH MAINTENANCE LETTER (OUTPATIENT)
Age: 54
End: 2024-12-22

## 2025-02-12 ENCOUNTER — MYC MEDICAL ADVICE (OUTPATIENT)
Dept: FAMILY MEDICINE | Facility: CLINIC | Age: 55
End: 2025-02-12
Payer: COMMERCIAL

## 2025-02-12 ENCOUNTER — MYC REFILL (OUTPATIENT)
Dept: FAMILY MEDICINE | Facility: CLINIC | Age: 55
End: 2025-02-12
Payer: COMMERCIAL

## 2025-02-12 DIAGNOSIS — I10 HYPERTENSION GOAL BP (BLOOD PRESSURE) < 140/90: ICD-10-CM

## 2025-02-12 DIAGNOSIS — J45.40 MODERATE PERSISTENT ASTHMA, UNSPECIFIED WHETHER COMPLICATED: ICD-10-CM

## 2025-02-12 DIAGNOSIS — I25.10 CORONARY ARTERY DISEASE INVOLVING NATIVE HEART WITHOUT ANGINA PECTORIS, UNSPECIFIED VESSEL OR LESION TYPE: ICD-10-CM

## 2025-02-12 DIAGNOSIS — J45.41 MODERATE PERSISTENT ASTHMA WITH ACUTE EXACERBATION: Primary | ICD-10-CM

## 2025-02-12 DIAGNOSIS — F43.22 ADJUSTMENT DISORDER WITH ANXIETY: ICD-10-CM

## 2025-02-12 DIAGNOSIS — E87.6 HYPOKALEMIA: ICD-10-CM

## 2025-02-12 DIAGNOSIS — F17.200 TOBACCO USE DISORDER: ICD-10-CM

## 2025-02-12 DIAGNOSIS — Z13.6 CARDIOVASCULAR SCREENING; LDL GOAL LESS THAN 130: ICD-10-CM

## 2025-02-12 DIAGNOSIS — E11.59 TYPE 2 DIABETES MELLITUS WITH OTHER CIRCULATORY COMPLICATION, WITHOUT LONG-TERM CURRENT USE OF INSULIN (H): ICD-10-CM

## 2025-02-12 RX ORDER — LOSARTAN POTASSIUM 25 MG/1
25 TABLET ORAL DAILY
Qty: 90 TABLET | Refills: 2 | OUTPATIENT
Start: 2025-02-12

## 2025-02-12 RX ORDER — CHLORTHALIDONE 25 MG/1
12.5 TABLET ORAL DAILY
Qty: 45 TABLET | Refills: 0 | Status: SHIPPED | OUTPATIENT
Start: 2025-02-12

## 2025-02-12 RX ORDER — POTASSIUM CHLORIDE 750 MG/1
10 TABLET, EXTENDED RELEASE ORAL DAILY
Qty: 90 TABLET | Refills: 2 | OUTPATIENT
Start: 2025-02-12

## 2025-02-12 RX ORDER — ATORVASTATIN CALCIUM 80 MG/1
80 TABLET, FILM COATED ORAL DAILY
Qty: 90 TABLET | Refills: 2 | OUTPATIENT
Start: 2025-02-12

## 2025-02-12 RX ORDER — VARENICLINE TARTRATE 0.5 (11)-1
KIT ORAL
Qty: 53 TABLET | Refills: 0 | Status: CANCELLED | OUTPATIENT
Start: 2025-02-12

## 2025-02-12 RX ORDER — METOPROLOL SUCCINATE 25 MG/1
25 TABLET, EXTENDED RELEASE ORAL DAILY
Qty: 90 TABLET | Refills: 2 | OUTPATIENT
Start: 2025-02-12

## 2025-02-12 RX ORDER — CITALOPRAM HYDROBROMIDE 40 MG/1
40 TABLET ORAL DAILY
Qty: 90 TABLET | Refills: 0 | Status: SHIPPED | OUTPATIENT
Start: 2025-02-12

## 2025-02-12 NOTE — TELEPHONE ENCOUNTER
See my chart   Awaiting patient response     Daniela Welch, Registered Nurse  [facility]

## 2025-02-12 NOTE — TELEPHONE ENCOUNTER
PATIENT SWITCHING TO MAIL ORDER PHARMACY FOR COST SAVINGS ON MOST MEDS   citalopram (CELEXA) 40 MG tablet   losartan (COZAAR) 25 MG tablet   potassium chloride [place] ER (KLOR-CON M10) 10 MEQ CR tablet   varenicline (CHANTIX HANDY) 0.5 MG X 11 & 1 MG X 42 tablet

## 2025-02-12 NOTE — TELEPHONE ENCOUNTER
Down to 3 packs/week.  Currently at 1 mg twice daily.    Stephanie Rose RN, BSN  [facility]

## 2025-02-12 NOTE — TELEPHONE ENCOUNTER
David Gambino PAC   Please review my chart message     Patient requesting order for pulmicort to replace flovent due to insurance coverage (RN does not see Flovent on medication list)   RN did not T up pulmicort as uncertain what dose would be prescribed     Please route back if you would like an Evisit, virtual visit or F2F visit scheduled     Thank you   Daniela Welch, Registered Nurse  [facility]

## 2025-02-12 NOTE — TELEPHONE ENCOUNTER
PATIENT IS SWITCHING TO MAIL ORDER PHARMACY FOR COST SAVINGS ON MOST MEDS   atorvastatin (LIPITOR) 80 MG tablet   Semaglutide, 2 MG/DOSE, (OZEMPIC) 8 MG/3ML pen   metoprolol succinate ER (TOPROL XL) 25 MG 24 hr tablet

## 2025-02-13 ENCOUNTER — TELEPHONE (OUTPATIENT)
Dept: FAMILY MEDICINE | Facility: CLINIC | Age: 55
End: 2025-02-13
Payer: COMMERCIAL

## 2025-02-13 RX ORDER — VARENICLINE TARTRATE 1 MG/1
1 TABLET, FILM COATED ORAL 2 TIMES DAILY
Qty: 180 TABLET | Refills: 0 | Status: SHIPPED | OUTPATIENT
Start: 2025-02-13

## 2025-02-13 NOTE — TELEPHONE ENCOUNTER
Medication Change Request  Contacts       Contact Date/Time Type Contact Phone/Fax    02/13/2025 09:10 AM CST Phone (Incoming) [id number] [place], [place] - [address] [facility] [address] (Pharmacy) [phone]            What medication are you calling about (include dose and sig)?: varenicline (CHANTIX) 1 MG tablet    Preferred Pharmacy:   [id number] [place], [place] - [address] [facility] [address]  [address] [facility] [address]  Phone: [phone] Fax: [phone]      Controlled Substance Agreement on file:   CSA -- Patient Level:    CSA: None found at the patient level.       Who prescribed the medication?: Immanuel Bryant    Do you need a refill? Yes    When did you use the medication last? Rx'd last: 02/13    Patient offered an appointment? No    Do you have any questions or concerns?  Yes: Per Pharmacy - Drug not covered by patient plan. The preferred alternative is: [id number]. Please call/fax the pharmacy to change medication along with strength, directions, quantity, and refills      Could we [facility] this information to you [facility] or would you prefer to receive a phone call?:   Patient would prefer a phone call     Okay to leave a detailed message?: Yes at Cell number on file:    Telephone Information:   Mobile [phone]

## 2025-02-18 NOTE — TELEPHONE ENCOUNTER
PA Initiation    Medication: VARENICLINE TARTRATE 1 MG PO TABS  Insurance Company: [employer] - Phone [phone] Fax [phone]  Pharmacy Filling the Rx: [facility] #[id number] [place], [place] - [address] [address]  Filling Pharmacy Phone: [phone]  Filling Pharmacy Fax: [phone]  Start Date: 2/18/2025    INITIAL BENEFITS REVIEW DONE OVER THE PHONE  Case Id: [id number]

## 2025-02-18 NOTE — TELEPHONE ENCOUNTER
PRIOR AUTHORIZATION DENIED    Medication: VARENICLINE TARTRATE 1 MG PO TABS  Insurance Company: [employer] - Phone [phone] Fax [phone]  Denial Date: 2/18/2025  Denial Reason(s):       Appeal Information:       Patient Notified: NO

## 2025-02-19 DIAGNOSIS — F17.200 TOBACCO USE DISORDER: Primary | ICD-10-CM

## 2025-02-19 RX ORDER — NICOTINE 21 MG/24HR
1 PATCH, TRANSDERMAL 24 HOURS TRANSDERMAL EVERY 24 HOURS
Qty: 30 PATCH | Refills: 1 | Status: SHIPPED | OUTPATIENT
Start: 2025-02-19

## 2025-03-17 DIAGNOSIS — E11.59 TYPE 2 DIABETES MELLITUS WITH OTHER CIRCULATORY COMPLICATION, WITHOUT LONG-TERM CURRENT USE OF INSULIN (H): ICD-10-CM

## 2025-03-17 RX ORDER — SEMAGLUTIDE 2.68 MG/ML
INJECTION, SOLUTION SUBCUTANEOUS
Qty: 9 ML | Refills: 0 | Status: SHIPPED | OUTPATIENT
Start: 2025-03-17 | End: 2025-03-18

## 2025-03-18 ENCOUNTER — VIRTUAL VISIT (OUTPATIENT)
Dept: FAMILY MEDICINE | Facility: CLINIC | Age: 55
End: 2025-03-18
Payer: COMMERCIAL

## 2025-03-18 DIAGNOSIS — I10 HYPERTENSION GOAL BP (BLOOD PRESSURE) < 140/90: ICD-10-CM

## 2025-03-18 DIAGNOSIS — E11.59 TYPE 2 DIABETES MELLITUS WITH OTHER CIRCULATORY COMPLICATION, WITHOUT LONG-TERM CURRENT USE OF INSULIN (H): Primary | ICD-10-CM

## 2025-03-18 DIAGNOSIS — F43.22 ADJUSTMENT DISORDER WITH ANXIETY: ICD-10-CM

## 2025-03-18 DIAGNOSIS — I25.10 CORONARY ARTERY DISEASE INVOLVING NATIVE HEART WITHOUT ANGINA PECTORIS, UNSPECIFIED VESSEL OR LESION TYPE: ICD-10-CM

## 2025-03-18 DIAGNOSIS — E66.01 MORBID OBESITY (H): ICD-10-CM

## 2025-03-18 PROCEDURE — 98014 SYNCH AUDIO-ONLY EST MOD 30: CPT | Performed by: NURSE PRACTITIONER

## 2025-03-18 RX ORDER — CITALOPRAM HYDROBROMIDE 40 MG/1
40 TABLET ORAL DAILY
Qty: 90 TABLET | Refills: 3 | Status: SHIPPED | OUTPATIENT
Start: 2025-03-18

## 2025-03-18 RX ORDER — CHLORTHALIDONE 25 MG/1
12.5 TABLET ORAL DAILY
Qty: 45 TABLET | Refills: 3 | Status: SHIPPED | OUTPATIENT
Start: 2025-03-18

## 2025-03-18 RX ORDER — SEMAGLUTIDE 1.34 MG/ML
1 INJECTION, SOLUTION SUBCUTANEOUS
Qty: 3 ML | Refills: 2 | Status: SHIPPED | OUTPATIENT
Start: 2025-03-18

## 2025-03-18 RX ORDER — BLOOD SUGAR DIAGNOSTIC
STRIP MISCELLANEOUS
Qty: 100 STRIP | Refills: 0 | Status: SHIPPED | OUTPATIENT
Start: 2025-03-18

## 2025-03-18 NOTE — PROGRESS NOTES
Marni is a 54 year old who is being evaluated via a billable telephone visit.    What phone number would you like to be contacted at? [phone]  How would you like to obtain your AVS? Tess  Originating Location (pt. Location): Home    Distant Location (provider location):  On-site  Telephone visit completed due to the patient did not have access to video, while the distant provider did.    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 Due for med refills and labs. Schedule lab appt.  - REVIEW OF HEALTH MAINTENANCE PROTOCOL ORDERS  - Comprehensive metabolic panel (BMP + Alb, Alk Phos, ALT, AST, Total. Bili, TP)  - Hemoglobin A1c  - Semaglutide, 1 MG/DOSE, (OZEMPIC, 1 MG/DOSE,) 4 MG/3ML pen  Dispense: 3 mL; Refill: 2  - blood glucose (ONETOUCH VERIO IQ) test strip  Dispense: 100 strip; Refill: 0    Morbid obesity (H)  Continue ozempic.  - REVIEW OF HEALTH MAINTENANCE PROTOCOL ORDERS  - Comprehensive metabolic panel (BMP + Alb, Alk Phos, ALT, AST, Total. Bili, TP)  - Hemoglobin A1c  - Semaglutide, 1 MG/DOSE, (OZEMPIC, 1 MG/DOSE,) 4 MG/3ML pen  Dispense: 3 mL; Refill: 2    Hypertension goal BP (blood pressure) < 140/90  - chlorthalidone (HYGROTON) 25 MG tablet  Dispense: 45 tablet; Refill: 3    Coronary artery disease involving native heart without angina pectoris, unspecified vessel or lesion type  - chlorthalidone (HYGROTON) 25 MG tablet  Dispense: 45 tablet; Refill: 3    Adjustment disorder with anxiety  - citalopram (CELEXA) 40 MG tablet  Dispense: 90 tablet; Refill: 3              See Patient Instructions    Subjective   Marni is a 54 year old, presenting for the following health issues:  Recheck Medication      3/18/2025     7:32 AM   Additional Questions   Roomed by Fabiola CAROLINA   Accompanied by Self     HPI      Medication Followup of Ozempic  Taking Medication as prescribed: yes  Side Effects:   None  Medication Helping Symptoms:  yes      Constitutional, HEENT, cardiovascular, pulmonary, GI, , musculoskeletal, neuro, skin, endocrine and psych systems are negative, except as otherwise noted.        Objective           Vitals:  No vitals were obtained today due to virtual visit.    Physical Exam   General: Alert and no distress //Respiratory: No audible wheeze, cough, or shortness of breath // Psychiatric:  Appropriate affect, tone, and pace of words            Phone call duration: 10 minutes  Signed Electronically by: Karlie Morel CNP

## 2025-03-23 ENCOUNTER — HEALTH MAINTENANCE LETTER (OUTPATIENT)
Age: 55
End: 2025-03-23

## 2025-03-24 ENCOUNTER — MYC MEDICAL ADVICE (OUTPATIENT)
Dept: FAMILY MEDICINE | Facility: CLINIC | Age: 55
End: 2025-03-24
Payer: COMMERCIAL

## 2025-03-24 DIAGNOSIS — E11.59 TYPE 2 DIABETES MELLITUS WITH OTHER CIRCULATORY COMPLICATION, WITHOUT LONG-TERM CURRENT USE OF INSULIN (H): Primary | ICD-10-CM

## 2025-03-24 DIAGNOSIS — E66.01 MORBID OBESITY (H): ICD-10-CM

## 2025-03-31 ENCOUNTER — LAB (OUTPATIENT)
Dept: LAB | Facility: CLINIC | Age: 55
End: 2025-03-31
Payer: COMMERCIAL

## 2025-03-31 ENCOUNTER — TELEPHONE (OUTPATIENT)
Dept: ORTHOPEDICS | Facility: CLINIC | Age: 55
End: 2025-03-31

## 2025-03-31 DIAGNOSIS — M17.12 PRIMARY OSTEOARTHRITIS OF LEFT KNEE: Primary | ICD-10-CM

## 2025-03-31 DIAGNOSIS — E11.59 TYPE 2 DIABETES MELLITUS WITH OTHER CIRCULATORY COMPLICATION, WITHOUT LONG-TERM CURRENT USE OF INSULIN (H): ICD-10-CM

## 2025-03-31 DIAGNOSIS — E66.01 MORBID OBESITY (H): ICD-10-CM

## 2025-03-31 DIAGNOSIS — M17.11 PRIMARY OSTEOARTHRITIS OF RIGHT KNEE: ICD-10-CM

## 2025-03-31 LAB
ALBUMIN SERPL BCG-MCNC: 4.2 G/DL (ref 3.5–5.2)
ALP SERPL-CCNC: 117 U/L (ref 40–150)
ALT SERPL W P-5'-P-CCNC: 27 U/L (ref 0–50)
ANION GAP SERPL CALCULATED.3IONS-SCNC: 11 MMOL/L (ref 7–15)
AST SERPL W P-5'-P-CCNC: 26 U/L (ref 0–45)
BILIRUB SERPL-MCNC: 0.3 MG/DL
BUN SERPL-MCNC: 15.8 MG/DL (ref 6–20)
CALCIUM SERPL-MCNC: 9.6 MG/DL (ref 8.8–10.4)
CHLORIDE SERPL-SCNC: 102 MMOL/L (ref 98–107)
CREAT SERPL-MCNC: 0.84 MG/DL (ref 0.51–0.95)
EGFRCR SERPLBLD CKD-EPI 2021: 81 ML/MIN/1.73M2
EST. AVERAGE GLUCOSE BLD GHB EST-MCNC: 143 MG/DL
GLUCOSE SERPL-MCNC: 112 MG/DL (ref 70–99)
HBA1C MFR BLD: 6.6 % (ref 0–5.6)
HCO3 SERPL-SCNC: 29 MMOL/L (ref 22–29)
POTASSIUM SERPL-SCNC: 4 MMOL/L (ref 3.4–5.3)
PROT SERPL-MCNC: 6.7 G/DL (ref 6.4–8.3)
SODIUM SERPL-SCNC: 142 MMOL/L (ref 135–145)

## 2025-03-31 PROCEDURE — 83036 HEMOGLOBIN GLYCOSYLATED A1C: CPT

## 2025-03-31 PROCEDURE — 80053 COMPREHEN METABOLIC PANEL: CPT

## 2025-03-31 PROCEDURE — 36415 COLL VENOUS BLD VENIPUNCTURE: CPT

## 2025-03-31 NOTE — TELEPHONE ENCOUNTER
Patient scheduled for appointment on 4/15/25 @ [facility] for discussion of viscosupplementation injection vs steroid injection of bilateral knee.      Called and spoke with patient.    Steroid  injection last completed 12/17/24.  Patient reports partial relief for ~ 2 months. Pain has increased over the last 1 month. Patient had right knee Synvisc One injection on 4/23/24 which provided ~ 6 months of relief.     Patient has failed 3 month trial of Pharmacologic Approach (e.g., topical NSAIDs, oral NSAIDs with or without oral proton pump inhibitors, MENDOZA-2 inhibitors, topical capsaicin, acetaminophen, tramadol, duloxetine, etc.):  Yes     Patient has completed 3 month trial of Non-Pharmacologic treatments (i.e., physical, psychosocial, or mind-body approach (e.g., exercise-land based or aquatic, physical therapy, mayte chi, yoga, weight management, cognitive behavioral therapy, knee brace or cane, etc).  Yes - home exercises, hinged knee braces    Has patient had prior reaction to Synvisc/SynviscOne or any alternative HA product?: No    Will call patient if there are any issues getting approval. Patient verbalized understanding and was appreciative of call.    Prior authorization referral for SynviscOne injection pended.    Please advise.    Virgen Gutierrez ATC

## 2025-03-31 NOTE — TELEPHONE ENCOUNTER
Other: Patient scheduled appointment for margie knee gel injections for 4/15. Sending TE for prior authorization.       Could we send this information to you in [email or web address] or would you prefer to receive a phone call?:   Patient would like to be contacted via [email or web address]

## 2025-04-08 ENCOUNTER — MYC REFILL (OUTPATIENT)
Dept: FAMILY MEDICINE | Facility: CLINIC | Age: 55
End: 2025-04-08

## 2025-04-08 ENCOUNTER — OFFICE VISIT (OUTPATIENT)
Dept: FAMILY MEDICINE | Facility: CLINIC | Age: 55
End: 2025-04-08
Payer: COMMERCIAL

## 2025-04-08 VITALS
WEIGHT: 195.7 LBS | OXYGEN SATURATION: 95 % | SYSTOLIC BLOOD PRESSURE: 108 MMHG | HEART RATE: 81 BPM | HEIGHT: 61 IN | RESPIRATION RATE: 16 BRPM | BODY MASS INDEX: 36.95 KG/M2 | DIASTOLIC BLOOD PRESSURE: 78 MMHG | TEMPERATURE: 98.1 F

## 2025-04-08 DIAGNOSIS — G56.03 BILATERAL CARPAL TUNNEL SYNDROME: ICD-10-CM

## 2025-04-08 DIAGNOSIS — E66.01 MORBID OBESITY (H): ICD-10-CM

## 2025-04-08 DIAGNOSIS — I25.119 CORONARY ARTERY DISEASE INVOLVING NATIVE HEART WITH ANGINA PECTORIS, UNSPECIFIED VESSEL OR LESION TYPE: ICD-10-CM

## 2025-04-08 DIAGNOSIS — I10 HYPERTENSION GOAL BP (BLOOD PRESSURE) < 140/90: ICD-10-CM

## 2025-04-08 DIAGNOSIS — E11.59 TYPE 2 DIABETES MELLITUS WITH OTHER CIRCULATORY COMPLICATION, WITHOUT LONG-TERM CURRENT USE OF INSULIN (H): ICD-10-CM

## 2025-04-08 DIAGNOSIS — B96.89 BACTERIAL VAGINOSIS: Primary | ICD-10-CM

## 2025-04-08 DIAGNOSIS — L30.4 INTERTRIGO: ICD-10-CM

## 2025-04-08 DIAGNOSIS — J45.40 MODERATE PERSISTENT ASTHMA WITHOUT COMPLICATION: ICD-10-CM

## 2025-04-08 DIAGNOSIS — J41.0 SIMPLE CHRONIC BRONCHITIS (H): ICD-10-CM

## 2025-04-08 DIAGNOSIS — R21 RASH AND NONSPECIFIC SKIN ERUPTION: ICD-10-CM

## 2025-04-08 DIAGNOSIS — Z00.00 ROUTINE GENERAL MEDICAL EXAMINATION AT A HEALTH CARE FACILITY: Primary | ICD-10-CM

## 2025-04-08 DIAGNOSIS — I50.32 CHRONIC DIASTOLIC HEART FAILURE WITH PRESERVED EJECTION FRACTION (H): ICD-10-CM

## 2025-04-08 DIAGNOSIS — N76.0 BACTERIAL VAGINOSIS: Primary | ICD-10-CM

## 2025-04-08 LAB
CLUE CELLS: PRESENT
TRICHOMONAS, WET PREP: ABNORMAL
WBC'S/HIGH POWER FIELD, WET PREP: ABNORMAL
YEAST, WET PREP: ABNORMAL

## 2025-04-08 PROCEDURE — 87210 SMEAR WET MOUNT SALINE/INK: CPT | Performed by: PHYSICIAN ASSISTANT

## 2025-04-08 PROCEDURE — G2211 COMPLEX E/M VISIT ADD ON: HCPCS | Performed by: PHYSICIAN ASSISTANT

## 2025-04-08 PROCEDURE — 90471 IMMUNIZATION ADMIN: CPT | Performed by: PHYSICIAN ASSISTANT

## 2025-04-08 PROCEDURE — 99396 PREV VISIT EST AGE 40-64: CPT | Mod: 25 | Performed by: PHYSICIAN ASSISTANT

## 2025-04-08 PROCEDURE — 99214 OFFICE O/P EST MOD 30 MIN: CPT | Mod: 25 | Performed by: PHYSICIAN ASSISTANT

## 2025-04-08 PROCEDURE — 90750 HZV VACC RECOMBINANT IM: CPT | Performed by: PHYSICIAN ASSISTANT

## 2025-04-08 PROCEDURE — 90746 HEPB VACCINE 3 DOSE ADULT IM: CPT | Performed by: PHYSICIAN ASSISTANT

## 2025-04-08 PROCEDURE — 90472 IMMUNIZATION ADMIN EACH ADD: CPT | Performed by: PHYSICIAN ASSISTANT

## 2025-04-08 RX ORDER — CLOTRIMAZOLE 1 %
CREAM (GRAM) TOPICAL 2 TIMES DAILY
Qty: 60 G | Refills: 1 | Status: SHIPPED | OUTPATIENT
Start: 2025-04-08

## 2025-04-08 RX ORDER — FLUCONAZOLE 150 MG/1
150 TABLET ORAL
Qty: 3 TABLET | Refills: 0 | Status: SHIPPED | OUTPATIENT
Start: 2025-04-08 | End: 2025-04-15

## 2025-04-08 RX ORDER — METRONIDAZOLE 500 MG/1
500 TABLET ORAL 2 TIMES DAILY
Qty: 14 TABLET | Refills: 0 | Status: SHIPPED | OUTPATIENT
Start: 2025-04-08 | End: 2025-04-15

## 2025-04-08 SDOH — HEALTH STABILITY: PHYSICAL HEALTH: ON AVERAGE, HOW MANY DAYS PER WEEK DO YOU ENGAGE IN MODERATE TO STRENUOUS EXERCISE (LIKE A BRISK WALK)?: 0 DAYS

## 2025-04-08 SDOH — HEALTH STABILITY: PHYSICAL HEALTH: ON AVERAGE, HOW MANY MINUTES DO YOU ENGAGE IN EXERCISE AT THIS LEVEL?: 0 MIN

## 2025-04-08 ASSESSMENT — SOCIAL DETERMINANTS OF HEALTH (SDOH): HOW OFTEN DO YOU GET TOGETHER WITH FRIENDS OR RELATIVES?: ONCE A WEEK

## 2025-04-08 ASSESSMENT — PAIN SCALES - GENERAL: PAINLEVEL_OUTOF10: NO PAIN (0)

## 2025-04-08 NOTE — PROGRESS NOTES
Preventive Care Visit  [facility] VERNON Gambino PA-C, Family Medicine  Apr 8, 2025      Assessment & Plan     Routine general medical examination at a health care facility  Reviewed personal and family history. Reviewed age appropriate screenings. Recommended any needed vaccinations.  - HEPATITIS B VACCINE (20 YEARS AND OLDER) (ENGERIX-B/RECOMBIVAX)  - ZOSTER RECOMBINANT ADJUVANTED (SHINGRIX)    Moderate persistent asthma  Simple chronic bronchitis (H)  Overall feels controlled though continues to smoke. SHe was on chantix for an extended period of time without fully quitting. Tried patches but mentions these seem to make her smoke more. Consider gum? Recommend slow taper and reconsider chantix     Bilateral carpal tunnel syndrome  Given duration and failure of bracing recommend consult  - Orthopedic  Referral; Future    Intertrigo  Rash and nonspecific skin eruption  Trial below. Addendum: wet prep positive so will hold on diflucan until following abx (flagyl). Adding oral given diffuse nature of rash. Use topical concomitantly  - fluconazole (DIFLUCAN) 150 MG tablet; Take 1 tablet (150 mg) by mouth every 3 days for 3 doses.  - clotrimazole (LOTRIMIN) 1 % external cream; Apply topically 2 times daily. Tube will last one month  - Wet prep - lab collect; Future  - Wet prep - lab collect    - fluconazole (DIFLUCAN) 150 MG tablet; Take 1 tablet (150 mg) by mouth every 3 days for 3 doses.  - clotrimazole (LOTRIMIN) 1 % external cream; Apply topically 2 times daily. Tube will last one month    Hypertension goal BP (blood pressure) < 140/90  At goal. Continue present management. I am not sure if she truly is taking hydralazine. She will confirm and let me know    Type 2 diabetes mellitus with other circulatory complication, without long-term current use of insulin (H)  Excellent control on current therapies.     Chronic diastolic heart failure with preserved ejection fraction (H)  Stable.  No evidence of overload. She did not tolerate sglt2i. On BB, ARB and diuretic. Echo in 2024 looked good    Coronary artery disease involving native heart with angina pectoris, unspecified vessel or lesion type  Asymptomatic.     Morbid obesity (H)  On glp1. Continue focus on lifestyle to help with lipids, dm2, htn etc    The longitudinal plan of care for the diagnosis(es)/condition(s) as documented were addressed during this visit. Due to the added complexity in care, I will continue to support Marni in the subsequent management and with ongoing continuity of care.          Counseling  Appropriate preventive services were addressed with this patient via screening, questionnaire, or discussion as appropriate for fall prevention, nutrition, physical activity, Tobacco-use cessation, social engagement, weight loss and cognition.  Checklist reviewing preventive services available has been given to the patient.  Reviewed patient's diet, addressing concerns and/or questions.   The patient was instructed to see the dentist every 6 months.   She is at risk for psychosocial distress and has been provided with information to reduce risk.         Subjective   Marni is a 55 year old, presenting for the following:  Physical        4/8/2025     1:50 PM   Additional Questions   Roomed by Martha RUTLEDGE CMA   Accompanied by NA         4/8/2025     1:50 PM   Patient Reported Additional Medications   Patient reports taking the following new medications None         HPI       Patient states that she went to the chiropractor yesterday and they took x-rays of her neck, shoulders and lower back, they found something on her x-rays and sent the images to be looked at by her PCP.   (She has a USB for this but we will need a paper copy))      Patient also has a rash in her vaginal area, under her breasts and middle of her back- the creams are not helping.     Patient also states that she has numbness in her hands  L>R  Occurs most of the day at  this point  She did consistently wear braces a few times but they have not been helpful      pain in her knees that is getting worse  Is following with Dr. Yeo  Waiting for repeat gel injection; cortisone doesn't last nearly as long      Advance Care Planning  Patient does not have a Health Care Directive: Discussed advance care planning with patient; however, patient declined at this time.      4/8/2025   General Health   How would you rate your overall physical health? (!) FAIR   Feel stress (tense, anxious, or unable to sleep) To some extent   (!) STRESS CONCERN      4/8/2025   Nutrition   Three or more servings of calcium each day? (!) I DON'T KNOW   Diet: Regular (no restrictions)   How many servings of fruit and vegetables per day? (!) I DON'T KNOW   How many sweetened beverages each day? (!) 2         4/8/2025   Exercise   Days per week of moderate/strenous exercise 0 days   Average minutes spent exercising at this level 0 min   (!) EXERCISE CONCERN      4/8/2025   Social Factors   Frequency of gathering with friends or relatives Once a week   Worry food won't last until get money to buy more No   Food not last or not have enough money for food? No   Do you have housing? (Housing is defined as stable permanent housing and does not include staying ouside in a car, in a tent, in an abandoned building, in an overnight shelter, or couch-surfing.) Yes   Are you worried about losing your housing? No   Lack of transportation? No   Unable to get utilities (heat,electricity)? No         4/8/2025   Fall Risk   Fallen 2 or more times in the past year? Yes   Trouble with walking or balance? Yes           4/8/2025   Dental   Dentist two times every year? (!) NO                 4/8/2025   Substance Use   If I could quit smoking, I would Somewhat agree   I want to quit somking, worry about health affects Neutral   Willing to make a plan to quit smoking Neutral   Willing to cut down before quitting Completely agree   Alcohol  more than 3/day or more than 7/wk Not Applicable   Do you use any other substances recreationally? No     Social History     Tobacco Use    Smoking status: Every Day     Current packs/day: 0.50     Average packs/day: 0.5 packs/day for 46.3 years (23.1 ttl pk-yrs)     Types: Cigarettes     Start date: 1/1/1979    Smokeless tobacco: Never    Tobacco comments:     working on it. somedays i dont some i do   Vaping Use    Vaping status: Never Used   Substance Use Topics    Alcohol use: No    Drug use: No           8/20/2024   LAST FHS-7 RESULTS   1st degree relative breast or ovarian cancer No   Any relative bilateral breast cancer No   Any male have breast cancer No   Any ONE woman have BOTH breast AND ovarian cancer No   Any woman with breast cancer before 50yrs No   2 or more relatives with breast AND/OR ovarian cancer No   2 or more relatives with breast AND/OR bowel cancer No        Mammogram Screening - Mammogram every 1-2 years updated in Health Maintenance based on mutual decision making        4/8/2025   STI Screening   New sexual partner(s) since last STI/HIV test? No     History of abnormal Pap smear: No - age 30- 64 PAP with HPV every 5 years recommended        Latest Ref Rng & Units 3/8/2022     3:22 PM 2/27/2017     9:20 AM 2/27/2017     8:49 AM   PAP / HPV   PAP  Negative for Intraepithelial Lesion or Malignancy (NILM)      PAP (Historical)    NIL    HPV 16 DNA Negative Negative  Negative     HPV 18 DNA Negative Negative  Negative     Other HR HPV Negative Negative  Negative       ASCVD Risk   The 10-year ASCVD risk score (Louise CORDERO, et al., 2019) is: 12.6%    Values used to calculate the score:      Age: 55 years      Sex: Female      Is Non- : No      Diabetic: Yes      Tobacco smoker: Yes      Systolic Blood Pressure: 108 mmHg      Is BP treated: Yes      HDL Cholesterol: 30 mg/dL      Total Cholesterol: 150 mg/dL         Reviewed and updated as needed this visit by  "Provider                    Lab work is in process  Labs reviewed in EPIC      Review of Systems  Constitutional, HEENT, cardiovascular, pulmonary, gi and gu systems are negative, except as otherwise noted.     Objective    Exam  /78 (BP Location: Right arm, Patient Position: Sitting, Cuff Size: Adult Large)   Pulse 81   Temp 98.1  F (36.7  C) (Oral)   Resp 16   Ht 1.549 m (5' 1\")   Wt 88.8 kg (195 lb 11.2 oz)   LMP 08/30/2013   SpO2 95%   BMI 36.98 kg/m     Estimated body mass index is 36.98 kg/m  as calculated from the following:    Height as of this encounter: 1.549 m (5' 1\").    Weight as of this encounter: 88.8 kg (195 lb 11.2 oz).    Physical Exam  GENERAL: alert and no distress  EYES: Eyes grossly normal to inspection, PERRL and conjunctivae and sclerae normal  HENT: ear canals and TM's normal, nose and mouth without ulcers or lesions  NECK: no adenopathy, no asymmetry, masses, or scars  RESP: lungs grossly clear with soft expiratory wheeze in upper bilaterally cleared with cough  CV: regular rates and rhythm  ABDOMEN: soft, nontender, no hepatosplenomegaly, no masses and bowel sounds normal  MS: No peripheral edema; postive CTS provocation bilaterally  SKIN: beefy erythematous rash with satellite papules to the inguinal space and below the breast tissue bilaterally  PSYCH: mentation appears normal, affect normal/bright        Signed Electronically by: David Gambino PA-C    "

## 2025-04-08 NOTE — PATIENT INSTRUCTIONS
I want you to take the diflucan and additionally buy some OTC hydrocortisone and mix it with the topical I prescribe (clotrimazole) and apply that twice daily to the rash areas.   Patient Education   Preventive Care Advice   This is general advice given by our system to help you stay healthy. However, your care team may have specific advice just for you. Please talk to your care team about your preventive care needs.  Nutrition  Eat 5 or more servings of fruits and vegetables each day.  Try wheat bread, brown rice and whole grain pasta (instead of white bread, rice, and pasta).  Get enough calcium and vitamin D. Check the label on foods and aim for 100% of the RDA (recommended daily allowance).  Lifestyle  Exercise at least 150 minutes each week  (30 minutes a day, 5 days a week).  Do muscle strengthening activities 2 days a week. These help control your weight and prevent disease.  No smoking.  Wear sunscreen to prevent skin cancer.  Have a dental exam and cleaning every 6 months.  Yearly exams  See your health care team every year to talk about:  Any changes in your health.  Any medicines your care team has prescribed.  Preventive care, family planning, and ways to prevent chronic diseases.  Shots (vaccines)   HPV shots (up to age 26), if you've never had them before.  Hepatitis B shots (up to age 59), if you've never had them before.  COVID-19 shot: Get this shot when it's due.  Flu shot: Get a flu shot every year.  Tetanus shot: Get a tetanus shot every 10 years.  Pneumococcal, hepatitis A, and RSV shots: Ask your care team if you need these based on your risk.  Shingles shot (for age 50 and up)  General health tests  Diabetes screening:  Starting at age 35, Get screened for diabetes at least every 3 years.  If you are younger than age 35, ask your care team if you should be screened for diabetes.  Cholesterol test: At age 39, start having a cholesterol test every 5 years, or more often if advised.  Bone density  scan (DEXA): At age 50, ask your care team if you should have this scan for osteoporosis (brittle bones).  Hepatitis C: Get tested at least once in your life.  STIs (sexually transmitted infections)  Before age 24: Ask your care team if you should be screened for STIs.  After age 24: Get screened for STIs if you're at risk. You are at risk for STIs (including HIV) if:  You are sexually active with more than one person.  You don't use condoms every time.  You or a partner was diagnosed with a sexually transmitted infection.  If you are at risk for HIV, ask about PrEP medicine to prevent HIV.  Get tested for HIV at least once in your life, whether you are at risk for HIV or not.  Cancer screening tests  Cervical cancer screening: If you have a cervix, begin getting regular cervical cancer screening tests starting at age 21.  Breast cancer scan (mammogram): If you've ever had breasts, begin having regular mammograms starting at age 40. This is a scan to check for breast cancer.  Colon cancer screening: It is important to start screening for colon cancer at age 45.  Have a colonoscopy test every 10 years (or more often if you're at risk) Or, ask your provider about stool tests like a FIT test every year or Cologuard test every 3 years.  To learn more about your testing options, visit:   .  For help making a decision, visit:   https://bit.ly/rc24484.  Prostate cancer screening test: If you have a prostate, ask your care team if a prostate cancer screening test (PSA) at age 55 is right for you.  Lung cancer screening: If you are a current or former smoker ages 50 to 80, ask your care team if ongoing lung cancer screenings are right for you.  For informational purposes only. Not to replace the advice of your health care provider. Copyright   2023 [employer]. All rights reserved. Clinically reviewed by the  [employer] [facility]. [employer] 598330 - REV 01/24.  Preventing Falls: Care  Instructions  Injuries and health problems such as trouble walking or poor eyesight can increase your risk of falling. So can some medicines. But there are things you can do to help prevent falls. You can exercise to get stronger. You can also arrange your home to make it safer.    Talk to your doctor about the medicines you take. Ask if any of them increase the risk of falls and whether they can be changed or stopped.   Try to exercise regularly. It can help improve your strength and balance. This can help lower your risk of falling.         Practice fall safety and prevention.   Wear low-heeled shoes that fit well and give your feet good support. Talk to your doctor if you have foot problems that make this hard.  Carry a cellphone or wear a medical alert device that you can use to call for help.  Use stepladders instead of chairs to reach high objects. Don't climb if you're at risk for falls. Ask for help, if needed.  Wear the correct eyeglasses, if you need them.        Make your home safer.   Remove rugs, cords, clutter, and furniture from walkways.  Keep your house well lit. Use night-lights in hallways and bathrooms.  Install and use sturdy handrails on stairways.  Wear nonskid footwear, even inside. Don't walk barefoot or in socks without shoes.        Be safe outside.   Use handrails, curb cuts, and ramps whenever possible.  Keep your hands free by using a shoulder bag or backpack.  Try to walk in well-lit areas. Watch out for uneven ground, changes in pavement, and debris.  Be careful in the winter. Walk on the grass or gravel when sidewalks are slippery. Use de-icer on steps and walkways. Add non-slip devices to shoes.    Put grab bars and nonskid mats in your shower or tub and near the toilet. Try to use a shower chair or bath bench when bathing.   Get into a tub or shower by putting in your weaker leg first. Get out with your strong side first. Have a phone or medical alert device in the bathroom with  "you.   Where can you learn more?  Go to https://www.[employer].net/patiented  Enter G117 in the search box to learn more about \"Preventing Falls: Care Instructions.\"  Current as of: July 31, 2024  Content Version: 14.4    8744-4442 [employer].   Care instructions adapted under license by your healthcare professional. If you have questions about a medical condition or this instruction, always ask your healthcare professional. [employer] disclaims any warranty or liability for your use of this information.    Learning About Stress  What is stress?     Stress is your body's response to a hard situation. Your body can have a physical, emotional, or mental response. Stress is a fact of life for most people, and it affects everyone differently. What causes stress for you may not be stressful for someone else.  A lot of things can cause stress. You may feel stress when you go on a job interview, take a test, or run a race. This kind of short-term stress is normal and even useful. It can help you if you need to work hard or react quickly. For example, stress can help you finish an important job on time.  Long-term stress is caused by ongoing stressful situations or events. Examples of long-term stress include long-term health problems, ongoing problems at work, or conflicts in your family. Long-term stress can harm your health.  How does stress affect your health?  When you are stressed, your body responds as though you are in danger. It makes hormones that speed up your heart, make you breathe faster, and give you a burst of energy. This is called the fight-or-flight stress response. If the stress is over quickly, your body goes back to normal and no harm is done.  But if stress happens too often or lasts too long, it can have bad effects. Long-term stress can make you more likely to get sick, and it can make symptoms of some diseases worse. If you tense up when you are stressed, you may develop " neck, shoulder, or low back pain. Stress is linked to high blood pressure and heart disease.  Stress also harms your emotional health. It can make you rowan, tense, or depressed. Your relationships may suffer, and you may not do well at work or school.  What can you do to manage stress?  You can try these things to help manage stress:   Do something active. Exercise or activity can help reduce stress. Walking is a great way to get started. Even everyday activities such as housecleaning or yard work can help.  Try yoga or mayte chi. These techniques combine exercise and meditation. You may need some training at first to learn them.  Do something you enjoy. For example, listen to music or go to a movie. Practice your hobby or do volunteer work.  Meditate. This can help you relax, because you are not worrying about what happened before or what may happen in the future.  Do guided imagery. Imagine yourself in any setting that helps you feel calm. You can use online videos, books, or a teacher to guide you.  Do breathing exercises. For example:  From a standing position, bend forward from the waist with your knees slightly bent. Let your arms dangle close to the floor.  Breathe in slowly and deeply as you return to a standing position. Roll up slowly and lift your head last.  Hold your breath for just a few seconds in the standing position.  Breathe out slowly and bend forward from the waist.  Let your feelings out. Talk, laugh, cry, and express anger when you need to. Talking with supportive friends or family, a counselor, or a leonidas leader about your feelings is a healthy way to relieve stress. Avoid discussing your feelings with people who make you feel worse.  Write. It may help to write about things that are bothering you. This helps you find out how much stress you feel and what is causing it. When you know this, you can find better ways to cope.  What can you do to prevent stress?  You might try some of these things  "to help prevent stress:  Manage your time. This helps you find time to do the things you want and need to do.  Get enough sleep. Your body recovers from the stresses of the day while you are sleeping.  Get support. Your family, friends, and community can make a difference in how you experience stress.  Limit your news feed. Avoid or limit time on social media or news that may make you feel stressed.  Do something active. Exercise or activity can help reduce stress. Walking is a great way to get started.  Where can you learn more?  Go to https://www.[employer].net/patiented  Enter N032 in the search box to learn more about \"Learning About Stress.\"  Current as of: October 24, 2024  Content Version: 14.4    0084-6157 [employer].   Care instructions adapted under license by your healthcare professional. If you have questions about a medical condition or this instruction, always ask your healthcare professional. [employer] disclaims any warranty or liability for your use of this information.       "

## 2025-04-09 ENCOUNTER — PATIENT OUTREACH (OUTPATIENT)
Dept: CARE COORDINATION | Facility: CLINIC | Age: 55
End: 2025-04-09
Payer: COMMERCIAL

## 2025-04-09 PROBLEM — G56.03 BILATERAL CARPAL TUNNEL SYNDROME: Status: ACTIVE | Noted: 2025-04-09

## 2025-04-09 PROBLEM — I50.32 CHRONIC DIASTOLIC HEART FAILURE WITH PRESERVED EJECTION FRACTION (H): Status: ACTIVE | Noted: 2018-08-06

## 2025-04-15 ENCOUNTER — OFFICE VISIT (OUTPATIENT)
Dept: ORTHOPEDICS | Facility: CLINIC | Age: 55
End: 2025-04-15
Payer: COMMERCIAL

## 2025-04-15 DIAGNOSIS — M17.12 PRIMARY OSTEOARTHRITIS OF LEFT KNEE: Primary | ICD-10-CM

## 2025-04-15 DIAGNOSIS — M17.11 PRIMARY OSTEOARTHRITIS OF RIGHT KNEE: ICD-10-CM

## 2025-04-15 DIAGNOSIS — E66.01 MORBID OBESITY (H): ICD-10-CM

## 2025-04-15 DIAGNOSIS — J45.40 MODERATE PERSISTENT ASTHMA, UNSPECIFIED WHETHER COMPLICATED: ICD-10-CM

## 2025-04-15 DIAGNOSIS — I25.119 CORONARY ARTERY DISEASE INVOLVING NATIVE HEART WITH ANGINA PECTORIS, UNSPECIFIED VESSEL OR LESION TYPE: ICD-10-CM

## 2025-04-15 DIAGNOSIS — I50.32 CHRONIC DIASTOLIC HEART FAILURE WITH PRESERVED EJECTION FRACTION (H): ICD-10-CM

## 2025-04-15 DIAGNOSIS — J41.0 SIMPLE CHRONIC BRONCHITIS (H): ICD-10-CM

## 2025-04-15 DIAGNOSIS — F17.200 TOBACCO USE DISORDER: ICD-10-CM

## 2025-04-15 PROCEDURE — 99213 OFFICE O/P EST LOW 20 MIN: CPT | Mod: 25 | Performed by: FAMILY MEDICINE

## 2025-04-15 PROCEDURE — 20611 DRAIN/INJ JOINT/BURSA W/US: CPT | Mod: 50 | Performed by: FAMILY MEDICINE

## 2025-04-15 RX ORDER — LIDOCAINE HYDROCHLORIDE 10 MG/ML
5 INJECTION, SOLUTION INFILTRATION; PERINEURAL
Status: COMPLETED | OUTPATIENT
Start: 2025-04-15 | End: 2025-04-15

## 2025-04-15 RX ADMIN — LIDOCAINE HYDROCHLORIDE 5 ML: 10 INJECTION, SOLUTION INFILTRATION; PERINEURAL at 07:45

## 2025-04-15 NOTE — LETTER
4/15/2025      Marni Austin  [address]  [facility] [address]      Dear Colleague,    Thank you for referring your patient, Marni Austin, to the [facility] [place]. Please see a copy of my visit note below.    ASSESSMENT & PLAN  Patient Instructions     1. Primary osteoarthritis of left knee    2. Primary osteoarthritis of right knee    3. Tobacco use disorder    4. Coronary artery disease involving native heart with angina pectoris, unspecified vessel or lesion type    5. Simple chronic bronchitis (H)    6. Moderate persistent asthma, unspecified whether complicated    7. Morbid obesity (H)    8. 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  Has been wearing a hinged knee brace but states that sometimes that is causing even increased pain.  Patient reports recurrent bouts of buckling and instability of the right knee  -Patient was once again informed on the importance of exercise as well as strengthening to stabilize the knee.  Patient states that she has extreme difficulty with exercise due to her asthma, COPD, heart failure and a previous heart attack.    -Patient was advised to try to quit smoking as soon as possible cut down as much as she can prior to surgical intervention.  Patient has smoked for 30 years but has been trying to quit for the past year.  Patient had success with Chantix but her insurance only covered for 1 month and cannot afford to continue.  Patient states that she mainly smokes while she is driving.  She states she is an anxious  and it calms her.  I advised she not take her cigarrettes while she drives but she states she is too  anxious to not smoke while driving.  Patient states she is smoking approximately 3 packs/week  -Sounds from our conversation that patient has been smoking mainly due to manage her stress, anxiety.  Patient is already taking Celexa 40 mg.  Patient was advised to follow-up with her primary care physician to discuss potentially adding a secondary medication to manage her stress better.  Patient was encouraged to try therapy and counseling which she has done in the past which she stated has exacerbated her symptoms.  Patient states that she began smoking heavily many years ago when she lost her , father and father-in-law the same year.  -Patient states that her right knee pain has been worsening and is experiencing less and less relief with the cortisone patient injections.  -Patient will be referred to orthopedic surgery to discuss knee replacement for the right knee.  -Call direct clinic number [[phone]] at any time with questions or concerns.    Albert Yeo MD [facility]          -----    SUBJECTIVE:  Marni Austin is a 55 year old female who is seen in follow-up for bilateral knee pain.  They were last seen 12/17/24.    Since their last visit reports worsening pain. They indicate that their current pain level is 9/10. They have tried rest/activity avoidance, ice, Aleve, home exercises, corticosteroid injection (most recent date: 12/27/24) that provided  2 month(s) of relief, viscosupplementation injection (most recent date: 4/23/24) that provided  6-7 month(s) of relief, and casting/splinting/bracing.      Patient states right knee is worse than the left.  Patient reports significant pain with weightbearing activities, difficulty bending, getting in and out of a chair, and up and down stairs reports recurrent bouts of instability and buckling.  Patient is wearing a hinged knee brace and states that sometimes it causes severe increased  pain.    The patient is seen by themselves.    Patient's past medical, surgical, social, and family histories were reviewed today and no changes are noted.    REVIEW OF SYSTEMS:  Constitutional: NEGATIVE for fever, chills, change in weight  Skin: NEGATIVE for worrisome rashes, moles or lesions  GI/: NEGATIVE for bowel or bladder changes  Neuro: NEGATIVE for weakness, dizziness or paresthesias    OBJECTIVE:  LMP 08/30/2013    General: healthy, alert and in no distress  HEENT: no scleral icterus or conjunctival erythema  Skin: no suspicious lesions or rash. No jaundice.  CV: regular rhythm by palpation, no pedal edema  Resp: normal respiratory effort without conversational dyspnea   Psych: normal mood and affect  Gait: normal steady gait with appropriate coordination and balance  Neuro: normal light touch sensory exam of the extremities.    MSK:  BILATERAL KNEE  Inspection:    normal alignment  Palpation:    Tender about the lateral patellar facet, medial patellar facet and medial joint line. Remainder of bony and ligamentous landmarks are nontender.    Mod effusion is present bilaterally    Patellofemoral crepitus is Present  Range of Motion:     00 extension to 1100 flexion  Strength:    Quadriceps grossly intact    Extensor mechanism intact  Special Tests:    Positive: none    Negative: MCL/valgus stress (0 & 30 deg), LCL/varus stress (0 & 30 deg), Lachman's, anterior drawer, posterior drawer    Independent visualization of the below image:          Large Joint Injection/Arthocentesis: bilateral knee    Date/Time: 4/15/2025 7:45 AM    Performed by: Yeo, Albert, MD  Authorized by: Yeo, Albert, MD    Indications:  Pain and osteoarthritis  Needle Size:  22 G  Guidance: ultrasound    Approach:  Superolateral  Location:  Knee  Laterality:  Bilateral      Medications (Right):  5 mL lidocaine 1 %; 48 mg hylan 48 MG/6ML  Aspirate amount (mL):  12  Aspirate:  Yellow  Medications (Left):  5 mL lidocaine 1 %; 48 mg hylan 48  MG/6ML  Aspirate amount (mL):  18  Aspirate:  Yellow  Outcome:  Tolerated well, no immediate complications  Procedure discussed: discussed risks, benefits, and alternatives    Consent Given by:  Patient  Timeout: timeout called immediately prior to procedure    Prep: patient was prepped and draped in usual sterile fashion     Ultrasound was used to ensure safe and accurate needle placement and injection. Ultrasound images of the procedure were permanently stored.      Patient's conditions were thoroughly discussed during today's visit with total time spent face-to-face with the patient and documentation being 24 minutes, excluding time to perform the procedures.     Albert Yeo MD, [facility]      Again, thank you for allowing me to participate in the care of your patient.        Sincerely,        Albert Yeo, MD    Electronically signed

## 2025-04-15 NOTE — PATIENT INSTRUCTIONS
1. Primary osteoarthritis of left knee    2. Primary osteoarthritis of right knee    3. Tobacco use disorder    4. Coronary artery disease involving native heart with angina pectoris, unspecified vessel or lesion type    5. Simple chronic bronchitis (H)    6. Moderate persistent asthma, unspecified whether complicated    7. Morbid obesity (H)    8. 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  Has been wearing a hinged knee brace but states that sometimes that is causing even increased pain.  Patient reports recurrent bouts of buckling and instability of the right knee  -Patient was once again informed on the importance of exercise as well as strengthening to stabilize the knee.  Patient states that she has extreme difficulty with exercise due to her asthma, COPD, heart failure and a previous heart attack.    -Patient was advised to try to quit smoking as soon as possible cut down as much as she can prior to surgical intervention.  Patient has smoked for 30 years but has been trying to quit for the past year.  Patient had success with Chantix but her insurance only covered for 1 month and cannot afford to continue.  Patient states that she mainly smokes while she is driving.  She states she is an anxious  and it calms her.  I advised she not take her cigarrettes while she drives but she states she is too anxious to not smoke while driving.  Patient states she is smoking approximately 3 packs/week  -Sounds from our conversation that patient has been smoking mainly due to manage her stress, anxiety.  Patient is already taking Celexa 40 mg.  Patient was advised to follow-up with her primary care  physician to discuss potentially adding a secondary medication to manage her stress better.  Patient was encouraged to try therapy and counseling which she has done in the past which she stated has exacerbated her symptoms.  Patient states that she began smoking heavily many years ago when she lost her , father and father-in-law the same year.  -Patient states that her right knee pain has been worsening and is experiencing less and less relief with the cortisone patient injections.  -Patient will be referred to orthopedic surgery to discuss knee replacement for the right knee.  -Call direct clinic number [[phone]] at any time with questions or concerns.    Albert Yeo MD CAQSM  [facility]  [facility] [place]

## 2025-04-15 NOTE — PROGRESS NOTES
ASSESSMENT & PLAN  Patient Instructions     1. Primary osteoarthritis of left knee    2. Primary osteoarthritis of right knee    3. Tobacco use disorder    4. Coronary artery disease involving native heart with angina pectoris, unspecified vessel or lesion type    5. Simple chronic bronchitis (H)    6. Moderate persistent asthma, unspecified whether complicated    7. Morbid obesity (H)    8. 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  Has been wearing a hinged knee brace but states that sometimes that is causing even increased pain.  Patient reports recurrent bouts of buckling and instability of the right knee  -Patient was once again informed on the importance of exercise as well as strengthening to stabilize the knee.  Patient states that she has extreme difficulty with exercise due to her asthma, COPD, heart failure and a previous heart attack.    -Patient was advised to try to quit smoking as soon as possible cut down as much as she can prior to surgical intervention.  Patient has smoked for 30 years but has been trying to quit for the past year.  Patient had success with Chantix but her insurance only covered for 1 month and cannot afford to continue.  Patient states that she mainly smokes while she is driving.  She states she is an anxious  and it calms her.  I advised she not take her cigarrettes while she drives but she states she is too anxious to not smoke while driving.  Patient states she is smoking approximately 3 packs/week  -Sounds from our conversation that patient has been smoking mainly due to manage her stress, anxiety.  Patient is already taking Celexa 40 mg.  Patient was  advised to follow-up with her primary care physician to discuss potentially adding a secondary medication to manage her stress better.  Patient was encouraged to try therapy and counseling which she has done in the past which she stated has exacerbated her symptoms.  Patient states that she began smoking heavily many years ago when she lost her , father and father-in-law the same year.  -Patient states that her right knee pain has been worsening and is experiencing less and less relief with the cortisone patient injections.  -Patient will be referred to orthopedic surgery to discuss knee replacement for the right knee.  -Call direct clinic number [[phone]] at any time with questions or concerns.    Albert Yeo MD [facility]  [facility]        -----    SUBJECTIVE:  Marni Austin is a 55 year old female who is seen in follow-up for bilateral knee pain.  They were last seen 12/17/24.    Since their last visit reports worsening pain. They indicate that their current pain level is 9/10. They have tried rest/activity avoidance, ice, Aleve, home exercises, corticosteroid injection (most recent date: 12/27/24) that provided  2 month(s) of relief, viscosupplementation injection (most recent date: 4/23/24) that provided  6-7 month(s) of relief, and casting/splinting/bracing.      Patient states right knee is worse than the left.  Patient reports significant pain with weightbearing activities, difficulty bending, getting in and out of a chair, and up and down stairs reports recurrent bouts of instability and buckling.  Patient is wearing a hinged knee brace and states that sometimes it causes severe increased pain.    The patient is seen by themselves.    Patient's past medical, surgical, social, and family histories were reviewed today and no changes are noted.    REVIEW OF SYSTEMS:  Constitutional: NEGATIVE for fever, chills, change in weight  Skin:  NEGATIVE for worrisome rashes, moles or lesions  GI/: NEGATIVE for bowel or bladder changes  Neuro: NEGATIVE for weakness, dizziness or paresthesias    OBJECTIVE:  LMP 08/30/2013    General: healthy, alert and in no distress  HEENT: no scleral icterus or conjunctival erythema  Skin: no suspicious lesions or rash. No jaundice.  CV: regular rhythm by palpation, no pedal edema  Resp: normal respiratory effort without conversational dyspnea   Psych: normal mood and affect  Gait: normal steady gait with appropriate coordination and balance  Neuro: normal light touch sensory exam of the extremities.    MSK:  BILATERAL KNEE  Inspection:    normal alignment  Palpation:    Tender about the lateral patellar facet, medial patellar facet and medial joint line. Remainder of bony and ligamentous landmarks are nontender.    Mod effusion is present bilaterally    Patellofemoral crepitus is Present  Range of Motion:     00 extension to 1100 flexion  Strength:    Quadriceps grossly intact    Extensor mechanism intact  Special Tests:    Positive: none    Negative: MCL/valgus stress (0 & 30 deg), LCL/varus stress (0 & 30 deg), Lachman's, anterior drawer, posterior drawer    Independent visualization of the below image:          Large Joint Injection/Arthocentesis: bilateral knee    Date/Time: 4/15/2025 7:45 AM    Performed by: Yeo, Albert, MD  Authorized by: Yeo, Albert, MD    Indications:  Pain and osteoarthritis  Needle Size:  22 G  Guidance: ultrasound    Approach:  Superolateral  Location:  Knee  Laterality:  Bilateral      Medications (Right):  5 mL lidocaine 1 %; 48 mg hylan 48 MG/6ML  Aspirate amount (mL):  12  Aspirate:  Yellow  Medications (Left):  5 mL lidocaine 1 %; 48 mg hylan 48 MG/6ML  Aspirate amount (mL):  18  Aspirate:  Yellow  Outcome:  Tolerated well, no immediate complications  Procedure discussed: discussed risks, benefits, and alternatives    Consent Given by:  Patient  Timeout: timeout called immediately prior  to procedure    Prep: patient was prepped and draped in usual sterile fashion     Ultrasound was used to ensure safe and accurate needle placement and injection. Ultrasound images of the procedure were permanently stored.      Patient's conditions were thoroughly discussed during today's visit with total time spent face-to-face with the patient and documentation being 24 minutes, excluding time to perform the procedures.     Albert Yeo MD, [facility]

## 2025-04-16 ENCOUNTER — PATIENT OUTREACH (OUTPATIENT)
Dept: CARE COORDINATION | Facility: CLINIC | Age: 55
End: 2025-04-16
Payer: COMMERCIAL

## 2025-04-22 ENCOUNTER — OFFICE VISIT (OUTPATIENT)
Dept: ORTHOPEDICS | Facility: CLINIC | Age: 55
End: 2025-04-22
Attending: PHYSICIAN ASSISTANT
Payer: COMMERCIAL

## 2025-04-22 ENCOUNTER — TELEPHONE (OUTPATIENT)
Dept: ORTHOPEDICS | Facility: CLINIC | Age: 55
End: 2025-04-22

## 2025-04-22 DIAGNOSIS — G56.02 LEFT CARPAL TUNNEL SYNDROME: ICD-10-CM

## 2025-04-22 DIAGNOSIS — G56.01 RIGHT CARPAL TUNNEL SYNDROME: Primary | ICD-10-CM

## 2025-04-22 DIAGNOSIS — G56.03 BILATERAL CARPAL TUNNEL SYNDROME: ICD-10-CM

## 2025-04-22 NOTE — PROGRESS NOTES
Orthopaedic Surgery Hand and Upper Extremity Clinic H&P Note:  Date: Apr 22, 2025     Patient Name: Marni Austin  MRN: [id number]    Consult requested by: David Gambino    CHIEF COMPLAINT: Bilateral carpal tunnel syndrome    Dominant Hand:right  Occupation: computer work and work register       HPI:  Ms. Marni Austin is a 55 year old  female right hand dominant who presents with bilateral carpal tunnel syndrome.  Ongoing since 1993, but intermittent.  Over the last 2 years, has become much more constant and bothersome. Mostly affects index, middle, ring fingers. Small finger not affected. Occurs throughout the day.  Does wake her up at night.  She has tried bracing in the past during the day without improvement.  Aggravated also by gripping and driving.    PMH  Diabetes yes Type 2: A1C 6.6 3/31/2025  Thyroid Problems no   Smoking Y/N yes, 3-4 packs a week       PAST MEDICAL HISTORY:  Past Medical History:   Diagnosis Date    Arthritis     not sure if arthritis or carpel tunnel    CAD (coronary artery disease)     Congestive heart failure (H)     COPD (chronic obstructive pulmonary disease) (H)     Depression     Depressive disorder     its better    Hypertension     Rectal bleeding 03/06/2015    Uncomplicated asthma        PAST SURGICAL HISTORY:  Past Surgical History:   Procedure Laterality Date    CARDIAC SURGERY  2015    Cardiac stents x 1    GYN SURGERY  4/18/93    tubolligation (sp)    TONSILLECTOMY Bilateral 1985    Tubal ligation surgery  1993       MEDICATIONS:  Current Outpatient Medications   Medication Sig Dispense Refill    albuterol (PROAIR HFA/PROVENTIL HFA/VENTOLIN HFA) 108 (90 Base) MCG/ACT inhaler USE 2 INHALATIONS BY MOUTH  EVERY 6 HOURS 20.1 g 3    albuterol (PROVENTIL) (2.5 MG/3ML) 0.083% neb solution USE 1 VIAL VIA NEBULIZER EVERY 6 HOURS AS NEEDED FOR SHORTNESS OF BREATH DYSPNEA OR WHEEZING 180 mL 1    alcohol swab prep pads Use to swab area of injection/erik as directed. 100 each 3     amLODIPine (NORVASC) 10 MG tablet Take 10 mg by mouth daily.      aspirin EC 81 MG EC tablet Take 1 tablet (81 mg) by mouth daily      atorvastatin (LIPITOR) 80 MG tablet TAKE 1 TABLET(80 MG) BY MOUTH DAILY 90 tablet 2    blood glucose (ONETOUCH VERIO IQ) test strip USE TO TEST BLOOD SUGAR  ONCE DAILY OR AS DIRECTED 100 strip 0    blood glucose calibration (NO BRAND SPECIFIED) solution To accompany: Blood Glucose Monitor Brands: per insurance. 1 each 0    blood glucose monitoring (NO BRAND SPECIFIED) meter device kit Use to test blood sugar 1 times daily or as directed. Preferred blood glucose meter OR supplies to accompany: Blood Glucose Monitor Brands: per insurance. 1 kit 0    budesonide (PULMICORT FLEXHALER) 180 MCG/ACT inhaler Inhale 1 puff into the lungs 2 times daily. 1 each 5    cetirizine (ZYRTEC) 10 MG tablet Take 10 mg by mouth daily      chlorthalidone (HYGROTON) 25 MG tablet Take 0.5 tablets (12.5 mg) by mouth daily. 45 tablet 3    citalopram (CELEXA) 40 MG tablet Take 1 tablet (40 mg) by mouth daily. 90 tablet 3    clotrimazole (LOTRIMIN) 1 % external cream Apply topically 2 times daily. Tube will last one month 60 g 1    cyclobenzaprine (FLEXERIL) 5 MG tablet Take 1 tablet (5 mg) by mouth 3 times daily as needed for muscle spasms 30 tablet 3    hydrALAZINE (APRESOLINE) 50 MG tablet Take 50 mg by mouth 3 times daily.      losartan (COZAAR) 25 MG tablet TAKE 1 TABLET(25 MG) BY MOUTH DAILY 90 tablet 2    metoprolol succinate ER (TOPROL XL) 25 MG 24 hr tablet TAKE 1 TABLET(25 MG) BY MOUTH DAILY 90 tablet 2    nitroGLYcerin (NITROSTAT) 0.4 MG sublingual tablet SEE DIRECTIONS ON  MEDICATION INFORMATION  SHEET WITH INVOICE  PAPERWORK 75 tablet 1    potassium chloride maria [place] carmen ER (KLOR-CON M10) 10 MEQ CR tablet TAKE 1 TABLET(10 MEQ) BY MOUTH DAILY 90 tablet 2    Semaglutide, 2 MG/DOSE, (OZEMPIC) 8 MG/3ML pen Inject 2 mg subcutaneously every 7 days. 9 mL 3    thin (NO BRAND SPECIFIED) lancets Use with lanceting  device. To accompany: Blood Glucose Monitor Brands: per insurance. 100 each 6     No current facility-administered medications for this visit.       ALLERGIES:     Allergies   Allergen Reactions    Zyban [Bupropion Hcl] Anaphylaxis    Bupropion Rash       FAMILY HISTORY:  No pertinent family history    SOCIAL HISTORY:  Social History     Tobacco Use    Smoking status: Every Day     Current packs/day: 0.50     Average packs/day: 0.5 packs/day for 46.3 years (23.2 ttl pk-yrs)     Types: Cigarettes     Start date: 1/1/1979    Smokeless tobacco: Never    Tobacco comments:     working on it. somedays i dont some i do   Vaping Use    Vaping status: Never Used   Substance Use Topics    Alcohol use: No    Drug use: No       The patient's past medical, family, and social history was reviewed and confirmed.    REVIEW OF SYMPTOMS:      General: Negative   Eyes: Negative   Ear, Nose and Throat: Negative   Respiratory: Negative   Cardiovascular: Negative   Gastrointestinal: Negative   Genito-urinary: Negative   Musculoskeletal: see HPI  Neurological: Negative   Psychological: Negative  HEME: Negative   ENDO: Negative   SKIN: Negative    VITALS:  There were no vitals filed for this visit.    EXAM:  General: NAD, A&Ox3  HEENT: NC/AT  CV: RRR by peripheral pulse  Pulmonary: Non-labored breathing on RA  BUE:  Skin intact, no deformity, no atrophy  Sensations intact to light touch median, radial, ulnar nerve distributions  2-point discrimination 5 mm median and ulnar on the right, 5 mm median left, 6 mm ulnar left  Positive Tinel's at right carpal tunnel, negative at the left  Negative Durkan's compression test at bilateral carpal tunnels as she states the fingers are tingling already  Intact EPL, FPL, hand intrinsics  WWP, cap refill less than 2 seconds         IMAGING:  None    I have personally reviewed the above images and labs.         IMPRESSION AND RECOMMENDATIONS:  Ms. Marni Austin is a 55 year old female right hand  dominant with bilateral carpal tunnel syndrome    I discussed the diagnosis, prognosis, and treatment options with the patient in detail.  We discussed treatment options including nighttime bracing, OT, injections, and surgery.  Patient prefers the most definitive option.  She also wishes to minimize time off work.  She therefore wished to proceed with surgery.  I recommended the endoscopic option to the patient.    The indications for surgery were discussed with the patient. The benefits, risks, and alternatives of operative management were discussed in detail with the patient. The patient understands that the risks of surgery include, but are not limited to: infection, bleeding, injury to nearby structures (such as nerves, blood vessels, and tendons), temporary weakness in , wound healing problems given smoking history, need for additional surgery, pain, stiffness, scarring, need for rehabilitation, and anesthetic complications.  Patient expressed understanding and elected to proceed with surgery. All questions were answered to the patient's satisfaction.    This was placed, local only    Nate Momin MD    Hand, Upper Extremity & Microvascular Surgery  Department of Orthopaedic Surgery  [facility]    \

## 2025-04-22 NOTE — PATIENT INSTRUCTIONS
Thank you for choosing [facility]    Dr. Momin Locations:    [facility] [address], [address]  [address] [address]   Appointments: [phone] Appointments: [phone]   Fax: [phone] Fax: [phone]     Please call Surgical Scheduling at [phone] to schedule your surgery appointment.

## 2025-04-22 NOTE — LETTER
4/22/2025      Marni Austin  [address]  [place] [address]      Dear Colleague,    Thank you for referring your patient, Marni Austin, to the [facility] [place]. Please see a copy of my visit note below.    Orthopaedic Surgery Hand and Upper Extremity Clinic H&P Note:  Date: Apr 22, 2025     Patient Name: Marni Austin  MRN: [id number]    Consult requested by: David Gambino    CHIEF COMPLAINT: Bilateral carpal tunnel syndrome    Dominant Hand:right  Occupation: computer work and work register       HPI:  Ms. Marni Austin is a 55 year old  female right hand dominant who presents with bilateral carpal tunnel syndrome.  Ongoing since 1993, but intermittent.  Over the last 2 years, has become much more constant and bothersome. Mostly affects index, middle, ring fingers. Small finger not affected. Occurs throughout the day.  Does wake her up at night.  She has tried bracing in the past during the day without improvement.  Aggravated also by gripping and driving.    PMH  Diabetes yes Type 2: A1C 6.6 3/31/2025  Thyroid Problems no   Smoking Y/N yes, 3-4 packs a week       PAST MEDICAL HISTORY:  Past Medical History:   Diagnosis Date     Arthritis     not sure if arthritis or carpel tunnel     CAD (coronary artery disease)      Congestive heart failure (H)      COPD (chronic obstructive pulmonary disease) (H)      Depression      Depressive disorder     its better     Hypertension      Rectal bleeding 03/06/2015     Uncomplicated asthma        PAST SURGICAL HISTORY:  Past Surgical History:   Procedure Laterality Date     CARDIAC SURGERY  2015    Cardiac stents x 1     GYN SURGERY  4/18/93    tubolligation (sp)     TONSILLECTOMY Bilateral 1985     Tubal ligation surgery  1993       MEDICATIONS:  Current Outpatient Medications   Medication Sig Dispense Refill     albuterol (PROAIR HFA/PROVENTIL HFA/VENTOLIN HFA) 108 (90 Base) MCG/ACT inhaler USE 2 INHALATIONS BY MOUTH   EVERY 6 HOURS 20.1 g 3     albuterol (PROVENTIL) (2.5 MG/3ML) 0.083% neb solution USE 1 VIAL VIA NEBULIZER EVERY 6 HOURS AS NEEDED FOR SHORTNESS OF BREATH DYSPNEA OR WHEEZING 180 mL 1     alcohol swab prep pads Use to swab area of injection/erik as directed. 100 each 3     amLODIPine (NORVASC) 10 MG tablet Take 10 mg by mouth daily.       aspirin EC 81 MG EC tablet Take 1 tablet (81 mg) by mouth daily       atorvastatin (LIPITOR) 80 MG tablet TAKE 1 TABLET(80 MG) BY MOUTH DAILY 90 tablet 2     blood glucose (ONETOUCH VERIO IQ) test strip USE TO TEST BLOOD SUGAR  ONCE DAILY OR AS DIRECTED 100 strip 0     blood glucose calibration (NO BRAND SPECIFIED) solution To accompany: Blood Glucose Monitor Brands: per insurance. 1 each 0     blood glucose monitoring (NO BRAND SPECIFIED) meter device kit Use to test blood sugar 1 times daily or as directed. Preferred blood glucose meter OR supplies to accompany: Blood Glucose Monitor Brands: per insurance. 1 kit 0     budesonide (PULMICORT FLEXHALER) 180 MCG/ACT inhaler Inhale 1 puff into the lungs 2 times daily. 1 each 5     cetirizine (ZYRTEC) 10 MG tablet Take 10 mg by mouth daily       chlorthalidone (HYGROTON) 25 MG tablet Take 0.5 tablets (12.5 mg) by mouth daily. 45 tablet 3     citalopram (CELEXA) 40 MG tablet Take 1 tablet (40 mg) by mouth daily. 90 tablet 3     clotrimazole (LOTRIMIN) 1 % external cream Apply topically 2 times daily. Tube will last one month 60 g 1     cyclobenzaprine (FLEXERIL) 5 MG tablet Take 1 tablet (5 mg) by mouth 3 times daily as needed for muscle spasms 30 tablet 3     hydrALAZINE (APRESOLINE) 50 MG tablet Take 50 mg by mouth 3 times daily.       losartan (COZAAR) 25 MG tablet TAKE 1 TABLET(25 MG) BY MOUTH DAILY 90 tablet 2     metoprolol succinate ER (TOPROL XL) 25 MG 24 hr tablet TAKE 1 TABLET(25 MG) BY MOUTH DAILY 90 tablet 2     nitroGLYcerin (NITROSTAT) 0.4 MG sublingual tablet SEE DIRECTIONS ON  MEDICATION INFORMATION  SHEET WITH INVOICE   PAPERWORK 75 tablet 1     potassium chloride maria [place] carmen ER (KLOR-CON M10) 10 MEQ CR tablet TAKE 1 TABLET(10 MEQ) BY MOUTH DAILY 90 tablet 2     Semaglutide, 2 MG/DOSE, (OZEMPIC) 8 MG/3ML pen Inject 2 mg subcutaneously every 7 days. 9 mL 3     thin (NO BRAND SPECIFIED) lancets Use with lanceting device. To accompany: Blood Glucose Monitor Brands: per insurance. 100 each 6     No current facility-administered medications for this visit.       ALLERGIES:     Allergies   Allergen Reactions     Zyban [Bupropion Hcl] Anaphylaxis     Bupropion Rash       FAMILY HISTORY:  No pertinent family history    SOCIAL HISTORY:  Social History     Tobacco Use     Smoking status: Every Day     Current packs/day: 0.50     Average packs/day: 0.5 packs/day for 46.3 years (23.2 ttl pk-yrs)     Types: Cigarettes     Start date: 1/1/1979     Smokeless tobacco: Never     Tobacco comments:     working on it. somedays i dont some i do   Vaping Use     Vaping status: Never Used   Substance Use Topics     Alcohol use: No     Drug use: No       The patient's past medical, family, and social history was reviewed and confirmed.    REVIEW OF SYMPTOMS:      General: Negative   Eyes: Negative   Ear, Nose and Throat: Negative   Respiratory: Negative   Cardiovascular: Negative   Gastrointestinal: Negative   Genito-urinary: Negative   Musculoskeletal: see HPI  Neurological: Negative   Psychological: Negative  HEME: Negative   ENDO: Negative   SKIN: Negative    VITALS:  There were no vitals filed for this visit.    EXAM:  General: NAD, A&Ox3  HEENT: NC/AT  CV: RRR by peripheral pulse  Pulmonary: Non-labored breathing on RA  BUE:  Skin intact, no deformity, no atrophy  Sensations intact to light touch median, radial, ulnar nerve distributions  2-point discrimination 5 mm median and ulnar on the right, 5 mm median left, 6 mm ulnar left  Positive Tinel's at right carpal tunnel, negative at the left  Negative Durkan's compression test at bilateral carpal tunnels as  she states the fingers are tingling already  Intact EPL, FPL, hand intrinsics  WWP, cap refill less than 2 seconds         IMAGING:  None    I have personally reviewed the above images and labs.         IMPRESSION AND RECOMMENDATIONS:  Ms. Marni Austin is a 55 year old female right hand dominant with bilateral carpal tunnel syndrome    I discussed the diagnosis, prognosis, and treatment options with the patient in detail.  We discussed treatment options including nighttime bracing, OT, injections, and surgery.  Patient prefers the most definitive option.  She also wishes to minimize time off work.  She therefore wished to proceed with surgery.  I recommended the endoscopic option to the patient.    The indications for surgery were discussed with the patient. The benefits, risks, and alternatives of operative management were discussed in detail with the patient. The patient understands that the risks of surgery include, but are not limited to: infection, bleeding, injury to nearby structures (such as nerves, blood vessels, and tendons), temporary weakness in , wound healing problems given smoking history, need for additional surgery, pain, stiffness, scarring, need for rehabilitation, and anesthetic complications.  Patient expressed understanding and elected to proceed with surgery. All questions were answered to the patient's satisfaction.    This was placed, local only    Nate Momin MD    Hand, Upper Extremity & Microvascular Surgery  Department of Orthopaedic Surgery  [facility]    \      Again, thank you for allowing me to participate in the care of your patient.        Sincerely,        Nate Momin MD    Electronically signed

## 2025-04-22 NOTE — TELEPHONE ENCOUNTER
No consent to communicate on file.    Dr. Momin has additional availability this afternoon (4/22) if patient would like to be seen earlier.    LVM to call appointment scheduling about appointment time.    Abigail Woodall, Visit Facilitator

## 2025-04-29 ASSESSMENT — KOOS JR
GOING UP OR DOWN STAIRS: MODERATE
TWISING OR PIVOTING ON KNEE: MODERATE
HOW SEVERE IS YOUR KNEE STIFFNESS AFTER FIRST WAKING IN MORNING: MODERATE
BENDING TO THE FLOOR TO PICK UP OBJECT: SEVERE
STRAIGHTENING KNEE FULLY: MILD
RISING FROM SITTING: MODERATE
STANDING UPRIGHT: MODERATE
KOOS JR SCORING: 52.47

## 2025-04-30 ENCOUNTER — OFFICE VISIT (OUTPATIENT)
Dept: ORTHOPEDICS | Facility: CLINIC | Age: 55
End: 2025-04-30
Attending: FAMILY MEDICINE
Payer: COMMERCIAL

## 2025-04-30 DIAGNOSIS — M17.11 PRIMARY OSTEOARTHRITIS OF RIGHT KNEE: ICD-10-CM

## 2025-04-30 PROCEDURE — 99214 OFFICE O/P EST MOD 30 MIN: CPT | Performed by: STUDENT IN AN ORGANIZED HEALTH CARE EDUCATION/TRAINING PROGRAM

## 2025-04-30 NOTE — PATIENT INSTRUCTIONS
1. Primary osteoarthritis of right knee        [facility] and [facility]  [facility].  You can call [phone]  to schedule at your convenience.     Follow up with Jesus Mathis, Khris Metcalf, Kirit Estrada or Dr. Medel after you've obtained the knee brace for a cortisone injection.     Call my office with any questions or concerns, [phone].

## 2025-04-30 NOTE — PROGRESS NOTES
[facility]  Orthopaedic Surgery Consultation by Leighton Medel M.D.    Marni Austin MRN# [id number]   Age: 55 year old YOB: 1970     Requesting physician: Albert Yeo Meyer, Daniel     Background history:  DX:  Bilateral carpal tunnel syndrome  Coronary artery disease  Asthma  COPD  Morbid obesity  Type 2 diabetes mellitus  Chronic diastolic heart failure with preserved ejection fracture  Hypertension on aspirin 81 mg  Tobacco use disorder depressive disorder      TREATMENTS:  None           History of Present Illness:   55 year old female who presents our clinic for the evaluation of chronic right knee pain.  This pain has been present for multiple years and progressive over time.  No clear antecedent traumatic event.  The pain is experienced on the anterior and medial side of the knee.  It is present throughout the entire day.  It gets worse with prolonged standing and activities.  Patient reports the presence of night pain with initiation stiffness and soreness.  Patient does not report effusions but does endorse the presence of crepitus and giving way.  Kneeling, squatting and stair climbing is challenging.  To mitigate pain she said over-the-counter analgesics, activity modifications, knee sleeves and injections.  She had bilateral Synvisc One injections on 4/15/25 that provided no relief in left knee and only a few weeks relief in right knee. She has tried corticosteroid injections (most recent date: 12/27/24) that provided relief for 2 months.  Patient has not yet tried physical therapy or  bracing.  Patient has been working on smoking cessation's.  She used Chantix in the past with reasonably good success.  Due to insurance reasons she is no longer able to obtain this medication.    Social:   Occupation: office work during week, weekend job involves standing at register  Living situation: lives alone, has renters that live on the 2nd floor of her home, has a daughter  that lives nearby and one out of state  Hobbies / Sports: swimming    Smoking: Yes- 3 packs/ week, < 1/2 pack a day  Alcohol: No  Illicit drug use: No         Physical Exam:     EXAMINATION pertinent findings:   PSYCH: Pleasant, healthy-appearing, alert, oriented x3, cooperative. Normal mood and affect.  VITAL SIGNS: Last menstrual period 08/30/2013, not currently breastfeeding.  Reviewed nursing intake notes.   There is no height or weight on file to calculate BMI.  RESP: non labored breathing   ABD: benign, soft, non-tender, no acute peritoneal findings  SKIN: grossly normal   LYMPHATIC: grossly normal, no adenopathy, no extremity edema  NEURO: grossly normal , no motor deficits  VASCULAR: satisfactory perfusion of all extremities   MUSCULOSKELETAL:   Alignment: Neutral  Gait: Normal  The right hip exhibits a full range of motion.  No pain upon rotations.  Lasegue's test is negative.  No tenderness to palpation of greater trochanteric region.      R knee: -0-0 .  Deep flexion is recognizably painful.  Straight leg raise +. No redness, warmth or skin changes present. Effusion No. Ligamentously stable in both ML and AP direction. Normal PF tracking without crepitus.  Rabot is sensitive.  Apprehension -. Meniscal provocation tests are negative.  There is recognizable tenderness to palpation over the medial joint line.     Right LE:   Thigh and leg compartments soft and compressible   +Quad/TA/GSC/FHL/EHL   SILT DP/SP/Rosangela/Saph/Tib nerve distributions   Palpable dorsalis pedis pulse            Data:   All laboratory data reviewed  All imaging studies reviewed by me personally.    XR knee right 12/17/2024:  My interpretation: Moderate to severe osteoarthritic changes of the medial compartment with near complete obliteration of joint space, presence of marginal osteophytes, sclerosis and subchondral cyst.  Well-preserved lateral and patellofemoral compartments.    MRI knee right 7/4/2021:  Interpretation: Grade IV  chondromalacia of the medial compartment.  Degenerative tearing of medial meniscus.  Intact ACL/PCL.  Well-preserved articular surfaces lateral compartment.  Grade 3 focal osteoarthritic changes of the patellofemoral joint.         Assessment and Plan:   Assessment:  55-year-old female presenting with chronic right knee pain most likely due to underlying osteoarthritic changes medial compartment.  Clinically also pain of patellofemoral joint.  So far insufficiently responding to nonsurgical treatment options.     Plan:  I had a long discussion with the patient regarding etiology and ongoing management options.  Reviewed surgical and nonsurgical treatments.  The non-surgical options include activity modification, pain medication, weight reduction, PT, bracing and injection therapy. As for surgery we discussed the options of partial and total knee replacement surgery. We reviewed total knee replacement in detail including the procedure, the implants, the recovery process, and long-term outcomes.  We reviewed that the risks of the surgery include but are not limited to infection, wound problems, stiffness, persistent pain, swelling, clicking, loosening, revision surgery.  We also reviewed less common risks such as neurovascular injury fracture, and other implant-related issues.  We reviewed other medical complications such as a blood clot.  We discussed that the vast majority of cases have a highly successful outcome.      At this point I would recommend optimizing nonsurgical treatment and smoking cessation. However there is a small subset of patients that do experience complications or problems following the knee replacement and these problems can be very debilitating and painful and sometimes do not improve.  Furthermore patient can continue to work on weight reduction.  The nonsurgical treatment would consist of fitting of a medial  brace and physical therapy.  Once she has the brace fitted we could try a  repeat injection with cortisone.  Patient understands and agrees to the treatment plan as set forth.  All questions were answered.    For additional information and frequently asked questions regarding joint replacements, scan the QR code image below on your phone camera or go to: [email or web address]            Thank you for your referral.    Leighton Medel MD, PhD     Adult Reconstruction  [facility] Department of Orthopaedic Surgery    This note was created using dictation software and may contain errors.  Please contact the creator for any clarifications that are needed.    DATA for DOCUMENTATION:         Past Medical History:     Patient Active Problem List   Diagnosis    Tobacco use disorder    Hypertension goal BP (blood pressure) < 140/90    Coronary artery disease involving native heart with angina pectoris, unspecified vessel or lesion type    Simple chronic bronchitis (H)    Morbid obesity (H)    Moderate persistent asthma    Chronic diastolic heart failure with preserved ejection fraction (H)    Type 2 diabetes mellitus with other circulatory complication, without long-term current use of insulin (H)    Bilateral carpal tunnel syndrome     Past Medical History:   Diagnosis Date    Arthritis     not sure if arthritis or carpel tunnel    CAD (coronary artery disease)     Congestive heart failure (H)     COPD (chronic obstructive pulmonary disease) (H)     Depression     Depressive disorder     its better    Hypertension     Rectal bleeding 03/06/2015    Uncomplicated asthma        Also see scanned health assessment forms.       Past Surgical History:     Past Surgical History:   Procedure Laterality Date    CARDIAC SURGERY  2015    Cardiac stents x 1    GYN SURGERY  4/18/93    tubolligation (sp)    TONSILLECTOMY Bilateral 1985    Tubal ligation surgery  1993            Social History:     Social History     Socioeconomic  "History    Marital status:      Spouse name: Julian    Number of children: 3    Years of education: Not on file    Highest education level: Not on file   Occupational History    Occupation:    Tobacco Use    Smoking status: Every Day     Current packs/day: 0.50     Average packs/day: 0.5 packs/day for 46.3 years (23.2 ttl pk-yrs)     Types: Cigarettes     Start date: 1/1/1979    Smokeless tobacco: Never    Tobacco comments:     working on it. somedays i dont some i do   Vaping Use    Vaping status: Never Used   Substance and Sexual Activity    Alcohol use: No    Drug use: No    Sexual activity: Not Currently     Partners: Male     Birth control/protection: Female Surgical   Other Topics Concern     Service No    Blood Transfusions No    Caffeine Concern Yes     Comment: \"uses a lot of it\"    Occupational Exposure No    Hobby Hazards No    Sleep Concern Yes     Comment: trouble sleeping    Stress Concern Yes     Comment: work related    Weight Concern Yes    Special Diet No    Back Care No    Exercise No    Bike Helmet No    Seat Belt Yes    Self-Exams No    Parent/sibling w/ CABG, MI or angioplasty before 65F 55M? Yes     Comment: dad   Social History Narrative    Not on file     Social Drivers of Health     Financial Resource Strain: Low Risk  (4/8/2025)    Financial Resource Strain     Within the past 12 months, have you or your family members you live with been unable to get utilities (heat, electricity) when it was really needed?: No   Food Insecurity: Low Risk  (4/8/2025)    Food Insecurity     Within the past 12 months, did you worry that your food would run out before you got money to buy more?: No     Within the past 12 months, did the food you bought just not last and you didn t have money to get more?: No   Transportation Needs: Low Risk  (4/8/2025)    Transportation Needs     Within the past 12 months, has lack of transportation kept you from medical appointments, getting your " medicines, non-medical meetings or appointments, work, or from getting things that you need?: No   Physical Activity: Inactive (4/8/2025)    Exercise Vital Sign     Days of Exercise per Week: 0 days     Minutes of Exercise per Session: 0 min   Stress: Stress Concern Present (4/8/2025)    [employer] - Occupational Stress Questionnaire     Feeling of Stress : To some extent   Social Connections: Unknown (4/8/2025)    Social Connection and Isolation Panel [NHANES]     Frequency of Communication with Friends and Family: Not on file     Frequency of Social Gatherings with Friends and Family: Once a week     Attends Zoroastrianism Services: Not on file     Active Member of Clubs or Organizations: Not on file     Attends Club or Organization Meetings: Not on file     Marital Status: Not on file   Interpersonal Safety: Low Risk  (4/8/2025)    Interpersonal Safety     Do you feel physically and emotionally safe where you currently live?: Yes     Within the past 12 months, have you been hit, slapped, kicked or otherwise physically hurt by someone?: No     Within the past 12 months, have you been humiliated or emotionally abused in other ways by your partner or ex-partner?: No   Housing Stability: Low Risk  (4/8/2025)    Housing Stability     Do you have housing? : Yes     Are you worried about losing your housing?: No            Family History:       Family History   Problem Relation Age of Onset    Hypertension Mother     Heart Disease Mother     Alcoholism Mother     Diabetes Mother     Coronary Artery Disease Mother     Substance Abuse Mother     Asthma Mother     Diabetes Father     Coronary Artery Disease Father     Cerebrovascular Disease Father     Hypertension Father     Alzheimer Disease Maternal Grandmother     Heart Disease Maternal Grandmother     Lung Cancer Maternal Grandfather     Obesity Brother             Medications:     Current Outpatient Medications   Medication Sig Dispense  Refill    albuterol (PROAIR HFA/PROVENTIL HFA/VENTOLIN HFA) 108 (90 Base) MCG/ACT inhaler USE 2 INHALATIONS BY MOUTH  EVERY 6 HOURS 20.1 g 3    albuterol (PROVENTIL) (2.5 MG/3ML) 0.083% neb solution USE 1 VIAL VIA NEBULIZER EVERY 6 HOURS AS NEEDED FOR SHORTNESS OF BREATH DYSPNEA OR WHEEZING 180 mL 1    alcohol swab prep pads Use to swab area of injection/erik as directed. 100 each 3    amLODIPine (NORVASC) 10 MG tablet Take 10 mg by mouth daily.      aspirin EC 81 MG EC tablet Take 1 tablet (81 mg) by mouth daily      atorvastatin (LIPITOR) 80 MG tablet TAKE 1 TABLET(80 MG) BY MOUTH DAILY 90 tablet 2    blood glucose (ONETOUCH VERIO IQ) test strip USE TO TEST BLOOD SUGAR  ONCE DAILY OR AS DIRECTED 100 strip 0    blood glucose calibration (NO BRAND SPECIFIED) solution To accompany: Blood Glucose Monitor Brands: per insurance. 1 each 0    blood glucose monitoring (NO BRAND SPECIFIED) meter device kit Use to test blood sugar 1 times daily or as directed. Preferred blood glucose meter OR supplies to accompany: Blood Glucose Monitor Brands: per insurance. 1 kit 0    budesonide (PULMICORT FLEXHALER) 180 MCG/ACT inhaler Inhale 1 puff into the lungs 2 times daily. 1 each 5    cetirizine (ZYRTEC) 10 MG tablet Take 10 mg by mouth daily      chlorthalidone (HYGROTON) 25 MG tablet Take 0.5 tablets (12.5 mg) by mouth daily. 45 tablet 3    citalopram (CELEXA) 40 MG tablet Take 1 tablet (40 mg) by mouth daily. 90 tablet 3    clotrimazole (LOTRIMIN) 1 % external cream Apply topically 2 times daily. Tube will last one month 60 g 1    cyclobenzaprine (FLEXERIL) 5 MG tablet Take 1 tablet (5 mg) by mouth 3 times daily as needed for muscle spasms 30 tablet 3    hydrALAZINE (APRESOLINE) 50 MG tablet Take 50 mg by mouth 3 times daily.      losartan (COZAAR) 25 MG tablet TAKE 1 TABLET(25 MG) BY MOUTH DAILY 90 tablet 2    metoprolol succinate ER (TOPROL XL) 25 MG 24 hr tablet TAKE 1 TABLET(25 MG) BY MOUTH DAILY 90 tablet 2    nitroGLYcerin  (NITROSTAT) 0.4 MG sublingual tablet SEE DIRECTIONS ON  MEDICATION INFORMATION  SHEET WITH INVOICE  PAPERWORK 75 tablet 1    potassium chloride [place] ER (KLOR-CON M10) 10 MEQ CR tablet TAKE 1 TABLET(10 MEQ) BY MOUTH DAILY 90 tablet 2    Semaglutide, 2 MG/DOSE, (OZEMPIC) 8 MG/3ML pen Inject 2 mg subcutaneously every 7 days. 9 mL 3    thin (NO BRAND SPECIFIED) lancets Use with lanceting device. To accompany: Blood Glucose Monitor Brands: per insurance. 100 each 6     No current facility-administered medications for this visit.              Review of Systems:   A comprehensive 10 point review of systems (constitutional, ENT, cardiac, peripheral vascular, lymphatic, respiratory, GI, , Musculoskeletal, skin, Neurological) was performed and found to be negative except as described in this note.     See intake form completed by patient       Price (Do Not Change): 0.00 Instructions: This plan will send the code FBSE to the PM system.  DO NOT or CHANGE the price. Detail Level: Simple

## 2025-04-30 NOTE — LETTER
4/30/2025      Marni Austin  [address] [facility] [address]      Dear Colleague,    Thank you for referring your patient, Marni Austin, to the [facility] [place]. Please see a copy of my visit note below.        [facility]  Orthopaedic Surgery Consultation by Leighton Medel M.D.    Marni Austin MRN# [id number]   Age: 55 year old YOB: 1970     Requesting physician: Albert Yeo Meyer, Daniel     Background history:  DX:  Bilateral carpal tunnel syndrome  Coronary artery disease  Asthma  COPD  Morbid obesity  Type 2 diabetes mellitus  Chronic diastolic heart failure with preserved ejection fracture  Hypertension on aspirin 81 mg  Tobacco use disorder depressive disorder      TREATMENTS:  None           History of Present Illness:   55 year old female who presents our clinic for the evaluation of chronic right knee pain.  This pain has been present for multiple years and progressive over time.  No clear antecedent traumatic event.  The pain is experienced on the anterior and medial side of the knee.  It is present throughout the entire day.  It gets worse with prolonged standing and activities.  Patient reports the presence of night pain with initiation stiffness and soreness.  Patient does not report effusions but does endorse the presence of crepitus and giving way.  Kneeling, squatting and stair climbing is challenging.  To mitigate pain she said over-the-counter analgesics, activity modifications, knee sleeves and injections.  She had bilateral Synvisc One injections on 4/15/25 that provided no relief in left knee and only a few weeks relief in right knee. She has tried corticosteroid injections (most recent date: 12/27/24) that provided relief for 2 months.  Patient has not yet tried physical therapy or  bracing.  Patient has been working on smoking cessation's.  She used Chantix in the past with reasonably good success.  Due to  insurance reasons she is no longer able to obtain this medication.    Social:   Occupation: office work during week, weekend job involves standing at register  Living situation: lives alone, has renters that live on the 2nd floor of her home, has a daughter that lives nearby and one out of state  Hobbies / Sports: swimming    Smoking: Yes- 3 packs/ week, < 1/2 pack a day  Alcohol: No  Illicit drug use: No         Physical Exam:     EXAMINATION pertinent findings:   PSYCH: Pleasant, healthy-appearing, alert, oriented x3, cooperative. Normal mood and affect.  VITAL SIGNS: Last menstrual period 08/30/2013, not currently breastfeeding.  Reviewed nursing intake notes.   There is no height or weight on file to calculate BMI.  RESP: non labored breathing   ABD: benign, soft, non-tender, no acute peritoneal findings  SKIN: grossly normal   LYMPHATIC: grossly normal, no adenopathy, no extremity edema  NEURO: grossly normal , no motor deficits  VASCULAR: satisfactory perfusion of all extremities   MUSCULOSKELETAL:   Alignment: Neutral  Gait: Normal  The right hip exhibits a full range of motion.  No pain upon rotations.  Lasegue's test is negative.  No tenderness to palpation of greater trochanteric region.      R knee: -0-0 .  Deep flexion is recognizably painful.  Straight leg raise +. No redness, warmth or skin changes present. Effusion No. Ligamentously stable in both ML and AP direction. Normal PF tracking without crepitus.  Rabot is sensitive.  Apprehension -. Meniscal provocation tests are negative.  There is recognizable tenderness to palpation over the medial joint line.     Right LE:   Thigh and leg compartments soft and compressible   +Quad/TA/GSC/FHL/EHL   SILT DP/SP/Rosangela/Saph/Tib nerve distributions   Palpable dorsalis pedis pulse            Data:   All laboratory data reviewed  All imaging studies reviewed by me personally.    XR knee right 12/17/2024:  My interpretation: Moderate to severe osteoarthritic  changes of the medial compartment with near complete obliteration of joint space, presence of marginal osteophytes, sclerosis and subchondral cyst.  Well-preserved lateral and patellofemoral compartments.    MRI knee right 7/4/2021:  Interpretation: Grade IV chondromalacia of the medial compartment.  Degenerative tearing of medial meniscus.  Intact ACL/PCL.  Well-preserved articular surfaces lateral compartment.  Grade 3 focal osteoarthritic changes of the patellofemoral joint.         Assessment and Plan:   Assessment:  55-year-old female presenting with chronic right knee pain most likely due to underlying osteoarthritic changes medial compartment.  Clinically also pain of patellofemoral joint.  So far insufficiently responding to nonsurgical treatment options.     Plan:  I had a long discussion with the patient regarding etiology and ongoing management options.  Reviewed surgical and nonsurgical treatments.  The non-surgical options include activity modification, pain medication, weight reduction, PT, bracing and injection therapy. As for surgery we discussed the options of partial and total knee replacement surgery. We reviewed total knee replacement in detail including the procedure, the implants, the recovery process, and long-term outcomes.  We reviewed that the risks of the surgery include but are not limited to infection, wound problems, stiffness, persistent pain, swelling, clicking, loosening, revision surgery.  We also reviewed less common risks such as neurovascular injury fracture, and other implant-related issues.  We reviewed other medical complications such as a blood clot.  We discussed that the vast majority of cases have a highly successful outcome.      At this point I would recommend optimizing nonsurgical treatment and smoking cessation. However there is a small subset of patients that do experience complications or problems following the knee replacement and these problems can be very  debilitating and painful and sometimes do not improve.  Furthermore patient can continue to work on weight reduction.  The nonsurgical treatment would consist of fitting of a medial  brace and physical therapy.  Once she has the brace fitted we could try a repeat injection with cortisone.  Patient understands and agrees to the treatment plan as set forth.  All questions were answered.    For additional information and frequently asked questions regarding joint replacements, scan the QR code image below on your phone camera or go to: [email or web address]            Thank you for your referral.    Leighton Medel MD, PhD     Adult Reconstruction  [facility] Department of Orthopaedic Surgery    This note was created using dictation software and may contain errors.  Please contact the creator for any clarifications that are needed.    DATA for DOCUMENTATION:         Past Medical History:     Patient Active Problem List   Diagnosis     Tobacco use disorder     Hypertension goal BP (blood pressure) < 140/90     Coronary artery disease involving native heart with angina pectoris, unspecified vessel or lesion type     Simple chronic bronchitis (H)     Morbid obesity (H)     Moderate persistent asthma     Chronic diastolic heart failure with preserved ejection fraction (H)     Type 2 diabetes mellitus with other circulatory complication, without long-term current use of insulin (H)     Bilateral carpal tunnel syndrome     Past Medical History:   Diagnosis Date     Arthritis     not sure if arthritis or carpel tunnel     CAD (coronary artery disease)      Congestive heart failure (H)      COPD (chronic obstructive pulmonary disease) (H)      Depression      Depressive disorder     its better     Hypertension      Rectal bleeding 03/06/2015     Uncomplicated asthma        Also see scanned health assessment forms.       Past Surgical  "History:     Past Surgical History:   Procedure Laterality Date     CARDIAC SURGERY  2015    Cardiac stents x 1     GYN SURGERY  4/18/93    tubolligation (sp)     TONSILLECTOMY Bilateral 1985     Tubal ligation surgery  1993            Social History:     Social History     Socioeconomic History     Marital status:      Spouse name: Julian     Number of children: 3     Years of education: Not on file     Highest education level: Not on file   Occupational History     Occupation:    Tobacco Use     Smoking status: Every Day     Current packs/day: 0.50     Average packs/day: 0.5 packs/day for 46.3 years (23.2 ttl pk-yrs)     Types: Cigarettes     Start date: 1/1/1979     Smokeless tobacco: Never     Tobacco comments:     working on it. somedays i dont some i do   Vaping Use     Vaping status: Never Used   Substance and Sexual Activity     Alcohol use: No     Drug use: No     Sexual activity: Not Currently     Partners: Male     Birth control/protection: Female Surgical   Other Topics Concern      Service No     Blood Transfusions No     Caffeine Concern Yes     Comment: \"uses a lot of it\"     Occupational Exposure No     Hobby Hazards No     Sleep Concern Yes     Comment: trouble sleeping     Stress Concern Yes     Comment: work related     Weight Concern Yes     Special Diet No     Back Care No     Exercise No     Bike Helmet No     Seat Belt Yes     Self-Exams No     Parent/sibling w/ CABG, MI or angioplasty before 65F 55M? Yes     Comment: dad   Social History Narrative     Not on file     Social Drivers of Health     Financial Resource Strain: Low Risk  (4/8/2025)    Financial Resource Strain      Within the past 12 months, have you or your family members you live with been unable to get utilities (heat, electricity) when it was really needed?: No   Food Insecurity: Low Risk  (4/8/2025)    Food Insecurity      Within the past 12 months, did you worry that your food would run out before " you got money to buy more?: No      Within the past 12 months, did the food you bought just not last and you didn t have money to get more?: No   Transportation Needs: Low Risk  (4/8/2025)    Transportation Needs      Within the past 12 months, has lack of transportation kept you from medical appointments, getting your medicines, non-medical meetings or appointments, work, or from getting things that you need?: No   Physical Activity: Inactive (4/8/2025)    Exercise Vital Sign      Days of Exercise per Week: 0 days      Minutes of Exercise per Session: 0 min   Stress: Stress Concern Present (4/8/2025)    [place] [employer] - Occupational Stress Questionnaire      Feeling of Stress : To some extent   Social Connections: Unknown (4/8/2025)    Social Connection and Isolation Panel [NHANES]      Frequency of Communication with Friends and Family: Not on file      Frequency of Social Gatherings with Friends and Family: Once a week      Attends Faith Services: Not on file      Active Member of Clubs or Organizations: Not on file      Attends Club or Organization Meetings: Not on file      Marital Status: Not on file   Interpersonal Safety: Low Risk  (4/8/2025)    Interpersonal Safety      Do you feel physically and emotionally safe where you currently live?: Yes      Within the past 12 months, have you been hit, slapped, kicked or otherwise physically hurt by someone?: No      Within the past 12 months, have you been humiliated or emotionally abused in other ways by your partner or ex-partner?: No   Housing Stability: Low Risk  (4/8/2025)    Housing Stability      Do you have housing? : Yes      Are you worried about losing your housing?: No            Family History:       Family History   Problem Relation Age of Onset     Hypertension Mother      Heart Disease Mother      Alcoholism Mother      Diabetes Mother      Coronary Artery Disease Mother      Substance Abuse Mother      Asthma Mother       Diabetes Father      Coronary Artery Disease Father      Cerebrovascular Disease Father      Hypertension Father      Alzheimer Disease Maternal Grandmother      Heart Disease Maternal Grandmother      Lung Cancer Maternal Grandfather      Obesity Brother             Medications:     Current Outpatient Medications   Medication Sig Dispense Refill     albuterol (PROAIR HFA/PROVENTIL HFA/VENTOLIN HFA) 108 (90 Base) MCG/ACT inhaler USE 2 INHALATIONS BY MOUTH  EVERY 6 HOURS 20.1 g 3     albuterol (PROVENTIL) (2.5 MG/3ML) 0.083% neb solution USE 1 VIAL VIA NEBULIZER EVERY 6 HOURS AS NEEDED FOR SHORTNESS OF BREATH DYSPNEA OR WHEEZING 180 mL 1     alcohol swab prep pads Use to swab area of injection/erik as directed. 100 each 3     amLODIPine (NORVASC) 10 MG tablet Take 10 mg by mouth daily.       aspirin EC 81 MG EC tablet Take 1 tablet (81 mg) by mouth daily       atorvastatin (LIPITOR) 80 MG tablet TAKE 1 TABLET(80 MG) BY MOUTH DAILY 90 tablet 2     blood glucose (ONETOUCH VERIO IQ) test strip USE TO TEST BLOOD SUGAR  ONCE DAILY OR AS DIRECTED 100 strip 0     blood glucose calibration (NO BRAND SPECIFIED) solution To accompany: Blood Glucose Monitor Brands: per insurance. 1 each 0     blood glucose monitoring (NO BRAND SPECIFIED) meter device kit Use to test blood sugar 1 times daily or as directed. Preferred blood glucose meter OR supplies to accompany: Blood Glucose Monitor Brands: per insurance. 1 kit 0     budesonide (PULMICORT FLEXHALER) 180 MCG/ACT inhaler Inhale 1 puff into the lungs 2 times daily. 1 each 5     cetirizine (ZYRTEC) 10 MG tablet Take 10 mg by mouth daily       chlorthalidone (HYGROTON) 25 MG tablet Take 0.5 tablets (12.5 mg) by mouth daily. 45 tablet 3     citalopram (CELEXA) 40 MG tablet Take 1 tablet (40 mg) by mouth daily. 90 tablet 3     clotrimazole (LOTRIMIN) 1 % external cream Apply topically 2 times daily. Tube will last one month 60 g 1     cyclobenzaprine (FLEXERIL) 5 MG tablet Take  1 tablet (5 mg) by mouth 3 times daily as needed for muscle spasms 30 tablet 3     hydrALAZINE (APRESOLINE) 50 MG tablet Take 50 mg by mouth 3 times daily.       losartan (COZAAR) 25 MG tablet TAKE 1 TABLET(25 MG) BY MOUTH DAILY 90 tablet 2     metoprolol succinate ER (TOPROL XL) 25 MG 24 hr tablet TAKE 1 TABLET(25 MG) BY MOUTH DAILY 90 tablet 2     nitroGLYcerin (NITROSTAT) 0.4 MG sublingual tablet SEE DIRECTIONS ON  MEDICATION INFORMATION  SHEET WITH INVOICE  PAPERWORK 75 tablet 1     potassium chloride [place] ER (KLOR-CON M10) 10 MEQ CR tablet TAKE 1 TABLET(10 MEQ) BY MOUTH DAILY 90 tablet 2     Semaglutide, 2 MG/DOSE, (OZEMPIC) 8 MG/3ML pen Inject 2 mg subcutaneously every 7 days. 9 mL 3     thin (NO BRAND SPECIFIED) lancets Use with lanceting device. To accompany: Blood Glucose Monitor Brands: per insurance. 100 each 6     No current facility-administered medications for this visit.              Review of Systems:   A comprehensive 10 point review of systems (constitutional, ENT, cardiac, peripheral vascular, lymphatic, respiratory, GI, , Musculoskeletal, skin, Neurological) was performed and found to be negative except as described in this note.     See intake form completed by patient        Again, thank you for allowing me to participate in the care of your patient.        Sincerely,        Leighton Medel MD    Electronically signed

## 2025-05-08 ENCOUNTER — THERAPY VISIT (OUTPATIENT)
Dept: PHYSICAL THERAPY | Facility: CLINIC | Age: 55
End: 2025-05-08
Attending: STUDENT IN AN ORGANIZED HEALTH CARE EDUCATION/TRAINING PROGRAM
Payer: COMMERCIAL

## 2025-05-08 DIAGNOSIS — M17.11 PRIMARY OSTEOARTHRITIS OF RIGHT KNEE: ICD-10-CM

## 2025-05-08 NOTE — PROGRESS NOTES
PHYSICAL THERAPY EVALUATION  Type of Visit: Evaluation       Fall Risk Screen:  Have you fallen 2 or more times in the past year?: Yes  Have you fallen and had an injury in the past year?: Yes  Is patient receiving Physical Therapy Services?: Yes    Subjective  Pt reports that she's had pain in both knees for years now with right worse than left. Pain is worst with walking, lifting, standing, and stairs. Denies vague symptoms. Would like to get rid of this pain and return to PLOF pain free. Would like to keep up with work and all daily tasks without knee pain.          Presenting condition or subjective complaint: Knees  Date of onset: 05/08/25    Relevant medical history:     Dates & types of surgery:      Prior diagnostic imaging/testing results: X-ray     Prior therapy history for the same diagnosis, illness or injury: No        Living Environment  Social support: Alone   Type of home: House; 1 level; Basement   Stairs to enter the home: Yes 2 Is there a railing: Yes     Ramp: Yes   Stairs inside the home: Yes 16 Is there a railing: Yes     Help at home: Home and Yard maintenance tasks  Equipment owned: Straight Cane; Crutches     Employment: Yes Office work/  Hobbies/Interests: Spending time with family and friends    Patient goals for therapy: Standing to long    Pain assessment: Pain present  Location: right knee/Rating: low at rest     Objective   Gait:  Mod limp margie  Screening: negative    Flexibility: limited assessment    Knee AROM/PROM: R 0-0-140*, L 0-0-128*    Hip ROM: WFL margie    Knee Strength (* = pain) Right Left   FL 4/5 4/5   EXT 4/5 4/5   Quad Contraction (Good/Fair/Poor) fair fair               Palpation Tenderness:  medial right knee    Swelling/Circumferential Measurements: NT      Accessory Motion: normal passive patella margie    Functional Squat: painful squat    Balance: NT    Other tests: reports instability of right knee  Ossur Knee  Brace Trial:     Pain Rating    Affected  Joint: Right Knee    Without  brace:    At rest 0/10  Walking: 3/10    Squattin/10   Stairs: 6/10      Wearing  brace:    At rest 0/10  Walkin/10    Squattin/10   Stairs: 1/10      Brace and Measurements:    Brace trialed:     Type - [place]  One  Size - Medium  Side - Medial  Affected Knee - Right Knee    Circumference 6 inches below mid patella: 15 inches (Ossur )    Other Comments:  See PT evaluation in [facility] for any additional information including joint special testing/ligament testing       Assessment & Plan   CLINICAL IMPRESSIONS  Medical Diagnosis: Primary OA of right knee    Treatment Diagnosis: Primary OA of right knee   Impression/Assessment: Patient is a 55 year old female with right knee complaints.  The following significant findings have been identified: Pain, Decreased ROM/flexibility, Decreased joint mobility, Decreased strength, Impaired balance, Decreased proprioception, Edema, Impaired gait, Impaired muscle performance, and Decreased activity tolerance. These impairments interfere with their ability to perform self care tasks, work tasks, recreational activities, household chores, driving , household mobility, and community mobility as compared to previous level of function.     Clinical Decision Making (Complexity):  Clinical Presentation: Stable/Uncomplicated  Clinical Presentation Rationale: based on medical and personal factors listed in PT evaluation  Clinical Decision Making (Complexity): Low complexity    PLAN OF CARE  Treatment Interventions:  Interventions: Gait Training, Manual Therapy, Neuromuscular Re-education, Therapeutic Activity, Therapeutic Exercise, Self-Care/Home Management    Long Term Goals     PT Goal 1  Goal Identifier: Ambulation  Goal Description: Patient will be able to walk unrestricted distances without device pain free with normalized gati pattern  Rationale: to maximize safety and independence with performance of ADLs and  functional tasks;to maximize safety and independence within the home;to maximize safety and independence within the community;to maximize safety and independence with transportation;to maximize safety and independence with self cares  Goal Progress: new goal  Target Date: 07/17/25  PT Goal 2  Goal Identifier: Squatting  Goal Description: Patient will be able to perform a full depth squat pain free without deviation pain free  Rationale: to maximize safety and independence within the community;to maximize safety and independence with transportation;to maximize safety and independence within the home;to maximize safety and independence with performance of ADLs and functional tasks;to maximize safety and independence with self cares  Goal Progress: new goal  Target Date: 07/17/25      Frequency of Treatment: 1 x week  Duration of Treatment: 10 weeks    Recommended Referrals to Other Professionals: none  Education Assessment:   Learner/Method: Patient;No Barriers to Learning  Education Comments: no concerns    Risks and benefits of evaluation/treatment have been explained.   Patient/Family/caregiver agrees with Plan of Care.     Evaluation Time:     PT Eval, Low Complexity Minutes (34183): 10     Signing Clinician: Mariano Keyes PT

## 2025-05-12 ENCOUNTER — TELEPHONE (OUTPATIENT)
Dept: DERMATOLOGY | Facility: CLINIC | Age: 55
End: 2025-05-12

## 2025-05-12 ENCOUNTER — TELEPHONE (OUTPATIENT)
Dept: PEDIATRICS | Facility: CLINIC | Age: 55
End: 2025-05-12

## 2025-05-12 ENCOUNTER — OFFICE VISIT (OUTPATIENT)
Dept: PEDIATRICS | Facility: CLINIC | Age: 55
End: 2025-05-12
Payer: COMMERCIAL

## 2025-05-12 VITALS
BODY MASS INDEX: 36.8 KG/M2 | SYSTOLIC BLOOD PRESSURE: 146 MMHG | HEIGHT: 61 IN | RESPIRATION RATE: 18 BRPM | DIASTOLIC BLOOD PRESSURE: 91 MMHG | HEART RATE: 90 BPM | OXYGEN SATURATION: 94 % | WEIGHT: 194.9 LBS | TEMPERATURE: 98 F

## 2025-05-12 DIAGNOSIS — R21 RASH AND NONSPECIFIC SKIN ERUPTION: Primary | ICD-10-CM

## 2025-05-12 DIAGNOSIS — J41.0 SIMPLE CHRONIC BRONCHITIS (H): ICD-10-CM

## 2025-05-12 DIAGNOSIS — I10 HYPERTENSION GOAL BP (BLOOD PRESSURE) < 140/90: ICD-10-CM

## 2025-05-12 PROCEDURE — 99214 OFFICE O/P EST MOD 30 MIN: CPT | Performed by: NURSE PRACTITIONER

## 2025-05-12 PROCEDURE — G2211 COMPLEX E/M VISIT ADD ON: HCPCS | Performed by: NURSE PRACTITIONER

## 2025-05-12 RX ORDER — TRIAMCINOLONE ACETONIDE 1 MG/G
OINTMENT TOPICAL 2 TIMES DAILY
Qty: 80 G | Refills: 0 | Status: SHIPPED | OUTPATIENT
Start: 2025-05-12 | End: 2025-05-26

## 2025-05-12 NOTE — PROGRESS NOTES
Assessment & Plan     Rash and nonspecific skin eruption  Rash does not fit clinical picture of only intertrigo as it is also to wrists, low back, and occasionally lower extremities. She is tearful and very frustrated by her symptoms today as this has been ongoing for months. Will trial medium dose steroid ointment, avoid widespread use, and refer to dermatolgoy  - Adult Dermatology  Referral; Future  - triamcinolone (KENALOG) 0.1 % external ointment; Apply topically 2 times daily for 14 days. Ok to use up to 2 weeks per month    Hypertension goal BP (blood pressure) < 140/90  Slightly above goal but admits she didn't take her meds this morning. Monitor at next follow-up and work on compliance with meds.  BP Readings from Last 3 Encounters:   05/12/25 (!) 146/91   04/08/25 108/78   12/17/24 138/86     Simple chronic bronchitis (H)  Pt is not using her daily inhaler regularly due to cost. Declines discussing this problem today so recommend she schedule follow-up with PCP      The longitudinal plan of care for the diagnosis(es)/condition(s) as documented were addressed during this visit. Due to the added complexity in care, I will continue to support Marni in the subsequent management and with ongoing continuity of care in partnership with PCP      Follow-up   No follow-ups on file.        Sole Grider is a 55 year old, presenting for the following health issues:  Derm Problem    History of Present Illness       Reason for visit:  Rash spreading meds not working    She eats 0-1 servings of fruits and vegetables daily.She consumes 3 sweetened beverage(s) daily.She exercises with enough effort to increase her heart rate 9 or less minutes per day.  She exercises with enough effort to increase her heart rate 3 or less days per week.   She is taking medications regularly.      Intertrigo  Rash and nonspecific skin eruption  Trial below. Addendum: wet prep positive so will hold on diflucan until following  "abx (flagyl). Adding oral given diffuse nature of rash. Use topical concomitantly  - fluconazole (DIFLUCAN) 150 MG tablet; Take 1 tablet (150 mg) by mouth every 3 days for 3 doses.  - clotrimazole (LOTRIMIN) 1 % external cream; Apply topically 2 times daily. Tube will last one month  - Wet prep - lab collect; Future  - Wet prep - lab collect    Given diflucan x3 doses  Put clotrimazole x2 weeks, didn't help at all. Mixed with hydrocortisone and didn't help  Wrists and low back now  Very itchy  No known hx of eczema  Worsening in the last 3 mos, rash has been there for \"years\"          Objective    BP (!) 146/91 (BP Location: Right arm, Patient Position: Sitting, Cuff Size: Adult Large)   Pulse 90   Temp 98  F (36.7  C) (Temporal)   Resp 18   Ht 1.549 m (5' 1\")   Wt 88.4 kg (194 lb 14.4 oz)   LMP 08/30/2013   SpO2 94%   BMI 36.83 kg/m    Body mass index is 36.83 kg/m .  Physical Exam   GENERAL: alert and no distress  SKIN: erythematous rash with papules to b/l wrists, low back with localized grouping of erythematous papules and hyperpigmented macules, lower L abdomen (not under abdomen fold) erythematous lesions with overlying excoriation from scratching, b/l underside of breasts with erythematous papules            Signed Electronically by: Medina Sky NP    "

## 2025-05-12 NOTE — PATIENT INSTRUCTIONS
Cetirizine at daily to help with itching    Referral to dermatology  -could check with [facility] in [place] as an option    Steroid ointment to very itchy areas, do not put everywhere!

## 2025-05-12 NOTE — TELEPHONE ENCOUNTER
Can you help pt schedule follow-up with Manny Gambino in the next 1-2 mos for med follow-up?   Medina Sky, APRN, CNP

## 2025-05-12 NOTE — TELEPHONE ENCOUNTER
This encounter is being sent to inform the clinic that this patient has a referral from Medina Sky for the diagnoses of Rash and nonspecific skin eruption [R21] and has requested that this patient be seen within Priority: 1-2 Weeks. Based on the availability of our provider(s), we are unable to accommodate this request.    Were all sites offered this patient?  Yes    Does scheduling algorithm request to schedule next available?  Patient has been scheduled for the first available opening with Herbie Contreras on 11/24/25.  We have informed the patient that the clinic will review their referral and reach out if a sooner appointment is medically necessary.

## 2025-05-13 NOTE — TELEPHONE ENCOUNTER
Outgoing call spoke to patient. Scheduled with PCP for June 24th.    Thank you kindly,  Ike FONSECA

## 2025-05-13 NOTE — TELEPHONE ENCOUNTER
Patient Contact    Attempt # 1    Was call answered?  No    Left message on answering machine for patient to call back.    Sri LANE RN  Dermatology   [phone]

## 2025-05-13 NOTE — TELEPHONE ENCOUNTER
Outgoing call spoke to patient.  She would like a callback around 1pm.    Thank you kindly,  Ike FONSECA

## 2025-05-19 NOTE — PROGRESS NOTES
[facility] Dermatology Note  Encounter Date: May 20, 2025  Office Visit     Dermatology Problem List:  1. Intradermal melanocytic nevus removal/biopsy, 2022, cora    ____________________________________________    Assessment & Plan:     #Pruritus   #Dermatographism  # Dermatitis  DDX: ezcematous (favored); likely atopic dermatitis, given significant history of atopic triad and asthma     - Start triamcinolone cream 0.1% BID to affected areas.  Apply to trunk and extremities, avoid face and folds.  We reviewed risks of skin atrophy, skin thinning and striae with long-term use or overuse.  Start with 2 week burst of topical therapy and reduce use to as needed for 3-5 days at a time with breaks/time off in between.     - Okay to continue/start non-sedating antihistamines (zyrtec, allegra) as desired.  Reviewed the risk of sedation and potential side effects.  Start with 1 pill a day and uptitrate to 2 pills a day as needed for itch/rash.    -Close and continued follow-up is recommended.  She recently had a difficult time affording and her inhaler medications.  I do want to consider cost and our medical considerations.  However, I do feel like she would benefit from Dupixent to treat the underlying atopic dermatitis/pruritus.  I will reach out to her primary care team to see if they have any concerns or contraindications.    We will consider MTM referral.  I have placed an allergy/immunology referral for patch testing.      Procedures Performed:   None  None    Follow-up: 2 month(s) in-person, or earlier for new or changing lesions    Staff:     Matilde Segura MD PhD  Dermatology, Dermatopathology    ____________________________________________    CC: Derm Problem (Rash that she has had for some time and assumed that it was an allergy, the itching is severe, torso, arms, legs, abdomen and chest)      HPI:  Marni Austin is a(n) 55 year old female who presents today as a return patient for  itchy rash.   Rash on back - photos in chart reviewed.    Previously saw Fior for skin tags on neck and buttocks.   Intradermal melanocytic nevus biopsied from glabella.    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     Some sleep disturbance.     Does not have access to a bathtub.  Daily appointments such as phototherapy would not work with her work schedule.  She is frustrated with being on multiple medications.  Take 9 pills a day.    Patient is otherwise feeling well, without additional skin concerns.         Labs Reviewed:    Lab Results   Component Value Date    WBC 10.3 08/13/2024    HGB 16.1 (H) 08/13/2024    HCT 46.9 08/13/2024     08/13/2024     03/31/2025    POTASSIUM 4.0 03/31/2025    CHLORIDE 102 03/31/2025    CO2 29 03/31/2025    BUN 15.8 03/31/2025    CR 0.84 03/31/2025     (H) 03/31/2025    SED 15 01/18/2019    DD <0.3 07/18/2018    NTBNPI 68 07/18/2018    NTBNP 30 09/17/2018    TROPONIN <0.015 08/25/2021    TROPI <0.015 10/02/2019    AST 26 03/31/2025    ALT 27 03/31/2025    ALKPHOS 117 03/31/2025    BILITOTAL 0.3 03/31/2025    INR 0.94 10/01/2019        Physical Exam:  Vitals: LMP 08/30/2013   SKIN: Full skin, which includes the head/face, both arms, chest, back, abdomen,both legs, genitalia and/or groin buttocks, digits and/or nails, was examined.  - Scattered erythematous linear excoriations  Scattered erythematous patches and thin plaques with overlying xerosis.      Photos taken for the chart                 - No other lesions of concern on areas examined.       Medications:  Current Outpatient Medications   Medication Sig Dispense Refill    albuterol (PROAIR HFA/PROVENTIL HFA/VENTOLIN HFA) 108 (90 Base) MCG/ACT inhaler USE 2 INHALATIONS BY MOUTH  EVERY 6 HOURS (Patient not taking: Reported on 5/12/2025) 20.1 g 3    albuterol (PROVENTIL) (2.5 MG/3ML) 0.083% neb solution USE 1 VIAL VIA NEBULIZER EVERY 6 HOURS AS NEEDED FOR SHORTNESS  OF BREATH DYSPNEA OR WHEEZING (Patient not taking: Reported on 5/12/2025) 180 mL 1    alcohol swab prep pads Use to swab area of injection/erik as directed. (Patient not taking: Reported on 5/12/2025) 100 each 3    amLODIPine (NORVASC) 10 MG tablet Take 10 mg by mouth daily. (Patient not taking: Reported on 5/12/2025)      aspirin EC 81 MG EC tablet Take 1 tablet (81 mg) by mouth daily (Patient not taking: Reported on 5/12/2025)      atorvastatin (LIPITOR) 80 MG tablet TAKE 1 TABLET(80 MG) BY MOUTH DAILY (Patient not taking: Reported on 5/12/2025) 90 tablet 2    blood glucose (ONETOUCH VERIO IQ) test strip USE TO TEST BLOOD SUGAR  ONCE DAILY OR AS DIRECTED (Patient not taking: Reported on 5/12/2025) 100 strip 0    blood glucose calibration (NO BRAND SPECIFIED) solution To accompany: Blood Glucose Monitor Brands: per insurance. (Patient not taking: Reported on 5/12/2025) 1 each 0    blood glucose monitoring (NO BRAND SPECIFIED) meter device kit Use to test blood sugar 1 times daily or as directed. Preferred blood glucose meter OR supplies to accompany: Blood Glucose Monitor Brands: per insurance. (Patient not taking: Reported on 5/12/2025) 1 kit 0    budesonide (PULMICORT FLEXHALER) 180 MCG/ACT inhaler Inhale 1 puff into the lungs 2 times daily. (Patient not taking: Reported on 5/12/2025) 1 each 5    cetirizine (ZYRTEC) 10 MG tablet Take 10 mg by mouth daily (Patient not taking: Reported on 5/12/2025)      chlorthalidone (HYGROTON) 25 MG tablet Take 0.5 tablets (12.5 mg) by mouth daily. (Patient not taking: Reported on 5/12/2025) 45 tablet 3    citalopram (CELEXA) 40 MG tablet Take 1 tablet (40 mg) by mouth daily. (Patient not taking: Reported on 5/12/2025) 90 tablet 3    clotrimazole (LOTRIMIN) 1 % external cream Apply topically 2 times daily. Tube will last one month (Patient not taking: Reported on 5/12/2025) 60 g 1    cyclobenzaprine (FLEXERIL) 5 MG tablet Take 1 tablet (5 mg) by mouth 3 times daily as needed  for muscle spasms (Patient not taking: Reported on 5/12/2025) 30 tablet 3    hydrALAZINE (APRESOLINE) 50 MG tablet Take 50 mg by mouth 3 times daily. (Patient not taking: Reported on 5/12/2025)      losartan (COZAAR) 25 MG tablet TAKE 1 TABLET(25 MG) BY MOUTH DAILY (Patient not taking: Reported on 5/12/2025) 90 tablet 2    metoprolol succinate ER (TOPROL XL) 25 MG 24 hr tablet TAKE 1 TABLET(25 MG) BY MOUTH DAILY (Patient not taking: Reported on 5/12/2025) 90 tablet 2    nitroGLYcerin (NITROSTAT) 0.4 MG sublingual tablet SEE DIRECTIONS ON  MEDICATION INFORMATION  SHEET WITH INVOICE  PAPERWORK (Patient not taking: Reported on 5/12/2025) 75 tablet 1    potassium chloride maria [place] carmen ER (KLOR-CON M10) 10 MEQ CR tablet TAKE 1 TABLET(10 MEQ) BY MOUTH DAILY (Patient not taking: Reported on 5/12/2025) 90 tablet 2    Semaglutide, 2 MG/DOSE, (OZEMPIC) 8 MG/3ML pen Inject 2 mg subcutaneously every 7 days. 9 mL 3    thin (NO BRAND SPECIFIED) lancets Use with lanceting device. To accompany: Blood Glucose Monitor Brands: per insurance. (Patient not taking: Reported on 5/12/2025) 100 each 6    triamcinolone (KENALOG) 0.1 % external ointment Apply topically 2 times daily for 14 days. Ok to use up to 2 weeks per month 80 g 0     No current facility-administered medications for this visit.      Past Medical History:   Patient Active Problem List   Diagnosis    Tobacco use disorder    Hypertension goal BP (blood pressure) < 140/90    Coronary artery disease involving native heart with angina pectoris, unspecified vessel or lesion type    Simple chronic bronchitis (H)    Morbid obesity (H)    Moderate persistent asthma    Chronic diastolic heart failure with preserved ejection fraction (H)    Type 2 diabetes mellitus with other circulatory complication, without long-term current use of insulin (H)    Bilateral carpal tunnel syndrome    Primary osteoarthritis of right knee       Past Medical History:   Diagnosis Date    Arthritis     not sure  if arthritis or carpel tunnel    CAD (coronary artery disease)     Congestive heart failure (H)     COPD (chronic obstructive pulmonary disease) (H)     Depression     Depressive disorder     its better    Hypertension     Rectal bleeding 03/06/2015    Uncomplicated asthma        CC: Primary Care Provider: David Gambino or Referring Provider: Medina Sky

## 2025-05-20 ENCOUNTER — TELEPHONE (OUTPATIENT)
Dept: DERMATOLOGY | Facility: CLINIC | Age: 55
End: 2025-05-20

## 2025-05-20 ENCOUNTER — OFFICE VISIT (OUTPATIENT)
Dept: DERMATOLOGY | Facility: CLINIC | Age: 55
End: 2025-05-20
Attending: NURSE PRACTITIONER
Payer: COMMERCIAL

## 2025-05-20 DIAGNOSIS — R21 RASH AND NONSPECIFIC SKIN ERUPTION: Primary | ICD-10-CM

## 2025-05-20 DIAGNOSIS — L20.81 ATOPIC NEURODERMATITIS: ICD-10-CM

## 2025-05-20 DIAGNOSIS — L20.9 AD (ATOPIC DERMATITIS): ICD-10-CM

## 2025-05-20 PROCEDURE — 99203 OFFICE O/P NEW LOW 30 MIN: CPT | Performed by: STUDENT IN AN ORGANIZED HEALTH CARE EDUCATION/TRAINING PROGRAM

## 2025-05-20 RX ORDER — TRIAMCINOLONE ACETONIDE 1 MG/G
CREAM TOPICAL 2 TIMES DAILY
Qty: 454 G | Refills: 1 | Status: SHIPPED | OUTPATIENT
Start: 2025-05-20

## 2025-05-20 RX ORDER — FEXOFENADINE HCL 180 MG/1
TABLET ORAL
Qty: 180 TABLET | Refills: 1 | Status: SHIPPED | OUTPATIENT
Start: 2025-05-20

## 2025-05-20 RX ORDER — CETIRIZINE HYDROCHLORIDE 10 MG/1
TABLET ORAL
Qty: 60 TABLET | Refills: 3 | Status: SHIPPED | OUTPATIENT
Start: 2025-05-20

## 2025-05-20 ASSESSMENT — PAIN SCALES - GENERAL: PAINLEVEL_OUTOF10: NO PAIN (0)

## 2025-05-20 NOTE — TELEPHONE ENCOUNTER
Prior Authorization Specialty Medication Request    Medication/Dose: fexofenadine (ALLEGRA ALLERGY) 180 MG tablet   Diagnosis and ICD code (if different than what is on RX):    New/renewal/insurance change PA/secondary ins. PA:  Previously Tried and Failed:      Important Lab Values:   Rationale:     Insurance   Primary:   Insurance ID:      Secondary (if applicable):  Insurance ID:      Pharmacy Information (if different than what is on RX)  Name:    Phone:    Fax:    Clinic Information  Preferred routing pool for dept communication:

## 2025-05-20 NOTE — PATIENT INSTRUCTIONS
Start taking medication for the itch:  Take Zyrtec 10mg-20mg (1 or 2 pills) in the morning.   Start Allegra 180mg (1 pill) by mouth in the PM. You can increase to two pills as needed.   _______________________________________________________  We would suggest you start with gentle, non irritating skin care:    Avoid fragrances and perfumes! Start selecting fragrance free soaps, detergents, and other household items.      Take warm (not hot!) short showers. Pat your skin dry, and right after the shower, start applying daily emollient to your skin. We suggest using Vanicream, Vaseline or Aquaphor after shower. Here are some of my favorite options for thick moisturizers. You want something out of a tub so it's thick and moisturizing.                    _______________________________________________________      Anti Itch Lotions:    You can use these in addition to your creams and general moisturizing ointments if you're having a flare of itch.                 ______________________________________________________

## 2025-05-20 NOTE — LETTER
5/20/2025      Marni Ausitn  [address]  [facility] [address]      Dear Colleague,    Thank you for referring your patient, Marni Austin, to the [facility]. Please see a copy of my visit note below.    [facility] Dermatology Note  Encounter Date: May 20, 2025  Office Visit     Dermatology Problem List:  1. Intradermal melanocytic nevus removal/biopsy, 2022, glabella    ____________________________________________    Assessment & Plan:     #Pruritus   #Dermatographism  # Dermatitis  DDX: ezcematous (favored); likely atopic dermatitis, given significant history of atopic triad and asthma     - Start triamcinolone cream 0.1% BID to affected areas.  Apply to trunk and extremities, avoid face and folds.  We reviewed risks of skin atrophy, skin thinning and striae with long-term use or overuse.  Start with 2 week burst of topical therapy and reduce use to as needed for 3-5 days at a time with breaks/time off in between.     - Okay to continue/start non-sedating antihistamines (zyrtec, allegra) as desired.  Reviewed the risk of sedation and potential side effects.  Start with 1 pill a day and uptitrate to 2 pills a day as needed for itch/rash.    -Close and continued follow-up is recommended.  She recently had a difficult time affording and her inhaler medications.  I do want to consider cost and our medical considerations.  However, I do feel like she would benefit from Dupixent to treat the underlying atopic dermatitis/pruritus.  I will reach out to her primary care team to see if they have any concerns or contraindications.    We will consider MTM referral.  I have placed an allergy/immunology referral for patch testing.      Procedures Performed:   None  None    Follow-up: 2 month(s) in-person, or earlier for new or changing lesions    Staff:     Matilde Segura MD PhD  Dermatology,  Dermatopathology    ____________________________________________    CC: Derm Problem (Rash that she has had for some time and assumed that it was an allergy, the itching is severe, torso, arms, legs, abdomen and chest)      HPI:  Marni Austin is a(n) 55 year old female who presents today as a return patient for itchy rash.   Rash on back - photos in chart reviewed.    Previously saw Fior for skin tags on neck and buttocks.   Intradermal melanocytic nevus biopsied from glabella.    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     Some sleep disturbance.     Does not have access to a bathtub.  Daily appointments such as phototherapy would not work with her work schedule.  She is frustrated with being on multiple medications.  Take 9 pills a day.    Patient is otherwise feeling well, without additional skin concerns.         Labs Reviewed:    Lab Results   Component Value Date    WBC 10.3 08/13/2024    HGB 16.1 (H) 08/13/2024    HCT 46.9 08/13/2024     08/13/2024     03/31/2025    POTASSIUM 4.0 03/31/2025    CHLORIDE 102 03/31/2025    CO2 29 03/31/2025    BUN 15.8 03/31/2025    CR 0.84 03/31/2025     (H) 03/31/2025    SED 15 01/18/2019    DD <0.3 07/18/2018    NTBNPI 68 07/18/2018    NTBNP 30 09/17/2018    TROPONIN <0.015 08/25/2021    TROPI <0.015 10/02/2019    AST 26 03/31/2025    ALT 27 03/31/2025    ALKPHOS 117 03/31/2025    BILITOTAL 0.3 03/31/2025    INR 0.94 10/01/2019        Physical Exam:  Vitals: LMP 08/30/2013   SKIN: Full skin, which includes the head/face, both arms, chest, back, abdomen,both legs, genitalia and/or groin buttocks, digits and/or nails, was examined.  - Scattered erythematous linear excoriations  Scattered erythematous patches and thin plaques with overlying xerosis.      Photos taken for the chart                 - No other lesions of concern on areas examined.       Medications:  Current Outpatient Medications   Medication  Sig Dispense Refill     albuterol (PROAIR HFA/PROVENTIL HFA/VENTOLIN HFA) 108 (90 Base) MCG/ACT inhaler USE 2 INHALATIONS BY MOUTH  EVERY 6 HOURS (Patient not taking: Reported on 5/12/2025) 20.1 g 3     albuterol (PROVENTIL) (2.5 MG/3ML) 0.083% neb solution USE 1 VIAL VIA NEBULIZER EVERY 6 HOURS AS NEEDED FOR SHORTNESS OF BREATH DYSPNEA OR WHEEZING (Patient not taking: Reported on 5/12/2025) 180 mL 1     alcohol swab prep pads Use to swab area of injection/erik as directed. (Patient not taking: Reported on 5/12/2025) 100 each 3     amLODIPine (NORVASC) 10 MG tablet Take 10 mg by mouth daily. (Patient not taking: Reported on 5/12/2025)       aspirin EC 81 MG EC tablet Take 1 tablet (81 mg) by mouth daily (Patient not taking: Reported on 5/12/2025)       atorvastatin (LIPITOR) 80 MG tablet TAKE 1 TABLET(80 MG) BY MOUTH DAILY (Patient not taking: Reported on 5/12/2025) 90 tablet 2     blood glucose (ONETOUCH VERIO IQ) test strip USE TO TEST BLOOD SUGAR  ONCE DAILY OR AS DIRECTED (Patient not taking: Reported on 5/12/2025) 100 strip 0     blood glucose calibration (NO BRAND SPECIFIED) solution To accompany: Blood Glucose Monitor Brands: per insurance. (Patient not taking: Reported on 5/12/2025) 1 each 0     blood glucose monitoring (NO BRAND SPECIFIED) meter device kit Use to test blood sugar 1 times daily or as directed. Preferred blood glucose meter OR supplies to accompany: Blood Glucose Monitor Brands: per insurance. (Patient not taking: Reported on 5/12/2025) 1 kit 0     budesonide (PULMICORT FLEXHALER) 180 MCG/ACT inhaler Inhale 1 puff into the lungs 2 times daily. (Patient not taking: Reported on 5/12/2025) 1 each 5     cetirizine (ZYRTEC) 10 MG tablet Take 10 mg by mouth daily (Patient not taking: Reported on 5/12/2025)       chlorthalidone (HYGROTON) 25 MG tablet Take 0.5 tablets (12.5 mg) by mouth daily. (Patient not taking: Reported on 5/12/2025) 45 tablet 3     citalopram (CELEXA) 40 MG tablet Take 1 tablet  (40 mg) by mouth daily. (Patient not taking: Reported on 5/12/2025) 90 tablet 3     clotrimazole (LOTRIMIN) 1 % external cream Apply topically 2 times daily. Tube will last one month (Patient not taking: Reported on 5/12/2025) 60 g 1     cyclobenzaprine (FLEXERIL) 5 MG tablet Take 1 tablet (5 mg) by mouth 3 times daily as needed for muscle spasms (Patient not taking: Reported on 5/12/2025) 30 tablet 3     hydrALAZINE (APRESOLINE) 50 MG tablet Take 50 mg by mouth 3 times daily. (Patient not taking: Reported on 5/12/2025)       losartan (COZAAR) 25 MG tablet TAKE 1 TABLET(25 MG) BY MOUTH DAILY (Patient not taking: Reported on 5/12/2025) 90 tablet 2     metoprolol succinate ER (TOPROL XL) 25 MG 24 hr tablet TAKE 1 TABLET(25 MG) BY MOUTH DAILY (Patient not taking: Reported on 5/12/2025) 90 tablet 2     nitroGLYcerin (NITROSTAT) 0.4 MG sublingual tablet SEE DIRECTIONS ON  MEDICATION INFORMATION  SHEET WITH INVOICE  PAPERWORK (Patient not taking: Reported on 5/12/2025) 75 tablet 1     potassium chloride maria [place] carmen ER (KLOR-CON M10) 10 MEQ CR tablet TAKE 1 TABLET(10 MEQ) BY MOUTH DAILY (Patient not taking: Reported on 5/12/2025) 90 tablet 2     Semaglutide, 2 MG/DOSE, (OZEMPIC) 8 MG/3ML pen Inject 2 mg subcutaneously every 7 days. 9 mL 3     thin (NO BRAND SPECIFIED) lancets Use with lanceting device. To accompany: Blood Glucose Monitor Brands: per insurance. (Patient not taking: Reported on 5/12/2025) 100 each 6     triamcinolone (KENALOG) 0.1 % external ointment Apply topically 2 times daily for 14 days. Ok to use up to 2 weeks per month 80 g 0     No current facility-administered medications for this visit.      Past Medical History:   Patient Active Problem List   Diagnosis     Tobacco use disorder     Hypertension goal BP (blood pressure) < 140/90     Coronary artery disease involving native heart with angina pectoris, unspecified vessel or lesion type     Simple chronic bronchitis (H)     Morbid obesity (H)     Moderate  persistent asthma     Chronic diastolic heart failure with preserved ejection fraction (H)     Type 2 diabetes mellitus with other circulatory complication, without long-term current use of insulin (H)     Bilateral carpal tunnel syndrome     Primary osteoarthritis of right knee       Past Medical History:   Diagnosis Date     Arthritis     not sure if arthritis or carpel tunnel     CAD (coronary artery disease)      Congestive heart failure (H)      COPD (chronic obstructive pulmonary disease) (H)      Depression      Depressive disorder     its better     Hypertension      Rectal bleeding 03/06/2015     Uncomplicated asthma        CC: Primary Care Provider: David Gambino or Referring Provider: Medina Sky      Again, thank you for allowing me to participate in the care of your patient.        Sincerely,        Matilde Segura MD    Electronically signed

## 2025-05-20 NOTE — TELEPHONE ENCOUNTER
Prior authorization started for Marni for Allergy relief 180MG tablets prescription.     Please and thank you

## 2025-05-21 ENCOUNTER — PATIENT OUTREACH (OUTPATIENT)
Dept: CARE COORDINATION | Facility: CLINIC | Age: 55
End: 2025-05-21
Payer: COMMERCIAL

## 2025-05-22 NOTE — TELEPHONE ENCOUNTER
Retail Pharmacy Prior Authorization Team   Phone: [phone]      Prior Authorization Not Needed per Insurance    Medication: FEXOFENADINE  MG PO TABS  Insurance Company: [employer] - Phone [phone] Fax [phone]  Expected CoPay: $    Pharmacy Filling the Rx: [id number] [id number]  Pharmacy Notified: Yes  Patient Notified: **Instructed pharmacy to notify patient when script is ready to /ship.**    I called [employer] and spoke with a rep whom informed me that the patient's insurance will only cover a certain NDC.  She did do a test claim that paid out and the other NDC in their system did not pay out.    The pharmacy will order in NDC [address]-16 for the patient.

## 2025-05-27 ENCOUNTER — E-VISIT (OUTPATIENT)
Dept: URGENT CARE | Facility: CLINIC | Age: 55
End: 2025-05-27
Payer: COMMERCIAL

## 2025-05-27 DIAGNOSIS — L29.9 PRURITIC DISORDER: Primary | ICD-10-CM

## 2025-05-27 PROCEDURE — 99207 PR NON-BILLABLE SERV PER CHARTING: CPT | Performed by: EMERGENCY MEDICINE

## 2025-05-27 NOTE — PATIENT INSTRUCTIONS
Dear Marni,    We are sorry you are not feeling well. Based on the responses you provided, it is recommended that you be seen in-person in clinic so we can better evaluate your symptoms. Please schedule this visit in [facility]. You will have a Schedule Now button in [facility] to help with scheduling this appointment. Otherwise, you can call [phone] to schedule an appointment.     You will not be charged for this eVisit. Thank you for trusting us with your care.     Renan Gaspar MD

## 2025-06-05 ENCOUNTER — OFFICE VISIT (OUTPATIENT)
Dept: FAMILY MEDICINE | Facility: CLINIC | Age: 55
End: 2025-06-05
Attending: EMERGENCY MEDICINE
Payer: COMMERCIAL

## 2025-06-05 VITALS
BODY MASS INDEX: 36.63 KG/M2 | OXYGEN SATURATION: 96 % | RESPIRATION RATE: 16 BRPM | HEART RATE: 83 BPM | DIASTOLIC BLOOD PRESSURE: 85 MMHG | SYSTOLIC BLOOD PRESSURE: 136 MMHG | TEMPERATURE: 98 F | HEIGHT: 61 IN | WEIGHT: 194 LBS

## 2025-06-05 DIAGNOSIS — R21 RASH AND NONSPECIFIC SKIN ERUPTION: ICD-10-CM

## 2025-06-05 DIAGNOSIS — E11.59 TYPE 2 DIABETES MELLITUS WITH OTHER CIRCULATORY COMPLICATION, WITHOUT LONG-TERM CURRENT USE OF INSULIN (H): ICD-10-CM

## 2025-06-05 DIAGNOSIS — J45.40 MODERATE PERSISTENT ASTHMA WITHOUT COMPLICATION: ICD-10-CM

## 2025-06-05 DIAGNOSIS — L29.9 PRURITUS: Primary | ICD-10-CM

## 2025-06-05 DIAGNOSIS — L30.4 INTERTRIGO: ICD-10-CM

## 2025-06-05 PROCEDURE — 99214 OFFICE O/P EST MOD 30 MIN: CPT | Performed by: PHYSICIAN ASSISTANT

## 2025-06-05 PROCEDURE — G2211 COMPLEX E/M VISIT ADD ON: HCPCS | Performed by: PHYSICIAN ASSISTANT

## 2025-06-05 RX ORDER — CLOTRIMAZOLE 1 %
CREAM (GRAM) TOPICAL 2 TIMES DAILY
Qty: 60 G | Refills: 1 | Status: SHIPPED | OUTPATIENT
Start: 2025-06-05

## 2025-06-05 RX ORDER — BUDESONIDE AND FORMOTEROL FUMARATE DIHYDRATE 160; 4.5 UG/1; UG/1
AEROSOL RESPIRATORY (INHALATION)
Qty: 20.4 G | Refills: 11 | Status: SHIPPED | OUTPATIENT
Start: 2025-06-05

## 2025-06-05 ASSESSMENT — PAIN SCALES - GENERAL: PAINLEVEL_OUTOF10: MODERATE PAIN (4)

## 2025-06-05 NOTE — PROGRESS NOTES
Assessment & Plan     Pruritus  Unclear etiology. There are no new medication to suggest iatrogenic aspect. Derm has favored atopic driven and suggested possible initiation of xolair and I will reach out to them to see if they have given this more thought. For now manish will continue to utilize/max her OTC therapies    Moderate persistent asthma without complication  Switching to SMART based therapy.   - budesonide-formoterol (SYMBICORT/BREYNA) 160-4.5 MCG/ACT inhaler; Inhale 2 puffs twice daily plus 1 puff as needed. May use up to 12 puffs per day.    Type 2 diabetes mellitus with other circulatory complication, without long-term current use of insulin (H)  Stable. Continue present management.     The longitudinal plan of care for the diagnosis(es)/condition(s) as documented were addressed during this visit. Due to the added complexity in care, I will continue to support Manish in the subsequent management and with ongoing continuity of care.      Subjective   Manish is a 55 year old, presenting for the following health issues:  No chief complaint on file.        5/12/2025    11:09 AM   Additional Questions   Roomed by Celina Ramírez MA     History of Present Illness       Reason for visit:  Continue rash    She eats 0-1 servings of fruits and vegetables daily.She exercises with enough effort to increase her heart rate 9 or less minutes per day.  She exercises with enough effort to increase her heart rate 3 or less days per week. She is missing 3 dose(s) of medications per week.  She is not taking prescribed medications regularly due to remembering to take and other.          ED/UC Followup:    Facility:  [facility]  Date of visit: 5/12/2025  Reason for visit: Rash  Current Status: No improvement, saw Derm 5/202/2025    Rash  Onset/Duration: ongoing for about 3 months  Description  Location: all over  Character: blotchy, painful  Itching: severe  Intensity:  severe  Progression of Symptoms:   "constant  Accompanying signs and symptoms:   Fever: No  Body aches or joint pain: No  Sore throat symptoms: No  Recent cold symptoms: No  History:           Previous episodes of similar rash: Yes  New exposures:  None  Recent travel: No  Exposure to similar rash: No  Precipitating or alleviating factors: none  Therapies tried and outcome: hydrocortisone cream -  not effective, topical steroid - , and Allegra/fexofenadine -  not effective      Marni Austin is a 55 year old female who presents today for ongoing profuse/diffuse itching   She does believe these most recent symptoms started in fall 2024  She cannot identify any overt triggers at that time  Since then she has not been able to control of the rash/itching  She has used many creams, orals without improvement  Chronicity and intensity are causing major emotional toll    Her biggest concern today is creating a plan moving forwards  She cannot get seen by allergy until December  Derm suggested taking injection but she has not heard back    Additionally, Marni has gotten so overwhelmed by this itching she STOPPED all rx for the past week  (Itching has not changed)  Wants to see how many meds she can get off      Review of Systems  Constitutional, HEENT, cardiovascular, pulmonary, gi and gu systems are negative, except as otherwise noted.      Objective    /85   Pulse 83   Temp 98  F (36.7  C)   Resp 16   Ht 1.549 m (5' 1\")   Wt 88 kg (194 lb)   LMP 08/30/2013   SpO2 96%   BMI 36.66 kg/m    Body mass index is 36.66 kg/m .  Physical Exam   GENERAL: alert and no distress  RESP: lungs clear to auscultation - no rales, rhonchi or wheezes  CV: regular rates and rhythm  SKIN: linear beefy erythematous patch along the right lateral abd and lower groin; there are scattered erythematous papules along the upper arms; excoriated areas thorughout trunk and upper ext  PSYCH: mentation appears normal, affect normal/bright        Signed Electronically by: " David Gambino PA-C

## 2025-06-16 NOTE — PROGRESS NOTES
Medication Therapy Management (MTM) Encounter    ASSESSMENT:                            Atopic Dermatitis: Some improvement in her itching, however she is still having itching although it has overall improved.  Despite this improvement, the reason is unknown and she is still interested in starting Dupixent.  Encouraged her to continue triamcinolone for now as needed while we work on Dupixent coverage.  We discussed Dupixent in detail today including mechanism of action, dosing/frequency, side effects, administration, storage, and the specialty pharmacy process.  She is familiar with subcutaneous injections with Ozempic, however we discussed that the Dupixent pen is much different therefore I will send her an administration video on [employer] once approved.  She will need to separate the injections by at least a few inches if she administers on the same day.  I will be in touch with her regarding the coverage on [employer].    Moderate persistent asthma without complication: Patient was able to obtain the recent inhaler her PCP sent for an affordable price.  We reviewed SMART therapy, and that she needs to increase Symbicort to 2 puffs twice daily.  In addition, she is able to use an additional as needed.  We discussed that Dupixent is also FDA approved for asthma, therefore she may start to see some improvement in her breathing although she is to continue to use Symbicort regularly she may not need it as often as needed.     Type 2 Diabetes: Continue current regimen.  Last A1c at goal under 7%.    CAD: Due to patient's stent history, she is to continue aspirin and high intensity statin.  No recent use of nitroglycerin.    Hypertension: Last blood pressure improved.  Continue to closely monitor.    PLAN:                            Order sent for Dupixent 300 mg x 2 (600 mg) once, then 300 mg every 14 days to [facility] (#[phone]). They will contact patient pending insurance coverage.   Patient to  increase Symbicort to 2 puffs twice daily (plus additional 1 puff as needed)    Follow-up: Via [employer] [facility] pending Dupixent coverage    SUBJECTIVE/OBJECTIVE:                          Marni Ausitn is a 55 year old female seen for an initial visit. She was referred to me from Dr. Segura.     Reason for visit: Per Dr. Segura: Dupixent start.  Medication Adherence/Access: Patient would like to be on as little medications as possible.  Her and her PCP were able to discontinue many of her medications.  She has had cost issues in the past.    Atopic Dermatitis:   - triamcinolone cream 0.1% BID to affected areas. Apply to trunk and extremities, avoid face and folds. Start with 2 week burst of topical therapy and reduce use to as needed for 3-5 days at a time with breaks/time off in between.   - Dupixent 300 mg x2 (600 mg) once then 300 mg every 14 days (pending)  Since last appointment with Dr. Segura on 5/20/2025, she reports that her itching has improved.  However, she does still have itching on the sides of her hips.  Her itching has overall improved, but she is not sure why although she is still itching.  When she does itch, she cannot stop.  Previously was hard to wear clothing and unable to sleep.  Was bleeding and had scars on her hands.  Is using the triamcinolone now only as needed.  Despite the improvement in her itching, she is still very interested in starting Dupixent.  Of note, she did stop many of her medications but she is not sure if that improves the itching.  On 5/20/25 was recommended to start triamcinolone and increase antihistamines. She was also referred to allergy for patch testing (appointment with Dr Mir set for 12/16/2025).  She reports that she has continued use of cetirizine, has been on once daily since 2016.    Moderate persistent asthma without complication  - budesonide-formoterol (SYMBICORT) 160-4.5 MCG/ACT inhaler 2 puffs twice daily plus 1 puff as needed. May use up to 12 puffs  per day.  - abuterol neb PRN   Met with PCP on 6/5/25 and PCP Switched to SMART based therapy.   $30 two inhalers.  Previous inhaler was $100 a month.  Reports that she is using 2 puffs once a day.  Her breathing is a little better than it was before.  Confirms that she rinses her mouth after.     Type 2 Diabetes :  - Ozempic 2 mg subcutaneous weekly  Last A1c = 6.6% on 3/31/25  Last microalbumin 8/13/24  No concerns today.  She injects into her stomach.    CAD:   - aspirin 81 mg daily   - atorvastatin 80 mg daily   - nitroglycerin 0.4 mg SL PRN   Reports she was told she had a heart attack 2014, has a stent.  She has not used nitroglycerin for at least a year.  Lab Test 08/13/24  1517 11/22/23  1425   CHOL 150 161   HDL 30* 30*   LDL 42 73   TRIG 389* 292*     Hypertension:   - chlorthalidone 25 mg half tablet (12.5 mg) once daily  - losartan 25 mg daily   Patient reports no concerns.  BP Readings from Last 3 Encounters:   06/05/25 136/85   05/12/25 (!) 146/91   04/08/25 108/78         Today's Vitals: LMP 08/30/2013     Allergies/ADRs: Reviewed in chart  Past Medical History: Reviewed in chart  Tobacco: She reports that she has been smoking cigarettes. She started smoking about 46 years ago. She has a 23.2 pack-year smoking history. She has been exposed to tobacco smoke. She has never used smokeless tobacco.Nicotine/Tobacco Cessation Plan  Information offered: Patient not interested at this time  Alcohol: Reviewed in chart    ----------------      I spent 23 minutes with this patient today. All changes were made via collaborative practice agreement with Matilde Segura.     A summary of these recommendations was declined by the patient.    Jacqueline Sanchez, Pharm.D., BCACP   Medication Therapy Management Pharmacist   [facility]    Telemedicine Visit Details  The patient's medications can be safely assessed via a telemedicine encounter.  Type of service:  Telephone visit  Originating Location (pt.  Location): Home    Distant Location (provider location):  Off-site  Start Time: 2:31 PM  End Time: 2:54 PM     Medication Therapy Recommendations  Moderate persistent asthma   1 Current Medication: budesonide-formoterol (SYMBICORT/BREYNA) 160-4.5 MCG/ACT inhaler   Current Medication Sig: Inhale 2 puffs twice daily plus 1 puff as needed. May use up to 12 puffs per day.   Rationale: Does not understand instructions - Adherence - Adherence   Recommendation: Provide Adherence Intervention   Status: Patient Agreed - Adherence/Education   Identified Date: 6/17/2025 Completed Date: 6/17/2025

## 2025-06-17 ENCOUNTER — VIRTUAL VISIT (OUTPATIENT)
Dept: PHARMACY | Facility: CLINIC | Age: 55
End: 2025-06-17
Attending: STUDENT IN AN ORGANIZED HEALTH CARE EDUCATION/TRAINING PROGRAM
Payer: COMMERCIAL

## 2025-06-17 DIAGNOSIS — E11.59 TYPE 2 DIABETES MELLITUS WITH OTHER CIRCULATORY COMPLICATION, WITHOUT LONG-TERM CURRENT USE OF INSULIN (H): ICD-10-CM

## 2025-06-17 DIAGNOSIS — J45.40 MODERATE PERSISTENT ASTHMA, UNSPECIFIED WHETHER COMPLICATED: ICD-10-CM

## 2025-06-17 DIAGNOSIS — I25.119 CORONARY ARTERY DISEASE INVOLVING NATIVE HEART WITH ANGINA PECTORIS, UNSPECIFIED VESSEL OR LESION TYPE: ICD-10-CM

## 2025-06-17 DIAGNOSIS — L20.9 ATOPIC DERMATITIS, UNSPECIFIED TYPE: Primary | ICD-10-CM

## 2025-06-17 DIAGNOSIS — I10 HYPERTENSION GOAL BP (BLOOD PRESSURE) < 140/90: ICD-10-CM

## 2025-06-19 ENCOUNTER — TELEPHONE (OUTPATIENT)
Dept: DERMATOLOGY | Facility: CLINIC | Age: 55
End: 2025-06-19

## 2025-06-19 NOTE — TELEPHONE ENCOUNTER
PA Initiation    Medication: DUPIXENT 300 MG/2ML SC SOAJ  Insurance Company: [employer] - Phone [phone] Fax [phone]  Pharmacy Filling the Rx:    Filling Pharmacy Phone:    Filling Pharmacy Fax:    Start Date: 6/19/2025           Thank you,     Brent Elder, [facility]  Ph[facility]acy [facility]  [email or web address]   Phone: [phone]  Fax: [phone]

## 2025-06-22 ENCOUNTER — HOSPITAL ENCOUNTER (OUTPATIENT)
Facility: CLINIC | Age: 55
Setting detail: OBSERVATION
LOS: 1 days | Discharge: HOME | DRG: 189 | End: 2025-06-23
Attending: PHYSICIAN ASSISTANT | Admitting: STUDENT IN AN ORGANIZED HEALTH CARE EDUCATION/TRAINING PROGRAM
Payer: COMMERCIAL

## 2025-06-22 ENCOUNTER — TRANSFERRED RECORDS (OUTPATIENT)
Dept: HEALTH INFORMATION MANAGEMENT | Facility: CLINIC | Age: 55
End: 2025-06-22

## 2025-06-22 DIAGNOSIS — J44.1 COPD EXACERBATION (H): ICD-10-CM

## 2025-06-22 DIAGNOSIS — J18.9 COMMUNITY ACQUIRED PNEUMONIA OF LEFT LOWER LOBE OF LUNG: ICD-10-CM

## 2025-06-22 DIAGNOSIS — R07.9 CHEST PAIN: ICD-10-CM

## 2025-06-22 DIAGNOSIS — M79.10 MYALGIA: ICD-10-CM

## 2025-06-22 DIAGNOSIS — J96.01 ACUTE RESPIRATORY FAILURE WITH HYPOXIA (H): ICD-10-CM

## 2025-06-22 LAB
ALBUMIN SERPL BCG-MCNC: 4.1 G/DL (ref 3.5–5.2)
ALP SERPL-CCNC: 115 U/L (ref 40–150)
ALT SERPL W P-5'-P-CCNC: 22 U/L (ref 0–50)
ANION GAP SERPL CALCULATED.3IONS-SCNC: 12 MMOL/L (ref 7–15)
AST SERPL W P-5'-P-CCNC: 23 U/L (ref 0–45)
BASE EXCESS BLDV CALC-SCNC: 4.5 MMOL/L (ref -3–3)
BASOPHILS # BLD AUTO: 0.1 10E3/UL (ref 0–0.2)
BASOPHILS NFR BLD AUTO: 0 %
BILIRUB SERPL-MCNC: 0.8 MG/DL
BUN SERPL-MCNC: 16.7 MG/DL (ref 6–20)
CALCIUM SERPL-MCNC: 9.4 MG/DL (ref 8.8–10.4)
CHLORIDE SERPL-SCNC: 97 MMOL/L (ref 98–107)
CK SERPL-CCNC: 541 U/L (ref 26–192)
CREAT SERPL-MCNC: 0.78 MG/DL (ref 0.51–0.95)
D DIMER PPP FEU-MCNC: 0.34 UG/ML FEU (ref 0–0.5)
EGFRCR SERPLBLD CKD-EPI 2021: 88 ML/MIN/1.73M2
EOSINOPHIL # BLD AUTO: 0.1 10E3/UL (ref 0–0.7)
EOSINOPHIL NFR BLD AUTO: 1 %
ERYTHROCYTE [DISTWIDTH] IN BLOOD BY AUTOMATED COUNT: 12.6 % (ref 10–15)
FLUAV RNA SPEC QL NAA+PROBE: NEGATIVE
FLUBV RNA RESP QL NAA+PROBE: NEGATIVE
GLUCOSE SERPL-MCNC: 140 MG/DL (ref 70–99)
HCO3 BLDV-SCNC: 30 MMOL/L (ref 21–28)
HCO3 SERPL-SCNC: 26 MMOL/L (ref 22–29)
HCT VFR BLD AUTO: 43.7 % (ref 35–47)
HGB BLD-MCNC: 15.3 G/DL (ref 11.7–15.7)
IMM GRANULOCYTES # BLD: 0.1 10E3/UL
IMM GRANULOCYTES NFR BLD: 1 %
LYMPHOCYTES # BLD AUTO: 2.1 10E3/UL (ref 0.8–5.3)
LYMPHOCYTES NFR BLD AUTO: 15 %
MAGNESIUM SERPL-MCNC: 3 MG/DL (ref 1.7–2.3)
MCH RBC QN AUTO: 29.5 PG (ref 26.5–33)
MCHC RBC AUTO-ENTMCNC: 35 G/DL (ref 31.5–36.5)
MCV RBC AUTO: 84 FL (ref 78–100)
MONOCYTES # BLD AUTO: 1 10E3/UL (ref 0–1.3)
MONOCYTES NFR BLD AUTO: 6 %
NEUTROPHILS # BLD AUTO: 11.4 10E3/UL (ref 1.6–8.3)
NEUTROPHILS NFR BLD AUTO: 77 %
NRBC # BLD AUTO: 0 10E3/UL
NRBC BLD AUTO-RTO: 0 /100
NT-PROBNP SERPL-MCNC: <36 PG/ML (ref 0–226)
O2/TOTAL GAS SETTING VFR VENT: 21 %
OXYHGB MFR BLDV: 61 % (ref 70–75)
PCO2 BLDV: 46 MM HG (ref 40–50)
PH BLDV: 7.42 [PH] (ref 7.32–7.43)
PHOSPHATE SERPL-MCNC: 2.7 MG/DL (ref 2.5–4.5)
PLATELET # BLD AUTO: 251 10E3/UL (ref 150–450)
PO2 BLDV: 31 MM HG (ref 25–47)
POTASSIUM SERPL-SCNC: 3.3 MMOL/L (ref 3.4–5.3)
POTASSIUM SERPL-SCNC: 4.1 MMOL/L (ref 3.4–5.3)
PROCALCITONIN SERPL IA-MCNC: 0.05 NG/ML
PROT SERPL-MCNC: 7.1 G/DL (ref 6.4–8.3)
RBC # BLD AUTO: 5.19 10E6/UL (ref 3.8–5.2)
RSV RNA SPEC NAA+PROBE: NEGATIVE
SAO2 % BLDV: 64.2 % (ref 70–75)
SARS-COV-2 RNA RESP QL NAA+PROBE: NEGATIVE
SODIUM SERPL-SCNC: 135 MMOL/L (ref 135–145)
TROPONIN T SERPL HS-MCNC: 10 NG/L
TROPONIN T SERPL HS-MCNC: 8 NG/L
WBC # BLD AUTO: 14.7 10E3/UL (ref 4–11)

## 2025-06-22 PROCEDURE — 82550 ASSAY OF CK (CPK): CPT | Performed by: STUDENT IN AN ORGANIZED HEALTH CARE EDUCATION/TRAINING PROGRAM

## 2025-06-22 PROCEDURE — 36415 COLL VENOUS BLD VENIPUNCTURE: CPT | Performed by: PHYSICIAN ASSISTANT

## 2025-06-22 PROCEDURE — 250N000013 HC RX MED GY IP 250 OP 250 PS 637: Performed by: PHYSICIAN ASSISTANT

## 2025-06-22 PROCEDURE — 84100 ASSAY OF PHOSPHORUS: CPT | Performed by: STUDENT IN AN ORGANIZED HEALTH CARE EDUCATION/TRAINING PROGRAM

## 2025-06-22 PROCEDURE — 94640 AIRWAY INHALATION TREATMENT: CPT

## 2025-06-22 PROCEDURE — 250N000009 HC RX 250: Performed by: PHYSICIAN ASSISTANT

## 2025-06-22 PROCEDURE — 82805 BLOOD GASES W/O2 SATURATION: CPT | Performed by: PHYSICIAN ASSISTANT

## 2025-06-22 PROCEDURE — 120N000001 HC R&B MED SURG/OB

## 2025-06-22 PROCEDURE — 83880 ASSAY OF NATRIURETIC PEPTIDE: CPT | Performed by: PHYSICIAN ASSISTANT

## 2025-06-22 PROCEDURE — 84145 PROCALCITONIN (PCT): CPT | Performed by: PHYSICIAN ASSISTANT

## 2025-06-22 PROCEDURE — 250N000011 HC RX IP 250 OP 636: Performed by: STUDENT IN AN ORGANIZED HEALTH CARE EDUCATION/TRAINING PROGRAM

## 2025-06-22 PROCEDURE — 84132 ASSAY OF SERUM POTASSIUM: CPT | Performed by: STUDENT IN AN ORGANIZED HEALTH CARE EDUCATION/TRAINING PROGRAM

## 2025-06-22 PROCEDURE — 85004 AUTOMATED DIFF WBC COUNT: CPT | Performed by: PHYSICIAN ASSISTANT

## 2025-06-22 PROCEDURE — 87637 SARSCOV2&INF A&B&RSV AMP PRB: CPT | Performed by: PHYSICIAN ASSISTANT

## 2025-06-22 PROCEDURE — 96375 TX/PRO/DX INJ NEW DRUG ADDON: CPT

## 2025-06-22 PROCEDURE — 83735 ASSAY OF MAGNESIUM: CPT | Performed by: STUDENT IN AN ORGANIZED HEALTH CARE EDUCATION/TRAINING PROGRAM

## 2025-06-22 PROCEDURE — 99223 1ST HOSP IP/OBS HIGH 75: CPT | Performed by: STUDENT IN AN ORGANIZED HEALTH CARE EDUCATION/TRAINING PROGRAM

## 2025-06-22 PROCEDURE — 96365 THER/PROPH/DIAG IV INF INIT: CPT

## 2025-06-22 PROCEDURE — 93005 ELECTROCARDIOGRAM TRACING: CPT

## 2025-06-22 PROCEDURE — 99285 EMERGENCY DEPT VISIT HI MDM: CPT | Mod: 25

## 2025-06-22 PROCEDURE — 250N000013 HC RX MED GY IP 250 OP 250 PS 637: Performed by: STUDENT IN AN ORGANIZED HEALTH CARE EDUCATION/TRAINING PROGRAM

## 2025-06-22 PROCEDURE — 85379 FIBRIN DEGRADATION QUANT: CPT | Performed by: PHYSICIAN ASSISTANT

## 2025-06-22 PROCEDURE — 84484 ASSAY OF TROPONIN QUANT: CPT | Performed by: PHYSICIAN ASSISTANT

## 2025-06-22 PROCEDURE — 96366 THER/PROPH/DIAG IV INF ADDON: CPT

## 2025-06-22 PROCEDURE — 250N000011 HC RX IP 250 OP 636: Performed by: PHYSICIAN ASSISTANT

## 2025-06-22 PROCEDURE — 82310 ASSAY OF CALCIUM: CPT | Performed by: PHYSICIAN ASSISTANT

## 2025-06-22 PROCEDURE — 36415 COLL VENOUS BLD VENIPUNCTURE: CPT | Performed by: STUDENT IN AN ORGANIZED HEALTH CARE EDUCATION/TRAINING PROGRAM

## 2025-06-22 RX ORDER — ASPIRIN 81 MG/1
81 TABLET ORAL DAILY
Status: DISCONTINUED | OUTPATIENT
Start: 2025-06-22 | End: 2025-06-23 | Stop reason: HOSPADM

## 2025-06-22 RX ORDER — ACETAMINOPHEN 325 MG/1
650 TABLET ORAL EVERY 4 HOURS PRN
Status: DISCONTINUED | OUTPATIENT
Start: 2025-06-22 | End: 2025-06-23 | Stop reason: HOSPADM

## 2025-06-22 RX ORDER — AZITHROMYCIN 250 MG/1
500 TABLET, FILM COATED ORAL ONCE
Status: COMPLETED | OUTPATIENT
Start: 2025-06-22 | End: 2025-06-22

## 2025-06-22 RX ORDER — IPRATROPIUM BROMIDE AND ALBUTEROL SULFATE 2.5; .5 MG/3ML; MG/3ML
3 SOLUTION RESPIRATORY (INHALATION) ONCE
Status: COMPLETED | OUTPATIENT
Start: 2025-06-22 | End: 2025-06-22

## 2025-06-22 RX ORDER — ALBUTEROL SULFATE 0.83 MG/ML
5 SOLUTION RESPIRATORY (INHALATION) EVERY 6 HOURS PRN
COMMUNITY

## 2025-06-22 RX ORDER — PROCHLORPERAZINE MALEATE 5 MG/1
10 TABLET ORAL EVERY 6 HOURS PRN
Status: DISCONTINUED | OUTPATIENT
Start: 2025-06-22 | End: 2025-06-23 | Stop reason: HOSPADM

## 2025-06-22 RX ORDER — CYCLOBENZAPRINE HCL 10 MG
10 TABLET ORAL EVERY 8 HOURS PRN
Status: DISCONTINUED | OUTPATIENT
Start: 2025-06-22 | End: 2025-06-23 | Stop reason: HOSPADM

## 2025-06-22 RX ORDER — ONDANSETRON 2 MG/ML
4 INJECTION INTRAMUSCULAR; INTRAVENOUS EVERY 6 HOURS PRN
Status: DISCONTINUED | OUTPATIENT
Start: 2025-06-22 | End: 2025-06-23 | Stop reason: HOSPADM

## 2025-06-22 RX ORDER — GUAIFENESIN 200 MG/10ML
200 LIQUID ORAL EVERY 4 HOURS PRN
Status: DISCONTINUED | OUTPATIENT
Start: 2025-06-22 | End: 2025-06-23

## 2025-06-22 RX ORDER — ONDANSETRON 4 MG/1
4 TABLET, ORALLY DISINTEGRATING ORAL EVERY 6 HOURS PRN
Status: DISCONTINUED | OUTPATIENT
Start: 2025-06-22 | End: 2025-06-23 | Stop reason: HOSPADM

## 2025-06-22 RX ORDER — BUDESONIDE AND FORMOTEROL FUMARATE DIHYDRATE 160; 4.5 UG/1; UG/1
2 AEROSOL RESPIRATORY (INHALATION)
Status: DISCONTINUED | OUTPATIENT
Start: 2025-06-22 | End: 2025-06-23 | Stop reason: HOSPADM

## 2025-06-22 RX ORDER — MAGNESIUM SULFATE HEPTAHYDRATE 40 MG/ML
2 INJECTION, SOLUTION INTRAVENOUS ONCE
Status: COMPLETED | OUTPATIENT
Start: 2025-06-22 | End: 2025-06-22

## 2025-06-22 RX ORDER — CEFTRIAXONE 1 G/1
1 INJECTION, POWDER, FOR SOLUTION INTRAMUSCULAR; INTRAVENOUS EVERY 24 HOURS
Status: DISCONTINUED | OUTPATIENT
Start: 2025-06-23 | End: 2025-06-23 | Stop reason: HOSPADM

## 2025-06-22 RX ORDER — CALCIUM CARBONATE 500 MG/1
1000 TABLET, CHEWABLE ORAL 4 TIMES DAILY PRN
Status: DISCONTINUED | OUTPATIENT
Start: 2025-06-22 | End: 2025-06-23 | Stop reason: HOSPADM

## 2025-06-22 RX ORDER — ACETAMINOPHEN 650 MG/1
650 SUPPOSITORY RECTAL EVERY 4 HOURS PRN
Status: DISCONTINUED | OUTPATIENT
Start: 2025-06-22 | End: 2025-06-23 | Stop reason: HOSPADM

## 2025-06-22 RX ORDER — CEFTRIAXONE 2 G/1
2 INJECTION, POWDER, FOR SOLUTION INTRAMUSCULAR; INTRAVENOUS ONCE
Status: COMPLETED | OUTPATIENT
Start: 2025-06-22 | End: 2025-06-22

## 2025-06-22 RX ORDER — AZITHROMYCIN 500 MG/5ML
500 INJECTION, POWDER, LYOPHILIZED, FOR SOLUTION INTRAVENOUS EVERY 24 HOURS
Status: DISCONTINUED | OUTPATIENT
Start: 2025-06-23 | End: 2025-06-23 | Stop reason: HOSPADM

## 2025-06-22 RX ORDER — ALBUTEROL SULFATE 0.83 MG/ML
2.5 SOLUTION RESPIRATORY (INHALATION) ONCE
Status: COMPLETED | OUTPATIENT
Start: 2025-06-22 | End: 2025-06-22

## 2025-06-22 RX ORDER — PREDNISONE 20 MG/1
40 TABLET ORAL DAILY
Status: DISCONTINUED | OUTPATIENT
Start: 2025-06-23 | End: 2025-06-23 | Stop reason: HOSPADM

## 2025-06-22 RX ORDER — ENOXAPARIN SODIUM 100 MG/ML
40 INJECTION SUBCUTANEOUS EVERY 24 HOURS
Status: DISCONTINUED | OUTPATIENT
Start: 2025-06-22 | End: 2025-06-23 | Stop reason: HOSPADM

## 2025-06-22 RX ORDER — AMOXICILLIN 250 MG
1 CAPSULE ORAL 2 TIMES DAILY PRN
Status: DISCONTINUED | OUTPATIENT
Start: 2025-06-22 | End: 2025-06-23 | Stop reason: HOSPADM

## 2025-06-22 RX ORDER — POTASSIUM CHLORIDE 1500 MG/1
40 TABLET, EXTENDED RELEASE ORAL ONCE
Status: COMPLETED | OUTPATIENT
Start: 2025-06-22 | End: 2025-06-22

## 2025-06-22 RX ORDER — LIDOCAINE 40 MG/G
CREAM TOPICAL
Status: DISCONTINUED | OUTPATIENT
Start: 2025-06-22 | End: 2025-06-23 | Stop reason: HOSPADM

## 2025-06-22 RX ORDER — BENZONATATE 100 MG/1
100 CAPSULE ORAL 3 TIMES DAILY PRN
Status: DISCONTINUED | OUTPATIENT
Start: 2025-06-22 | End: 2025-06-23 | Stop reason: HOSPADM

## 2025-06-22 RX ORDER — IPRATROPIUM BROMIDE AND ALBUTEROL SULFATE 2.5; .5 MG/3ML; MG/3ML
3 SOLUTION RESPIRATORY (INHALATION) EVERY 4 HOURS PRN
Status: DISCONTINUED | OUTPATIENT
Start: 2025-06-22 | End: 2025-06-23 | Stop reason: HOSPADM

## 2025-06-22 RX ORDER — AMOXICILLIN 250 MG
2 CAPSULE ORAL 2 TIMES DAILY PRN
Status: DISCONTINUED | OUTPATIENT
Start: 2025-06-22 | End: 2025-06-23 | Stop reason: HOSPADM

## 2025-06-22 RX ORDER — CLOTRIMAZOLE 1 %
CREAM (GRAM) TOPICAL 2 TIMES DAILY PRN
COMMUNITY

## 2025-06-22 RX ORDER — METHYLPREDNISOLONE SODIUM SUCCINATE 125 MG/2ML
125 INJECTION INTRAMUSCULAR; INTRAVENOUS ONCE
Status: COMPLETED | OUTPATIENT
Start: 2025-06-22 | End: 2025-06-22

## 2025-06-22 RX ORDER — ATORVASTATIN CALCIUM 40 MG/1
80 TABLET, FILM COATED ORAL DAILY
Status: DISCONTINUED | OUTPATIENT
Start: 2025-06-22 | End: 2025-06-23 | Stop reason: HOSPADM

## 2025-06-22 RX ADMIN — CYCLOBENZAPRINE 10 MG: 10 TABLET, FILM COATED ORAL at 18:24

## 2025-06-22 RX ADMIN — ASPIRIN 81 MG: 81 TABLET, DELAYED RELEASE ORAL at 17:20

## 2025-06-22 RX ADMIN — MAGNESIUM SULFATE HEPTAHYDRATE 2 G: 40 INJECTION, SOLUTION INTRAVENOUS at 10:35

## 2025-06-22 RX ADMIN — SENNOSIDES AND DOCUSATE SODIUM 1 TABLET: 50; 8.6 TABLET ORAL at 17:20

## 2025-06-22 RX ADMIN — GUAIFENESIN 200 MG: 200 SOLUTION ORAL at 18:19

## 2025-06-22 RX ADMIN — CEFTRIAXONE 2 G: 2 INJECTION, POWDER, FOR SOLUTION INTRAMUSCULAR; INTRAVENOUS at 10:29

## 2025-06-22 RX ADMIN — IPRATROPIUM BROMIDE AND ALBUTEROL SULFATE 3 ML: .5; 3 SOLUTION RESPIRATORY (INHALATION) at 09:51

## 2025-06-22 RX ADMIN — POTASSIUM CHLORIDE 40 MEQ: 1500 TABLET, EXTENDED RELEASE ORAL at 15:05

## 2025-06-22 RX ADMIN — AZITHROMYCIN DIHYDRATE 500 MG: 250 TABLET ORAL at 10:29

## 2025-06-22 RX ADMIN — ENOXAPARIN SODIUM 40 MG: 40 INJECTION SUBCUTANEOUS at 15:05

## 2025-06-22 RX ADMIN — METHYLPREDNISOLONE SODIUM SUCCINATE 125 MG: 125 INJECTION, POWDER, FOR SOLUTION INTRAMUSCULAR; INTRAVENOUS at 09:51

## 2025-06-22 RX ADMIN — IPRATROPIUM BROMIDE AND ALBUTEROL SULFATE 3 ML: .5; 3 SOLUTION RESPIRATORY (INHALATION) at 09:50

## 2025-06-22 RX ADMIN — ATORVASTATIN CALCIUM 80 MG: 40 TABLET, FILM COATED ORAL at 17:20

## 2025-06-22 ASSESSMENT — ACTIVITIES OF DAILY LIVING (ADL)
ADLS_ACUITY_SCORE: 24
ADLS_ACUITY_SCORE: 47
ADLS_ACUITY_SCORE: 24
ADLS_ACUITY_SCORE: 24
ADLS_ACUITY_SCORE: 47
ADLS_ACUITY_SCORE: 24
ADLS_ACUITY_SCORE: 24
ADLS_ACUITY_SCORE: 47
ADLS_ACUITY_SCORE: 47
ADLS_ACUITY_SCORE: 24
ADLS_ACUITY_SCORE: 47
ADLS_ACUITY_SCORE: 24

## 2025-06-22 ASSESSMENT — COLUMBIA-SUICIDE SEVERITY RATING SCALE - C-SSRS
2. HAVE YOU ACTUALLY HAD ANY THOUGHTS OF KILLING YOURSELF IN THE PAST MONTH?: NO
6. HAVE YOU EVER DONE ANYTHING, STARTED TO DO ANYTHING, OR PREPARED TO DO ANYTHING TO END YOUR LIFE?: NO
1. IN THE PAST MONTH, HAVE YOU WISHED YOU WERE DEAD OR WISHED YOU COULD GO TO SLEEP AND NOT WAKE UP?: NO

## 2025-06-22 NOTE — H&P
"[facility]    History and Physical - Hospitalist Service       Date of Admission:  6/22/2025    Assessment & Plan      Marni Austin is a 55 year old female with CHF, T2DM, CAD, HTN, tobacco use disorder, COPD admitted on 6/22/2025 with shortness of breath.       Acute hypoxemic respiratory failure 2/2 COPD exacerbation and possible CAP:  Patient presents with \"charley horse\" in her bilateral lower rib.  She also has complained of shortness of breath and cough over the recent few days.  Cough is nonproductive.  Denies overt fevers.  On presentation to the ED she was found to be mild hypoxemic with an oxygen saturation of 87%.  Chest x-ray shows possible pneumonia.  WBC 14.7.  -Ceftriaxone, azithromycin for 5-day course  -Prednisone 40 x 4 more days  -DuoNebs as needed  -Continuous oximetry, supplemental oxygen as needed, wean as tolerated    Chest pain/cramps:  Patient has longstanding history of \"charley horse.\"  Looking back in her chart seems like these have been present for 10+ years.  In various areas throughout her body.  Now she presents with cramping of her bilateral lower rib.  She reports that she was previously prescribed cyclobenzaprine.  She has had multiple ACS rule outs due to similar pains in the past.  In the ED, EKG is unchanged, Trop is negative x 2.  -Flexeril prn    CAD s/p PCI x1 2015: Continue PTA meds once med rec completed    Tobacco use disorder: Smoking 1/3rd ppd.  Declines NRT.          Diet:  Regular  DVT Prophylaxis: Enoxaparin (Lovenox) SQ  Knight Catheter: Not present  Lines: None     Cardiac Monitoring: None  Code Status:  FULL    Clinically Significant Risk Factors Present on Admission        # Hypokalemia: Lowest K = 3.3 mmol/L in last 2 days, will replace as needed   # Hypochloremia: Lowest Cl = 97 mmol/L in last 2 days, will monitor as appropriate        # Drug Induced Platelet Defect: home medication list includes an antiplatelet medication   # " "Hypertension: Noted on problem list          # DMII: A1C = 6.6 % (Ref range: 0.0 - 5.6 %) within past 6 months    # Obesity: Estimated body mass index is 36.49 kg/m  as calculated from the following:    Height as of 6/5/25: 1.549 m (5' 1\").    Weight as of this encounter: 87.6 kg (193 lb 2 oz).       # Asthma: noted on problem list        Disposition Plan     Medically Ready for Discharge: Anticipated in 2-4 Days           Gilmar Izaguirre DO  Hospitalist Service  [facility]  Securely message with [employer] (more info)  Text page via [facility] Paging/Directory     ______________________________________________________________________    Chief Complaint   Rib pain, cough, shortness of breath    History is obtained from the patient    History of Present Illness   Marni Austin is a 55 year old female with CHF, T2DM, CAD, HTN, tobacco use disorder, COPD admitted on 6/22/2025 with shortness of breath.     Patient presents with a few days of cough & shortness of breath.  What is bothering her the most is \"charley horse\" of her bilateral lower ribs.  She reports that this started yesterday and continues throughout today.  She was able to deal with the pain yesterday but today it became unbearable and her coworker told her to go to urgent care.  Has tried albuterol without significant improvement in symptoms.  Previously on cyclobenzaprine for cramping but it sounds like this was recently stopped by her primary provider?  Denies overt fevers.       Past Medical History    Past Medical History:   Diagnosis Date    Arthritis     not sure if arthritis or carpel tunnel    CAD (coronary artery disease)     Congestive heart failure (H)     COPD (chronic obstructive pulmonary disease) (H)     Depression     Depressive disorder     its better    Diabetes (H)     Hypertension     Rectal bleeding 03/06/2015    Uncomplicated asthma        Past Surgical History   Past Surgical History:   Procedure Laterality Date    " CARDIAC SURGERY  2015    Cardiac stents x 1    GYN SURGERY  4/18/93    tubolligation (sp)    TONSILLECTOMY Bilateral 1985    Tubal ligation surgery  1993       Prior to Admission Medications   Prior to Admission Medications   Prescriptions Last Dose Informant Patient Reported? Taking?   Semaglutide, 2 MG/DOSE, (OZEMPIC) 8 MG/3ML pen 6/15/2025  No Yes   Sig: Inject 2 mg subcutaneously every 7 days.   albuterol (PROVENTIL) (2.5 MG/3ML) 0.083% neb solution Past Month  Yes Yes   Sig: Take 5 mg by nebulization every 6 hours as needed for shortness of breath, wheezing or cough.   aspirin EC 81 MG EC tablet 6/21/2025  No Yes   Sig: Take 1 tablet (81 mg) by mouth daily   atorvastatin (LIPITOR) 80 MG tablet 6/21/2025  No Yes   Sig: TAKE 1 TABLET(80 MG) BY MOUTH DAILY   budesonide-formoterol (SYMBICORT/BREYNA) 160-4.5 MCG/ACT inhaler 6/21/2025  No Yes   Sig: Inhale 2 puffs twice daily plus 1 puff as needed. May use up to 12 puffs per day.   chlorthalidone (HYGROTON) 25 MG tablet 6/21/2025  No Yes   Sig: Take 0.5 tablets (12.5 mg) by mouth daily.   clotrimazole (LOTRIMIN) 1 % external cream Past Month  Yes Yes   Sig: Apply topically 2 times daily as needed (rash).   dupilumab (DUPIXENT) 300 MG/2ML prefilled pen   No Yes   Sig: Inject 4 mLs (600 mg) subcutaneously See Admin Instructions for 1 dose. followed by 300 mg every other week.   dupilumab (DUPIXENT) 300 MG/2ML prefilled pen   No Yes   Sig: Inject 2 mLs (300 mg) subcutaneously every 14 days.   nitroGLYcerin (NITROSTAT) 0.4 MG sublingual tablet   No Yes   Sig: SEE DIRECTIONS ON  MEDICATION INFORMATION  SHEET WITH INVOICE  PAPERWORK   triamcinolone (KENALOG) 0.1 % external cream Past Month  No Yes   Sig: Apply topically 2 times daily. Use and short 2 weeks bursts as needed for itch/rash.  Do not apply to face or folds.  Daily use is not recommended as it can increase your risk for skin thinning and stretch marks.      Facility-Administered Medications: None        Review  of Systems    The 10 point Review of Systems is negative other than noted in the HPI or here.     Social History   I have reviewed this patient's social history and updated it with pertinent information if needed.  Social History     Tobacco Use    Smoking status: Every Day     Current packs/day: 0.50     Average packs/day: 0.5 packs/day for 46.5 years (23.2 ttl pk-yrs)     Types: Cigarettes     Start date: 1/1/1979     Passive exposure: Current    Smokeless tobacco: Never    Tobacco comments:     working on it. somedays i dont some i do   Vaping Use    Vaping status: Never Used   Substance Use Topics    Alcohol use: No    Drug use: No         Family History   I have reviewed this patient's family history and updated it with pertinent information if needed.  Family History   Problem Relation Age of Onset    Hypertension Mother     Heart Disease Mother     Alcoholism Mother     Diabetes Mother     Coronary Artery Disease Mother     Substance Abuse Mother     Asthma Mother     Diabetes Father     Coronary Artery Disease Father     Cerebrovascular Disease Father     Hypertension Father     Obesity Brother     Alzheimer Disease Maternal Grandmother     Heart Disease Maternal Grandmother     Lung Cancer Maternal Grandfather     Skin Cancer No family hx of          Allergies   Allergies   Allergen Reactions    Oxycodone Nausea and Vomiting    Zyban [Bupropion Hcl] Anaphylaxis        Physical Exam   Vital Signs: Temp: 97.4  F (36.3  C) Temp src: Temporal BP: 104/59 Pulse: 89   Resp: 26 SpO2: 95 % O2 Device: None (Room air)    Weight: 193 lbs 1.97 oz    General Appearance: Patient awake & alert.  No apparent distress.   Respiratory: Lungs with expiratory wheezing.  Work of breathing appears normal on 2L NC.  Cardiovascular: Regular rate and rhythm.  No murmurs rubs or gallops.  There is no edema present.  GI: Benign.  Soft.  Non-tender.  Bowel sounds active.   Skin: No rashes or lesions exposed skin.  Neuro:  The patient is  moving all extremities.  No obvious focal asymmetries.   Other: Patient is appropriate and oriented x3.     Medical Decision Making       75 MINUTES SPENT BY ME on the date of service doing chart review, history, exam, documentation & further activities per the note.      Data   ------------------------- PAST 24 HR DATA REVIEWED -----------------------------------------------    I have personally reviewed the following data over the past 24 hrs:    14.7 (H)  \   15.3   / 251     135 97 (L) 16.7 /  140 (H)   3.3 (L) 26 0.78 \     ALT: 22 AST: 23 AP: 115 TBILI: 0.8   ALB: 4.1 TOT PROTEIN: 7.1 LIPASE: N/A     Trop: 8 BNP: <36     Procal: 0.05 CRP: N/A Lactic Acid: N/A       INR:  N/A PTT:  N/A   D-dimer:  0.34 Fibrinogen:  N/A       Imaging results reviewed over the past 24 hrs:   Recent Results (from the past 24 hours)   XR Chest 2 Views    Narrative    EXAM:  XR CHEST 2 VIEWS    INDICATION:  short of breath    COMPARISON:  None.    FINDINGS:  Normal cardiomediastinal silhouette and pulmonary vasculature.  There is a patchy left basilar opacity.  This is indeterminate.  Given provided history, infection is a possibility.  This could represent a prominent pericardiophrenic fat pad however.  There  are some linear peripheral opacities, probably fibrotic.  No pneumothorax or pleural effusion. Bony thorax is unremarkable.    Signed by: Sachin Zheng 6/22/2025 8:43 AM

## 2025-06-22 NOTE — ED NOTES
[facility]  ED Nurse Handoff Report    ED Chief complaint: Shortness of Breath  . ED Diagnosis:   Final diagnoses:   Community acquired pneumonia of left lower lobe of lung   COPD exacerbation (H)   Acute respiratory failure with hypoxia (H)       Allergies:   Allergies   Allergen Reactions    Zyban [Bupropion Hcl] Anaphylaxis       Code Status: Full Code    Activity level - Baseline/Home:  independent.  Activity Level - Current:   independent.   Lift room needed: No.   Bariatric: No   Needed: No   Isolation: No.   Infection: Not Applicable.     Respiratory status: Nasal cannula    Vital Signs (within 30 minutes):   Vitals:    06/22/25 0931 06/22/25 1113 06/22/25 1159 06/22/25 1209   BP: (!) 154/88 (!) 146/90     Pulse: 107      Resp: 26      Temp: 97.4  F (36.3  C)      TempSrc: Temporal      SpO2: 94% (!) 88% 96% 95%   Weight: 87.6 kg (193 lb 2 oz)          Cardiac Rhythm:  ,      Pain level:    Patient confused: No.   Patient Falls Risk: bed/chair alarm on, nonskid shoes/slippers when out of bed, arm band in place, patient and family education, and assistive device/personal items within reach.   Elimination Status: Has voided     Patient Report - Initial Complaint: SOB.   Focused Assessment: Marni Austin is a 55 year old female with history of diastolic CHF, type 2 diabetes, CAD, hypertension and COPD who presents to the ED with shortness of breath. Patient reports chest pain and muscle aches that began last night under her breasts by her ribcage with associated shortness of breath. She denies breast pain on exam but states her chest is tight and sore. She had jaw pain yesterday and this morning. She has a bronchitis cough and uses her inhaler, it is not productive. She endorses sweats but denies fever.  She takes a water pill but did not this weekend. She denies new leg swelling. She reports a history of MI.      Abnormal Results:   Labs Ordered and Resulted from Time of ED  Arrival to Time of ED Departure   COMPREHENSIVE METABOLIC PANEL - Abnormal       Result Value    Sodium 135      Potassium 3.3 (*)     Carbon Dioxide (CO2) 26      Anion Gap 12      Urea Nitrogen 16.7      Creatinine 0.78      GFR Estimate 88      Calcium 9.4      Chloride 97 (*)     Glucose 140 (*)     Alkaline Phosphatase 115      AST 23      ALT 22      Protein Total 7.1      Albumin 4.1      Bilirubin Total 0.8     BLOOD GAS VENOUS - Abnormal    pH Venous 7.42      pCO2 Venous 46      pO2 Venous 31      Bicarbonate Venous 30 (*)     Base Excess/Deficit Venous 4.5 (*)     FIO2 21      Oxyhemoglobin Venous 61 (*)     O2 Sat, Venous 64.2 (*)    CBC WITH PLATELETS AND DIFFERENTIAL - Abnormal    WBC Count 14.7 (*)     RBC Count 5.19      Hemoglobin 15.3      Hematocrit 43.7      MCV 84      MCH 29.5      MCHC 35.0      RDW 12.6      Platelet Count 251      % Neutrophils 77      % Lymphocytes 15      % Monocytes 6      % Eosinophils 1      % Basophils 0      % Immature Granulocytes 1      NRBCs per 100 WBC 0      Absolute Neutrophils 11.4 (*)     Absolute Lymphocytes 2.1      Absolute Monocytes 1.0      Absolute Eosinophils 0.1      Absolute Basophils 0.1      Absolute Immature Granulocytes 0.1      Absolute NRBCs 0.0     TROPONIN T, HIGH SENSITIVITY - Normal    Troponin T, High Sensitivity 10     NT-PROBNP - Normal    NT-proBNP <36     INFLUENZA A/B, RSV AND SARS-COV2 PCR - Normal    Influenza A PCR Negative      Influenza B PCR Negative      RSV PCR Negative      SARS CoV2 PCR Negative     D DIMER QUANTITATIVE - Normal    D-Dimer Quantitative 0.34     PROCALCITONIN - Normal    Procalcitonin 0.05     TROPONIN T, HIGH SENSITIVITY - Normal    Troponin T, High Sensitivity 8          No orders to display       Treatments provided: XR, albuterol, labs   Family Comments: n/a  OBS brochure/video discussed/provided to patient:  N/A  ED Medications:   Medications   ipratropium - albuterol 0.5 mg/2.5 mg (3mg)/3 mL (DUONEB) neb  solution 3 mL (3 mLs Nebulization $Given 6/22/25 0951)   ipratropium - albuterol 0.5 mg/2.5 mg (3mg)/3 mL (DUONEB) neb solution 3 mL (3 mLs Nebulization $Given 6/22/25 0951)   ipratropium - albuterol 0.5 mg/2.5 mg (3mg)/3 mL (DUONEB) neb solution 3 mL (3 mLs Nebulization $Given 6/22/25 0950)   methylPREDNISolone Na Suc (solu-MEDROL) injection 125 mg (125 mg Intravenous $Given 6/22/25 0951)   magnesium sulfate 2 g in 50 mL sterile water intermittent infusion (2 g Intravenous $New Bag 6/22/25 1035)   cefTRIAXone (ROCEPHIN) 2 g vial to attach to  ml bag for ADULTS or NS 50 ml bag for PEDS (0 g Intravenous Stopped 6/22/25 1034)   azithromycin (ZITHROMAX) tablet 500 mg (500 mg Oral $Given 6/22/25 1029)       Drips infusing:  No  For the majority of the shift this patient was Green.   Interventions performed were n/a.    Sepsis treatment initiated: No    Cares/treatment/interventions/medications to be completed following ED care: IV antibiotics, albuterol treatments, o2 via NC @ 2L    ED Nurse Name: Chanel Lira RN  12:10 PM     RECEIVING UNIT ED HANDOFF REVIEW    Above ED Nurse Handoff Report was reviewed: Yes  Reviewed by: Marilee Andrew RN on June 22, 2025 at 1:50 PM   STEPHANIE Crum called the ED to inform them the note was read: Yes

## 2025-06-22 NOTE — ED PROVIDER NOTES
Emergency Department Note      History of Present Illness     Chief Complaint   Shortness of Breath      HPI   Marni Austin is a 55 year old female with history of diastolic CHF, type 2 diabetes, CAD, hypertension and COPD who presents to the ED with shortness of breath. Patient reports chest pain and muscle aches that began last night under her breasts by her ribcage with associated shortness of breath. She denies breast pain on exam but states her chest is tight and sore. She had jaw pain yesterday and this morning. She has a bronchitis cough and uses her inhaler, it is productive, no hemoptysis. She endorses sweats but denies fever.  She takes a water pill but did not this weekend. She denies new leg swelling. She reports a history of MI.       Independent Historian   None    Review of External Notes   I reviewed the ECHO from 10/17/24 note a history of diastolic CHF no valve disease   I reviewed the chest x-ray from today and the urgent care visit with Dr. Donis today.  Sent over for cardiac, alternative etiology rule out.  XR CHEST 2 VIEWS     INDICATION:   short of breath     COMPARISON:   None.     FINDINGS:   Normal cardiomediastinal silhouette and pulmonary vasculature.  There is a patchy left basilar opacity.  This is indeterminate.   Given provided history, infection is a possibility.  This could represent a prominent pericardiophrenic fat pad however.  There   are some linear peripheral opacities, probably fibrotic.  No pneumothorax or pleural effusion. Bony thorax is unremarkable.     Past Medical History     Medical History and Problem List   Bronchitis   Asthma   Diastolic CHF with preserved ejection fracture   CAD  Hypertension   Morbid obesity   Osteoarthritis   Tobacco use disorder type 2 diabetes   Depression       Medications   Albuterol   Pulmicort   Aspirin 81mg   Atorvastatin   Budesonide   Chlorthalidone   Dupilumab   Losartan   Semaglutide     Surgical History   Cardiac stent   Tubal  ligation   Tonsillectomy      Physical Exam     Patient Vitals for the past 24 hrs:   BP Temp Temp src Pulse Resp SpO2 Weight   06/22/25 1209 -- -- -- -- -- 95 % --   06/22/25 1159 -- -- -- -- -- 96 % --   06/22/25 1113 (!) 146/90 -- -- -- -- (!) 88 % --   06/22/25 0931 (!) 154/88 97.4  F (36.3  C) Temporal 107 26 94 % 87.6 kg (193 lb 2 oz)     Physical Exam  General: Awake, alert, non-toxic.  Head:  Scalp is NC/AT  Eyes:  Conjunctiva normal appearing and track normally.  PERRL  ENT:  The external nose and ears are normal.     Oropharynx clear, uvula midline.  Neck:  Normal range of motion without rigidity.  CV:  Regular rate and rhythm    No pathologic murmur, rubs, or gallops.  Resp:  Left basilar crackles, diffuse expiratory wheezing w/decreased air movement.    Tachypnic, speaking in short sentences.  no retractions or accessory muscle use  Abdomen: Abdomen is soft, no distension, no tenderness, no masses. No CVA tenderness.  MS:  No lower extremity edema/swelling. No midline cervical, thoracic, or lumbar tenderness.    Skin:  Warm and dry, No rash or lesions noted.  Neuro:  Alert and oriented to person, place, time, situation.  GCS 15 Moves all extremities normal.  No facial asymmetry. Speech not slurred.  Psych:  Awake. Alert. Normal affect. Appropriate interactions.      Diagnostics     Lab Results   Labs Ordered and Resulted from Time of ED Arrival to Time of ED Departure   COMPREHENSIVE METABOLIC PANEL - Abnormal       Result Value    Sodium 135      Potassium 3.3 (*)     Carbon Dioxide (CO2) 26      Anion Gap 12      Urea Nitrogen 16.7      Creatinine 0.78      GFR Estimate 88      Calcium 9.4      Chloride 97 (*)     Glucose 140 (*)     Alkaline Phosphatase 115      AST 23      ALT 22      Protein Total 7.1      Albumin 4.1      Bilirubin Total 0.8     BLOOD GAS VENOUS - Abnormal    pH Venous 7.42      pCO2 Venous 46      pO2 Venous 31      Bicarbonate Venous 30 (*)     Base Excess/Deficit Venous 4.5 (*)      FIO2 21      Oxyhemoglobin Venous 61 (*)     O2 Sat, Venous 64.2 (*)    CBC WITH PLATELETS AND DIFFERENTIAL - Abnormal    WBC Count 14.7 (*)     RBC Count 5.19      Hemoglobin 15.3      Hematocrit 43.7      MCV 84      MCH 29.5      MCHC 35.0      RDW 12.6      Platelet Count 251      % Neutrophils 77      % Lymphocytes 15      % Monocytes 6      % Eosinophils 1      % Basophils 0      % Immature Granulocytes 1      NRBCs per 100 WBC 0      Absolute Neutrophils 11.4 (*)     Absolute Lymphocytes 2.1      Absolute Monocytes 1.0      Absolute Eosinophils 0.1      Absolute Basophils 0.1      Absolute Immature Granulocytes 0.1      Absolute NRBCs 0.0     TROPONIN T, HIGH SENSITIVITY - Normal    Troponin T, High Sensitivity 10     NT-PROBNP - Normal    NT-proBNP <36     INFLUENZA A/B, RSV AND SARS-COV2 PCR - Normal    Influenza A PCR Negative      Influenza B PCR Negative      RSV PCR Negative      SARS CoV2 PCR Negative     D DIMER QUANTITATIVE - Normal    D-Dimer Quantitative 0.34     PROCALCITONIN - Normal    Procalcitonin 0.05     TROPONIN T, HIGH SENSITIVITY - Normal    Troponin T, High Sensitivity 8         Imaging   None    EKG   ECG taken at 0939, ECG read at 0951  Normal sinus rhythm  Nonspecific ST and T wave abnormality    No change as compared to prior, dated 11/22/23.  Rate 96 bpm. VT interval 138 ms. QRS duration 100 ms. QT/QTc 366/462 ms. P-R-T axes 35 52 55.    Independent Interpretation   None    ED Course      Medications Administered   Medications   ipratropium - albuterol 0.5 mg/2.5 mg (3mg)/3 mL (DUONEB) neb solution 3 mL (3 mLs Nebulization $Given 6/22/25 0951)   ipratropium - albuterol 0.5 mg/2.5 mg (3mg)/3 mL (DUONEB) neb solution 3 mL (3 mLs Nebulization $Given 6/22/25 0951)   ipratropium - albuterol 0.5 mg/2.5 mg (3mg)/3 mL (DUONEB) neb solution 3 mL (3 mLs Nebulization $Given 6/22/25 0950)   methylPREDNISolone Na Suc (solu-MEDROL) injection 125 mg (125 mg Intravenous $Given 6/22/25 0951)    magnesium sulfate 2 g in 50 mL sterile water intermittent infusion (2 g Intravenous $New Bag 6/22/25 1035)   cefTRIAXone (ROCEPHIN) 2 g vial to attach to  ml bag for ADULTS or NS 50 ml bag for PEDS (0 g Intravenous Stopped 6/22/25 1034)   azithromycin (ZITHROMAX) tablet 500 mg (500 mg Oral $Given 6/22/25 1029)       Procedures   None     Discussion of Management   Admitting Hospitalist, Dr. Izaguirre who agrees to accept    ED Course   ED Course as of 06/22/25 1211   Sun Jun 22, 2025   0938 I obtained history and examined the patient as noted above.    1036 I rechecked the patient and explained findings.        Additional Documentation  None    Medical Decision Making / Diagnosis     CMS Diagnoses: None    MIPS   None      MDM   Marni Austin is a 55 year old female who presents for evaluation of cough rib pain shortness of breath.  Has a history of COPD and diastolic CHF does continue to smoke.  Presentation here is most consistent with COPD exacerbation likely secondary to a community-acquired pneumonia of the left lower lobe based on chest x-ray findings and exam.  Patient does have some leukocytosis but afebrile vitally stable low procalcitonin does not appear septic.   No respiratory distress or indication for escalation of therapy and is improving some after nebs, steroids, and magnesium however she did desat to the high 80s on room air requiring 2 L nasal cannula.  She is not hypercapnic or requiring BiPAP or additional advanced air support at this time.  EKG shows nonspecific ST changes but high-sensitivity troponins are normal x 2 D-dimer is normal low risk for PE by Wells and I think ACS myocarditis PE or other cardiopulmonary etiology is very unlikely.  No evidence of CHF and BNP is undetectable does not appear to be volume overloaded.  Remainder of labs are reassuring.  Given continued wheezing with requirement for oxygen despite therapy we will admit for antibiotics and monitoring.  Given  Rocephin and azithromycin I do not think she needs coverage of resistant strains at this time and no evidence of empyema or other complication.  Discussed with hospitalist who agrees to accept for admission.    Disposition   The patient was admitted to the hospital.     Diagnosis     ICD-10-CM    1. Community acquired pneumonia of left lower lobe of lung  J18.9       2. COPD exacerbation (H)  J44.1       3. Acute respiratory failure with hypoxia (H)  J96.01       4. Chest pain  R07.9              Scribe Disclosure:  Oscar BROWN, am serving as a scribe at 9:36 AM on 6/22/2025 to document services personally performed by Doroteo Chairez PA-C based on my observations and the provider's statements to me.        Doroteo Chairez PA-C  06/22/25 1422

## 2025-06-22 NOTE — ED TRIAGE NOTES
Pt here with c/o SOB that started yesterday. Pt used inhalers at home with no relief. Hx of COPD and asthma

## 2025-06-22 NOTE — PLAN OF CARE
"End of Shift Summary  For vital signs and complete assessments, please see documentation flowsheets.     Pertinent assessments: Alert and oriented, 96% on 2L, diminished lung sounds. Frequent non-productive cough. Pt complains of severe muscle aching in rib cage, flexeril given. Reg diet, K Mg protocols.      Major Shift Events admitted to unit    Treatment Plan: Rocephin, Zithromax, prednisone, nebs,     Bedside Nurse: Marilee Andrew RN         Goal Outcome Evaluation:      Plan of Care Reviewed With: patient    Overall Patient Progress: no changeOverall Patient Progress: no change    Outcome Evaluation: Admitted to unit.      Problem: Adult Inpatient Plan of Care  Goal: Plan of Care Review  Description: The Plan of Care Review/Shift note should be completed every shift.  The Outcome Evaluation is a brief statement about your assessment that the patient is improving, declining, or no change.  This information will be displayed automatically on your shift  note.  Outcome: Progressing  Flowsheets (Taken 6/22/2025 7857)  Outcome Evaluation: Admitted to unit.  Plan of Care Reviewed With: patient  Overall Patient Progress: no change  Goal: Patient-Specific Goal (Individualized)  Description: You can add care plan individualizations to a care plan. Examples of Individualization might be:  \"Parent requests to be called daily at 9am for status\", \"I have a hard time hearing out of my right ear\", or \"Do not touch me to wake me up as it startles  me\".  Outcome: Progressing  Goal: Absence of Hospital-Acquired Illness or Injury  Outcome: Progressing  Intervention: Identify and Manage Fall Risk  Recent Flowsheet Documentation  Taken 6/22/2025 1419 by Marilee nAdrew, RN  Safety Promotion/Fall Prevention: safety round/check completed  Goal: Optimal Comfort and Wellbeing  Outcome: Progressing  Goal: Readiness for Transition of Care  Outcome: Progressing  Intervention: Mutually Develop Transition Plan  Recent Flowsheet " Documentation  Taken 6/22/2025 1400 by Marilee Andrew, RN  Equipment Currently Used at Home: none     Problem: Skin Injury Risk Increased  Goal: Skin Health and Integrity  Outcome: Progressing

## 2025-06-22 NOTE — PHARMACY-ADMISSION MEDICATION HISTORY
Pharmacy Intern Admission Medication History    Admission medication history is complete. The information provided in this note is only as accurate as the sources available at the time of the update.    Information Source(s): Patient and CareEverywhere/SureScripts via in-person    Pertinent Information: Patient has not taken first dose of Dupixent yet, but will be starting the medication in the near future. Injects Ozempic every Sunday, but did not get dose in today.    Changes made to PTA medication list:  Added: None  Deleted:   Losartan 25 mg tablet once daily  Changed:   Albuterol 0.083% neb solution - 1 vial every 6 hours as needed --> 2 vials every 6 hours as needed (shortness of breath, dyspnea, wheezing)  Clotrimazole 1% external cream twice daily --> twice daily as needed (rash)    Allergies reviewed with patient and updates made in EHR: yes, added oxycodone     Medication History Completed By: Mary Grace Joya 6/22/2025 1:20 PM    PTA Med List   Medication Sig Note Last Dose/Taking    albuterol (PROVENTIL) (2.5 MG/3ML) 0.083% neb solution Take 5 mg by nebulization every 6 hours as needed for shortness of breath, wheezing or cough.  Past Month    aspirin EC 81 MG EC tablet Take 1 tablet (81 mg) by mouth daily  6/21/2025    atorvastatin (LIPITOR) 80 MG tablet TAKE 1 TABLET(80 MG) BY MOUTH DAILY  6/21/2025    budesonide-formoterol (SYMBICORT/BREYNA) 160-4.5 MCG/ACT inhaler Inhale 2 puffs twice daily plus 1 puff as needed. May use up to 12 puffs per day.  6/21/2025    chlorthalidone (HYGROTON) 25 MG tablet Take 0.5 tablets (12.5 mg) by mouth daily.  6/21/2025    clotrimazole (LOTRIMIN) 1 % external cream Apply topically 2 times daily as needed (rash).  Past Month    dupilumab (DUPIXENT) 300 MG/2ML prefilled pen Inject 4 mLs (600 mg) subcutaneously See Admin Instructions for 1 dose. followed by 300 mg every other week.  Taking    dupilumab (DUPIXENT) 300 MG/2ML prefilled pen Inject 2 mLs (300 mg)  subcutaneously every 14 days.  Taking    nitroGLYcerin (NITROSTAT) 0.4 MG sublingual tablet SEE DIRECTIONS ON  MEDICATION INFORMATION  SHEET WITH INVOICE  PAPERWORK  Taking    triamcinolone (KENALOG) 0.1 % external cream Apply topically 2 times daily. Use and short 2 weeks bursts as needed for itch/rash.  Do not apply to face or folds.  Daily use is not recommended as it can increase your risk for skin thinning and stretch marks.  Past Month    Semaglutide, 2 MG/DOSE, (OZEMPIC) 8 MG/3ML pen Inject 2 mg subcutaneously every 7 days. 6/22/2025: Injects on Sundays. Did not inject today. 6/15/2025

## 2025-06-22 NOTE — PROGRESS NOTES
06/22/25 1419   Skin   Skin WDL X   Skin Color pale;itchy   Skin Temperature cool   Skin Moisture dry   Skin Elasticity firm   Skin Integrity rash   Rash Left;Abdominal;Right;Ankle;Back;Wrist   Device Skin Interventions Taken Other (See comment)     Admission/Transfer from: ED  2 RN skin assessment completed. Yes  Significant findings include: Rash left abdomen, lower back, right ankle. Abrasion right wrist.  LDA added (if applicable)? NO  Requested WOC Nurse Consult from MD? NO  Marilee Andrew, RN on 6/22/2025 at 3:25 PM

## 2025-06-23 VITALS
DIASTOLIC BLOOD PRESSURE: 79 MMHG | HEART RATE: 99 BPM | WEIGHT: 191.7 LBS | RESPIRATION RATE: 26 BRPM | HEIGHT: 61 IN | TEMPERATURE: 98.1 F | BODY MASS INDEX: 36.19 KG/M2 | OXYGEN SATURATION: 94 % | SYSTOLIC BLOOD PRESSURE: 130 MMHG

## 2025-06-23 LAB
ANION GAP SERPL CALCULATED.3IONS-SCNC: 11 MMOL/L (ref 7–15)
ATRIAL RATE - MUSE: 96 BPM
BUN SERPL-MCNC: 19.3 MG/DL (ref 6–20)
CALCIUM SERPL-MCNC: 9.7 MG/DL (ref 8.8–10.4)
CHLORIDE SERPL-SCNC: 104 MMOL/L (ref 98–107)
CK SERPL-CCNC: 367 U/L (ref 26–192)
CREAT SERPL-MCNC: 0.64 MG/DL (ref 0.51–0.95)
DIASTOLIC BLOOD PRESSURE - MUSE: NORMAL MMHG
EGFRCR SERPLBLD CKD-EPI 2021: >90 ML/MIN/1.73M2
ERYTHROCYTE [DISTWIDTH] IN BLOOD BY AUTOMATED COUNT: 12.6 % (ref 10–15)
GLUCOSE SERPL-MCNC: 168 MG/DL (ref 70–99)
HCO3 SERPL-SCNC: 23 MMOL/L (ref 22–29)
HCT VFR BLD AUTO: 45.2 % (ref 35–47)
HGB BLD-MCNC: 15.5 G/DL (ref 11.7–15.7)
INTERPRETATION ECG - MUSE: NORMAL
MAGNESIUM SERPL-MCNC: 2.3 MG/DL (ref 1.7–2.3)
MCH RBC QN AUTO: 29.3 PG (ref 26.5–33)
MCHC RBC AUTO-ENTMCNC: 34.3 G/DL (ref 31.5–36.5)
MCV RBC AUTO: 85 FL (ref 78–100)
P AXIS - MUSE: 35 DEGREES
PLATELET # BLD AUTO: 279 10E3/UL (ref 150–450)
POTASSIUM SERPL-SCNC: 3.9 MMOL/L (ref 3.4–5.3)
PR INTERVAL - MUSE: 138 MS
QRS DURATION - MUSE: 100 MS
QT - MUSE: 366 MS
QTC - MUSE: 462 MS
R AXIS - MUSE: 52 DEGREES
RBC # BLD AUTO: 5.29 10E6/UL (ref 3.8–5.2)
SODIUM SERPL-SCNC: 138 MMOL/L (ref 135–145)
SYSTOLIC BLOOD PRESSURE - MUSE: NORMAL MMHG
T AXIS - MUSE: 55 DEGREES
VENTRICULAR RATE- MUSE: 96 BPM
WBC # BLD AUTO: 18.7 10E3/UL (ref 4–11)

## 2025-06-23 PROCEDURE — 258N000003 HC RX IP 258 OP 636: Performed by: STUDENT IN AN ORGANIZED HEALTH CARE EDUCATION/TRAINING PROGRAM

## 2025-06-23 PROCEDURE — G0378 HOSPITAL OBSERVATION PER HR: HCPCS

## 2025-06-23 PROCEDURE — 250N000009 HC RX 250: Performed by: STUDENT IN AN ORGANIZED HEALTH CARE EDUCATION/TRAINING PROGRAM

## 2025-06-23 PROCEDURE — 85018 HEMOGLOBIN: CPT | Performed by: STUDENT IN AN ORGANIZED HEALTH CARE EDUCATION/TRAINING PROGRAM

## 2025-06-23 PROCEDURE — 999N000157 HC STATISTIC RCP TIME EA 10 MIN

## 2025-06-23 PROCEDURE — 36415 COLL VENOUS BLD VENIPUNCTURE: CPT | Performed by: STUDENT IN AN ORGANIZED HEALTH CARE EDUCATION/TRAINING PROGRAM

## 2025-06-23 PROCEDURE — 94640 AIRWAY INHALATION TREATMENT: CPT

## 2025-06-23 PROCEDURE — 250N000012 HC RX MED GY IP 250 OP 636 PS 637: Performed by: STUDENT IN AN ORGANIZED HEALTH CARE EDUCATION/TRAINING PROGRAM

## 2025-06-23 PROCEDURE — 83735 ASSAY OF MAGNESIUM: CPT | Performed by: STUDENT IN AN ORGANIZED HEALTH CARE EDUCATION/TRAINING PROGRAM

## 2025-06-23 PROCEDURE — 82550 ASSAY OF CK (CPK): CPT | Performed by: STUDENT IN AN ORGANIZED HEALTH CARE EDUCATION/TRAINING PROGRAM

## 2025-06-23 PROCEDURE — 250N000013 HC RX MED GY IP 250 OP 250 PS 637: Performed by: STUDENT IN AN ORGANIZED HEALTH CARE EDUCATION/TRAINING PROGRAM

## 2025-06-23 PROCEDURE — 99239 HOSP IP/OBS DSCHRG MGMT >30: CPT | Performed by: STUDENT IN AN ORGANIZED HEALTH CARE EDUCATION/TRAINING PROGRAM

## 2025-06-23 PROCEDURE — 250N000011 HC RX IP 250 OP 636: Performed by: STUDENT IN AN ORGANIZED HEALTH CARE EDUCATION/TRAINING PROGRAM

## 2025-06-23 PROCEDURE — 80048 BASIC METABOLIC PNL TOTAL CA: CPT | Performed by: STUDENT IN AN ORGANIZED HEALTH CARE EDUCATION/TRAINING PROGRAM

## 2025-06-23 RX ORDER — GUAIFENESIN/DEXTROMETHORPHAN 100-10MG/5
10 SYRUP ORAL EVERY 4 HOURS PRN
Qty: 236 ML | Refills: 0 | Status: SHIPPED | OUTPATIENT
Start: 2025-06-23

## 2025-06-23 RX ORDER — GUAIFENESIN/DEXTROMETHORPHAN 100-10MG/5
10 SYRUP ORAL EVERY 4 HOURS PRN
Status: DISCONTINUED | OUTPATIENT
Start: 2025-06-23 | End: 2025-06-23 | Stop reason: HOSPADM

## 2025-06-23 RX ORDER — CEFUROXIME AXETIL 500 MG/1
500 TABLET ORAL 2 TIMES DAILY
Qty: 6 TABLET | Refills: 0 | Status: SHIPPED | OUTPATIENT
Start: 2025-06-24 | End: 2025-06-27

## 2025-06-23 RX ORDER — CYCLOBENZAPRINE HCL 10 MG
10 TABLET ORAL EVERY 8 HOURS PRN
Qty: 15 TABLET | Refills: 0 | Status: SHIPPED | OUTPATIENT
Start: 2025-06-23

## 2025-06-23 RX ORDER — BENZONATATE 100 MG/1
100 CAPSULE ORAL 3 TIMES DAILY PRN
Qty: 30 CAPSULE | Refills: 0 | Status: SHIPPED | OUTPATIENT
Start: 2025-06-23

## 2025-06-23 RX ORDER — ACETAMINOPHEN 325 MG/1
650 TABLET ORAL EVERY 4 HOURS PRN
Qty: 30 TABLET | Refills: 0 | Status: SHIPPED | OUTPATIENT
Start: 2025-06-23

## 2025-06-23 RX ORDER — AZITHROMYCIN 250 MG/1
500 TABLET, FILM COATED ORAL ONCE
Qty: 2 TABLET | Refills: 0 | Status: SHIPPED | OUTPATIENT
Start: 2025-06-24 | End: 2025-06-24

## 2025-06-23 RX ORDER — PREDNISONE 20 MG/1
40 TABLET ORAL DAILY
Qty: 6 TABLET | Refills: 0 | Status: SHIPPED | OUTPATIENT
Start: 2025-06-24 | End: 2025-06-27

## 2025-06-23 RX ADMIN — BUDESONIDE AND FORMOTEROL FUMARATE DIHYDRATE 2 PUFF: 160; 4.5 AEROSOL RESPIRATORY (INHALATION) at 08:00

## 2025-06-23 RX ADMIN — GUAIFENESIN 200 MG: 200 SOLUTION ORAL at 08:51

## 2025-06-23 RX ADMIN — ASPIRIN 81 MG: 81 TABLET, DELAYED RELEASE ORAL at 08:47

## 2025-06-23 RX ADMIN — ATORVASTATIN CALCIUM 80 MG: 40 TABLET, FILM COATED ORAL at 08:47

## 2025-06-23 RX ADMIN — AZITHROMYCIN MONOHYDRATE 500 MG: 500 INJECTION, POWDER, LYOPHILIZED, FOR SOLUTION INTRAVENOUS at 09:41

## 2025-06-23 RX ADMIN — IPRATROPIUM BROMIDE AND ALBUTEROL SULFATE 3 ML: .5; 3 SOLUTION RESPIRATORY (INHALATION) at 08:00

## 2025-06-23 RX ADMIN — BENZONATATE 100 MG: 100 CAPSULE ORAL at 08:51

## 2025-06-23 RX ADMIN — CEFTRIAXONE 1 G: 1 INJECTION, POWDER, FOR SOLUTION INTRAMUSCULAR; INTRAVENOUS at 08:47

## 2025-06-23 RX ADMIN — PREDNISONE 40 MG: 20 TABLET ORAL at 08:47

## 2025-06-23 ASSESSMENT — ACTIVITIES OF DAILY LIVING (ADL)
ADLS_ACUITY_SCORE: 24
ADLS_ACUITY_SCORE: 25
ADLS_ACUITY_SCORE: 25
ADLS_ACUITY_SCORE: 24
ADLS_ACUITY_SCORE: 25
ADLS_ACUITY_SCORE: 26

## 2025-06-23 NOTE — PLAN OF CARE
"End of Shift Summary  For vital signs and complete assessments, please see documentation flowsheets.     Pertinent assessments: Alert and oriented, 96% on 2L, diminished lung sounds, Expiratory wheezing RML and RLL, Frequent non-productive cough. Pt complains of  muscle aching/ tightness in rib cage.      Major Shift Events None    Treatment Plan: Rocephin, Zithromax, prednisone, nebs    Bedside Nurse: Cynthia Acosta RN         Problem: Adult Inpatient Plan of Care  Goal: Plan of Care Review  Description: The Plan of Care Review/Shift note should be completed every shift.  The Outcome Evaluation is a brief statement about your assessment that the patient is improving, declining, or no change.  This information will be displayed automatically on your shift  note.  Outcome: Progressing  Flowsheets (Taken 6/23/2025 0607)  Outcome Evaluation: Rested quiet throughout night  Plan of Care Reviewed With: patient  Overall Patient Progress: no change  Goal: Patient-Specific Goal (Individualized)  Description: You can add care plan individualizations to a care plan. Examples of Individualization might be:  \"Parent requests to be called daily at 9am for status\", \"I have a hard time hearing out of my right ear\", or \"Do not touch me to wake me up as it startles  me\".  Outcome: Progressing  Goal: Absence of Hospital-Acquired Illness or Injury  Outcome: Progressing  Intervention: Identify and Manage Fall Risk  Recent Flowsheet Documentation  Taken 6/22/2025 2200 by Cynthia Acosta RN  Safety Promotion/Fall Prevention: safety round/check completed  Intervention: Prevent Skin Injury  Recent Flowsheet Documentation  Taken 6/22/2025 2200 by Cynthia Acosta RN  Body Position: position changed independently  Goal: Optimal Comfort and Wellbeing  Outcome: Progressing  Goal: Readiness for Transition of Care  Outcome: Progressing     Problem: Skin Injury Risk Increased  Goal: Skin Health and Integrity  Outcome: " Progressing  Intervention: Optimize Skin Protection  Recent Flowsheet Documentation  Taken 6/22/2025 2200 by Cynthia Acosta, RN  Activity Management:   ambulated in room   ambulated to bathroom  Head of Bed (HOB) Positioning: HOB at 20-30 degrees   Goal Outcome Evaluation:      Plan of Care Reviewed With: patient    Overall Patient Progress: no changeOverall Patient Progress: no change    Outcome Evaluation: Rested quiet throughout night

## 2025-06-23 NOTE — PLAN OF CARE
"Pertinent assessments: A&Ox4, VSS. On RA. Wheezing. SOB with activities. Home 02 done. Sustained above 90 with activities. Independent with activities. PIV removed. On regular diet with good appetite. Discharge instructions given by virtual nurse.      Major Shift Events: Discharging     Treatment Plan: Switched to oral antibiotics & prednisone. Discharging home     Goal Outcome Evaluation:  Plan of Care Reviewed With: patient  Overall Patient Progress: improvingOverall Patient Progress: improving  Outcome Evaluation: Discharging home  Problem: Adult Inpatient Plan of Care  Goal: Plan of Care Review  Description: The Plan of Care Review/Shift note should be completed every shift.  The Outcome Evaluation is a brief statement about your assessment that the patient is improving, declining, or no change.  This information will be displayed automatically on your shift  note.  Outcome: Met  Flowsheets (Taken 6/23/2025 1089)  Outcome Evaluation: Discharging home  Plan of Care Reviewed With: patient  Overall Patient Progress: improving  Goal: Patient-Specific Goal (Individualized)  Description: You can add care plan individualizations to a care plan. Examples of Individualization might be:  \"Parent requests to be called daily at 9am for status\", \"I have a hard time hearing out of my right ear\", or \"Do not touch me to wake me up as it startles  me\".  Outcome: Met  Goal: Absence of Hospital-Acquired Illness or Injury  Outcome: Met  Intervention: Identify and Manage Fall Risk  Recent Flowsheet Documentation  Taken 6/23/2025 0904 by Noni Michael RN  Safety Promotion/Fall Prevention: safety round/check completed  Intervention: Prevent Skin Injury  Recent Flowsheet Documentation  Taken 6/23/2025 0812 by Noni Michael RN  Body Position: position changed independently  Intervention: Prevent Infection  Recent Flowsheet Documentation  Taken 6/23/2025 0904 by Noni Michael RN  Infection Prevention:   equipment surfaces " disinfected   environmental surveillance performed   hand hygiene promoted  Goal: Optimal Comfort and Wellbeing  Outcome: Met  Intervention: Monitor Pain and Promote Comfort  Recent Flowsheet Documentation  Taken 6/23/2025 0900 by Noni Michael RN  Pain Management Interventions:   care clustered   declines  Goal: Readiness for Transition of Care  Outcome: Met     Problem: Skin Injury Risk Increased  Goal: Skin Health and Integrity  Outcome: Met  Intervention: Plan: Nurse Driven Intervention: Moisture Management  Recent Flowsheet Documentation  Taken 6/23/2025 0812 by Noni Michael RN  Moisture Interventions: Encourage regular toileting  Bathing/Skin Care: other (see comments)  Intervention: Optimize Skin Protection  Recent Flowsheet Documentation  Taken 6/23/2025 0812 by Noni Michael RN  Activity Management:   activity adjusted per tolerance   ambulated to bathroom  Head of Bed (HOB) Positioning: HOB at 20-30 degrees

## 2025-06-23 NOTE — PROGRESS NOTES
06/23/25 1200   Home Oxygen Assessment (RN/RT ONLY)   Does patient have oxygen at home? No   1. SpO2 on room air at rest while awake 95   2. SpO2 while at rest on oxygen 95       Oxygen LPM at rest RA   3. SpO2 on room air during Activity/with exercise 92   4. SpO2 with oxygen during activity/with exercise 92       Oxygen LPM during activity/with exercise RA   Does patient qualify for Home O2? No

## 2025-06-23 NOTE — PLAN OF CARE
Discharge Note    Patient discharged to home via private vehicle  accompanied by daughter.  IV: Discontinued  Prescriptions filled and given to patient/family.   Belongings reviewed and sent with patient and family.   Home medications returned to patient: NA  Equipment sent with: N/A.   patient and family verbalizes understanding of discharge instructions. AVS given to patient and family.  Additional education completed? Antibiotic

## 2025-06-23 NOTE — CARE PLAN
06/23/25 1200   Home Oxygen Assessment (RN/RT ONLY)   Does patient have oxygen at home? No   1. SpO2 on room air at rest while awake 95       Oxygen LPM at rest RA   3. SpO2 on room air during Activity/with exercise 92   4. SpO2 with oxygen during activity/with exercise 92       Oxygen LPM during activity/with exercise RA   Does patient qualify for Home O2? No

## 2025-06-23 NOTE — DISCHARGE SUMMARY
"[facility]  Hospitalist Discharge Summary      Date of Admission:  6/22/2025  Date of Discharge:  6/23/2025  2:37 PM  Discharging Provider: Gilmar Izaguirre DO  Discharge Service: Hospitalist Service    Discharge Diagnoses   Marni Austin is a 55 year old female with CHF, T2DM, CAD, HTN, tobacco use disorder, COPD admitted on 6/22/2025 with shortness of breath.     CXR showed possible pneumonia.  WBC elevated.  Treated with ceftriaxone, azithromycin, prednisone with improvement in symptoms.  Not requiring oxygen at the time of discharge.         Acute hypoxemic respiratory failure 2/2 COPD exacerbation and possible CAP:  Patient presents with \"charley horse\" in her bilateral lower rib.  She also has complained of shortness of breath and cough over the recent few days.  Cough is nonproductive.  Denies overt fevers.  On presentation to the ED she was found to be mild hypoxemic with an oxygen saturation of 87%.  Chest x-ray shows possible pneumonia.  WBC 14.7.  -Cefuroxime for 3 more days, azithromycin for 1 more day  -Prednisone 40 x 3 more days  -DuoNebs as needed  -No oxygen need at the time of discharge     Chest pain/cramps:  Patient has longstanding history of \"charley horse.\"  Looking back in her chart seems like these have been present for 10+ years.  In various areas throughout her body.  Now she presents with cramping of her bilateral lower rib.  She reports that she was previously prescribed cyclobenzaprine.  She has had multiple ACS rule outs due to similar pains in the past.  In the ED, EKG is unchanged, Trop is negative x 2.  -Flexeril prn     CAD s/p PCI x1 2015: Continue PTA meds once med rec completed     Tobacco use disorder: Smoking 1/3rd ppd.  Declines NRT.    Clinically Significant Risk Factors     # DMII: A1C = 6.6 % (Ref range: 0.0 - 5.6 %) within past 6 months  # Obesity: Estimated body mass index is 36.22 kg/m  as calculated from the following:    Height as of this " "encounter: 1.549 m (5' 1\").    Weight as of this encounter: 87 kg (191 lb 11.2 oz).       Follow-ups Needed After Discharge   Follow-up Appointments       Hospital Follow-up with Existing Primary Care Provider (PCP)          Schedule Primary Care visit within: 7 Days               Unresulted Labs Ordered in the Past 30 Days of this Admission       No orders found for last 31 day(s).        These results will be followed up by NA    Discharge Disposition   Discharged to home  Condition at discharge: Stable    Consultations This Hospital Stay   None    Code Status   Full Code    Time Spent on this Encounter   I, Gilmar Izaguirre DO, personally saw the patient today and spent greater than 30 minutes discharging this patient.       Gilmar Izaguirre DO  [address] SURGICAL  [address] [place] [address]  Phone: [phone]  Fax: [phone]  ______________________________________________________________________    Physical Exam   Vital Signs: Temp: 98.1  F (36.7  C) Temp src: Oral BP: 130/79 Pulse: 99   Resp: 26 SpO2: 94 % O2 Device: None (Room air) Oxygen Delivery: 2 LPM  Weight: 191 lbs 11.2 oz  General Appearance: Patient awake & alert.  No apparent distress.   Respiratory: Lungs are CTAB.  Work of breathing appears normal on room air.  Cardiovascular: Regular rate and rhythm.  No murmurs rubs or gallops.  There is no edema present.  GI: Benign.  Soft.  Non-tender.  Bowel sounds active.   Skin: No rashes or lesions exposed skin.  Neuro:  The patient is moving all extremities.  No obvious focal asymmetries.   Other: Patient is appropriate and oriented x3.          Primary Care Physician   David Gambino    Discharge Orders      Reason for your hospital stay    You were admitted for a pneumonia and COPD exacerbation.  You were treated with antibiotics and steroids.  You were also treated for rib pain with muscle relaxants.     Activity    Your activity upon discharge: activity as " tolerated     Diet    Follow this diet upon discharge: Regular     Hospital Follow-up with Existing Primary Care Provider (PCP)            Significant Results and Procedures   Most Recent 3 CBC's:  Recent Labs   Lab Test 06/23/25 0625 06/22/25  0944 08/13/24  1517   WBC 18.7* 14.7* 10.3   HGB 15.5 15.3 16.1*   MCV 85 84 85    251 263     Most Recent 3 BMP's:  Recent Labs   Lab Test 06/23/25 0625 06/22/25 2051 06/22/25  0944 03/31/25  1544     --  135 142   POTASSIUM 3.9 4.1 3.3* 4.0   CHLORIDE 104  --  97* 102   CO2 23  --  26 29   BUN 19.3  --  16.7 15.8   CR 0.64  --  0.78 0.84   ANIONGAP 11  --  12 11   RANDY 9.7  --  9.4 9.6   *  --  140* 112*       Discharge Medications      Review of your medicines        START taking        Dose / Directions   acetaminophen 325 MG tablet  Commonly known as: TYLENOL      Dose: 650 mg  Take 2 tablets (650 mg) by mouth every 4 hours as needed for mild pain or other (and adjunct with moderate or severe pain or per patient request).  Quantity: 30 tablet  Refills: 0     azithromycin 250 MG tablet  Commonly known as: ZITHROMAX      Dose: 500 mg  Start taking on: June 24, 2025  Take 2 tablets (500 mg) by mouth once for 1 dose.  Quantity: 2 tablet  Refills: 0     benzonatate 100 MG capsule  Commonly known as: TESSALON      Dose: 100 mg  Take 1 capsule (100 mg) by mouth 3 times daily as needed for cough.  Quantity: 30 capsule  Refills: 0     cefuroxime 500 MG tablet  Commonly known as: CEFTIN      Dose: 500 mg  Start taking on: June 24, 2025  Take 1 tablet (500 mg) by mouth 2 times daily for 3 days.  Quantity: 6 tablet  Refills: 0     cyclobenzaprine 10 MG tablet  Commonly known as: FLEXERIL      Dose: 10 mg  Take 1 tablet (10 mg) by mouth every 8 hours as needed for muscle spasms.  Quantity: 15 tablet  Refills: 0     guaiFENesin-dextromethorphan 100-10 MG/5ML syrup  Commonly known as: ROBITUSSIN DM      Dose: 10 mL  Take 10 mLs by mouth every 4 hours as needed for  cough.  Quantity: 236 mL  Refills: 0     predniSONE 20 MG tablet  Commonly known as: DELTASONE      Dose: 40 mg  Start taking on: June 24, 2025  Take 2 tablets (40 mg) by mouth daily for 3 days.  Quantity: 6 tablet  Refills: 0            CONTINUE these medicines which have NOT CHANGED        Dose / Directions   albuterol (2.5 MG/3ML) 0.083% neb solution  Commonly known as: PROVENTIL      Dose: 5 mg  Take 5 mg by nebulization every 6 hours as needed for shortness of breath, wheezing or cough.  Refills: 0     aspirin 81 MG EC tablet  Commonly known as: ASA  Used for: NSTEMI (non-ST elevated myocardial infarction) (H)      Dose: 81 mg  Take 1 tablet (81 mg) by mouth daily  Refills: 0     atorvastatin 80 MG tablet  Commonly known as: LIPITOR  Used for: Coronary artery disease involving native heart without angina pectoris, unspecified vessel or lesion type, CARDIOVASCULAR SCREENING; LDL GOAL LESS THAN 130      Dose: 80 mg  TAKE 1 TABLET(80 MG) BY MOUTH DAILY  Quantity: 90 tablet  Refills: 2     budesonide-formoterol 160-4.5 MCG/ACT inhaler  Commonly known as: SYMBICORT/BREYNA  Used for: Moderate persistent asthma without complication      Inhale 2 puffs twice daily plus 1 puff as needed. May use up to 12 puffs per day.  Quantity: 20.4 g  Refills: 11     chlorthalidone 25 MG tablet  Commonly known as: HYGROTON  Used for: Hypertension goal BP (blood pressure) < 140/90, Coronary artery disease involving native heart without angina pectoris, unspecified vessel or lesion type      Dose: 12.5 mg  Take 0.5 tablets (12.5 mg) by mouth daily.  Quantity: 45 tablet  Refills: 3     clotrimazole 1 % external cream  Commonly known as: LOTRIMIN      Apply topically 2 times daily as needed (rash).  Refills: 0     * dupilumab 300 MG/2ML prefilled pen  Commonly known as: DUPIXENT  Used for: Atopic dermatitis, unspecified type      Dose: 600 mg  Inject 4 mLs (600 mg) subcutaneously See Admin Instructions for 1 dose. followed by 300 mg  every other week.  Quantity: 6 mL  Refills: 0     * dupilumab 300 MG/2ML prefilled pen  Commonly known as: DUPIXENT  Used for: Atopic dermatitis, unspecified type      Dose: 300 mg  Inject 2 mLs (300 mg) subcutaneously every 14 days.  Quantity: 4 mL  Refills: 2     nitroGLYcerin 0.4 MG sublingual tablet  Commonly known as: NITROSTAT  Used for: NSTEMI (non-ST elevated myocardial infarction) (H)      SEE DIRECTIONS ON  MEDICATION INFORMATION  SHEET WITH INVOICE  PAPERWORK  Quantity: 75 tablet  Refills: 1     Semaglutide (2 MG/DOSE) 8 MG/3ML pen  Commonly known as: OZEMPIC  Used for: Type 2 diabetes mellitus with other circulatory complication, without long-term current use of insulin (H), Morbid obesity (H)      Dose: 2 mg  Inject 2 mg subcutaneously every 7 days.  Quantity: 9 mL  Refills: 3     triamcinolone 0.1 % external cream  Commonly known as: KENALOG  Used for: Rash and nonspecific skin eruption, AD (atopic dermatitis), Atopic neurodermatitis      Apply topically 2 times daily. Use and short 2 weeks bursts as needed for itch/rash.  Do not apply to face or folds.  Daily use is not recommended as it can increase your risk for skin thinning and stretch marks.  Quantity: 454 g  Refills: 1           * This list has 2 medication(s) that are the same as other medications prescribed for you. Read the directions carefully, and ask your doctor or other care provider to review them with you.                   Where to get your medicines        These medications were sent to [facility] [address] [facility]  [address] [facility] [address]      Phone: [phone]   acetaminophen 325 MG tablet  azithromycin 250 MG tablet  benzonatate 100 MG capsule  cefuroxime 500 MG tablet  cyclobenzaprine 10 MG tablet  guaiFENesin-dextromethorphan 100-10 MG/5ML syrup  predniSONE 20 MG tablet       Allergies   Allergies   Allergen Reactions    Oxycodone Nausea and Vomiting    Zyban  [Bupropion Hcl] Anaphylaxis

## 2025-06-24 ENCOUNTER — PATIENT OUTREACH (OUTPATIENT)
Dept: CARE COORDINATION | Facility: CLINIC | Age: 55
End: 2025-06-24

## 2025-06-24 NOTE — PROGRESS NOTES
Clinic Care Coordination Contact  Transitions of Care Outreach  Chief Complaint   Patient presents with    Clinic Care Coordination - Post Hospital       Most Recent Admission Date: 6/22/2025   Most Recent Admission Diagnosis: Myalgia - M79.10  Chest pain - R07.9  COPD exacerbation (H) - J44.1  Acute respiratory failure with hypoxia (H) - J96.01  Community acquired pneumonia of left lower lobe of lung - J18.9     Most Recent Discharge Date: 6/23/2025   Most Recent Discharge Diagnosis: Community acquired pneumonia of left lower lobe of lung - J18.9  COPD exacerbation (H) - J44.1  Acute respiratory failure with hypoxia (H) - J96.01  Chest pain - R07.9  Myalgia - M79.10     Transitions of Care Assessment    Discharge Assessment  How are you doing now that you are home?: better  How are your symptoms? (Red Flag symptoms escalate to triage hotline per guidelines): Improved  Do you know how to contact your clinic care team if you have future questions or changes to your health status? : Yes  Does the patient have their discharge instructions? : Yes  Does the patient have questions regarding their discharge instructions? : No  Were you started on any new medications or were there changes to any of your previous medications? : Yes  Does the patient have all of their medications?: Yes  Do you have questions regarding any of your medications? : No  Do you have all of your needed medical supplies or equipment (DME)?  (i.e. oxygen tank, CPAP, cane, etc.): Yes                  Follow up Plan     Discharge Follow-Up  Discharge follow up appointment scheduled in alignment with recommended follow up timeframe or Transitions of Risk Category? (Low = within 30 days; Moderate= within 14 days; High= within 7 days): No    Future Appointments   Date Time Provider Department Center   6/30/2025  3:35 PM Celina Little PT [facility] [place]   7/17/2025 10:30 AM Jesus Mathis PA-C [facility] [place]   12/16/2025 12:15 PM  Leighton Mir MD [facility]       Outpatient Plan as outlined on AVS reviewed with patient.    For any urgent concerns, please contact our 24 hour nurse triage line: [phone] ([phone])       CHELSEY Lange

## 2025-07-01 ENCOUNTER — VIRTUAL VISIT (OUTPATIENT)
Dept: FAMILY MEDICINE | Facility: CLINIC | Age: 55
End: 2025-07-01
Payer: COMMERCIAL

## 2025-07-01 DIAGNOSIS — J18.9 COMMUNITY ACQUIRED PNEUMONIA OF LEFT LOWER LOBE OF LUNG: ICD-10-CM

## 2025-07-01 DIAGNOSIS — L30.4 INTERTRIGO: ICD-10-CM

## 2025-07-01 DIAGNOSIS — J45.40 MODERATE PERSISTENT ASTHMA WITHOUT COMPLICATION: ICD-10-CM

## 2025-07-01 DIAGNOSIS — Z09 HOSPITAL DISCHARGE FOLLOW-UP: Primary | ICD-10-CM

## 2025-07-01 DIAGNOSIS — J41.0 SIMPLE CHRONIC BRONCHITIS (H): ICD-10-CM

## 2025-07-01 PROCEDURE — 98005 SYNCH AUDIO-VIDEO EST LOW 20: CPT | Performed by: PHYSICIAN ASSISTANT

## 2025-07-01 RX ORDER — FLUCONAZOLE 150 MG/1
150 TABLET ORAL
Qty: 3 TABLET | Refills: 0 | Status: SHIPPED | OUTPATIENT
Start: 2025-07-01 | End: 2025-07-08

## 2025-07-01 NOTE — PROGRESS NOTES
"Marni is a 55 year old who is being evaluated via a billable video visit.    How would you like to obtain your AVS? MyChart  If the video visit is dropped, the invitation should be resent by: Text to cell phone: [phone]  Will anyone else be joining your video visit? No      Assessment & Plan     Hospital discharge follow-up  Community acquired pneumonia of left lower lobe of lung  Overall she is doing well; Completed course of abx and denies any hernan shortness of breath; minimal cough. Continue to monitor    Simple chronic bronchitis (H)  Moderate persistent asthma without complication  Not currently on LAMA but did just switch to YUDELKA guideline directed therapy with symbicort. She does not have consistent pneumonia/infection so for now this combo continues to be reasonable. Initially she was not using 2p twice daily but is now and actually finds it much more effective than past inhalers. Hoping dupixent will help as well    Intertrigo  Trial extended course given recent abx  - fluconazole (DIFLUCAN) 150 MG tablet; Take 1 tablet (150 mg) by mouth every 3 days for 3 doses.    The longitudinal plan of care for the diagnosis(es)/condition(s) as documented were addressed during this visit. Due to the added complexity in care, I will continue to support Marni in the subsequent management and with ongoing continuity of care.        MED REC REQUIRED  Post Medication Reconciliation Status:     BMI  Estimated body mass index is 36.22 kg/m  as calculated from the following:    Height as of 6/22/25: 1.549 m (5' 1\").    Weight as of 6/22/25: 87 kg (191 lb 11.2 oz).             Subjective   Marni is a 55 year old, presenting for the following health issues:  No chief complaint on file.        7/1/2025     3:37 PM   Additional Questions   Roomed by kalyn     HPI          6/24/2025   Post Discharge Outreach   How are you doing now that you are home? better   How are your symptoms? (Red Flag symptoms escalate to triage " hotline per guidelines) Improved   Does the patient have their discharge instructions?  Yes   Does the patient have questions regarding their discharge instructions?  No   Were you started on any new medications or were there changes to any of your previous medications?  Yes   Does the patient have all of their medications? Yes   Do you have questions regarding any of your medications?  No   Do you have all of your needed medical supplies or equipment (DME)?  (i.e. oxygen tank, CPAP, cane, etc.) Yes       Hospital Follow-up Visit:    Hospital/Nursing Home/ Rehab Facility: [facility]  Most Recent Admission Date: 6/22/2025   Most Recent Admission Diagnosis: Myalgia - M79.10  Chest pain - R07.9  COPD exacerbation (H) - J44.1  Acute respiratory failure with hypoxia (H) - J96.01  Community acquired pneumonia of left lower lobe of lung - J18.9     Most Recent Discharge Date: 6/23/2025   Most Recent Discharge Diagnosis: Community acquired pneumonia of left lower lobe of lung - J18.9  COPD exacerbation (H) - J44.1  Acute respiratory failure with hypoxia (H) - J96.01  Chest pain - R07.9  Myalgia - M79.10   Do you have any other stressors you would like to discuss with your provider? Health Concerns    Marni Austin is a 55 year old female who presents today for [facility] hospital follow up   She initially went in to clinic for muscle spasms in the ribs but with additional breathing difficult the work up revealed some of the symptoms turned out to be 2/2 pneumonia  Today she mentions feeling much better  She has finished the abx   Still coughing intermittently; breathing overall ok but continues the inhaler 2p twice daily   Humidity does really get to her  She is trying to limit smoking  Cramps have improved for now though these are chronic    rashes treated previously with one dose following abx improved significantly the the groin flared again following recent treatments      Problems taking  medications regularly:  None  Medication changes since discharge: abx; already completed  Problems adhering to non-medication therapy:  None    Summary of hospitalization:  [facility] discharge summary reviewed  Diagnostic Tests/Treatments reviewed.  Follow up needed: none  Other Healthcare Providers Involved in Patient s Care:         None  Update since discharge: improved.         Plan of care communicated with patient               Review of Systems  Constitutional, HEENT, cardiovascular, pulmonary, gi and gu systems are negative, except as otherwise noted.      Objective           Vitals:  No vitals were obtained today due to virtual visit.    Physical Exam   GENERAL: alert and no distress  EYES: Eyes grossly normal to inspection.  No discharge or erythema, or obvious scleral/conjunctival abnormalities.  RESP: No audible wheeze, cough, or visible cyanosis.    SKIN: Visible skin clear. No significant rash, abnormal pigmentation or lesions.  NEURO: Cranial nerves grossly intact.  Mentation and speech appropriate for age.  PSYCH: Appropriate affect, tone, and pace of words        Video-Visit Details    Type of service:  Video Visit   Originating Location (pt. Location): Home    Distant Location (provider location):  On-site  Platform used for Video Visit: Alfonso  Signed Electronically by: David Gambino PA-C

## 2025-07-01 NOTE — TELEPHONE ENCOUNTER
Hi there is it possible to get an update on this PA? I just had a hospital follow up with Marni and told her I would check in. Thanks!

## 2025-07-02 ENCOUNTER — MYC MEDICAL ADVICE (OUTPATIENT)
Dept: PHARMACY | Facility: CLINIC | Age: 55
End: 2025-07-02
Payer: COMMERCIAL

## 2025-07-02 NOTE — TELEPHONE ENCOUNTER
Prior Authorization Approval    Medication: DUPIXENT 300 MG/2ML SC SOAJ  Authorization Effective Date: 7/2/2025  Authorization Expiration Date: 11/2/2025  Approved Dose/Quantity: 6 ml per 28 days loading dose then 4 ml per 28 days maintenance dose  Reference #: [id number]   Insurance Company: [employer] - Phone [phone] Fax [phone]  Expected CoPay: $  Cannot see co-pay due to pharmacy restriction  CoPay Card Available: Yes    Financial Assistance Needed: Possibly - sent [employer] message offering savings card  Which Pharmacy is filling the prescription:    Pharmacy Notified: Yes  Patient Notified: Yes - via [employer]     COPAY CARD OFFERED    Medication: DUPIXENT 300 MG/2ML SC SOAJ  Sponsor: Dupixent [employer]  Expected Copay: $  0  Copay card Monthly Max Amount: $ 833  Copay card Annual Amount: $ 10,000         Thank you,     Brent Elder [facility]  Pharmacy Clinic [facility]  [email or web address]   Phone: [phone]  Fax: [phone]

## 2025-07-02 NOTE — TELEPHONE ENCOUNTER
Called [employer] at [phone] to check on the Dupixent prior authorization status. They stated that they have not received the prior authorization. Called [facility] pharmacy at [phone] to start a verbal prior authorization. Representative stated that it sounds like this will be approved but that I need to send in the chart notes for view.     PA#: [id number]  Fax#: [phone]    Faxed chart notes to the fax number listed above. Fax confirmed sent.         Thank you,     Brent Elder [facility]  Pharmacy Clinic [facility]  [email or web address]   Phone: [phone]  Fax: [phone]

## 2025-07-06 ENCOUNTER — HEALTH MAINTENANCE LETTER (OUTPATIENT)
Age: 55
End: 2025-07-06

## 2025-07-08 ENCOUNTER — MYC MEDICAL ADVICE (OUTPATIENT)
Dept: FAMILY MEDICINE | Facility: CLINIC | Age: 55
End: 2025-07-08
Payer: COMMERCIAL

## 2025-07-08 DIAGNOSIS — M79.10 MYALGIA: ICD-10-CM

## 2025-07-09 RX ORDER — CYCLOBENZAPRINE HCL 10 MG
10 TABLET ORAL EVERY 8 HOURS PRN
Qty: 15 TABLET | Refills: 0 | Status: SHIPPED | OUTPATIENT
Start: 2025-07-09

## 2025-07-09 NOTE — TELEPHONE ENCOUNTER
"Per ER discharge summary 6/23/25 patient was prescribed flexeril for cramping in bilateral lower ribs.     Routing to PCP for refill request.       Chest pain/cramps:  Patient has longstanding history of \"charley horse.\"  Looking back in her chart seems like these have been present for 10+ years.  In various areas throughout her body.  Now she presents with cramping of her bilateral lower rib.  She reports that she was previously prescribed cyclobenzaprine.  She has had multiple ACS rule outs due to similar pains in the past.  In the ED, EKG is unchanged, Trop is negative x 2.  -Flexeril prn  "

## 2025-07-17 ENCOUNTER — OFFICE VISIT (OUTPATIENT)
Dept: ORTHOPEDICS | Facility: CLINIC | Age: 55
End: 2025-07-17
Payer: COMMERCIAL

## 2025-07-17 DIAGNOSIS — G56.02 LEFT CARPAL TUNNEL SYNDROME: ICD-10-CM

## 2025-07-17 DIAGNOSIS — Z47.89 ORTHOPEDIC AFTERCARE: Primary | ICD-10-CM

## 2025-07-17 PROBLEM — M17.11 PRIMARY OSTEOARTHRITIS OF RIGHT KNEE: Status: RESOLVED | Noted: 2025-05-08 | Resolved: 2025-07-17

## 2025-07-17 NOTE — LETTER
7/17/2025      Marni Austin  [address]  [facility] [address]      Dear Colleague,    Thank you for referring your patient, Marni Austin, to the [facility] [place]. Please see a copy of my visit note below.    HISTORY OF PRESENT ILLNESS:  Marni Austin is a 55 year old female who is seen in follow up now 1 week S/P Left Carpal tunnel release (DOS: 7/11/2025) performed by Dr. Richie Momin  Present symptoms: Patient reports that she is overall doing well following her procedure.  She does state that her pain has intermittently increased.  Denies using any medication for pain management.  She has kept the incision clean, dry, and covered with a Band-Aid.  States that she has been using her hand regularly and has not maintained 1 pound weightbearing restriction.  She does note mild continued weakness.  Denies paresthesias.  Denies Chest pain, Calve pain, Fever, Chills.    PHYSICAL EXAM:  There were no vitals taken for this visit.  There is no weight on file to calculate BMI.   GENERAL APPEARANCE: healthy, alert and no distress   PSYCH: mentation appears normal and affect normal/bright    MSK:  Left Volar wrist.  Incision clean and dry, no sutures present, healing.  Appropriate incisional erythema.   No Ecchymosis.  Edema subtle most at in the left hand  CMS: ronnie incisional numbness, otherwise grossly intact to digits.  AROM: mild restriction in palmar wrist flexion, otherwise WNL.      ASSESSMENT:  Marni Austin is a 55 year old female who is seen in follow up now 1 week S/P Left Carpal tunnel release (DOS: 7/11/2025) performed by Dr. Richie Momin  - Improving appropriately    PLAN:  - Surgery discussed and all questions were answered at this time.  - Incision care instructions given and verbally acknowledged.  - Medications: Discussed the use of Tylenol or ibuprofen as needed for pain management  - Physical Therapy: As instructed / Fabio -reviewed that her  strength  should slowly begin to return  - May slowly progress weightbearing activity, using pain as her guide.  - CTR symptoms may take several months to resolve following surgery.      Return to clinic PRN.    Jesus Mathis PA-C  Physician Assistant   Oncology and Adult Reconstructive Surgery  Dept Orthopaedic Surgery, [facility]      7/17/2025  This note was created using dictation software and may contain errors.  Please contact the creator for any clarifications that are needed.       Again, thank you for allowing me to participate in the care of your patient.        Sincerely,        Jesus Mathis PA-C    Electronically signed

## 2025-07-17 NOTE — PROGRESS NOTES
HISTORY OF PRESENT ILLNESS:  Marni Austin is a 55 year old female who is seen in follow up now 1 week S/P Left Carpal tunnel release (DOS: 7/11/2025) performed by Dr. Richie Momin  Present symptoms: Patient reports that she is overall doing well following her procedure.  She does state that her pain has intermittently increased.  Denies using any medication for pain management.  She has kept the incision clean, dry, and covered with a Band-Aid.  States that she has been using her hand regularly and has not maintained 1 pound weightbearing restriction.  She does note mild continued weakness.  Denies paresthesias.  Denies Chest pain, Calve pain, Fever, Chills.    PHYSICAL EXAM:  There were no vitals taken for this visit.  There is no weight on file to calculate BMI.   GENERAL APPEARANCE: healthy, alert and no distress   PSYCH: mentation appears normal and affect normal/bright    MSK:  Left Volar wrist.  Incision clean and dry, no sutures present, healing.  Appropriate incisional erythema.   No Ecchymosis.  Edema subtle most at in the left hand  CMS: ronnie incisional numbness, otherwise grossly intact to digits.  AROM: mild restriction in palmar wrist flexion, otherwise WNL.      ASSESSMENT:  Marni Austin is a 55 year old female who is seen in follow up now 1 week S/P Left Carpal tunnel release (DOS: 7/11/2025) performed by Dr. Richie Momin  - Improving appropriately    PLAN:  - Surgery discussed and all questions were answered at this time.  - Incision care instructions given and verbally acknowledged.  - Medications: Discussed the use of Tylenol or ibuprofen as needed for pain management  - Physical Therapy: As instructed / Fabio -reviewed that her  strength should slowly begin to return  - May slowly progress weightbearing activity, using pain as her guide.  - CTR symptoms may take several months to resolve following surgery.      Return to clinic PRN.    Jesus Mathis PA-C  Physician Assistant    Oncology and Adult Reconstructive Surgery  Dept Orthopaedic Surgery, [facility]      7/17/2025  This note was created using dictation software and may contain errors.  Please contact the creator for any clarifications that are needed.

## 2025-07-21 ENCOUNTER — PATIENT OUTREACH (OUTPATIENT)
Dept: CARE COORDINATION | Facility: CLINIC | Age: 55
End: 2025-07-21

## 2025-07-21 ENCOUNTER — MYC REFILL (OUTPATIENT)
Dept: FAMILY MEDICINE | Facility: CLINIC | Age: 55
End: 2025-07-21

## 2025-07-21 DIAGNOSIS — I10 HYPERTENSION GOAL BP (BLOOD PRESSURE) < 140/90: ICD-10-CM

## 2025-07-21 DIAGNOSIS — I25.10 CORONARY ARTERY DISEASE INVOLVING NATIVE HEART WITHOUT ANGINA PECTORIS, UNSPECIFIED VESSEL OR LESION TYPE: ICD-10-CM

## 2025-07-22 RX ORDER — CHLORTHALIDONE 25 MG/1
12.5 TABLET ORAL DAILY
Qty: 45 TABLET | Refills: 2 | Status: SHIPPED | OUTPATIENT
Start: 2025-07-22

## 2025-08-06 ENCOUNTER — PATIENT OUTREACH (OUTPATIENT)
Dept: CARE COORDINATION | Facility: CLINIC | Age: 55
End: 2025-08-06
Payer: COMMERCIAL

## 2025-08-07 DIAGNOSIS — Z12.11 COLON CANCER SCREENING: ICD-10-CM

## 2025-08-21 ENCOUNTER — ORDERS ONLY (AUTO-RELEASED) (OUTPATIENT)
Dept: URGENT CARE | Facility: CLINIC | Age: 55
End: 2025-08-21
Payer: COMMERCIAL

## 2025-08-21 DIAGNOSIS — Z12.11 COLON CANCER SCREENING: ICD-10-CM

## 2025-08-24 ENCOUNTER — MYC REFILL (OUTPATIENT)
Dept: FAMILY MEDICINE | Facility: CLINIC | Age: 55
End: 2025-08-24
Payer: COMMERCIAL

## 2025-08-24 DIAGNOSIS — J45.40 MODERATE PERSISTENT ASTHMA WITHOUT COMPLICATION: ICD-10-CM

## 2025-08-24 DIAGNOSIS — Z13.6 CARDIOVASCULAR SCREENING; LDL GOAL LESS THAN 130: ICD-10-CM

## 2025-08-24 DIAGNOSIS — I25.10 CORONARY ARTERY DISEASE INVOLVING NATIVE HEART WITHOUT ANGINA PECTORIS, UNSPECIFIED VESSEL OR LESION TYPE: ICD-10-CM

## 2025-08-25 ENCOUNTER — ANCILLARY PROCEDURE (OUTPATIENT)
Dept: MAMMOGRAPHY | Facility: CLINIC | Age: 55
End: 2025-08-25
Attending: PHYSICIAN ASSISTANT
Payer: COMMERCIAL

## 2025-08-25 DIAGNOSIS — Z12.31 VISIT FOR SCREENING MAMMOGRAM: ICD-10-CM

## 2025-08-25 DIAGNOSIS — J45.40 MODERATE PERSISTENT ASTHMA WITHOUT COMPLICATION: ICD-10-CM

## 2025-08-25 PROCEDURE — 77067 SCR MAMMO BI INCL CAD: CPT | Mod: TC | Performed by: RADIOLOGY

## 2025-08-25 PROCEDURE — 77063 BREAST TOMOSYNTHESIS BI: CPT | Mod: TC | Performed by: RADIOLOGY

## 2025-08-25 RX ORDER — ALBUTEROL SULFATE 0.83 MG/ML
2.5 SOLUTION RESPIRATORY (INHALATION) EVERY 6 HOURS PRN
Qty: 90 ML | Refills: 1 | Status: SHIPPED | OUTPATIENT
Start: 2025-08-25

## 2025-08-25 RX ORDER — ALBUTEROL SULFATE 0.83 MG/ML
5 SOLUTION RESPIRATORY (INHALATION) EVERY 6 HOURS PRN
Qty: 90 ML | Refills: 1 | Status: SHIPPED | OUTPATIENT
Start: 2025-08-25 | End: 2025-08-25

## 2025-08-25 RX ORDER — BUDESONIDE AND FORMOTEROL FUMARATE DIHYDRATE 160; 4.5 UG/1; UG/1
AEROSOL RESPIRATORY (INHALATION)
Qty: 20.4 G | Refills: 11 | Status: CANCELLED | OUTPATIENT
Start: 2025-08-25

## 2025-08-25 RX ORDER — ATORVASTATIN CALCIUM 80 MG/1
80 TABLET, FILM COATED ORAL DAILY
Qty: 90 TABLET | Refills: 1 | Status: SHIPPED | OUTPATIENT
Start: 2025-08-25